# Patient Record
Sex: FEMALE | Race: WHITE | Employment: UNEMPLOYED | URBAN - METROPOLITAN AREA
[De-identification: names, ages, dates, MRNs, and addresses within clinical notes are randomized per-mention and may not be internally consistent; named-entity substitution may affect disease eponyms.]

---

## 2018-03-08 PROBLEM — M51.36 LUMBAR DEGENERATIVE DISC DISEASE: Status: ACTIVE | Noted: 2018-03-08

## 2018-03-08 PROBLEM — M51.369 LUMBAR DEGENERATIVE DISC DISEASE: Status: ACTIVE | Noted: 2018-03-08

## 2018-06-29 ENCOUNTER — HOSPITAL ENCOUNTER (OUTPATIENT)
Age: 68
Discharge: HOME | End: 2018-07-01
Payer: MEDICARE

## 2018-06-29 PROCEDURE — 88305 TISSUE EXAM BY PATHOLOGIST: CPT

## 2018-06-29 PROCEDURE — 88342 IMHCHEM/IMCYTCHM 1ST ANTB: CPT

## 2019-03-26 ENCOUNTER — HOSPITAL ENCOUNTER (OUTPATIENT)
Age: 69
Discharge: HOME | End: 2019-03-28

## 2019-03-26 LAB — LACTIC ACID: 1.8 MMOL/L (ref 0.5–2.2)

## 2019-03-26 PROCEDURE — 83605 ASSAY OF LACTIC ACID: CPT

## 2019-03-27 ENCOUNTER — HOSPITAL ENCOUNTER (OUTPATIENT)
Age: 69
Discharge: HOME | End: 2019-03-29

## 2019-03-27 LAB
ANION GAP SERPL CALCULATED.3IONS-SCNC: 12 MMOL/L (ref 7–16)
BUN BLDV-MCNC: 17 MG/DL (ref 8–23)
CALCIUM SERPL-MCNC: 8.8 MG/DL (ref 8.6–10.2)
CHLORIDE BLD-SCNC: 104 MMOL/L (ref 98–107)
CO2: 26 MMOL/L (ref 22–29)
CREAT SERPL-MCNC: 0.8 MG/DL (ref 0.5–1)
GFR AFRICAN AMERICAN: >60
GFR NON-AFRICAN AMERICAN: >60 ML/MIN/1.73
GLUCOSE BLD-MCNC: 158 MG/DL (ref 74–99)
HCT VFR BLD CALC: 42.2 % (ref 34–48)
HEMOGLOBIN: 13 G/DL (ref 11.5–15.5)
MCH RBC QN AUTO: 28.1 PG (ref 26–35)
MCHC RBC AUTO-ENTMCNC: 30.8 % (ref 32–34.5)
MCV RBC AUTO: 91.1 FL (ref 80–99.9)
PDW BLD-RTO: 15.1 FL (ref 11.5–15)
PLATELET # BLD: 189 E9/L (ref 130–450)
PMV BLD AUTO: 11.7 FL (ref 7–12)
POTASSIUM SERPL-SCNC: 4.3 MMOL/L (ref 3.5–5)
RBC # BLD: 4.63 E12/L (ref 3.5–5.5)
SODIUM BLD-SCNC: 142 MMOL/L (ref 132–146)
WBC # BLD: 9.1 E9/L (ref 4.5–11.5)

## 2019-03-27 PROCEDURE — 80048 BASIC METABOLIC PNL TOTAL CA: CPT

## 2019-03-27 PROCEDURE — 85027 COMPLETE CBC AUTOMATED: CPT

## 2019-03-28 ENCOUNTER — HOSPITAL ENCOUNTER (OUTPATIENT)
Age: 69
Discharge: HOME | End: 2019-03-30

## 2019-03-28 LAB
ANION GAP SERPL CALCULATED.3IONS-SCNC: 14 MMOL/L (ref 7–16)
BUN BLDV-MCNC: 22 MG/DL (ref 8–23)
CALCIUM SERPL-MCNC: 9.3 MG/DL (ref 8.6–10.2)
CHLORIDE BLD-SCNC: 98 MMOL/L (ref 98–107)
CO2: 26 MMOL/L (ref 22–29)
CREAT SERPL-MCNC: 0.7 MG/DL (ref 0.5–1)
GFR AFRICAN AMERICAN: >60
GFR NON-AFRICAN AMERICAN: >60 ML/MIN/1.73
GLUCOSE BLD-MCNC: 179 MG/DL (ref 74–99)
HCT VFR BLD CALC: 40.5 % (ref 34–48)
HEMOGLOBIN: 12.1 G/DL (ref 11.5–15.5)
MCH RBC QN AUTO: 28.1 PG (ref 26–35)
MCHC RBC AUTO-ENTMCNC: 29.9 % (ref 32–34.5)
MCV RBC AUTO: 94 FL (ref 80–99.9)
PDW BLD-RTO: 15.6 FL (ref 11.5–15)
PLATELET # BLD: 177 E9/L (ref 130–450)
PMV BLD AUTO: 11.6 FL (ref 7–12)
POTASSIUM SERPL-SCNC: 4.3 MMOL/L (ref 3.5–5)
RBC # BLD: 4.31 E12/L (ref 3.5–5.5)
SODIUM BLD-SCNC: 138 MMOL/L (ref 132–146)
WBC # BLD: 9 E9/L (ref 4.5–11.5)

## 2019-03-28 PROCEDURE — 80048 BASIC METABOLIC PNL TOTAL CA: CPT

## 2019-03-28 PROCEDURE — 85027 COMPLETE CBC AUTOMATED: CPT

## 2019-03-29 ENCOUNTER — HOSPITAL ENCOUNTER (OUTPATIENT)
Age: 69
Discharge: HOME | End: 2019-03-31

## 2019-03-29 LAB
ANION GAP SERPL CALCULATED.3IONS-SCNC: 14 MMOL/L (ref 7–16)
BILIRUBIN URINE: NEGATIVE
BLOOD, URINE: NEGATIVE
BUN BLDV-MCNC: 27 MG/DL (ref 8–23)
CALCIUM SERPL-MCNC: 8.9 MG/DL (ref 8.6–10.2)
CHLORIDE BLD-SCNC: 103 MMOL/L (ref 98–107)
CLARITY: CLEAR
CO2: 24 MMOL/L (ref 22–29)
COLOR: YELLOW
CREAT SERPL-MCNC: 0.7 MG/DL (ref 0.5–1)
GFR AFRICAN AMERICAN: >60
GFR NON-AFRICAN AMERICAN: >60 ML/MIN/1.73
GLUCOSE BLD-MCNC: 232 MG/DL (ref 74–99)
GLUCOSE URINE: >=1000 MG/DL
HCT VFR BLD CALC: 40 % (ref 34–48)
HEMOGLOBIN: 12.1 G/DL (ref 11.5–15.5)
KETONES, URINE: 15 MG/DL
LEUKOCYTE ESTERASE, URINE: NEGATIVE
MCH RBC QN AUTO: 28.1 PG (ref 26–35)
MCHC RBC AUTO-ENTMCNC: 30.3 % (ref 32–34.5)
MCV RBC AUTO: 93 FL (ref 80–99.9)
NITRITE, URINE: NEGATIVE
PDW BLD-RTO: 15.6 FL (ref 11.5–15)
PH UA: 6 (ref 5–9)
PLATELET # BLD: 174 E9/L (ref 130–450)
PMV BLD AUTO: 11.7 FL (ref 7–12)
POTASSIUM SERPL-SCNC: 4.7 MMOL/L (ref 3.5–5)
PROTEIN UA: NEGATIVE MG/DL
RBC # BLD: 4.3 E12/L (ref 3.5–5.5)
SODIUM BLD-SCNC: 141 MMOL/L (ref 132–146)
SPECIFIC GRAVITY UA: 1.01 (ref 1–1.03)
UROBILINOGEN, URINE: 0.2 E.U./DL
WBC # BLD: 7.5 E9/L (ref 4.5–11.5)

## 2019-03-29 PROCEDURE — 87088 URINE BACTERIA CULTURE: CPT

## 2019-03-29 PROCEDURE — 81003 URINALYSIS AUTO W/O SCOPE: CPT

## 2019-03-29 PROCEDURE — 85027 COMPLETE CBC AUTOMATED: CPT

## 2019-03-29 PROCEDURE — 80048 BASIC METABOLIC PNL TOTAL CA: CPT

## 2019-03-30 ENCOUNTER — HOSPITAL ENCOUNTER (OUTPATIENT)
Age: 69
Discharge: HOME | End: 2019-04-01

## 2019-03-30 LAB
ANION GAP SERPL CALCULATED.3IONS-SCNC: 15 MMOL/L (ref 7–16)
BUN BLDV-MCNC: 27 MG/DL (ref 8–23)
CALCIUM SERPL-MCNC: 9.3 MG/DL (ref 8.6–10.2)
CHLORIDE BLD-SCNC: 104 MMOL/L (ref 98–107)
CO2: 26 MMOL/L (ref 22–29)
CREAT SERPL-MCNC: 0.6 MG/DL (ref 0.5–1)
GFR AFRICAN AMERICAN: >60
GFR NON-AFRICAN AMERICAN: >60 ML/MIN/1.73
GLUCOSE BLD-MCNC: 176 MG/DL (ref 74–99)
HCT VFR BLD CALC: 43.8 % (ref 34–48)
HEMOGLOBIN: 13.4 G/DL (ref 11.5–15.5)
MCH RBC QN AUTO: 28.2 PG (ref 26–35)
MCHC RBC AUTO-ENTMCNC: 30.6 % (ref 32–34.5)
MCV RBC AUTO: 92.2 FL (ref 80–99.9)
PDW BLD-RTO: 15.2 FL (ref 11.5–15)
PLATELET # BLD: 184 E9/L (ref 130–450)
PMV BLD AUTO: 12 FL (ref 7–12)
POTASSIUM SERPL-SCNC: 4.1 MMOL/L (ref 3.5–5)
RBC # BLD: 4.75 E12/L (ref 3.5–5.5)
SODIUM BLD-SCNC: 145 MMOL/L (ref 132–146)
WBC # BLD: 6.1 E9/L (ref 4.5–11.5)

## 2019-03-30 PROCEDURE — 85027 COMPLETE CBC AUTOMATED: CPT

## 2019-03-30 PROCEDURE — 80048 BASIC METABOLIC PNL TOTAL CA: CPT

## 2019-03-31 ENCOUNTER — HOSPITAL ENCOUNTER (OUTPATIENT)
Age: 69
Discharge: HOME | End: 2019-04-02

## 2019-03-31 LAB
ANION GAP SERPL CALCULATED.3IONS-SCNC: 11 MMOL/L (ref 7–16)
BASOPHILS ABSOLUTE: 0 E9/L (ref 0–0.2)
BASOPHILS RELATIVE PERCENT: 0 % (ref 0–2)
BUN BLDV-MCNC: 28 MG/DL (ref 8–23)
CALCIUM SERPL-MCNC: 8.9 MG/DL (ref 8.6–10.2)
CHLORIDE BLD-SCNC: 106 MMOL/L (ref 98–107)
CO2: 25 MMOL/L (ref 22–29)
CREAT SERPL-MCNC: 0.6 MG/DL (ref 0.5–1)
EOSINOPHILS ABSOLUTE: 0 E9/L (ref 0.05–0.5)
EOSINOPHILS RELATIVE PERCENT: 0 % (ref 0–6)
GFR AFRICAN AMERICAN: >60
GFR NON-AFRICAN AMERICAN: >60 ML/MIN/1.73
GLUCOSE BLD-MCNC: 165 MG/DL (ref 74–99)
HCT VFR BLD CALC: 40.2 % (ref 34–48)
HEMOGLOBIN: 12.4 G/DL (ref 11.5–15.5)
IMMATURE GRANULOCYTES #: 0.03 E9/L
IMMATURE GRANULOCYTES %: 0.5 % (ref 0–5)
LYMPHOCYTES ABSOLUTE: 0.55 E9/L (ref 1.5–4)
LYMPHOCYTES RELATIVE PERCENT: 9.5 % (ref 20–42)
MCH RBC QN AUTO: 27.7 PG (ref 26–35)
MCHC RBC AUTO-ENTMCNC: 30.8 % (ref 32–34.5)
MCV RBC AUTO: 89.9 FL (ref 80–99.9)
MONOCYTES ABSOLUTE: 0.26 E9/L (ref 0.1–0.95)
MONOCYTES RELATIVE PERCENT: 4.5 % (ref 2–12)
NEUTROPHILS ABSOLUTE: 4.94 E9/L (ref 1.8–7.3)
NEUTROPHILS RELATIVE PERCENT: 85.5 % (ref 43–80)
ORGANISM: ABNORMAL
PDW BLD-RTO: 14.7 FL (ref 11.5–15)
PLATELET # BLD: 188 E9/L (ref 130–450)
PMV BLD AUTO: 11.9 FL (ref 7–12)
POTASSIUM SERPL-SCNC: 4.1 MMOL/L (ref 3.5–5)
RBC # BLD: 4.47 E12/L (ref 3.5–5.5)
RBC # BLD: NORMAL 10*6/UL
SODIUM BLD-SCNC: 142 MMOL/L (ref 132–146)
URINE CULTURE, ROUTINE: ABNORMAL
URINE CULTURE, ROUTINE: ABNORMAL
WBC # BLD: 5.8 E9/L (ref 4.5–11.5)

## 2019-03-31 PROCEDURE — 85025 COMPLETE CBC W/AUTO DIFF WBC: CPT

## 2019-03-31 PROCEDURE — 80048 BASIC METABOLIC PNL TOTAL CA: CPT

## 2019-04-01 ENCOUNTER — HOSPITAL ENCOUNTER (OUTPATIENT)
Age: 69
Discharge: HOME | End: 2019-04-03

## 2019-04-01 LAB
ANION GAP SERPL CALCULATED.3IONS-SCNC: 15 MMOL/L (ref 7–16)
BUN BLDV-MCNC: 27 MG/DL (ref 8–23)
CALCIUM SERPL-MCNC: 9 MG/DL (ref 8.6–10.2)
CHLORIDE BLD-SCNC: 104 MMOL/L (ref 98–107)
CO2: 24 MMOL/L (ref 22–29)
CREAT SERPL-MCNC: 0.6 MG/DL (ref 0.5–1)
GFR AFRICAN AMERICAN: >60
GFR NON-AFRICAN AMERICAN: >60 ML/MIN/1.73
GLUCOSE BLD-MCNC: 128 MG/DL (ref 74–99)
HCT VFR BLD CALC: 42.8 % (ref 34–48)
HEMOGLOBIN: 13.4 G/DL (ref 11.5–15.5)
MCH RBC QN AUTO: 28.1 PG (ref 26–35)
MCHC RBC AUTO-ENTMCNC: 31.3 % (ref 32–34.5)
MCV RBC AUTO: 89.7 FL (ref 80–99.9)
PDW BLD-RTO: 14.6 FL (ref 11.5–15)
PLATELET # BLD: 190 E9/L (ref 130–450)
PMV BLD AUTO: 11.4 FL (ref 7–12)
POTASSIUM SERPL-SCNC: 3.9 MMOL/L (ref 3.5–5)
RBC # BLD: 4.77 E12/L (ref 3.5–5.5)
SODIUM BLD-SCNC: 143 MMOL/L (ref 132–146)
WBC # BLD: 6.7 E9/L (ref 4.5–11.5)

## 2019-04-01 PROCEDURE — 85027 COMPLETE CBC AUTOMATED: CPT

## 2019-04-01 PROCEDURE — 80048 BASIC METABOLIC PNL TOTAL CA: CPT

## 2019-04-03 ENCOUNTER — HOSPITAL ENCOUNTER (EMERGENCY)
Age: 69
Discharge: ANOTHER HOSPITAL | End: 2019-04-04
Attending: EMERGENCY MEDICINE
Payer: MEDICARE

## 2019-04-03 VITALS
TEMPERATURE: 98.4 F | WEIGHT: 160 LBS | BODY MASS INDEX: 27.31 KG/M2 | HEIGHT: 64 IN | OXYGEN SATURATION: 97 % | SYSTOLIC BLOOD PRESSURE: 143 MMHG | HEART RATE: 97 BPM | RESPIRATION RATE: 18 BRPM | DIASTOLIC BLOOD PRESSURE: 86 MMHG

## 2019-04-03 DIAGNOSIS — R45.851 SUICIDAL IDEATION: Primary | ICD-10-CM

## 2019-04-03 LAB
BASOPHILS ABSOLUTE: 0.01 E9/L (ref 0–0.2)
BASOPHILS RELATIVE PERCENT: 0.1 % (ref 0–2)
BILIRUBIN URINE: NEGATIVE
BLOOD, URINE: ABNORMAL
CLARITY: CLEAR
COLOR: YELLOW
EOSINOPHILS ABSOLUTE: 0.12 E9/L (ref 0.05–0.5)
EOSINOPHILS RELATIVE PERCENT: 1.6 % (ref 0–6)
GLUCOSE URINE: 100 MG/DL
HCT VFR BLD CALC: 47.3 % (ref 34–48)
HEMOGLOBIN: 15.2 G/DL (ref 11.5–15.5)
IMMATURE GRANULOCYTES #: 0.09 E9/L
IMMATURE GRANULOCYTES %: 1.2 % (ref 0–5)
KETONES, URINE: NEGATIVE MG/DL
LEUKOCYTE ESTERASE, URINE: NEGATIVE
LYMPHOCYTES ABSOLUTE: 1.25 E9/L (ref 1.5–4)
LYMPHOCYTES RELATIVE PERCENT: 16.8 % (ref 20–42)
MCH RBC QN AUTO: 28 PG (ref 26–35)
MCHC RBC AUTO-ENTMCNC: 32.1 % (ref 32–34.5)
MCV RBC AUTO: 87.3 FL (ref 80–99.9)
MONOCYTES ABSOLUTE: 0.45 E9/L (ref 0.1–0.95)
MONOCYTES RELATIVE PERCENT: 6 % (ref 2–12)
NEUTROPHILS ABSOLUTE: 5.52 E9/L (ref 1.8–7.3)
NEUTROPHILS RELATIVE PERCENT: 74.3 % (ref 43–80)
NITRITE, URINE: NEGATIVE
PDW BLD-RTO: 14.1 FL (ref 11.5–15)
PH UA: 7 (ref 5–9)
PLATELET # BLD: 223 E9/L (ref 130–450)
PMV BLD AUTO: 10.7 FL (ref 7–12)
PROTEIN UA: NEGATIVE MG/DL
RBC # BLD: 5.42 E12/L (ref 3.5–5.5)
SPECIFIC GRAVITY UA: <=1.005 (ref 1–1.03)
UROBILINOGEN, URINE: 0.2 E.U./DL
WBC # BLD: 7.4 E9/L (ref 4.5–11.5)

## 2019-04-03 PROCEDURE — 99285 EMERGENCY DEPT VISIT HI MDM: CPT

## 2019-04-03 PROCEDURE — 80307 DRUG TEST PRSMV CHEM ANLYZR: CPT

## 2019-04-03 PROCEDURE — 84443 ASSAY THYROID STIM HORMONE: CPT

## 2019-04-03 PROCEDURE — 84439 ASSAY OF FREE THYROXINE: CPT

## 2019-04-03 PROCEDURE — G0480 DRUG TEST DEF 1-7 CLASSES: HCPCS

## 2019-04-03 PROCEDURE — 36415 COLL VENOUS BLD VENIPUNCTURE: CPT

## 2019-04-03 PROCEDURE — 85025 COMPLETE CBC W/AUTO DIFF WBC: CPT

## 2019-04-03 PROCEDURE — 80053 COMPREHEN METABOLIC PANEL: CPT

## 2019-04-03 PROCEDURE — 81001 URINALYSIS AUTO W/SCOPE: CPT

## 2019-04-03 ASSESSMENT — PAIN DESCRIPTION - LOCATION: LOCATION: BACK

## 2019-04-03 ASSESSMENT — PAIN SCALES - GENERAL: PAINLEVEL_OUTOF10: 10

## 2019-04-03 ASSESSMENT — PAIN DESCRIPTION - PAIN TYPE: TYPE: ACUTE PAIN

## 2019-04-04 PROBLEM — R45.851 SUICIDAL IDEATION: Status: ACTIVE | Noted: 2019-04-04

## 2019-04-04 LAB
ACETAMINOPHEN LEVEL: <5 MCG/ML (ref 10–30)
ALBUMIN SERPL-MCNC: 3.4 G/DL (ref 3.5–5.2)
ALP BLD-CCNC: 57 U/L (ref 35–104)
ALT SERPL-CCNC: 13 U/L (ref 0–32)
AMPHETAMINE SCREEN, URINE: NOT DETECTED
ANION GAP SERPL CALCULATED.3IONS-SCNC: 13 MMOL/L (ref 7–16)
AST SERPL-CCNC: 18 U/L (ref 0–31)
BACTERIA: ABNORMAL /HPF
BARBITURATE SCREEN URINE: NOT DETECTED
BENZODIAZEPINE SCREEN, URINE: NOT DETECTED
BILIRUB SERPL-MCNC: 1.3 MG/DL (ref 0–1.2)
BUN BLDV-MCNC: 12 MG/DL (ref 8–23)
CALCIUM SERPL-MCNC: 8.8 MG/DL (ref 8.6–10.2)
CANNABINOID SCREEN URINE: NOT DETECTED
CHLORIDE BLD-SCNC: 102 MMOL/L (ref 98–107)
CO2: 26 MMOL/L (ref 22–29)
COCAINE METABOLITE SCREEN URINE: NOT DETECTED
CREAT SERPL-MCNC: 0.6 MG/DL (ref 0.5–1)
EKG ATRIAL RATE: 67 BPM
EKG P AXIS: 99 DEGREES
EKG P-R INTERVAL: 140 MS
EKG Q-T INTERVAL: 440 MS
EKG QRS DURATION: 82 MS
EKG QTC CALCULATION (BAZETT): 464 MS
EKG R AXIS: 70 DEGREES
EKG T AXIS: 83 DEGREES
EKG VENTRICULAR RATE: 67 BPM
EPITHELIAL CELLS, UA: ABNORMAL /HPF
ETHANOL: <10 MG/DL (ref 0–0.08)
GFR AFRICAN AMERICAN: >60
GFR NON-AFRICAN AMERICAN: >60 ML/MIN/1.73
GLUCOSE BLD-MCNC: 73 MG/DL (ref 74–99)
METHADONE SCREEN, URINE: NOT DETECTED
OPIATE SCREEN URINE: NOT DETECTED
PHENCYCLIDINE SCREEN URINE: NOT DETECTED
POTASSIUM SERPL-SCNC: 3.8 MMOL/L (ref 3.5–5)
PROPOXYPHENE SCREEN: NOT DETECTED
RBC UA: ABNORMAL /HPF (ref 0–2)
SALICYLATE, SERUM: <0.3 MG/DL (ref 0–30)
SODIUM BLD-SCNC: 141 MMOL/L (ref 132–146)
T3 UPTAKE PERCENT: 37.3 % (ref 22.5–37)
T4 FREE: 1.74 NG/DL (ref 0.93–1.7)
TOTAL PROTEIN: 5.8 G/DL (ref 6.4–8.3)
TRICYCLIC ANTIDEPRESSANTS SCREEN SERUM: NEGATIVE NG/ML
TSH SERPL DL<=0.05 MIU/L-ACNC: 2.96 UIU/ML (ref 0.27–4.2)
WBC UA: ABNORMAL /HPF (ref 0–5)
YEAST: ABNORMAL

## 2019-04-04 PROCEDURE — 6370000000 HC RX 637 (ALT 250 FOR IP): Performed by: EMERGENCY MEDICINE

## 2019-04-04 PROCEDURE — 93005 ELECTROCARDIOGRAM TRACING: CPT | Performed by: EMERGENCY MEDICINE

## 2019-04-04 RX ORDER — LORAZEPAM 1 MG/1
1 TABLET ORAL ONCE
Status: COMPLETED | OUTPATIENT
Start: 2019-04-04 | End: 2019-04-04

## 2019-04-04 RX ADMIN — LORAZEPAM 1 MG: 1 TABLET ORAL at 01:18

## 2019-04-04 ASSESSMENT — PATIENT HEALTH QUESTIONNAIRE - PHQ9: SUM OF ALL RESPONSES TO PHQ QUESTIONS 1-9: 24

## 2019-04-04 NOTE — ED NOTES
Pt moved to [facility]. Pt voiced several times \"I just want to die\". She states she hates herself for making the decision to get the spinal stimulator implanted. She states she was in [place] for rehab and wasn't happy with her care. States nothing was being done for her. Pt calm and cooperative but tearful. Pt awaiting social work consult.       Kaitlyn Fortune RN  04/04/19 5107

## 2019-04-04 NOTE — ED NOTES
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 Pt has increase in depression and thoughts of suicide due to her current medical condition. Pt has a mental health hx of depression and anxiety. Pt is in current treatment with [facility]. Pt reports that she takes her meds as prescribed. Pt denies previous suicide attempts but states she was hospitalized for depression \"80\" yrs ago. Pt is current at [facility] in [place]. JESS GÓMEZ spoke with supervisor Barbara who informed that they do not have suicide rooms and they do 2 hour roundings at night. Barbara did inform that they could order a 24/ 7 sitter for Pt. Pt denies alcohol/drug use. Pt is depressed, oriented x 4, flat affect, anxious, cooperative, denies A/V hallucinations. Denies HI. Pt reports poor sleep/appetite. JESS GÓMEZ reviewed chart with ED Doc. Pt does not meet inpatient criteria at this time. Pt will be d/c to [facility] with a request for a sitter. Pt will need to follow up with outpatient provider for counseling and possible med adjustment. JESS GÓMEZ contacted [facility], spoke with supervisor Barbara and informed of this information. Philip Hook is in agreement and understanding d/c orders. JESS GÓMEZ will make transportation arrangements for Pt to be returned to [facility] rehabilitation.      ELICEO Lane, Farhat Wilde  04/04/19 9548

## 2019-04-04 NOTE — ED PROVIDER NOTES
Urine NOT DETECTED Negative < 25 ng/mL    Methadone Screen, Urine NOT DETECTED Negative <300 ng/mL    Propoxyphene Scrn, Ur NOT DETECTED Negative <300 ng/mL   CBC Auto Differential   Result Value Ref Range    WBC 7.4 4.5 - 11.5 E9/L    RBC 5.42 3.50 - 5.50 E12/L    Hemoglobin 15.2 11.5 - 15.5 g/dL    Hematocrit 47.3 34.0 - 48.0 %    MCV 87.3 80.0 - 99.9 fL    MCH 28.0 26.0 - 35.0 pg    MCHC 32.1 32.0 - 34.5 %    RDW 14.1 11.5 - 15.0 fL    Platelets 696 435 - 184 E9/L    MPV 10.7 7.0 - 12.0 fL    Neutrophils % 74.3 43.0 - 80.0 %    Immature Granulocytes % 1.2 0.0 - 5.0 %    Lymphocytes % 16.8 (L) 20.0 - 42.0 %    Monocytes % 6.0 2.0 - 12.0 %    Eosinophils % 1.6 0.0 - 6.0 %    Basophils % 0.1 0.0 - 2.0 %    Neutrophils # 5.52 1.80 - 7.30 E9/L    Immature Granulocytes # 0.09 E9/L    Lymphocytes # 1.25 (L) 1.50 - 4.00 E9/L    Monocytes # 0.45 0.10 - 0.95 E9/L    Eosinophils # 0.12 0.05 - 0.50 E9/L    Basophils # 0.01 0.00 - 0.20 E9/L   Comprehensive Metabolic Panel   Result Value Ref Range    Sodium 141 132 - 146 mmol/L    Potassium 3.8 3.5 - 5.0 mmol/L    Chloride 102 98 - 107 mmol/L    CO2 26 22 - 29 mmol/L    Anion Gap 13 7 - 16 mmol/L    Glucose 73 (L) 74 - 99 mg/dL    BUN 12 8 - 23 mg/dL    CREATININE 0.6 0.5 - 1.0 mg/dL    GFR Non-African American >60 >=60 mL/min/1.73    GFR African American >60     Calcium 8.8 8.6 - 10.2 mg/dL    Total Protein 5.8 (L) 6.4 - 8.3 g/dL    Alb 3.4 (L) 3.5 - 5.2 g/dL    Total Bilirubin 1.3 (H) 0.0 - 1.2 mg/dL    Alkaline Phosphatase 57 35 - 104 U/L    ALT 13 0 - 32 U/L    AST 18 0 - 31 U/L   T3, Uptake   Result Value Ref Range    T3 Uptake 37.3 (H) 22.5 - 37.0 %   T4, Free   Result Value Ref Range    T4 Free 1.74 (H) 0.93 - 1.70 ng/dL   TSH without Reflex   Result Value Ref Range    TSH 2.960 0.270 - 4.200 uIU/mL   Microscopic Urinalysis   Result Value Ref Range    WBC, UA 1-3 0 - 5 /HPF    RBC, UA 1-3 0 - 2 /HPF    Epi Cells RARE /HPF    Bacteria, UA FEW (A) /HPF    Yeast, UA MANY EKG 12 Lead   Result Value Ref Range    Ventricular Rate 67 BPM    Atrial Rate 67 BPM    P-R Interval 140 ms    QRS Duration 82 ms    Q-T Interval 440 ms    QTc Calculation (Bazett) 464 ms    P Axis 99 degrees    R Axis 70 degrees    T Axis 83 degrees       RADIOLOGY:  Interpreted by Radiologist.  No orders to display     ------------------------- NURSING NOTES AND VITALS REVIEWED ---------------------------   The nursing notes within the ED encounter and vital signs as below have been reviewed by myself. BP (!) 143/86   Pulse 97   Temp 98.4 °F (36.9 °C) (Temporal)   Resp 18   Ht 5' 4\" (1.626 m)   Wt 160 lb (72.6 kg)   SpO2 97%   BMI 27.46 kg/m²   Oxygen Saturation Interpretation: Normal    The patients available past medical records and past encounters were reviewed. ------------------------------ ED COURSE/MEDICAL DECISION MAKING----------------------  Medications   LORazepam (ATIVAN) tablet 1 mg (1 mg Oral Given 4/4/19 0118)       Medical Decision Making:    Pt presents for SI. Normal physical exam. Not in distress. Under ED observation. Social work called for consult. Medically cleared. Spoke with Nursing home staff. They are comfortable taking her back into their care and taking extra precautions against suicide. The nursing home will have a sitter watching her 24/7. This patient's ED course included: a personal history and physicial examination and multiple bedside re-evaluations    This patient has remained hemodynamically stable and been closely monitored during their ED course. Re-Evaluations:           Re-evaluation. Patients symptoms are improving    Consultations:             None. Counseling: The emergency provider has spoken with the patient and discussed todays results, in addition to providing specific details for the plan of care and counseling regarding the diagnosis and prognosis. Questions are answered at this time and they are agreeable with the plan. --------------------------------- IMPRESSION AND DISPOSITION ---------------------------------    IMPRESSION  1. Suicidal ideation        DISPOSITION  Disposition: Discharge to nursing home  Patient condition is good    4/3/19, 10:26 PM.    This note is prepared by Yael Velasquez, acting as Scribe for Susie Mckeon MD.    Susie Mckeon MD:  The scribe's documentation has been prepared under my direction and personally reviewed by me in its entirety. I confirm that the note above accurately reflects all work, treatment, procedures, and medical decision making performed by me.         Susie Mckeon MD  04/04/19 8489

## 2019-06-28 ENCOUNTER — HOSPITAL ENCOUNTER (EMERGENCY)
Age: 69
Discharge: HOME | DRG: 682 | End: 2019-06-29
Attending: EMERGENCY MEDICINE
Payer: MEDICARE

## 2019-06-28 DIAGNOSIS — N30.00 ACUTE CYSTITIS WITHOUT HEMATURIA: Primary | ICD-10-CM

## 2019-06-28 DIAGNOSIS — E86.0 DEHYDRATION: ICD-10-CM

## 2019-06-28 PROCEDURE — 99284 EMERGENCY DEPT VISIT MOD MDM: CPT

## 2019-06-28 RX ORDER — LEVOTHYROXINE SODIUM 0.05 MG/1
50 TABLET ORAL DAILY
Status: ON HOLD | COMMUNITY
End: 2019-09-23 | Stop reason: ALTCHOICE

## 2019-06-29 VITALS
BODY MASS INDEX: 22.02 KG/M2 | HEART RATE: 99 BPM | RESPIRATION RATE: 18 BRPM | SYSTOLIC BLOOD PRESSURE: 113 MMHG | OXYGEN SATURATION: 98 % | WEIGHT: 129 LBS | DIASTOLIC BLOOD PRESSURE: 69 MMHG | HEIGHT: 64 IN | TEMPERATURE: 98.8 F

## 2019-06-29 LAB
ALBUMIN SERPL-MCNC: 3.5 G/DL (ref 3.5–5.2)
ALP BLD-CCNC: 62 U/L (ref 35–104)
ALT SERPL-CCNC: 5 U/L (ref 0–32)
ANION GAP SERPL CALCULATED.3IONS-SCNC: 17 MMOL/L (ref 7–16)
AST SERPL-CCNC: 10 U/L (ref 0–31)
BACTERIA: ABNORMAL /HPF
BASOPHILS ABSOLUTE: 0.03 E9/L (ref 0–0.2)
BASOPHILS RELATIVE PERCENT: 0.7 % (ref 0–2)
BILIRUB SERPL-MCNC: 0.4 MG/DL (ref 0–1.2)
BILIRUBIN URINE: ABNORMAL
BLOOD, URINE: ABNORMAL
BUN BLDV-MCNC: 10 MG/DL (ref 8–23)
CALCIUM SERPL-MCNC: 9.2 MG/DL (ref 8.6–10.2)
CHLORIDE BLD-SCNC: 98 MMOL/L (ref 98–107)
CLARITY: ABNORMAL
CO2: 24 MMOL/L (ref 22–29)
COLOR: YELLOW
CREAT SERPL-MCNC: 0.9 MG/DL (ref 0.5–1)
EOSINOPHILS ABSOLUTE: 0.19 E9/L (ref 0.05–0.5)
EOSINOPHILS RELATIVE PERCENT: 4.2 % (ref 0–6)
EPITHELIAL CELLS, UA: ABNORMAL /HPF
GFR AFRICAN AMERICAN: >60
GFR NON-AFRICAN AMERICAN: >60 ML/MIN/1.73
GLUCOSE BLD-MCNC: 156 MG/DL (ref 74–99)
GLUCOSE URINE: NEGATIVE MG/DL
HCT VFR BLD CALC: 37 % (ref 34–48)
HEMOGLOBIN: 11.7 G/DL (ref 11.5–15.5)
IMMATURE GRANULOCYTES #: 0.02 E9/L
IMMATURE GRANULOCYTES %: 0.4 % (ref 0–5)
KETONES, URINE: 40 MG/DL
LEUKOCYTE ESTERASE, URINE: ABNORMAL
LYMPHOCYTES ABSOLUTE: 1.43 E9/L (ref 1.5–4)
LYMPHOCYTES RELATIVE PERCENT: 31.4 % (ref 20–42)
MAGNESIUM: 1 MG/DL (ref 1.6–2.6)
MCH RBC QN AUTO: 26.5 PG (ref 26–35)
MCHC RBC AUTO-ENTMCNC: 31.6 % (ref 32–34.5)
MCV RBC AUTO: 83.9 FL (ref 80–99.9)
MONOCYTES ABSOLUTE: 0.37 E9/L (ref 0.1–0.95)
MONOCYTES RELATIVE PERCENT: 8.1 % (ref 2–12)
NEUTROPHILS ABSOLUTE: 2.51 E9/L (ref 1.8–7.3)
NEUTROPHILS RELATIVE PERCENT: 55.2 % (ref 43–80)
NITRITE, URINE: POSITIVE
PDW BLD-RTO: 14.2 FL (ref 11.5–15)
PH UA: 5 (ref 5–9)
PLATELET # BLD: 253 E9/L (ref 130–450)
PMV BLD AUTO: 10.3 FL (ref 7–12)
POTASSIUM REFLEX MAGNESIUM: 3.3 MMOL/L (ref 3.5–5)
PROTEIN UA: 30 MG/DL
RBC # BLD: 4.41 E12/L (ref 3.5–5.5)
RBC UA: ABNORMAL /HPF (ref 0–2)
SODIUM BLD-SCNC: 139 MMOL/L (ref 132–146)
SPECIFIC GRAVITY UA: >=1.03 (ref 1–1.03)
TOTAL PROTEIN: 6.4 G/DL (ref 6.4–8.3)
UROBILINOGEN, URINE: 1 E.U./DL
WBC # BLD: 4.6 E9/L (ref 4.5–11.5)
WBC UA: ABNORMAL /HPF (ref 0–5)

## 2019-06-29 PROCEDURE — 6360000002 HC RX W HCPCS: Performed by: EMERGENCY MEDICINE

## 2019-06-29 PROCEDURE — 2580000003 HC RX 258: Performed by: EMERGENCY MEDICINE

## 2019-06-29 PROCEDURE — 87088 URINE BACTERIA CULTURE: CPT

## 2019-06-29 PROCEDURE — 85025 COMPLETE CBC W/AUTO DIFF WBC: CPT

## 2019-06-29 PROCEDURE — 96375 TX/PRO/DX INJ NEW DRUG ADDON: CPT

## 2019-06-29 PROCEDURE — 81001 URINALYSIS AUTO W/SCOPE: CPT

## 2019-06-29 PROCEDURE — 36415 COLL VENOUS BLD VENIPUNCTURE: CPT

## 2019-06-29 PROCEDURE — 96361 HYDRATE IV INFUSION ADD-ON: CPT

## 2019-06-29 PROCEDURE — 83735 ASSAY OF MAGNESIUM: CPT

## 2019-06-29 PROCEDURE — 96365 THER/PROPH/DIAG IV INF INIT: CPT

## 2019-06-29 PROCEDURE — 80053 COMPREHEN METABOLIC PANEL: CPT

## 2019-06-29 RX ORDER — CEPHALEXIN 500 MG/1
500 CAPSULE ORAL 4 TIMES DAILY
Qty: 28 CAPSULE | Refills: 0 | Status: ON HOLD | OUTPATIENT
Start: 2019-06-29 | End: 2019-07-02

## 2019-06-29 RX ORDER — LORAZEPAM 2 MG/ML
1 INJECTION INTRAMUSCULAR ONCE
Status: COMPLETED | OUTPATIENT
Start: 2019-06-29 | End: 2019-06-29

## 2019-06-29 RX ORDER — 0.9 % SODIUM CHLORIDE 0.9 %
1000 INTRAVENOUS SOLUTION INTRAVENOUS ONCE
Status: COMPLETED | OUTPATIENT
Start: 2019-06-29 | End: 2019-06-29

## 2019-06-29 RX ADMIN — LORAZEPAM 1 MG: 2 INJECTION INTRAMUSCULAR; INTRAVENOUS at 06:02

## 2019-06-29 RX ADMIN — SODIUM CHLORIDE 1000 ML: 9 INJECTION, SOLUTION INTRAVENOUS at 01:51

## 2019-06-29 RX ADMIN — CEFTRIAXONE SODIUM 1 G: 1 INJECTION, POWDER, FOR SOLUTION INTRAMUSCULAR; INTRAVENOUS at 03:08

## 2019-06-29 NOTE — ED NOTES
Stated she hadn't slept in 3 nights. Medicated as ordered. Repositioned for comfort.        Sahra Rayo RN  06/29/19 6876

## 2019-06-29 NOTE — ED NOTES
[employer] for transport services and they referred me to [facility] [phone] which is temporarily disconnected. Pt repositioned for comfort every30 to 60 min.       Sun Dan RN  06/29/19 7429

## 2019-06-29 NOTE — ED PROVIDER NOTES
Intravenous Stopped 6/29/19 0338)       Medical Decision Making:    Patient arrived in the ED for a fall from bed at her rehab facility. Re-Evaluations:           Re-evaluation. Patients symptoms are improving    Consultations:                 Counseling: The emergency provider has spoken with the patient and discussed todays results, in addition to providing specific details for the plan of care and counseling regarding the diagnosis and prognosis. Questions are answered at this time and they are agreeable with the plan.     --------------------------------- IMPRESSION AND DISPOSITION ---------------------------------    IMPRESSION  1. Acute cystitis without hematuria    2. Dehydration        DISPOSITION  Disposition: Discharge to nursing home  Patient condition is stable    6/28/19, 11:53 PM.    This note is prepared by Susana Quintanilla, acting as Scribe for Sharif Louie MD.    Sharif Louie MD:  The scribe's documentation has been prepared under my direction and personally reviewed by me in its entirety. I confirm that the note above accurately reflects all work, treatment, procedures, and medical decision making performed by me.           Sharif Louie MD  06/29/19 5461

## 2019-06-30 LAB — URINE CULTURE, ROUTINE: NORMAL

## 2019-07-02 ENCOUNTER — APPOINTMENT (OUTPATIENT)
Dept: GENERAL RADIOLOGY | Age: 69
DRG: 682 | End: 2019-07-02
Payer: MEDICARE

## 2019-07-02 ENCOUNTER — HOSPITAL ENCOUNTER (INPATIENT)
Age: 69
LOS: 9 days | Discharge: PSYCHIATRIC OR SUBSTANCE USE FACILITY | DRG: 682 | End: 2019-07-11
Attending: EMERGENCY MEDICINE | Admitting: INTERNAL MEDICINE
Payer: MEDICARE

## 2019-07-02 ENCOUNTER — APPOINTMENT (OUTPATIENT)
Dept: CT IMAGING | Age: 69
DRG: 682 | End: 2019-07-02
Payer: MEDICARE

## 2019-07-02 DIAGNOSIS — E83.42 HYPOMAGNESEMIA: ICD-10-CM

## 2019-07-02 DIAGNOSIS — N28.9 RENAL INSUFFICIENCY: ICD-10-CM

## 2019-07-02 DIAGNOSIS — I95.9 HYPOTENSION, UNSPECIFIED HYPOTENSION TYPE: ICD-10-CM

## 2019-07-02 DIAGNOSIS — Z97.8 INDWELLING FOLEY CATHETER PRESENT: ICD-10-CM

## 2019-07-02 DIAGNOSIS — G93.40 ACUTE ENCEPHALOPATHY: Primary | ICD-10-CM

## 2019-07-02 PROBLEM — R41.82 ALTERED MENTAL STATUS: Status: ACTIVE | Noted: 2019-07-02

## 2019-07-02 LAB
ALBUMIN SERPL-MCNC: 3.3 G/DL (ref 3.5–5.2)
ALP BLD-CCNC: 58 U/L (ref 35–104)
ALT SERPL-CCNC: 5 U/L (ref 0–32)
AMMONIA: 18 UMOL/L (ref 11–51)
ANION GAP SERPL CALCULATED.3IONS-SCNC: 11 MMOL/L (ref 7–16)
AST SERPL-CCNC: 7 U/L (ref 0–31)
B.E.: -2.4 MMOL/L (ref -3–3)
BACTERIA: ABNORMAL /HPF
BASOPHILS ABSOLUTE: 0.02 E9/L (ref 0–0.2)
BASOPHILS RELATIVE PERCENT: 0.4 % (ref 0–2)
BILIRUB SERPL-MCNC: 0.4 MG/DL (ref 0–1.2)
BILIRUBIN URINE: ABNORMAL
BLOOD, URINE: ABNORMAL
BUN BLDV-MCNC: 13 MG/DL (ref 8–23)
CALCIUM SERPL-MCNC: 8.8 MG/DL (ref 8.6–10.2)
CHLORIDE BLD-SCNC: 105 MMOL/L (ref 98–107)
CHP ED QC CHECK: NORMAL
CLARITY: CLEAR
CO2: 28 MMOL/L (ref 22–29)
COHB: 0.1 % (ref 0–1.5)
COLOR: YELLOW
CREAT SERPL-MCNC: 1.6 MG/DL (ref 0.5–1)
CRITICAL: ABNORMAL
DATE ANALYZED: ABNORMAL
DATE OF COLLECTION: ABNORMAL
EKG ATRIAL RATE: 88 BPM
EKG P AXIS: 58 DEGREES
EKG P-R INTERVAL: 162 MS
EKG Q-T INTERVAL: 414 MS
EKG QRS DURATION: 80 MS
EKG QTC CALCULATION (BAZETT): 500 MS
EKG R AXIS: 12 DEGREES
EKG T AXIS: 51 DEGREES
EKG VENTRICULAR RATE: 88 BPM
EOSINOPHILS ABSOLUTE: 0.28 E9/L (ref 0.05–0.5)
EOSINOPHILS RELATIVE PERCENT: 5.5 % (ref 0–6)
GFR AFRICAN AMERICAN: 39
GFR NON-AFRICAN AMERICAN: 32 ML/MIN/1.73
GLUCOSE BLD-MCNC: 115 MG/DL
GLUCOSE BLD-MCNC: 136 MG/DL (ref 74–99)
GLUCOSE URINE: NEGATIVE MG/DL
HCO3: 23.4 MMOL/L (ref 22–26)
HCT VFR BLD CALC: 36.6 % (ref 34–48)
HEMOGLOBIN: 11.5 G/DL (ref 11.5–15.5)
HHB: 6.8 % (ref 0–5)
IMMATURE GRANULOCYTES #: 0.01 E9/L
IMMATURE GRANULOCYTES %: 0.2 % (ref 0–5)
KETONES, URINE: NEGATIVE MG/DL
LAB: ABNORMAL
LACTIC ACID: 1.2 MMOL/L (ref 0.5–2.2)
LEUKOCYTE ESTERASE, URINE: ABNORMAL
LYMPHOCYTES ABSOLUTE: 1.42 E9/L (ref 1.5–4)
LYMPHOCYTES RELATIVE PERCENT: 28 % (ref 20–42)
Lab: ABNORMAL
MAGNESIUM: 1.1 MG/DL (ref 1.6–2.6)
MCH RBC QN AUTO: 26.8 PG (ref 26–35)
MCHC RBC AUTO-ENTMCNC: 31.4 % (ref 32–34.5)
MCV RBC AUTO: 85.3 FL (ref 80–99.9)
METER GLUCOSE: 115 MG/DL (ref 74–99)
METER GLUCOSE: 246 MG/DL (ref 74–99)
METER GLUCOSE: 286 MG/DL (ref 74–99)
METHB: 0.4 % (ref 0–1.5)
MODE: ABNORMAL
MONOCYTES ABSOLUTE: 0.36 E9/L (ref 0.1–0.95)
MONOCYTES RELATIVE PERCENT: 7.1 % (ref 2–12)
NEUTROPHILS ABSOLUTE: 2.98 E9/L (ref 1.8–7.3)
NEUTROPHILS RELATIVE PERCENT: 58.8 % (ref 43–80)
NITRITE, URINE: NEGATIVE
O2 CONTENT: 14 ML/DL
O2 SATURATION: 93.2 % (ref 92–98.5)
O2HB: 92.7 % (ref 94–97)
OPERATOR ID: 421
PATIENT TEMP: 37 C
PCO2: 44.2 MMHG (ref 35–45)
PDW BLD-RTO: 14.6 FL (ref 11.5–15)
PH BLOOD GAS: 7.34 (ref 7.35–7.45)
PH UA: 5.5 (ref 5–9)
PLATELET # BLD: 225 E9/L (ref 130–450)
PMV BLD AUTO: 10.6 FL (ref 7–12)
PO2: 74.9 MMHG (ref 60–100)
POTASSIUM REFLEX MAGNESIUM: 3.7 MMOL/L (ref 3.5–5)
PROTEIN UA: ABNORMAL MG/DL
RBC # BLD: 4.29 E12/L (ref 3.5–5.5)
RBC UA: ABNORMAL /HPF (ref 0–2)
SODIUM BLD-SCNC: 144 MMOL/L (ref 132–146)
SOURCE, BLOOD GAS: ABNORMAL
SPECIFIC GRAVITY UA: 1.01 (ref 1–1.03)
THB: 10.7 G/DL (ref 11.5–16.5)
TIME ANALYZED: 901
TOTAL PROTEIN: 5.9 G/DL (ref 6.4–8.3)
TROPONIN: <0.01 NG/ML (ref 0–0.03)
UROBILINOGEN, URINE: 0.2 E.U./DL
WBC # BLD: 5.1 E9/L (ref 4.5–11.5)
WBC UA: ABNORMAL /HPF (ref 0–5)

## 2019-07-02 PROCEDURE — 96375 TX/PRO/DX INJ NEW DRUG ADDON: CPT

## 2019-07-02 PROCEDURE — 71045 X-RAY EXAM CHEST 1 VIEW: CPT

## 2019-07-02 PROCEDURE — 82140 ASSAY OF AMMONIA: CPT

## 2019-07-02 PROCEDURE — 82805 BLOOD GASES W/O2 SATURATION: CPT

## 2019-07-02 PROCEDURE — 74176 CT ABD & PELVIS W/O CONTRAST: CPT

## 2019-07-02 PROCEDURE — 6370000000 HC RX 637 (ALT 250 FOR IP): Performed by: INTERNAL MEDICINE

## 2019-07-02 PROCEDURE — 2140000000 HC CCU INTERMEDIATE R&B

## 2019-07-02 PROCEDURE — 85025 COMPLETE CBC W/AUTO DIFF WBC: CPT

## 2019-07-02 PROCEDURE — 94760 N-INVAS EAR/PLS OXIMETRY 1: CPT

## 2019-07-02 PROCEDURE — 02HV33Z INSERTION OF INFUSION DEVICE INTO SUPERIOR VENA CAVA, PERCUTANEOUS APPROACH: ICD-10-PCS | Performed by: EMERGENCY MEDICINE

## 2019-07-02 PROCEDURE — 83735 ASSAY OF MAGNESIUM: CPT

## 2019-07-02 PROCEDURE — 84484 ASSAY OF TROPONIN QUANT: CPT

## 2019-07-02 PROCEDURE — 83605 ASSAY OF LACTIC ACID: CPT

## 2019-07-02 PROCEDURE — 2500000003 HC RX 250 WO HCPCS: Performed by: EMERGENCY MEDICINE

## 2019-07-02 PROCEDURE — 96365 THER/PROPH/DIAG IV INF INIT: CPT

## 2019-07-02 PROCEDURE — 82962 GLUCOSE BLOOD TEST: CPT

## 2019-07-02 PROCEDURE — 99285 EMERGENCY DEPT VISIT HI MDM: CPT

## 2019-07-02 PROCEDURE — 81001 URINALYSIS AUTO W/SCOPE: CPT

## 2019-07-02 PROCEDURE — 2580000003 HC RX 258: Performed by: STUDENT IN AN ORGANIZED HEALTH CARE EDUCATION/TRAINING PROGRAM

## 2019-07-02 PROCEDURE — 6360000002 HC RX W HCPCS: Performed by: EMERGENCY MEDICINE

## 2019-07-02 PROCEDURE — 93005 ELECTROCARDIOGRAM TRACING: CPT | Performed by: STUDENT IN AN ORGANIZED HEALTH CARE EDUCATION/TRAINING PROGRAM

## 2019-07-02 PROCEDURE — 80053 COMPREHEN METABOLIC PANEL: CPT

## 2019-07-02 PROCEDURE — 6360000002 HC RX W HCPCS: Performed by: INTERNAL MEDICINE

## 2019-07-02 PROCEDURE — 93010 ELECTROCARDIOGRAM REPORT: CPT | Performed by: INTERNAL MEDICINE

## 2019-07-02 PROCEDURE — 51702 INSERT TEMP BLADDER CATH: CPT

## 2019-07-02 PROCEDURE — 87040 BLOOD CULTURE FOR BACTERIA: CPT

## 2019-07-02 PROCEDURE — 87088 URINE BACTERIA CULTURE: CPT

## 2019-07-02 PROCEDURE — 2580000003 HC RX 258: Performed by: INTERNAL MEDICINE

## 2019-07-02 PROCEDURE — 6360000002 HC RX W HCPCS: Performed by: STUDENT IN AN ORGANIZED HEALTH CARE EDUCATION/TRAINING PROGRAM

## 2019-07-02 PROCEDURE — 70450 CT HEAD/BRAIN W/O DYE: CPT

## 2019-07-02 PROCEDURE — 36415 COLL VENOUS BLD VENIPUNCTURE: CPT

## 2019-07-02 RX ORDER — LISINOPRIL 5 MG/1
5 TABLET ORAL DAILY
Status: DISCONTINUED | OUTPATIENT
Start: 2019-07-02 | End: 2019-07-11 | Stop reason: HOSPADM

## 2019-07-02 RX ORDER — OXYCODONE HYDROCHLORIDE AND ACETAMINOPHEN 5; 325 MG/1; MG/1
1 TABLET ORAL EVERY 6 HOURS PRN
Status: ON HOLD | COMMUNITY
End: 2019-07-17 | Stop reason: HOSPADM

## 2019-07-02 RX ORDER — SODIUM CHLORIDE 9 MG/ML
INJECTION, SOLUTION INTRAVENOUS CONTINUOUS
Status: DISCONTINUED | OUTPATIENT
Start: 2019-07-02 | End: 2019-07-09

## 2019-07-02 RX ORDER — GLIMEPIRIDE 2 MG/1
2 TABLET ORAL EVERY EVENING
Status: DISCONTINUED | OUTPATIENT
Start: 2019-07-02 | End: 2019-07-02

## 2019-07-02 RX ORDER — DULOXETIN HYDROCHLORIDE 60 MG/1
60 CAPSULE, DELAYED RELEASE ORAL NIGHTLY
Status: ON HOLD | COMMUNITY
End: 2019-07-17 | Stop reason: HOSPADM

## 2019-07-02 RX ORDER — DEXTROSE MONOHYDRATE 25 G/50ML
12.5 INJECTION, SOLUTION INTRAVENOUS PRN
Status: DISCONTINUED | OUTPATIENT
Start: 2019-07-02 | End: 2019-07-11 | Stop reason: HOSPADM

## 2019-07-02 RX ORDER — TRAZODONE HYDROCHLORIDE 50 MG/1
200 TABLET ORAL NIGHTLY
Status: DISCONTINUED | OUTPATIENT
Start: 2019-07-02 | End: 2019-07-10

## 2019-07-02 RX ORDER — BISACODYL 10 MG
10 SUPPOSITORY, RECTAL RECTAL DAILY PRN
Status: ON HOLD | COMMUNITY
End: 2019-07-17 | Stop reason: HOSPADM

## 2019-07-02 RX ORDER — CLONAZEPAM 2 MG/1
2 TABLET ORAL NIGHTLY PRN
Status: ON HOLD | COMMUNITY
End: 2019-07-17 | Stop reason: HOSPADM

## 2019-07-02 RX ORDER — ATORVASTATIN CALCIUM 10 MG/1
20 TABLET, FILM COATED ORAL DAILY
Status: DISCONTINUED | OUTPATIENT
Start: 2019-07-02 | End: 2019-07-11 | Stop reason: HOSPADM

## 2019-07-02 RX ORDER — GABAPENTIN 300 MG/1
300 CAPSULE ORAL 3 TIMES DAILY
Status: ON HOLD | COMMUNITY
End: 2019-07-17 | Stop reason: HOSPADM

## 2019-07-02 RX ORDER — 0.9 % SODIUM CHLORIDE 0.9 %
500 INTRAVENOUS SOLUTION INTRAVENOUS ONCE
Status: DISCONTINUED | OUTPATIENT
Start: 2019-07-02 | End: 2019-07-02

## 2019-07-02 RX ORDER — ONDANSETRON 2 MG/ML
4 INJECTION INTRAMUSCULAR; INTRAVENOUS EVERY 6 HOURS PRN
Status: DISCONTINUED | OUTPATIENT
Start: 2019-07-02 | End: 2019-07-11 | Stop reason: HOSPADM

## 2019-07-02 RX ORDER — DRONABINOL 2.5 MG/1
2.5 CAPSULE ORAL
Status: ON HOLD | COMMUNITY
End: 2019-07-17 | Stop reason: HOSPADM

## 2019-07-02 RX ORDER — SODIUM CHLORIDE 0.9 % (FLUSH) 0.9 %
10 SYRINGE (ML) INJECTION PRN
Status: DISCONTINUED | OUTPATIENT
Start: 2019-07-02 | End: 2019-07-11 | Stop reason: HOSPADM

## 2019-07-02 RX ORDER — LEVOTHYROXINE SODIUM 0.05 MG/1
50 TABLET ORAL DAILY
Status: DISCONTINUED | OUTPATIENT
Start: 2019-07-03 | End: 2019-07-11 | Stop reason: HOSPADM

## 2019-07-02 RX ORDER — NICOTINE POLACRILEX 4 MG
15 LOZENGE BUCCAL PRN
Status: DISCONTINUED | OUTPATIENT
Start: 2019-07-02 | End: 2019-07-11 | Stop reason: HOSPADM

## 2019-07-02 RX ORDER — ARIPIPRAZOLE 15 MG/1
15 TABLET ORAL DAILY
Status: ON HOLD | COMMUNITY
End: 2019-07-17 | Stop reason: HOSPADM

## 2019-07-02 RX ORDER — MAGNESIUM SULFATE 1 G/100ML
1 INJECTION INTRAVENOUS PRN
Status: DISCONTINUED | OUTPATIENT
Start: 2019-07-02 | End: 2019-07-11 | Stop reason: HOSPADM

## 2019-07-02 RX ORDER — OXYCODONE HYDROCHLORIDE AND ACETAMINOPHEN 5; 325 MG/1; MG/1
1 TABLET ORAL EVERY 4 HOURS PRN
Status: ON HOLD | COMMUNITY
End: 2019-07-17 | Stop reason: HOSPADM

## 2019-07-02 RX ORDER — SERTRALINE HYDROCHLORIDE 100 MG/1
100 TABLET, FILM COATED ORAL DAILY
Status: DISCONTINUED | OUTPATIENT
Start: 2019-07-02 | End: 2019-07-02

## 2019-07-02 RX ORDER — BACLOFEN 10 MG/1
10 TABLET ORAL EVERY 8 HOURS PRN
Status: ON HOLD | COMMUNITY
End: 2019-07-17 | Stop reason: HOSPADM

## 2019-07-02 RX ORDER — INSULIN GLARGINE 100 [IU]/ML
50 INJECTION, SOLUTION SUBCUTANEOUS NIGHTLY
Status: DISCONTINUED | OUTPATIENT
Start: 2019-07-02 | End: 2019-07-03

## 2019-07-02 RX ORDER — OMEPRAZOLE 40 MG/1
40 CAPSULE, DELAYED RELEASE ORAL DAILY
Status: ON HOLD | COMMUNITY
End: 2019-07-17 | Stop reason: HOSPADM

## 2019-07-02 RX ORDER — LACTULOSE 10 G/15ML
45 SOLUTION ORAL
Status: ON HOLD | COMMUNITY
End: 2019-07-17 | Stop reason: HOSPADM

## 2019-07-02 RX ORDER — PHENAZOPYRIDINE HYDROCHLORIDE 200 MG/1
200 TABLET, FILM COATED ORAL 3 TIMES DAILY PRN
Status: ON HOLD | COMMUNITY
End: 2019-07-17 | Stop reason: HOSPADM

## 2019-07-02 RX ORDER — ASPIRIN 81 MG/1
81 TABLET, CHEWABLE ORAL DAILY
Status: DISCONTINUED | OUTPATIENT
Start: 2019-07-02 | End: 2019-07-03

## 2019-07-02 RX ORDER — ACETAMINOPHEN 325 MG/1
650 TABLET ORAL EVERY 4 HOURS PRN
COMMUNITY

## 2019-07-02 RX ORDER — 0.9 % SODIUM CHLORIDE 0.9 %
1000 INTRAVENOUS SOLUTION INTRAVENOUS ONCE
Status: COMPLETED | OUTPATIENT
Start: 2019-07-02 | End: 2019-07-02

## 2019-07-02 RX ORDER — DEXTROSE MONOHYDRATE 50 MG/ML
100 INJECTION, SOLUTION INTRAVENOUS PRN
Status: DISCONTINUED | OUTPATIENT
Start: 2019-07-02 | End: 2019-07-11 | Stop reason: HOSPADM

## 2019-07-02 RX ORDER — SODIUM CHLORIDE 0.9 % (FLUSH) 0.9 %
10 SYRINGE (ML) INJECTION EVERY 12 HOURS SCHEDULED
Status: DISCONTINUED | OUTPATIENT
Start: 2019-07-02 | End: 2019-07-11 | Stop reason: HOSPADM

## 2019-07-02 RX ORDER — INSULIN GLARGINE 100 [IU]/ML
65 INJECTION, SOLUTION SUBCUTANEOUS NIGHTLY
Status: DISCONTINUED | OUTPATIENT
Start: 2019-07-02 | End: 2019-07-02

## 2019-07-02 RX ADMIN — ENOXAPARIN SODIUM 40 MG: 40 INJECTION SUBCUTANEOUS at 21:30

## 2019-07-02 RX ADMIN — MAGNESIUM SULFATE 1 G: 1 INJECTION INTRAVENOUS at 12:47

## 2019-07-02 RX ADMIN — HYDROCORTISONE SODIUM SUCCINATE 100 MG: 100 INJECTION, POWDER, FOR SOLUTION INTRAMUSCULAR; INTRAVENOUS at 10:19

## 2019-07-02 RX ADMIN — INSULIN GLARGINE 50 UNITS: 100 INJECTION, SOLUTION SUBCUTANEOUS at 21:30

## 2019-07-02 RX ADMIN — ATORVASTATIN CALCIUM 20 MG: 10 TABLET, FILM COATED ORAL at 21:31

## 2019-07-02 RX ADMIN — METFORMIN HYDROCHLORIDE 500 MG: 500 TABLET ORAL at 21:31

## 2019-07-02 RX ADMIN — SODIUM CHLORIDE: 9 INJECTION, SOLUTION INTRAVENOUS at 16:05

## 2019-07-02 RX ADMIN — SODIUM CHLORIDE 1000 ML: 9 INJECTION, SOLUTION INTRAVENOUS at 08:17

## 2019-07-02 RX ADMIN — TRAZODONE HYDROCHLORIDE 200 MG: 50 TABLET ORAL at 21:31

## 2019-07-02 RX ADMIN — MAGNESIUM SULFATE 1 G: 1 INJECTION INTRAVENOUS at 09:17

## 2019-07-02 RX ADMIN — CEFEPIME 2 G: 2 INJECTION, POWDER, FOR SOLUTION INTRAVENOUS at 12:52

## 2019-07-02 RX ADMIN — Medication 10 ML: at 21:31

## 2019-07-02 RX ADMIN — MAGNESIUM SULFATE 1 G: 1 INJECTION INTRAVENOUS at 10:19

## 2019-07-02 RX ADMIN — MAGNESIUM SULFATE 1 G: 1 INJECTION INTRAVENOUS at 11:40

## 2019-07-02 RX ADMIN — VANCOMYCIN HYDROCHLORIDE 1250 MG: 10 INJECTION, POWDER, LYOPHILIZED, FOR SOLUTION INTRAVENOUS at 14:42

## 2019-07-02 ASSESSMENT — PAIN SCALES - GENERAL: PAINLEVEL_OUTOF10: 0

## 2019-07-02 NOTE — PROCEDURES
Central Line Placement Procedure Note    Indication: vascular access    Consent: Obtained via daughter. Procedure: The patient was positioned appropriately and the skin over the left internal jugular vein was prepped with betadine and draped in a sterile fashion. Local anesthesia was obtained by infiltration using 1% Lidocaine without epinephrine. A large bore needle was used to identify the vein. A guide wire was then inserted into the vein through the needle. The guide wire would not feed properly after multiple attempts so the procedure was aborted.

## 2019-07-02 NOTE — PROGRESS NOTES
Spoke with Dr herring for [facility] regarding patients home medications and he stated that he would like  [facility] to make this changes and to address this tomorrow morning with him.

## 2019-07-02 NOTE — ED PROVIDER NOTES
history of Anxiety disorder, Chronic back pain, Diabetes mellitus ([address] Hyperlipidemia, Neurogenic bowel, Radiculopathy of lumbar region, and Spinal cord infarction ([facility] [address] Past Surgical History:  has a past surgical history that includes back surgery. Social History:  reports that she has quit smoking. She smoked 0.50 packs per day. She has never used smokeless tobacco. She reports that she does not drink alcohol or use drugs. Family History: family history includes Diabetes in her father. The patients home medications have been reviewed. Allergies: Patient has no known allergies.     -------------------------------------------------- RESULTS -------------------------------------------------    LABS:  Results for orders placed or performed during the hospital encounter of 07/02/19   CBC Auto Differential   Result Value Ref Range    WBC 5.1 4.5 - 11.5 E9/L    RBC 4.29 3.50 - 5.50 E12/L    Hemoglobin 11.5 11.5 - 15.5 g/dL    Hematocrit 36.6 34.0 - 48.0 %    MCV 85.3 80.0 - 99.9 fL    MCH 26.8 26.0 - 35.0 pg    MCHC 31.4 (L) 32.0 - 34.5 %    RDW 14.6 11.5 - 15.0 fL    Platelets 847 123 - 469 E9/L    MPV 10.6 7.0 - 12.0 fL    Neutrophils % 58.8 43.0 - 80.0 %    Immature Granulocytes % 0.2 0.0 - 5.0 %    Lymphocytes % 28.0 20.0 - 42.0 %    Monocytes % 7.1 2.0 - 12.0 %    Eosinophils % 5.5 0.0 - 6.0 %    Basophils % 0.4 0.0 - 2.0 %    Neutrophils # 2.98 1.80 - 7.30 E9/L    Immature Granulocytes # 0.01 E9/L    Lymphocytes # 1.42 (L) 1.50 - 4.00 E9/L    Monocytes # 0.36 0.10 - 0.95 E9/L    Eosinophils # 0.28 0.05 - 0.50 E9/L    Basophils # 0.02 0.00 - 0.20 E9/L   Comprehensive Metabolic Panel w/ Reflex to MG   Result Value Ref Range    Sodium 144 132 - 146 mmol/L    Potassium reflex Magnesium 3.7 3.5 - 5.0 mmol/L    Chloride 105 98 - 107 mmol/L    CO2 28 22 - 29 mmol/L    Anion Gap 11 7 - 16 mmol/L    Glucose 136 (H) 74 - 99 mg/dL    BUN 13 8 - 23 mg/dL    CREATININE 1.6 (H) 0.5 - 1.0 mg/dL    GFR Non- 32 >=60 mL/min/1.73    GFR African American 39     Calcium 8.8 8.6 - 10.2 mg/dL    Total Protein 5.9 (L) 6.4 - 8.3 g/dL    Alb 3.3 (L) 3.5 - 5.2 g/dL    Total Bilirubin 0.4 0.0 - 1.2 mg/dL    Alkaline Phosphatase 58 35 - 104 U/L    ALT 5 0 - 32 U/L    AST 7 0 - 31 U/L   Troponin   Result Value Ref Range    Troponin <0.01 0.00 - 0.03 ng/mL   Urinalysis, reflex to microscopic   Result Value Ref Range    Color, UA Yellow Straw/Yellow    Clarity, UA Clear Clear    Glucose, Ur Negative Negative mg/dL    Bilirubin Urine SMALL (A) Negative    Ketones, Urine Negative Negative mg/dL    Specific Gravity, UA 1.015 1.005 - 1.030    Blood, Urine LARGE (A) Negative    pH, UA 5.5 5.0 - 9.0    Protein, UA TRACE Negative mg/dL    Urobilinogen, Urine 0.2 <2.0 E.U./dL    Nitrite, Urine Negative Negative    Leukocyte Esterase, Urine TRACE (A) Negative   Lactic Acid, Plasma   Result Value Ref Range    Lactic Acid 1.2 0.5 - 2.2 mmol/L   Microscopic Urinalysis   Result Value Ref Range    WBC, UA 0-1 0 - 5 /HPF    RBC, UA 10-20 (A) 0 - 2 /HPF    Bacteria, UA FEW (A) /HPF   Magnesium   Result Value Ref Range    Magnesium 1.1 (LL) 1.6 - 2.6 mg/dL   Ammonia   Result Value Ref Range    Ammonia 18.0 11.0 - 51.0 umol/L   Blood Gas, Arterial   Result Value Ref Range    Date Analyzed [id number]     Time Analyzed 0901     Source: Blood Arterial     pH, Blood Gas 7.341 (L) 7.350 - 7.450    PCO2 44.2 35.0 - 45.0 mmHg    PO2 74.9 60.0 - 100.0 mmHg    HCO3 23.4 22.0 - 26.0 mmol/L    B.E. -2.4 -3.0 - 3.0 mmol/L    O2 Sat 93.2 92.0 - 98.5 %    O2Hb 92.7 (L) 94.0 - 97.0 %    COHb 0.1 0.0 - 1.5 %    MetHb 0.4 0.0 - 1.5 %    O2 Content 14.0 mL/dL    HHb 6.8 (H) 0.0 - 5.0 %    tHb (est) 10.7 (L) 11.5 - 16.5 g/dL    Mode RA     Date Of Collection      Time Collected      Pt Temp 37.0 C     ID [id number]     Lab [id number]     Critical(s) Notified .  No Critical Values    POCT Glucose   Result Value Ref Range    Glucose 115 mg/dL    QC OK? y POCT Glucose   Result Value Ref Range    Meter Glucose 115 (H) 74 - 99 mg/dL   EKG 12 Lead   Result Value Ref Range    Ventricular Rate 88 BPM    Atrial Rate 88 BPM    P-R Interval 162 ms    QRS Duration 80 ms    Q-T Interval 414 ms    QTc Calculation (Bazett) 500 ms    P Axis 58 degrees    R Axis 12 degrees    T Axis 51 degrees       RADIOLOGY:  XR CHEST PORTABLE   Final Result   1. Stable, enlarged cardiac mediastinal silhouette with mild central   pulmonary vascular congestion and thoracic aortic vascular   calcification. 2. Nonspecific bibasilar airspace disease, findings can be seen in   infiltrate/pneumonia and/or atelectasis. .      CT ABDOMEN PELVIS WO CONTRAST Additional Contrast? None   Final Result   5 mm subsolid nodule in the right middle lobe. No renal or   ureteral calculi or hydronephrosis. Blankenship catheter within a collapsed   urinary bladder. No pericolic or periappendiceal inflammatory changes. Density is within the subcutaneous fat of the anterior abdominal wall,   extending across the mid abdomen, likely related to abdominal   injections. CT Head WO Contrast   Final Result   No evidence of intracranial mass. No change since July 19, 2015. XR CHEST PORTABLE   Final Result   No evidence of aspiration pneumonia or other acute pathology in the   chest.                        ------------------------- NURSING NOTES AND VITALS REVIEWED ---------------------------  Date / Time Roomed:  7/2/2019  6:55 AM  ED Bed Assignment:  [id number]/[id number]    The nursing notes within the ED encounter and vital signs as below have been reviewed.      Patient Vitals for the past 24 hrs:   BP Temp Temp src Pulse Resp SpO2 Height Weight   07/02/19 1500 139/63 96.8 °F (36 °C) Temporal 99 18 98 % -- --   07/02/19 1455 -- -- -- -- -- -- 5' 4\" (1.626 m) 132 lb 8 oz (60.1 kg)   07/02/19 1415 134/85 97.9 °F (36.6 °C) Oral 99 20 97 % -- --   07/02/19 1322 (!) 148/96 -- -- -- 14 100 % -- --   07/02/19 1300 (!) telemetry  Patient's condition is fair.          Dorcas Perea DO  Resident  07/02/19 9611

## 2019-07-03 LAB
ALBUMIN SERPL-MCNC: 3.5 G/DL (ref 3.5–5.2)
ALP BLD-CCNC: 57 U/L (ref 35–104)
ALT SERPL-CCNC: 7 U/L (ref 0–32)
ANION GAP SERPL CALCULATED.3IONS-SCNC: 13 MMOL/L (ref 7–16)
AST SERPL-CCNC: 10 U/L (ref 0–31)
BASOPHILS ABSOLUTE: 0.02 E9/L (ref 0–0.2)
BASOPHILS RELATIVE PERCENT: 0.3 % (ref 0–2)
BILIRUB SERPL-MCNC: 0.3 MG/DL (ref 0–1.2)
BUN BLDV-MCNC: 10 MG/DL (ref 8–23)
CALCIUM SERPL-MCNC: 8.8 MG/DL (ref 8.6–10.2)
CHLORIDE BLD-SCNC: 106 MMOL/L (ref 98–107)
CO2: 25 MMOL/L (ref 22–29)
CORTISOL TOTAL: 18.67 MCG/DL (ref 2.68–18.4)
CREAT SERPL-MCNC: 1.1 MG/DL (ref 0.5–1)
EOSINOPHILS ABSOLUTE: 0.17 E9/L (ref 0.05–0.5)
EOSINOPHILS RELATIVE PERCENT: 2.8 % (ref 0–6)
GFR AFRICAN AMERICAN: 60
GFR NON-AFRICAN AMERICAN: 50 ML/MIN/1.73
GLUCOSE BLD-MCNC: 55 MG/DL (ref 74–99)
HCT VFR BLD CALC: 33.5 % (ref 34–48)
HEMOGLOBIN: 10.6 G/DL (ref 11.5–15.5)
IMMATURE GRANULOCYTES #: 0.02 E9/L
IMMATURE GRANULOCYTES %: 0.3 % (ref 0–5)
LV EF: 60 %
LVEF MODALITY: NORMAL
LYMPHOCYTES ABSOLUTE: 1.08 E9/L (ref 1.5–4)
LYMPHOCYTES RELATIVE PERCENT: 17.6 % (ref 20–42)
MAGNESIUM: 1.9 MG/DL (ref 1.6–2.6)
MCH RBC QN AUTO: 26.3 PG (ref 26–35)
MCHC RBC AUTO-ENTMCNC: 31.6 % (ref 32–34.5)
MCV RBC AUTO: 83.1 FL (ref 80–99.9)
METER GLUCOSE: 132 MG/DL (ref 74–99)
METER GLUCOSE: 46 MG/DL (ref 74–99)
METER GLUCOSE: 57 MG/DL (ref 74–99)
METER GLUCOSE: 61 MG/DL (ref 74–99)
METER GLUCOSE: 75 MG/DL (ref 74–99)
METER GLUCOSE: 89 MG/DL (ref 74–99)
METER GLUCOSE: 96 MG/DL (ref 74–99)
MONOCYTES ABSOLUTE: 0.51 E9/L (ref 0.1–0.95)
MONOCYTES RELATIVE PERCENT: 8.3 % (ref 2–12)
NEUTROPHILS ABSOLUTE: 4.35 E9/L (ref 1.8–7.3)
NEUTROPHILS RELATIVE PERCENT: 70.7 % (ref 43–80)
PDW BLD-RTO: 14.6 FL (ref 11.5–15)
PLATELET # BLD: 267 E9/L (ref 130–450)
PMV BLD AUTO: 10.3 FL (ref 7–12)
POTASSIUM REFLEX MAGNESIUM: 3 MMOL/L (ref 3.5–5)
PROCALCITONIN: 0.13 NG/ML (ref 0–0.08)
RBC # BLD: 4.03 E12/L (ref 3.5–5.5)
SODIUM BLD-SCNC: 144 MMOL/L (ref 132–146)
TOTAL PROTEIN: 5.8 G/DL (ref 6.4–8.3)
WBC # BLD: 6.2 E9/L (ref 4.5–11.5)

## 2019-07-03 PROCEDURE — 84145 PROCALCITONIN (PCT): CPT

## 2019-07-03 PROCEDURE — 36415 COLL VENOUS BLD VENIPUNCTURE: CPT

## 2019-07-03 PROCEDURE — 36592 COLLECT BLOOD FROM PICC: CPT

## 2019-07-03 PROCEDURE — 93306 TTE W/DOPPLER COMPLETE: CPT

## 2019-07-03 PROCEDURE — 82533 TOTAL CORTISOL: CPT

## 2019-07-03 PROCEDURE — 97530 THERAPEUTIC ACTIVITIES: CPT

## 2019-07-03 PROCEDURE — 82024 ASSAY OF ACTH: CPT

## 2019-07-03 PROCEDURE — 2140000000 HC CCU INTERMEDIATE R&B

## 2019-07-03 PROCEDURE — 97166 OT EVAL MOD COMPLEX 45 MIN: CPT

## 2019-07-03 PROCEDURE — 80053 COMPREHEN METABOLIC PANEL: CPT

## 2019-07-03 PROCEDURE — 82962 GLUCOSE BLOOD TEST: CPT

## 2019-07-03 PROCEDURE — 2580000003 HC RX 258: Performed by: INTERNAL MEDICINE

## 2019-07-03 PROCEDURE — 97110 THERAPEUTIC EXERCISES: CPT

## 2019-07-03 PROCEDURE — 6370000000 HC RX 637 (ALT 250 FOR IP): Performed by: INTERNAL MEDICINE

## 2019-07-03 PROCEDURE — 97162 PT EVAL MOD COMPLEX 30 MIN: CPT

## 2019-07-03 PROCEDURE — 83735 ASSAY OF MAGNESIUM: CPT

## 2019-07-03 PROCEDURE — 85025 COMPLETE CBC W/AUTO DIFF WBC: CPT

## 2019-07-03 PROCEDURE — 6360000002 HC RX W HCPCS: Performed by: INTERNAL MEDICINE

## 2019-07-03 RX ORDER — POTASSIUM CHLORIDE 7.45 MG/ML
10 INJECTION INTRAVENOUS
Status: DISPENSED | OUTPATIENT
Start: 2019-07-03 | End: 2019-07-03

## 2019-07-03 RX ORDER — DULOXETIN HYDROCHLORIDE 60 MG/1
60 CAPSULE, DELAYED RELEASE ORAL DAILY
Status: DISCONTINUED | OUTPATIENT
Start: 2019-07-03 | End: 2019-07-11 | Stop reason: HOSPADM

## 2019-07-03 RX ORDER — DRONABINOL 2.5 MG/1
2.5 CAPSULE ORAL 2 TIMES DAILY
Status: DISCONTINUED | OUTPATIENT
Start: 2019-07-03 | End: 2019-07-09

## 2019-07-03 RX ORDER — INSULIN GLARGINE 100 [IU]/ML
35 INJECTION, SOLUTION SUBCUTANEOUS NIGHTLY
Status: DISCONTINUED | OUTPATIENT
Start: 2019-07-03 | End: 2019-07-04

## 2019-07-03 RX ORDER — ARIPIPRAZOLE 15 MG/1
15 TABLET ORAL DAILY
Status: DISCONTINUED | OUTPATIENT
Start: 2019-07-03 | End: 2019-07-10

## 2019-07-03 RX ADMIN — DEXTROSE 50 % IN WATER (D50W) INTRAVENOUS SYRINGE 12.5 G: at 06:27

## 2019-07-03 RX ADMIN — DULOXETINE HYDROCHLORIDE 60 MG: 60 CAPSULE, DELAYED RELEASE ORAL at 09:41

## 2019-07-03 RX ADMIN — SODIUM CHLORIDE: 9 INJECTION, SOLUTION INTRAVENOUS at 23:31

## 2019-07-03 RX ADMIN — METFORMIN HYDROCHLORIDE 500 MG: 500 TABLET ORAL at 21:24

## 2019-07-03 RX ADMIN — POTASSIUM CHLORIDE 10 MEQ: 7.46 INJECTION, SOLUTION INTRAVENOUS at 11:33

## 2019-07-03 RX ADMIN — POTASSIUM CHLORIDE 10 MEQ: 7.46 INJECTION, SOLUTION INTRAVENOUS at 10:32

## 2019-07-03 RX ADMIN — POTASSIUM CHLORIDE 10 MEQ: 7.46 INJECTION, SOLUTION INTRAVENOUS at 13:10

## 2019-07-03 RX ADMIN — POTASSIUM CHLORIDE 10 MEQ: 7.46 INJECTION, SOLUTION INTRAVENOUS at 09:42

## 2019-07-03 RX ADMIN — APIXABAN 5 MG: 5 TABLET, FILM COATED ORAL at 08:47

## 2019-07-03 RX ADMIN — DEXTROSE 50 % IN WATER (D50W) INTRAVENOUS SYRINGE 12.5 G: at 10:59

## 2019-07-03 RX ADMIN — TRAZODONE HYDROCHLORIDE 200 MG: 50 TABLET ORAL at 21:24

## 2019-07-03 RX ADMIN — DRONABINOL 2.5 MG: 2.5 CAPSULE ORAL at 09:42

## 2019-07-03 RX ADMIN — LISINOPRIL 5 MG: 5 TABLET ORAL at 08:47

## 2019-07-03 RX ADMIN — ARIPIPRAZOLE 15 MG: 15 TABLET ORAL at 08:47

## 2019-07-03 RX ADMIN — METFORMIN HYDROCHLORIDE 500 MG: 500 TABLET ORAL at 08:47

## 2019-07-03 RX ADMIN — ATORVASTATIN CALCIUM 20 MG: 10 TABLET, FILM COATED ORAL at 21:25

## 2019-07-03 RX ADMIN — DRONABINOL 2.5 MG: 2.5 CAPSULE ORAL at 21:25

## 2019-07-03 RX ADMIN — APIXABAN 5 MG: 5 TABLET, FILM COATED ORAL at 21:24

## 2019-07-03 RX ADMIN — POTASSIUM CHLORIDE 10 MEQ: 7.46 INJECTION, SOLUTION INTRAVENOUS at 08:48

## 2019-07-03 RX ADMIN — Medication 10 ML: at 08:49

## 2019-07-03 RX ADMIN — POTASSIUM CHLORIDE 10 MEQ: 7.46 INJECTION, SOLUTION INTRAVENOUS at 14:22

## 2019-07-03 RX ADMIN — Medication 10 ML: at 21:25

## 2019-07-03 RX ADMIN — POTASSIUM CHLORIDE 10 MEQ: 7.46 INJECTION, SOLUTION INTRAVENOUS at 15:25

## 2019-07-03 RX ADMIN — DEXTROSE 50 % IN WATER (D50W) INTRAVENOUS SYRINGE 12.5 G: at 16:20

## 2019-07-03 RX ADMIN — SODIUM CHLORIDE: 9 INJECTION, SOLUTION INTRAVENOUS at 09:42

## 2019-07-03 RX ADMIN — LEVOTHYROXINE SODIUM 50 MCG: 50 TABLET ORAL at 06:54

## 2019-07-03 RX ADMIN — DEXTROSE 50 % IN WATER (D50W) INTRAVENOUS SYRINGE 12.5 G: at 21:23

## 2019-07-03 ASSESSMENT — PAIN SCALES - GENERAL
PAINLEVEL_OUTOF10: 0

## 2019-07-03 NOTE — PROGRESS NOTES
Aware of consult at 7/3/19 at 096 9523. Brief overview of chart indicates patient is not appropriate for acute inpatient rehabilitation at this time as there are not PT and OT evals and work up is incomplete. Will re-evaluate when all information is available. .  Final determination will be made by physician pending completion of testing, comprehensive chart review, patient and / or family input, medical stability, discharge plan, and payer source confirmation.

## 2019-07-03 NOTE — PLAN OF CARE
Problem: Mental Status - Impaired:  Goal: Mental status will improve  Description  Mental status will improve  Outcome: Met This Shift     Problem: Fluid Volume - Imbalance:  Goal: Absence of imbalanced fluid volume signs and symptoms  Description  Absence of imbalanced fluid volume signs and symptoms  Outcome: Met This Shift     Problem: Falls - Risk of:  Goal: Will remain free from falls  Description  Will remain free from falls  Outcome: Met This Shift  Goal: Absence of physical injury  Description  Absence of physical injury  Outcome: Met This Shift     Problem: Risk for Impaired Skin Integrity  Goal: Tissue integrity - skin and mucous membranes  Description  Structural intactness and normal physiological function of skin and  mucous membranes. Outcome: Met This Shift     Problem: Safety:  Goal: Free from accidental physical injury  Description  Free from accidental physical injury  Outcome: Met This Shift     Problem: Daily Care:  Goal: Daily care needs are met  Description  Daily care needs are met  Outcome: Met This Shift     Problem: Inadequate oral food/beverage intake (NI-2.1)  Goal: Food and/or Nutrient Delivery  Description  Individualized approach for food/nutrient provision.   7/3/2019 1422 by Tracy Tate RD, LD  Outcome: Met This Shift     Problem: Nausea/Vomiting:  Goal: Absence of nausea/vomiting  Description  Absence of nausea/vomiting  Outcome: Met This Shift  Goal: Able to drink  Description  Able to drink  Outcome: Met This Shift

## 2019-07-03 NOTE — PROGRESS NOTES
Physical Therapy    Facility/Department: [facility]  Initial Assessment    NAME: Liliana Friday  : 1950  MRN: [id number]    Date of Service: 7/3/2019    Evaluating Therapist: Ronny Sloan PT, DPT    Room #: 3528/3947-U  DIAGNOSIS: AMS  PRECAUTIONS: Falls, paraplegia, BLE tone, bed alarm  PMHx: Chronic back pain, Depression, DM, HLD, HTN, Neurogenic bowel and bladder, TETE, Spinal cord infarction  PROCEDURES: NA    Social:  Pt was admitted from a rehab facility, per chart. Possibly [place]. Prior to admission pt used WC for mobility. Pt reported completing slide board transfers to [facility]. Initial Evaluation  Date: 7/3/19 Treatment  Date:     Short Term/ Long Term   Goals   AM-PAC 6 Clicks      Does pt have pain? No c/o pain at rest     Bed Mobility  Rolling: MaxA  Supine to sit: MaxA  Sit to supine: MaxA  Scooting: MaxA  ModA   Slide board Transfers Bed to chair: NT  Chair to bed: NT  MaxA   Wheelchair mobility NT  >50 feet with Gaudencio on level surfaces   Stair negotiation: ascended and descended NA  NA   BLE ROM Limited by tone     BLE strength Grossly 0/5  Increase by 1/3 MMT grade   Balance Sitting: SBA  Standing: NA  Sitting: Independent  Standing: NA     Pt is alert and oriented x 3-4 (Self, place, year but not month)  Sensation: impaired light touch to BLEs  Edema: WNL    ASSESSMENT  Pt displays functional ability as noted in the objective portion of this evaluation. Comments/Treatment:  Pt was supine in bed upon arrival with HCA present. Assisted pt with rolling and repositioning in bed. Pt was agreeable to initial evaluation. Pt reported being confused but seem to improve cognitively with session and conversation. Pt was able to recall PLOF and transfer technique. PROM completed in BLEs in all planes at all joints. Tone noted throughout BLEs. Pt was able to use BUEs to assist with rolling and remaining bed mobility.   Pt reported mild increase in LBP during transfer but improved

## 2019-07-03 NOTE — CARE COORDINATION
Care Coordination:  Admission papers contain medical information from [facility]. I have messaged the liaison to check if patient can return upon discharge. I have also left a message for Myla Browne, sister to check if dc plan is to return to [place]. Pt's daughter is in route traveling from [place] to [facility] today as per nurses notes. Will await return call from sister and return call from [place] as well.     Agustina Salgado    Addendum:  Pt is not a bed hold but can return to [place] [phone]

## 2019-07-04 LAB
ANION GAP SERPL CALCULATED.3IONS-SCNC: 9 MMOL/L (ref 7–16)
BUN BLDV-MCNC: 5 MG/DL (ref 8–23)
CALCIUM SERPL-MCNC: 9 MG/DL (ref 8.6–10.2)
CHLORIDE BLD-SCNC: 109 MMOL/L (ref 98–107)
CO2: 26 MMOL/L (ref 22–29)
CREAT SERPL-MCNC: 0.8 MG/DL (ref 0.5–1)
FERRITIN: 173 NG/ML
FOLATE: 5.7 NG/ML (ref 4.8–24.2)
GFR AFRICAN AMERICAN: >60
GFR NON-AFRICAN AMERICAN: >60 ML/MIN/1.73
GLUCOSE BLD-MCNC: 128 MG/DL (ref 74–99)
HCT VFR BLD CALC: 33.9 % (ref 34–48)
IMMATURE RETIC FRACT: 10.1 % (ref 3–15.9)
IRON SATURATION: 41 % (ref 15–50)
IRON: 67 MCG/DL (ref 37–145)
METER GLUCOSE: 104 MG/DL (ref 74–99)
METER GLUCOSE: 141 MG/DL (ref 74–99)
METER GLUCOSE: 149 MG/DL (ref 74–99)
METER GLUCOSE: 172 MG/DL (ref 74–99)
METER GLUCOSE: 57 MG/DL (ref 74–99)
METER GLUCOSE: 61 MG/DL (ref 74–99)
METER GLUCOSE: 98 MG/DL (ref 74–99)
POTASSIUM SERPL-SCNC: 3.7 MMOL/L (ref 3.5–5)
RETIC HGB EQUIVALENT: 33.8 PG (ref 28.2–36.6)
RETICULOCYTE ABSOLUTE COUNT: 0.05 E12/L
RETICULOCYTE COUNT PCT: 1.3 % (ref 0.4–1.9)
SODIUM BLD-SCNC: 144 MMOL/L (ref 132–146)
TOTAL IRON BINDING CAPACITY: 163 MCG/DL (ref 250–450)
URINE CULTURE, ROUTINE: NORMAL
VITAMIN B-12: 1047 PG/ML (ref 211–946)

## 2019-07-04 PROCEDURE — 6370000000 HC RX 637 (ALT 250 FOR IP): Performed by: INTERNAL MEDICINE

## 2019-07-04 PROCEDURE — 83540 ASSAY OF IRON: CPT

## 2019-07-04 PROCEDURE — 2580000003 HC RX 258: Performed by: INTERNAL MEDICINE

## 2019-07-04 PROCEDURE — 82728 ASSAY OF FERRITIN: CPT

## 2019-07-04 PROCEDURE — 82746 ASSAY OF FOLIC ACID SERUM: CPT

## 2019-07-04 PROCEDURE — 82272 OCCULT BLD FECES 1-3 TESTS: CPT

## 2019-07-04 PROCEDURE — 2140000000 HC CCU INTERMEDIATE R&B

## 2019-07-04 PROCEDURE — 80048 BASIC METABOLIC PNL TOTAL CA: CPT

## 2019-07-04 PROCEDURE — 82962 GLUCOSE BLOOD TEST: CPT

## 2019-07-04 PROCEDURE — 6360000002 HC RX W HCPCS: Performed by: INTERNAL MEDICINE

## 2019-07-04 PROCEDURE — 87324 CLOSTRIDIUM AG IA: CPT

## 2019-07-04 PROCEDURE — 85045 AUTOMATED RETICULOCYTE COUNT: CPT

## 2019-07-04 PROCEDURE — 83550 IRON BINDING TEST: CPT

## 2019-07-04 PROCEDURE — 36415 COLL VENOUS BLD VENIPUNCTURE: CPT

## 2019-07-04 PROCEDURE — 82607 VITAMIN B-12: CPT

## 2019-07-04 PROCEDURE — 87077 CULTURE AEROBIC IDENTIFY: CPT

## 2019-07-04 PROCEDURE — 87045 FECES CULTURE AEROBIC BACT: CPT

## 2019-07-04 RX ORDER — INSULIN GLARGINE 100 [IU]/ML
30 INJECTION, SOLUTION SUBCUTANEOUS NIGHTLY
Status: DISCONTINUED | OUTPATIENT
Start: 2019-07-04 | End: 2019-07-04

## 2019-07-04 RX ORDER — INSULIN GLARGINE 100 [IU]/ML
15 INJECTION, SOLUTION SUBCUTANEOUS NIGHTLY
Status: DISCONTINUED | OUTPATIENT
Start: 2019-07-04 | End: 2019-07-05

## 2019-07-04 RX ORDER — HEPARIN SODIUM (PORCINE) LOCK FLUSH IV SOLN 100 UNIT/ML 100 UNIT/ML
100 SOLUTION INTRAVENOUS PRN
Status: DISCONTINUED | OUTPATIENT
Start: 2019-07-04 | End: 2019-07-11 | Stop reason: HOSPADM

## 2019-07-04 RX ADMIN — DEXTROSE 50 % IN WATER (D50W) INTRAVENOUS SYRINGE 12.5 G: at 06:30

## 2019-07-04 RX ADMIN — ATORVASTATIN CALCIUM 20 MG: 10 TABLET, FILM COATED ORAL at 21:26

## 2019-07-04 RX ADMIN — SODIUM CHLORIDE: 9 INJECTION, SOLUTION INTRAVENOUS at 12:58

## 2019-07-04 RX ADMIN — LISINOPRIL 5 MG: 5 TABLET ORAL at 10:19

## 2019-07-04 RX ADMIN — LINACLOTIDE 290 MCG: 145 CAPSULE, GELATIN COATED ORAL at 06:26

## 2019-07-04 RX ADMIN — METFORMIN HYDROCHLORIDE 500 MG: 500 TABLET ORAL at 21:27

## 2019-07-04 RX ADMIN — HEPARIN 100 UNITS: 100 SYRINGE at 21:27

## 2019-07-04 RX ADMIN — ARIPIPRAZOLE 15 MG: 15 TABLET ORAL at 10:09

## 2019-07-04 RX ADMIN — Medication 10 ML: at 10:20

## 2019-07-04 RX ADMIN — Medication 10 ML: at 21:27

## 2019-07-04 RX ADMIN — METFORMIN HYDROCHLORIDE 500 MG: 500 TABLET ORAL at 10:19

## 2019-07-04 RX ADMIN — LEVOTHYROXINE SODIUM 50 MCG: 50 TABLET ORAL at 06:26

## 2019-07-04 RX ADMIN — APIXABAN 5 MG: 5 TABLET, FILM COATED ORAL at 21:26

## 2019-07-04 RX ADMIN — DULOXETINE HYDROCHLORIDE 60 MG: 60 CAPSULE, DELAYED RELEASE ORAL at 10:09

## 2019-07-04 RX ADMIN — TRAZODONE HYDROCHLORIDE 200 MG: 50 TABLET ORAL at 21:26

## 2019-07-04 RX ADMIN — DRONABINOL 2.5 MG: 2.5 CAPSULE ORAL at 10:29

## 2019-07-04 RX ADMIN — DEXTROSE 50 % IN WATER (D50W) INTRAVENOUS SYRINGE 12.5 G: at 02:19

## 2019-07-04 RX ADMIN — APIXABAN 5 MG: 5 TABLET, FILM COATED ORAL at 10:20

## 2019-07-04 RX ADMIN — DRONABINOL 2.5 MG: 2.5 CAPSULE ORAL at 21:47

## 2019-07-04 ASSESSMENT — PAIN DESCRIPTION - ORIENTATION: ORIENTATION: RIGHT;LEFT

## 2019-07-04 ASSESSMENT — PAIN DESCRIPTION - LOCATION: LOCATION: LEG

## 2019-07-04 ASSESSMENT — PAIN SCALES - GENERAL
PAINLEVEL_OUTOF10: 0

## 2019-07-04 ASSESSMENT — PAIN DESCRIPTION - PAIN TYPE: TYPE: ACUTE PAIN;CHRONIC PAIN

## 2019-07-04 NOTE — PLAN OF CARE
Problem: Cardiac Output - Decreased:  Goal: Cardiac output within specified parameters  Description  Cardiac output within specified parameters  Outcome: Met This Shift     Problem: Fluid Volume - Imbalance:  Goal: Absence of imbalanced fluid volume signs and symptoms  Description  Absence of imbalanced fluid volume signs and symptoms  Outcome: Met This Shift     Problem: Tissue Perfusion - Cardiopulmonary, Altered:  Goal: Absence of angina  Description  Absence of angina  Outcome: Met This Shift  Goal: Hemodynamic stability will improve  Description  Hemodynamic stability will improve  Outcome: Met This Shift     Problem: Falls - Risk of:  Goal: Will remain free from falls  Description  Will remain free from falls  Outcome: Met This Shift  Goal: Absence of physical injury  Description  Absence of physical injury  Outcome: Met This Shift     Problem: Risk for Impaired Skin Integrity  Goal: Tissue integrity - skin and mucous membranes  Description  Structural intactness and normal physiological function of skin and  mucous membranes.   Outcome: Met This Shift     Problem: Safety:  Goal: Free from accidental physical injury  Description  Free from accidental physical injury  Outcome: Met This Shift     Problem: Daily Care:  Goal: Daily care needs are met  Description  Daily care needs are met  Outcome: Met This Shift

## 2019-07-04 NOTE — PROGRESS NOTES
Called [phone] to see if they have patient's dentures. They said they will see if night time nurse has them and she will call back tonight.

## 2019-07-05 LAB
C DIFF TOXIN/ANTIGEN: NORMAL
METER GLUCOSE: 122 MG/DL (ref 74–99)
METER GLUCOSE: 129 MG/DL (ref 74–99)
METER GLUCOSE: 137 MG/DL (ref 74–99)
METER GLUCOSE: 140 MG/DL (ref 74–99)
METER GLUCOSE: 143 MG/DL (ref 74–99)
OCCULT BLOOD DIAGNOSTIC: NORMAL

## 2019-07-05 PROCEDURE — 6370000000 HC RX 637 (ALT 250 FOR IP): Performed by: INTERNAL MEDICINE

## 2019-07-05 PROCEDURE — 2580000003 HC RX 258: Performed by: INTERNAL MEDICINE

## 2019-07-05 PROCEDURE — 82962 GLUCOSE BLOOD TEST: CPT

## 2019-07-05 PROCEDURE — 1200000000 HC SEMI PRIVATE

## 2019-07-05 RX ORDER — INSULIN GLARGINE 100 [IU]/ML
10 INJECTION, SOLUTION SUBCUTANEOUS NIGHTLY
Status: DISCONTINUED | OUTPATIENT
Start: 2019-07-05 | End: 2019-07-10

## 2019-07-05 RX ADMIN — ARIPIPRAZOLE 15 MG: 15 TABLET ORAL at 08:48

## 2019-07-05 RX ADMIN — INSULIN GLARGINE 10 UNITS: 100 INJECTION, SOLUTION SUBCUTANEOUS at 22:21

## 2019-07-05 RX ADMIN — SODIUM CHLORIDE: 9 INJECTION, SOLUTION INTRAVENOUS at 02:56

## 2019-07-05 RX ADMIN — LEVOTHYROXINE SODIUM 50 MCG: 50 TABLET ORAL at 06:42

## 2019-07-05 RX ADMIN — METFORMIN HYDROCHLORIDE 500 MG: 500 TABLET ORAL at 22:12

## 2019-07-05 RX ADMIN — LISINOPRIL 5 MG: 5 TABLET ORAL at 08:48

## 2019-07-05 RX ADMIN — APIXABAN 5 MG: 5 TABLET, FILM COATED ORAL at 08:48

## 2019-07-05 RX ADMIN — METFORMIN HYDROCHLORIDE 500 MG: 500 TABLET ORAL at 08:48

## 2019-07-05 RX ADMIN — INSULIN LISPRO 3 UNITS: 100 INJECTION, SOLUTION INTRAVENOUS; SUBCUTANEOUS at 17:40

## 2019-07-05 RX ADMIN — DRONABINOL 2.5 MG: 2.5 CAPSULE ORAL at 08:48

## 2019-07-05 RX ADMIN — DULOXETINE HYDROCHLORIDE 60 MG: 60 CAPSULE, DELAYED RELEASE ORAL at 08:48

## 2019-07-05 RX ADMIN — Medication 10 ML: at 22:00

## 2019-07-05 RX ADMIN — TRAZODONE HYDROCHLORIDE 200 MG: 50 TABLET ORAL at 22:00

## 2019-07-05 RX ADMIN — Medication 10 ML: at 08:48

## 2019-07-05 RX ADMIN — LINACLOTIDE 290 MCG: 145 CAPSULE, GELATIN COATED ORAL at 06:14

## 2019-07-05 RX ADMIN — INSULIN LISPRO 3 UNITS: 100 INJECTION, SOLUTION INTRAVENOUS; SUBCUTANEOUS at 06:45

## 2019-07-05 RX ADMIN — ATORVASTATIN CALCIUM 20 MG: 10 TABLET, FILM COATED ORAL at 22:00

## 2019-07-05 ASSESSMENT — PAIN DESCRIPTION - PAIN TYPE: TYPE: ACUTE PAIN;CHRONIC PAIN

## 2019-07-05 ASSESSMENT — PAIN SCALES - GENERAL
PAINLEVEL_OUTOF10: 0
PAINLEVEL_OUTOF10: 0

## 2019-07-05 NOTE — PROGRESS NOTES
Department of Internal Quintin Brown M.D.  Progress Note      SUBJECTIVE:  Says food does not ytaste good ; I asked her to eat from outside;     OBJECTIVE  More alert and tells helen her daughter is here abd soft     Medications    Current Facility-Administered Medications: insulin glargine (LANTUS) injection vial 10 Units, 10 Units, Subcutaneous, Nightly  heparin flush 100 UNIT/ML injection 100 Units, 100 Units, Intracatheter, PRN  ARIPiprazole (ABILIFY) tablet 15 mg, 15 mg, Oral, Daily  apixaban (ELIQUIS) tablet 5 mg, 5 mg, Oral, BID  DULoxetine (CYMBALTA) extended release capsule 60 mg, 60 mg, Oral, Daily  dronabinol (MARINOL) capsule 2.5 mg, 2.5 mg, Oral, BID  linaclotide (LINZESS) capsule 290 mcg, 290 mcg, Oral, QAM AC  magnesium sulfate 1 g in dextrose 5% 100 mL IVPB, 1 g, Intravenous, PRN  atorvastatin (LIPITOR) tablet 20 mg, 20 mg, Oral, Daily  levothyroxine (SYNTHROID) tablet 50 mcg, 50 mcg, Oral, Daily  lisinopril (PRINIVIL;ZESTRIL) tablet 5 mg, 5 mg, Oral, Daily  metFORMIN (GLUCOPHAGE) tablet 500 mg, 500 mg, Oral, BID  traZODone (DESYREL) tablet 200 mg, 200 mg, Oral, Nightly  0.9 % sodium chloride infusion, , Intravenous, Continuous  sodium chloride flush 0.9 % injection 10 mL, 10 mL, Intravenous, 2 times per day  sodium chloride flush 0.9 % injection 10 mL, 10 mL, Intravenous, PRN  magnesium hydroxide (MILK OF MAGNESIA) 400 MG/5ML suspension 30 mL, 30 mL, Oral, Daily PRN  ondansetron (ZOFRAN) injection 4 mg, 4 mg, Intravenous, Q6H PRN  glucose (GLUTOSE) 40 % oral gel 15 g, 15 g, Oral, PRN  dextrose 50 % IV solution, 12.5 g, Intravenous, PRN  glucagon (rDNA) injection 1 mg, 1 mg, Intramuscular, PRN  dextrose 5 % solution, 100 mL/hr, Intravenous, PRN  perflutren lipid microspheres (DEFINITY) injection 1.65 mg, 1.5 mL, Intravenous, ONCE PRN  insulin lispro (HUMALOG) injection vial 0-18 Units, 0-18 Units, Subcutaneous, TID WC  Physical    VITALS:  BP (!) 162/78   Pulse 95

## 2019-07-05 NOTE — CONSULTS
PSYCHIATRIC EVALUATION  (Consult)     CHIEF COMPLAINT:   [x] Mood Problems [x] Anxiety Problems [] Psychosis                    [x] Suicidal/Homicidal   [] Aggression  [] Other    HISTORY OF PRESENT ILLNESS: Jaime Kendall  is a 79 y.o. female who has a previous psychiatric history of depression and anxiety and presents for admission with acute mental status change and increased depression with poor oral intake. Patient appears confused, however makes a statement about wanting to die due to pain. Symptoms are constant, severe, and worsened by possible health stress. Please transfer to psych when medically clear.       Precipitating Factors:     [] Family Stress   [] Recent loss/grief Stress   [x] Health Stress   [] Relationship Stress    [] Legal Stress   [x] Environmental Stress    [] Occupational Stress   [] Financial Stress   [] Substance Abuse [] Other      PAST PSYCHIATRIC HISTORY:   History of psychiatric Hospitalization:    [x] Denies    [] yes  [] Days ago     []  Weeks Ago    [] Months ago  [] Years ago              [] [facility]  [] [facility]  [] Other:        [] Once  [] More than once    Outpatient treatment:  [] [facility]  [] [place]  [] [employer]              [] [employer]  [] [facility]      [] [address] [place] [address] [] [facility]      [] [facility] [] [facility]  [] [facility] [] [facility]             [] currently  [x] in the past  [] Non-Compliant    [] Denies    Previous suicide attempt: [x]Denies            [] yes  [] OD  [] Cutting  [] Hanging  [] Gun  [] Other    Previous psych medications:  [] Was prescribed               [x] Currently Taking       [] Never taken medications      PAST MEDICAL HISTORY:       Diagnosis Date    Anemia     Anxiety disorder     Chronic back pain 01/2018    6/10 on the pain scale    Depression     [facility] [address]     Hyperlipidemia     Hypertension     Neurogenic bowel     Neuromuscular dysfunction of bladder     Obstructive sleep apnea     Radiculopathy of lumbar region     Spinal cord [facility] [address]     Suicidal ideations     Vitamin D deficiency        FAMILY PSYCHIATRIC HISTORY:  Family History   Problem Relation Age of Onset    Diabetes Father            [x] Denies       [] Endorses               [] Father                [] Depression  [] Anxiety  [] Bipolar  [] Psychosis  []  Other                  [] Mother               [] Depression  [] Anxiety  [] Bipolar  [] Psychosis  []  Other                  [] Sibling               [] Depression  [] Anxiety  [] Bipolar  [] Psychosis  []  Other                  [] Grandparent               [] Depression  [] Anxiety  [] Bipolar  [] Psychosis  []  Other       SOCIAL HISTORY:     1. Living Situation:[] Private Residence [] Homeless [x] [address]             [] [address] [] [address]  [] Shelter [] Other   2. Employment:  [x] Unemployed  [] Employed  [] Disabled  [] Retired   1. Legal History: [x] No Arrest [] Arrest  [] Theft  []  Assault  [] Substances   4. History of Trama/ Abuse: [x] Denies  [] Emotional [] Physical [] Sexual   5. Spirituality: [x] Spiritual [] Not Spiritual   6. Substance Abuse: [x] Denies  [] Drug of choice      [] Amphetamines [] Marijuana [] Cocaine      [] Opioids  [] Alcohol  [] Benzodiazepines     For further SH review SW note. Risk Assessment:  1. Risk Factors:   [x] Depression  [x] Anxiety  [x] Psychosis   [x] Suicidal/Homicidal Thoughts [] Suicide Attempt [] Substance Abuse     2. Protective Factors: [x] Controlled Environment         [] Supportive Family []         [] [facility] Support     3. Level of Risk: [] Mild [] Moderate [x] Severe      Strengths & Weaknesses:    1. Strengths: [x] Ability to communicate feelings     [x] Independent ADL's     [] Supportive Family    [] Current Health Status     2.  Weaknesses: [x] Emotional          [] Motivational     MEDICATIONS: Current Facility-Administered Medications: insulin glargine (LANTUS) injection vial 10 Units, 10 Units, Subcutaneous, Nightly  [START ON 7/6/2019] linaclotide (LINZESS) capsule 145 mcg, 145 mcg, Oral, QAM AC  heparin flush 100 UNIT/ML injection 100 Units, 100 Units, Intracatheter, PRN  ARIPiprazole (ABILIFY) tablet 15 mg, 15 mg, Oral, Daily  apixaban (ELIQUIS) tablet 5 mg, 5 mg, Oral, BID  DULoxetine (CYMBALTA) extended release capsule 60 mg, 60 mg, Oral, Daily  dronabinol (MARINOL) capsule 2.5 mg, 2.5 mg, Oral, BID  magnesium sulfate 1 g in dextrose 5% 100 mL IVPB, 1 g, Intravenous, PRN  atorvastatin (LIPITOR) tablet 20 mg, 20 mg, Oral, Daily  levothyroxine (SYNTHROID) tablet 50 mcg, 50 mcg, Oral, Daily  lisinopril (PRINIVIL;ZESTRIL) tablet 5 mg, 5 mg, Oral, Daily  metFORMIN (GLUCOPHAGE) tablet 500 mg, 500 mg, Oral, BID  traZODone (DESYREL) tablet 200 mg, 200 mg, Oral, Nightly  0.9 % sodium chloride infusion, , Intravenous, Continuous  sodium chloride flush 0.9 % injection 10 mL, 10 mL, Intravenous, 2 times per day  sodium chloride flush 0.9 % injection 10 mL, 10 mL, Intravenous, PRN  magnesium hydroxide (MILK OF MAGNESIA) 400 MG/5ML suspension 30 mL, 30 mL, Oral, Daily PRN  ondansetron (ZOFRAN) injection 4 mg, 4 mg, Intravenous, Q6H PRN  glucose (GLUTOSE) 40 % oral gel 15 g, 15 g, Oral, PRN  dextrose 50 % IV solution, 12.5 g, Intravenous, PRN  glucagon (rDNA) injection 1 mg, 1 mg, Intramuscular, PRN  dextrose 5 % solution, 100 mL/hr, Intravenous, PRN  perflutren lipid microspheres (DEFINITY) injection 1.65 mg, 1.5 mL, Intravenous, ONCE PRN  insulin lispro (HUMALOG) injection vial 0-18 Units, 0-18 Units, Subcutaneous, TID WC         VITALS: BP (!) 153/80   Pulse 101   Temp 97.3 °F (36.3 °C) (Temporal)   Resp 18   Ht 5' 4\" (1.626 m)   Wt 135 lb 14.4 oz (61.6 kg)   SpO2 97%   BMI 23.33 kg/m²     ALLERGIES: Patient has no known allergies.             MENTAL STATUS EXAM:       Mental Status Examination:    Cognition:      [x] Alert  [x] Awake  [x] Oriented  [x] Person  [x] Place [] Time      [] drowsy  [] tired  [] lethargic  [] distractable  []     Attention/Concentration:   [x] Attentive  [] Distracted        Memory Recent and Remote: [] Intact   [] Impaired [x] Partially Impaired     Language: [] Able to recognize and name objects          [] Unable to recognize and name Objects    Fund of Knowledge:  [] Poor []  Fair  [x] Good    Speech: [] Normal  [x] Soft  [] Slow  [] Fast [] Pressured            [] Loud [] Dysarthria  [] Incoherent       Appearance: [] Well Groomed  [] Casual Dressed  [] Unkept  [x] Disheveled          [] Normal weight[] Thin  [] Overweight  [] Obese           Attitude: [] Positive  [] Hostile  [] Demanding  [x] Guarded  [] Defensive         [x] Cooperative  []  Uncooperative      Behavior:  [] Normal Gait  [] Walks with Assistance  [] [address]    [] Walks with Nandini An  [x] In Hospital Bed  [] Sitting in Chair    Muscle-Skeletal:  [x] Normal Muscle Tone [] Muscle Atrophy       [] Abnormal Muscle Movement     Eye Contact:  [x] Good eye contact  [] Intermittent Eye Contact  [] Poor Eye Contact     Mood: [x] Depressed  [x] Anxious  [] Irritated  [] Euthymic   [] Angry [] Restless    Affect:  [x] Congruent  [] Incongruent  [] Labile  [] Constricted  [] Flat  [] Bizarre     Thought Process and Association:  [] Logical [] Illogical       [] Linear and Goal Directed  [x] Tangential  [] Circumstantial     Thought Content:  [] Denies [x] Endorses [x] Suicidal [] Homicidal  [x] Delusional      [x] Paranoid  [] Somatic  [] Grandiose    Perception: [x]  None  [] Auditory   [] Visual  [] tactile   [] olfactory  [] Illusions         Insight: [] Intact  [] Fair  [x] Limited    Judgement:  [] Intact  [] Fair  [x] Limited        ASSESSMENT  Patient Active Problem List   Diagnosis    Midline low back pain with sciatica    Lumbar degenerative disc disease    Suicidal ideation    Altered mental status    Hypotension       Recommendations and plan of treatment: Patient is actively presenting with severe

## 2019-07-05 NOTE — H&P
can be ruled out with doppler per primary  - In the meantime, patient can have supplemental nutrition via [employer] if she is amenable to it  - NPO midnight before PEG    Plan approved by Dr Denise Scales, please see his addendum for corrections.     Keith Eldridge MD  Resident, PGY-1  7/5/2019  11:46 AM

## 2019-07-05 NOTE — CARE COORDINATION
Social Work/Discharge Planning;  Continue to follow to assist with discharge planning. Placed call to daughter Ayanna([phone]). Left message for Irene Lopez re; discharge planning assistance.   Rivas CHOE

## 2019-07-05 NOTE — PLAN OF CARE
Problem: Cardiac Output - Decreased:  Goal: Avoidance of environmental tobacco smoke  Outcome: Met This Shift  Goal: Cardiac output within specified parameters  7/4/2019 2208 by Andry Levy RN  Outcome: Met This Shift  7/4/2019 1357 by Terrance Croft RN  Outcome: Met This Shift  Goal: Hemodynamic stability will improve  Outcome: Met This Shift     Problem: Fluid Volume - Imbalance:  Goal: Absence of imbalanced fluid volume signs and symptoms  7/4/2019 2208 by Andry Levy RN  Outcome: Met This Shift  7/4/2019 1357 by Terrance Croft RN  Outcome: Met This Shift     Problem: Tissue Perfusion, Altered:  Goal: Circulatory function within specified parameters  Outcome: Met This Shift     Problem: Tissue Perfusion - Cardiopulmonary, Altered:  Goal: Absence of angina  7/4/2019 2208 by Andry Levy RN  Outcome: Met This Shift  7/4/2019 1357 by Terrance Croft RN  Outcome: Met This Shift  Goal: Hemodynamic stability will improve  7/4/2019 2208 by Andry Levy RN  Outcome: Met This Shift  7/4/2019 1357 by Terrance Croft RN  Outcome: Met This Shift     Problem: Falls - Risk of:  Goal: Will remain free from falls  7/4/2019 2208 by Andry Levy RN  Outcome: Met This Shift  7/4/2019 1839 by Shaniqua Johnson RN  Outcome: Met This Shift  7/4/2019 1357 by Terrance Croft RN  Outcome: Met This Shift  Goal: Absence of physical injury  7/4/2019 1357 by Terrance Croft RN  Outcome: Met This Shift     Problem: Risk for Impaired Skin Integrity  Goal: Tissue integrity - skin and mucous membranes  7/4/2019 2208 by Andry Levy RN  Outcome: Met This Shift  7/4/2019 1357 by Terrance Croft RN  Outcome: Met This Shift     Problem: Safety:  Goal: Free from accidental physical injury  7/4/2019 2208 by Andry Levy RN  Outcome: Met This Shift  7/4/2019 1357 by Terrance Croft RN  Outcome: Met This Shift     Problem: Daily Care:  Goal: Daily care needs are met  7/4/2019 2208 by Andry Levy RN  Outcome: Met This Shift  7/4/2019 135Jovan by Terrance Croft

## 2019-07-06 LAB
ADRENOCORTICOTROPIC HORMONE: 34 PG/ML (ref 6–58)
METER GLUCOSE: 124 MG/DL (ref 74–99)
METER GLUCOSE: 131 MG/DL (ref 74–99)
METER GLUCOSE: 136 MG/DL (ref 74–99)
METER GLUCOSE: 137 MG/DL (ref 74–99)

## 2019-07-06 PROCEDURE — 6370000000 HC RX 637 (ALT 250 FOR IP): Performed by: INTERNAL MEDICINE

## 2019-07-06 PROCEDURE — 2580000003 HC RX 258: Performed by: INTERNAL MEDICINE

## 2019-07-06 PROCEDURE — 6360000002 HC RX W HCPCS: Performed by: INTERNAL MEDICINE

## 2019-07-06 PROCEDURE — 82962 GLUCOSE BLOOD TEST: CPT

## 2019-07-06 PROCEDURE — 6370000000 HC RX 637 (ALT 250 FOR IP): Performed by: FAMILY MEDICINE

## 2019-07-06 PROCEDURE — 1200000000 HC SEMI PRIVATE

## 2019-07-06 RX ORDER — LISINOPRIL 5 MG/1
5 TABLET ORAL EVERY 12 HOURS PRN
Status: DISCONTINUED | OUTPATIENT
Start: 2019-07-06 | End: 2019-07-11 | Stop reason: HOSPADM

## 2019-07-06 RX ADMIN — TRAZODONE HYDROCHLORIDE 200 MG: 50 TABLET ORAL at 21:14

## 2019-07-06 RX ADMIN — Medication 10 ML: at 21:14

## 2019-07-06 RX ADMIN — SODIUM CHLORIDE: 9 INJECTION, SOLUTION INTRAVENOUS at 21:13

## 2019-07-06 RX ADMIN — HEPARIN 100 UNITS: 100 SYRINGE at 21:14

## 2019-07-06 RX ADMIN — LISINOPRIL 5 MG: 5 TABLET ORAL at 16:55

## 2019-07-06 RX ADMIN — ATORVASTATIN CALCIUM 20 MG: 10 TABLET, FILM COATED ORAL at 21:14

## 2019-07-06 RX ADMIN — SODIUM CHLORIDE: 9 INJECTION, SOLUTION INTRAVENOUS at 07:03

## 2019-07-06 RX ADMIN — DRONABINOL 2.5 MG: 2.5 CAPSULE ORAL at 21:15

## 2019-07-06 ASSESSMENT — PAIN SCALES - GENERAL
PAINLEVEL_OUTOF10: 0

## 2019-07-06 ASSESSMENT — PAIN DESCRIPTION - PAIN TYPE: TYPE: ACUTE PAIN;CHRONIC PAIN

## 2019-07-06 ASSESSMENT — PAIN DESCRIPTION - LOCATION: LOCATION: GENERALIZED

## 2019-07-06 NOTE — PROGRESS NOTES
Patient Active Problem List    Diagnosis Date Noted    Altered mental status 07/02/2019    Hypotension 07/02/2019    Suicidal ideation 04/04/2019    Lumbar degenerative disc disease 03/08/2018    Midline low back pain with sciatica 03/18/2016        Severe malnutrition        Depression    Plan:     Encourage good oral intake. Continue with plans for PEG tube placement on 7/8/19.

## 2019-07-07 LAB
BLOOD CULTURE, ROUTINE: NORMAL
CULTURE, BLOOD 2: NORMAL
CULTURE, STOOL: NORMAL
METER GLUCOSE: 124 MG/DL (ref 74–99)
METER GLUCOSE: 135 MG/DL (ref 74–99)
METER GLUCOSE: 136 MG/DL (ref 74–99)
METER GLUCOSE: 136 MG/DL (ref 74–99)
METER GLUCOSE: 167 MG/DL (ref 74–99)

## 2019-07-07 PROCEDURE — 6370000000 HC RX 637 (ALT 250 FOR IP): Performed by: INTERNAL MEDICINE

## 2019-07-07 PROCEDURE — 1200000000 HC SEMI PRIVATE

## 2019-07-07 PROCEDURE — 2580000003 HC RX 258: Performed by: INTERNAL MEDICINE

## 2019-07-07 PROCEDURE — 82962 GLUCOSE BLOOD TEST: CPT

## 2019-07-07 RX ADMIN — Medication 10 ML: at 20:57

## 2019-07-07 RX ADMIN — LISINOPRIL 5 MG: 5 TABLET ORAL at 09:00

## 2019-07-07 RX ADMIN — DRONABINOL 2.5 MG: 2.5 CAPSULE ORAL at 09:00

## 2019-07-07 RX ADMIN — METFORMIN HYDROCHLORIDE 500 MG: 500 TABLET ORAL at 09:00

## 2019-07-07 RX ADMIN — LEVOTHYROXINE SODIUM 50 MCG: 50 TABLET ORAL at 06:28

## 2019-07-07 RX ADMIN — METFORMIN HYDROCHLORIDE 500 MG: 500 TABLET ORAL at 20:54

## 2019-07-07 RX ADMIN — ATORVASTATIN CALCIUM 20 MG: 10 TABLET, FILM COATED ORAL at 20:51

## 2019-07-07 RX ADMIN — DRONABINOL 2.5 MG: 2.5 CAPSULE ORAL at 20:53

## 2019-07-07 RX ADMIN — TRAZODONE HYDROCHLORIDE 200 MG: 50 TABLET ORAL at 20:54

## 2019-07-07 RX ADMIN — DULOXETINE HYDROCHLORIDE 60 MG: 60 CAPSULE, DELAYED RELEASE ORAL at 09:00

## 2019-07-07 RX ADMIN — LINACLOTIDE 145 MCG: 145 CAPSULE, GELATIN COATED ORAL at 06:28

## 2019-07-07 RX ADMIN — Medication 10 ML: at 09:00

## 2019-07-07 RX ADMIN — INSULIN GLARGINE 10 UNITS: 100 INJECTION, SOLUTION SUBCUTANEOUS at 20:56

## 2019-07-07 RX ADMIN — ARIPIPRAZOLE 15 MG: 15 TABLET ORAL at 09:00

## 2019-07-07 ASSESSMENT — PAIN SCALES - GENERAL
PAINLEVEL_OUTOF10: 0

## 2019-07-08 ENCOUNTER — ANESTHESIA EVENT (OUTPATIENT)
Dept: ENDOSCOPY | Age: 69
DRG: 682 | End: 2019-07-08
Payer: MEDICARE

## 2019-07-08 ENCOUNTER — ANESTHESIA (OUTPATIENT)
Dept: ENDOSCOPY | Age: 69
DRG: 682 | End: 2019-07-08
Payer: MEDICARE

## 2019-07-08 VITALS
OXYGEN SATURATION: 95 % | SYSTOLIC BLOOD PRESSURE: 110 MMHG | RESPIRATION RATE: 28 BRPM | DIASTOLIC BLOOD PRESSURE: 65 MMHG

## 2019-07-08 LAB
METER GLUCOSE: 137 MG/DL (ref 74–99)
METER GLUCOSE: 142 MG/DL (ref 74–99)
METER GLUCOSE: 155 MG/DL (ref 74–99)
METER GLUCOSE: 157 MG/DL (ref 74–99)

## 2019-07-08 PROCEDURE — 3609018000 HC EGD ESOPHAGOGASTRODUODENOSCOPY PEG TUBE INSERTION: Performed by: SURGERY

## 2019-07-08 PROCEDURE — 2709999900 HC NON-CHARGEABLE SUPPLY: Performed by: SURGERY

## 2019-07-08 PROCEDURE — 6360000002 HC RX W HCPCS: Performed by: NURSE ANESTHETIST, CERTIFIED REGISTERED

## 2019-07-08 PROCEDURE — 2580000003 HC RX 258: Performed by: NURSE ANESTHETIST, CERTIFIED REGISTERED

## 2019-07-08 PROCEDURE — 82962 GLUCOSE BLOOD TEST: CPT

## 2019-07-08 PROCEDURE — 0DH68UZ INSERTION OF FEEDING DEVICE INTO STOMACH, VIA NATURAL OR ARTIFICIAL OPENING ENDOSCOPIC: ICD-10-PCS | Performed by: SURGERY

## 2019-07-08 PROCEDURE — 97535 SELF CARE MNGMENT TRAINING: CPT

## 2019-07-08 PROCEDURE — 6370000000 HC RX 637 (ALT 250 FOR IP): Performed by: INTERNAL MEDICINE

## 2019-07-08 PROCEDURE — 7100000001 HC PACU RECOVERY - ADDTL 15 MIN: Performed by: SURGERY

## 2019-07-08 PROCEDURE — 7100000000 HC PACU RECOVERY - FIRST 15 MIN: Performed by: SURGERY

## 2019-07-08 PROCEDURE — 3700000001 HC ADD 15 MINUTES (ANESTHESIA): Performed by: SURGERY

## 2019-07-08 PROCEDURE — 2580000003 HC RX 258: Performed by: INTERNAL MEDICINE

## 2019-07-08 PROCEDURE — 2580000003 HC RX 258: Performed by: SURGERY

## 2019-07-08 PROCEDURE — 3700000000 HC ANESTHESIA ATTENDED CARE: Performed by: SURGERY

## 2019-07-08 PROCEDURE — 2500000003 HC RX 250 WO HCPCS: Performed by: SURGERY

## 2019-07-08 PROCEDURE — 99221 1ST HOSP IP/OBS SF/LOW 40: CPT | Performed by: PSYCHIATRY & NEUROLOGY

## 2019-07-08 PROCEDURE — 1200000000 HC SEMI PRIVATE

## 2019-07-08 RX ORDER — CEFAZOLIN SODIUM 1 G/3ML
INJECTION, POWDER, FOR SOLUTION INTRAMUSCULAR; INTRAVENOUS PRN
Status: DISCONTINUED | OUTPATIENT
Start: 2019-07-08 | End: 2019-07-08 | Stop reason: SDUPTHER

## 2019-07-08 RX ORDER — LIDOCAINE HYDROCHLORIDE 10 MG/ML
INJECTION, SOLUTION INFILTRATION; PERINEURAL PRN
Status: DISCONTINUED | OUTPATIENT
Start: 2019-07-08 | End: 2019-07-08 | Stop reason: ALTCHOICE

## 2019-07-08 RX ORDER — PROPOFOL 10 MG/ML
INJECTION, EMULSION INTRAVENOUS PRN
Status: DISCONTINUED | OUTPATIENT
Start: 2019-07-08 | End: 2019-07-08 | Stop reason: SDUPTHER

## 2019-07-08 RX ORDER — SODIUM CHLORIDE 9 MG/ML
INJECTION, SOLUTION INTRAVENOUS CONTINUOUS PRN
Status: DISCONTINUED | OUTPATIENT
Start: 2019-07-08 | End: 2019-07-08 | Stop reason: SDUPTHER

## 2019-07-08 RX ADMIN — PROPOFOL 220 MG: 10 INJECTION, EMULSION INTRAVENOUS at 14:14

## 2019-07-08 RX ADMIN — CEFAZOLIN 2000 MG: 1 INJECTION, POWDER, FOR SOLUTION INTRAMUSCULAR; INTRAVENOUS; PARENTERAL at 14:14

## 2019-07-08 RX ADMIN — SODIUM CHLORIDE: 9 INJECTION, SOLUTION INTRAVENOUS at 14:11

## 2019-07-08 RX ADMIN — SODIUM CHLORIDE: 9 INJECTION, SOLUTION INTRAVENOUS at 12:51

## 2019-07-08 RX ADMIN — Medication 10 ML: at 15:43

## 2019-07-08 RX ADMIN — Medication 10 ML: at 21:10

## 2019-07-08 RX ADMIN — LISINOPRIL 5 MG: 5 TABLET ORAL at 09:12

## 2019-07-08 NOTE — ANESTHESIA PRE PROCEDURE
injection 10 mL  10 mL Intravenous PRN sEequiel Ziegler MD        magnesium hydroxide (MILK OF MAGNESIA) 400 MG/5ML suspension 30 mL  30 mL Oral Daily PRN Esequiel Ziegler MD        ondansetron [facility]) injection 4 mg  4 mg Intravenous Q6H PRN Esequiel Ziegler MD        glucose (GLUTOSE) 40 % oral gel 15 g  15 g Oral PRN Esequiel Ziegler MD        dextrose 50 % IV solution  12.5 g Intravenous PRN Esequiel Ziegler MD   12.5 g at 07/04/19 0630    glucagon (rDNA) injection 1 mg  1 mg Intramuscular PRN Esequiel Ziegler MD        dextrose 5 % solution  100 mL/hr Intravenous PRN Esequiel Ziegler MD        perflutren lipid microspheres (DEFINITY) injection 1.65 mg  1.5 mL Intravenous ONCE PRN Esequiel Ziegler MD        insulin lispro (HUMALOG) injection vial 0-18 Units  0-18 Units Subcutaneous TID WC Esequiel Ziegler MD   3 Units at 07/05/19 1740       Allergies:  No Known Allergies    Problem List:    Patient Active Problem List   Diagnosis Code    Midline low back pain with sciatica M54.40    Lumbar degenerative disc disease M51.36    Suicidal ideation R45.851    Altered mental status R41.82    Hypotension I95.9       Past Medical History:        Diagnosis Date    Anemia     Anxiety disorder     Chronic back pain 01/2018    6/10 on the pain scale    Depression     Diabetes mellitus ([address]     Hyperlipidemia     Hypertension     Neurogenic bowel     Neuromuscular dysfunction of bladder     Obstructive sleep apnea     Radiculopathy of lumbar region     Spinal cord infarction ([address]     Suicidal ideations     Vitamin D deficiency        Past Surgical History:        Procedure Laterality Date    BACK SURGERY         Social History:    Social History     Tobacco Use    Smoking status: Former Smoker     Packs/day: 0.50    Smokeless tobacco: Never Used   Substance Use Topics    Alcohol use:  No                                Counseling given: Not Answered      Vital Signs (Current):   Vitals:    07/07/19 1537 07/07/19 2326 07/08/19 0600 07/08/19 0740   BP: (!) 155/79 139/84  (!) 155/78   Pulse: 106 99  81   Resp: 16 16  16   Temp: 96.7 °F (35.9 °C) 96.6 °F (35.9 °C)  97 °F (36.1 °C)   TempSrc: Temporal Temporal  Temporal   SpO2: 96% 98%  97%   Weight:   127 lb 6 oz (57.8 kg)    Height:                                                  BP Readings from Last 3 Encounters:   07/08/19 (!) 155/78   06/29/19 113/69   04/03/19 (!) 143/86       NPO Status:                                                                                 BMI:   Wt Readings from Last 3 Encounters:   07/08/19 127 lb 6 oz (57.8 kg)   06/28/19 129 lb (58.5 kg)   04/03/19 160 lb (72.6 kg)     Body mass index is 21.86 kg/m². CBC:   Lab Results   Component Value Date    WBC 6.2 07/03/2019    RBC 4.03 07/03/2019    HGB 10.6 07/03/2019    HCT 33.9 07/04/2019    MCV 83.1 07/03/2019    RDW 14.6 07/03/2019     07/03/2019       CMP:   Lab Results   Component Value Date     07/04/2019    K 3.7 07/04/2019    K 3.0 07/03/2019     07/04/2019    CO2 26 07/04/2019    BUN 5 07/04/2019    CREATININE 0.8 07/04/2019    GFRAA >60 07/04/2019    LABGLOM >60 07/04/2019    GLUCOSE 128 07/04/2019    PROT 5.8 07/03/2019    CALCIUM 9.0 07/04/2019    BILITOT 0.3 07/03/2019    ALKPHOS 57 07/03/2019    AST 10 07/03/2019    ALT 7 07/03/2019       POC Tests: No results for input(s): POCGLU, POCNA, POCK, POCCL, POCBUN, POCHEMO, POCHCT in the last 72 hours.     Coags: No results found for: PROTIME, INR, APTT    HCG (If Applicable): No results found for: PREGTESTUR, PREGSERUM, HCG, HCGQUANT     ABGs: No results found for: PHART, PO2ART, SRT9DLX, VLV1VWV, BEART, V0MFEPHC     Type & Screen (If Applicable):  No results found for: LABABO, [address]    Anesthesia Evaluation  Patient summary reviewed  Airway:         Dental:          Pulmonary:   (+) sleep apnea (Obstructive.):                            ROS

## 2019-07-08 NOTE — PLAN OF CARE
Problem: Malnutrition  (NI-5.2)  Goal: Food and/or Nutrient Delivery  Description When TF able to be intiated reccomending Diabetic 1.5 @ 40ml/hr x 23 hrs w/ 1 hr hold for synthroid- 100cc flush q4h if flush rec needed. Individualized approach for food/nutrient provision.   Outcome: Met This Shift

## 2019-07-08 NOTE — PROGRESS NOTES
GENERAL SURGERY  DAILY PROGRESS NOTE  7/8/2019    Subjective:  No issues overnight. Patient confused this am, refusing to wear hospital gown or clothing.      Objective:  BP (!) 155/78   Pulse 81   Temp 97 °F (36.1 °C) (Temporal)   Resp 16   Ht 5' 4\" (1.626 m)   Wt 127 lb 6 oz (57.8 kg)   SpO2 97%   BMI 21.86 kg/m²     GENERAL:  Laying in bed, awake, alert, cooperative, no apparent distress  HEAD: Normocephalic, atraumatic  EYES: No sclera icterus, pupils equal  LUNGS:  No increased work of breathing  CARDIOVASCULAR:  Reg rate  ABDOMEN:  Soft, non-tender, non-distended  EXTREMITIES: No edema or swelling  SKIN: Warm and dry    Assessment/Plan:  79 y.o. female with AMS, protein calorie malnutrition  - Will proceed with PEG placement this am  - NPO      Electronically signed by Shaniqua Lopez MD on 7/8/2019 at 8:31 AM

## 2019-07-08 NOTE — OP NOTE
Op Note    Trever Soares  YOB: 1950  [id number]    DATE OF PROCEDURE: 7/8/2019    SURGEON: Adella Mohs, M.D. Gwendolynn Jacks: None    PREOPERATIVE DIAGNOSIS: Malnutrition  . POSTOPERATIVE DIAGNOSIS: Same    OPERATION: EGD with PEG placement. ANESTHESIA: LMAC. ESTIMATED BLOOD LOSS: None    COMPLICATIONS: None. SPECIMENS: None. DESCRIPTION OF PROCEDURE: The patient was taken to the endoscopy suite and  placed on the endoscopy table in a supine position. LMAC anesthesia was  administered. A bite block was inserted. A lubricated gastroscope was inserted through the oropharynx and advanced  under direct vision to the esophagus and then the stomach. The stomach was  insufflated. The anterior abdominal wall was transilluminated. This area  was marked, prepped, and draped. Local anesthetic was injected. An 11 blade  was used to make a transverse incision. A snare forceps was inserted through the  gastroscope and deployed into the gastric lumen. An angiocatheter was  inserted through the incision and inserted into the gastric lumen under  direct vision. A wire was inserted through the angiocatheter and grasped with  a snare. The gastroscope, snare, and wire were then retrieved. The wire was connected to the gastrostomy tube and then pulled through the esophagus, stomach, and out through the anterior abdominal wall. The gastroscope was reintroduced into the stomach. The PEG tube was seen in good position without evidence of bleeding. The gastroscope was removed. The PEG tube was fit with a bumper and feeding port and connected to gravity. A binder was applied. The patient tolerated the procedure well.     Gordon Rich MD  7/8/2019  2:24 PM

## 2019-07-09 LAB
METER GLUCOSE: 156 MG/DL (ref 74–99)
METER GLUCOSE: 178 MG/DL (ref 74–99)
METER GLUCOSE: 183 MG/DL (ref 74–99)
METER GLUCOSE: 74 MG/DL (ref 74–99)

## 2019-07-09 PROCEDURE — 97535 SELF CARE MNGMENT TRAINING: CPT

## 2019-07-09 PROCEDURE — 1200000000 HC SEMI PRIVATE

## 2019-07-09 PROCEDURE — 6370000000 HC RX 637 (ALT 250 FOR IP): Performed by: SURGERY

## 2019-07-09 PROCEDURE — 97110 THERAPEUTIC EXERCISES: CPT

## 2019-07-09 PROCEDURE — 82962 GLUCOSE BLOOD TEST: CPT

## 2019-07-09 PROCEDURE — 2580000003 HC RX 258: Performed by: SURGERY

## 2019-07-09 PROCEDURE — 6370000000 HC RX 637 (ALT 250 FOR IP): Performed by: INTERNAL MEDICINE

## 2019-07-09 RX ADMIN — TRAZODONE HYDROCHLORIDE 100 MG: 50 TABLET ORAL at 21:43

## 2019-07-09 RX ADMIN — DULOXETINE HYDROCHLORIDE 60 MG: 60 CAPSULE, DELAYED RELEASE ORAL at 10:15

## 2019-07-09 RX ADMIN — LISINOPRIL 5 MG: 5 TABLET ORAL at 10:15

## 2019-07-09 RX ADMIN — INSULIN LISPRO 3 UNITS: 100 INJECTION, SOLUTION INTRAVENOUS; SUBCUTANEOUS at 18:02

## 2019-07-09 RX ADMIN — APIXABAN 5 MG: 5 TABLET, FILM COATED ORAL at 21:43

## 2019-07-09 RX ADMIN — METFORMIN HYDROCHLORIDE 500 MG: 500 TABLET ORAL at 10:15

## 2019-07-09 RX ADMIN — INSULIN GLARGINE 10 UNITS: 100 INJECTION, SOLUTION SUBCUTANEOUS at 21:45

## 2019-07-09 RX ADMIN — INSULIN LISPRO 3 UNITS: 100 INJECTION, SOLUTION INTRAVENOUS; SUBCUTANEOUS at 01:14

## 2019-07-09 RX ADMIN — Medication 10 ML: at 21:50

## 2019-07-09 RX ADMIN — METFORMIN HYDROCHLORIDE 500 MG: 500 TABLET ORAL at 21:43

## 2019-07-09 RX ADMIN — INSULIN LISPRO 3 UNITS: 100 INJECTION, SOLUTION INTRAVENOUS; SUBCUTANEOUS at 12:01

## 2019-07-09 RX ADMIN — ARIPIPRAZOLE 15 MG: 15 TABLET ORAL at 10:16

## 2019-07-09 RX ADMIN — LEVOTHYROXINE SODIUM 50 MCG: 50 TABLET ORAL at 06:50

## 2019-07-09 RX ADMIN — ATORVASTATIN CALCIUM 20 MG: 10 TABLET, FILM COATED ORAL at 21:43

## 2019-07-09 RX ADMIN — APIXABAN 5 MG: 5 TABLET, FILM COATED ORAL at 10:15

## 2019-07-09 RX ADMIN — LINACLOTIDE 145 MCG: 145 CAPSULE, GELATIN COATED ORAL at 06:50

## 2019-07-09 ASSESSMENT — PAIN SCALES - GENERAL: PAINLEVEL_OUTOF10: 0

## 2019-07-09 NOTE — PROGRESS NOTES
A  Min cues for sequencing and technique required. Supine to sit: Moderate Assist   Sit to supine: Moderate Assist    Functional Transfers NT  --     Functional Mobility NT  --     Balance Sitting:     Static: SBA/ CGA    Dynamic:min A  Standing: NT  Sitting:     Static: SBA    Dynamic:Min A  Standing: NT    Pt tolerated sitting at EOB x10 minutes with fair tolerance. Mild LOB x1 requiring Min A to recover.     Activity Tolerance F- Fair-   F+   Visual/  Perceptual wfl            Comments: Upon arrival pt in supine with HOB elevated. Pt educated on transfer/mobility safety, adaptive techniques for ADL tasks, body mechanics, posture, safety and benefits of increasing activity. Pt verbalized/demonstrated limited understanding, recommend continued education. At end of session pt returned to supine with HOB elevated and pillow between knees for pressure relief and to maintain skin integrity with all lines and tubes intact, call light within reach and bed alarm activated. · Pt has made limited progress towards set goals.    · Continue with current plan of care    Time in: [address] [facility]  Time out:1014  Total Tx Time: [address] [facility], [place] BROWN/L [address]

## 2019-07-09 NOTE — PLAN OF CARE
Problem: Falls - Risk of:  Goal: Will remain free from falls  Description  Will remain free from falls  Outcome: Met This Shift  Goal: Absence of physical injury  Description  Absence of physical injury  Outcome: Met This Shift     Problem: Safety:  Goal: Free from accidental physical injury  Description  Free from accidental physical injury  Outcome: Met This Shift     Problem: Nausea/Vomiting:  Goal: Absence of nausea/vomiting  Description  Absence of nausea/vomiting  Outcome: Met This Shift     Problem: Mental Status - Impaired:  Goal: Mental status will improve  Description  Mental status will improve  Outcome: Ongoing

## 2019-07-10 ENCOUNTER — APPOINTMENT (OUTPATIENT)
Dept: GENERAL RADIOLOGY | Age: 69
DRG: 682 | End: 2019-07-10
Payer: MEDICARE

## 2019-07-10 LAB
METER GLUCOSE: 105 MG/DL (ref 74–99)
METER GLUCOSE: 143 MG/DL (ref 74–99)
METER GLUCOSE: 173 MG/DL (ref 74–99)
METER GLUCOSE: 213 MG/DL (ref 74–99)

## 2019-07-10 PROCEDURE — 82962 GLUCOSE BLOOD TEST: CPT

## 2019-07-10 PROCEDURE — 74018 RADEX ABDOMEN 1 VIEW: CPT

## 2019-07-10 PROCEDURE — 6370000000 HC RX 637 (ALT 250 FOR IP): Performed by: SURGERY

## 2019-07-10 PROCEDURE — 6370000000 HC RX 637 (ALT 250 FOR IP): Performed by: FAMILY MEDICINE

## 2019-07-10 PROCEDURE — 6370000000 HC RX 637 (ALT 250 FOR IP): Performed by: INTERNAL MEDICINE

## 2019-07-10 PROCEDURE — 2580000003 HC RX 258: Performed by: SURGERY

## 2019-07-10 PROCEDURE — 6360000004 HC RX CONTRAST MEDICATION: Performed by: INTERNAL MEDICINE

## 2019-07-10 PROCEDURE — 1200000000 HC SEMI PRIVATE

## 2019-07-10 RX ORDER — TRAZODONE HYDROCHLORIDE 50 MG/1
200 TABLET ORAL NIGHTLY
Status: CANCELLED | OUTPATIENT
Start: 2019-07-10

## 2019-07-10 RX ORDER — INSULIN GLARGINE 100 [IU]/ML
15 INJECTION, SOLUTION SUBCUTANEOUS NIGHTLY
Status: DISCONTINUED | OUTPATIENT
Start: 2019-07-10 | End: 2019-07-11 | Stop reason: HOSPADM

## 2019-07-10 RX ORDER — DEXTROSE MONOHYDRATE 25 G/50ML
12.5 INJECTION, SOLUTION INTRAVENOUS PRN
Status: CANCELLED | OUTPATIENT
Start: 2019-07-10

## 2019-07-10 RX ORDER — LISINOPRIL 5 MG/1
5 TABLET ORAL DAILY
Status: CANCELLED | OUTPATIENT
Start: 2019-07-11

## 2019-07-10 RX ORDER — TRAZODONE HYDROCHLORIDE 50 MG/1
200 TABLET ORAL NIGHTLY
Status: DISCONTINUED | OUTPATIENT
Start: 2019-07-10 | End: 2019-07-11 | Stop reason: HOSPADM

## 2019-07-10 RX ORDER — ARIPIPRAZOLE 15 MG/1
15 TABLET ORAL DAILY
Status: DISCONTINUED | OUTPATIENT
Start: 2019-07-10 | End: 2019-07-11 | Stop reason: HOSPADM

## 2019-07-10 RX ORDER — METOCLOPRAMIDE 5 MG/1
5 TABLET ORAL ONCE
Status: COMPLETED | OUTPATIENT
Start: 2019-07-10 | End: 2019-07-10

## 2019-07-10 RX ORDER — TRAZODONE HYDROCHLORIDE 50 MG/1
200 TABLET ORAL ONCE
Status: COMPLETED | OUTPATIENT
Start: 2019-07-10 | End: 2019-07-10

## 2019-07-10 RX ORDER — DEXTROSE MONOHYDRATE 50 MG/ML
100 INJECTION, SOLUTION INTRAVENOUS PRN
Status: CANCELLED | OUTPATIENT
Start: 2019-07-10

## 2019-07-10 RX ORDER — ARIPIPRAZOLE 15 MG/1
15 TABLET ORAL DAILY
Status: CANCELLED | OUTPATIENT
Start: 2019-07-11

## 2019-07-10 RX ORDER — LEVOTHYROXINE SODIUM 0.05 MG/1
50 TABLET ORAL DAILY
Status: CANCELLED | OUTPATIENT
Start: 2019-07-11

## 2019-07-10 RX ORDER — DULOXETIN HYDROCHLORIDE 60 MG/1
60 CAPSULE, DELAYED RELEASE ORAL DAILY
Status: CANCELLED | OUTPATIENT
Start: 2019-07-11

## 2019-07-10 RX ORDER — ATORVASTATIN CALCIUM 10 MG/1
20 TABLET, FILM COATED ORAL DAILY
Status: CANCELLED | OUTPATIENT
Start: 2019-07-10

## 2019-07-10 RX ORDER — NICOTINE POLACRILEX 4 MG
15 LOZENGE BUCCAL PRN
Status: CANCELLED | OUTPATIENT
Start: 2019-07-10

## 2019-07-10 RX ORDER — INSULIN GLARGINE 100 [IU]/ML
15 INJECTION, SOLUTION SUBCUTANEOUS NIGHTLY
Status: CANCELLED | OUTPATIENT
Start: 2019-07-10

## 2019-07-10 RX ADMIN — TRAZODONE HYDROCHLORIDE 200 MG: 50 TABLET ORAL at 21:55

## 2019-07-10 RX ADMIN — METFORMIN HYDROCHLORIDE 500 MG: 500 TABLET ORAL at 21:56

## 2019-07-10 RX ADMIN — APIXABAN 5 MG: 5 TABLET, FILM COATED ORAL at 21:55

## 2019-07-10 RX ADMIN — ATORVASTATIN CALCIUM 20 MG: 10 TABLET, FILM COATED ORAL at 21:56

## 2019-07-10 RX ADMIN — Medication 10 ML: at 10:56

## 2019-07-10 RX ADMIN — INSULIN GLARGINE 15 UNITS: 100 INJECTION, SOLUTION SUBCUTANEOUS at 21:51

## 2019-07-10 RX ADMIN — Medication 10 ML: at 21:57

## 2019-07-10 RX ADMIN — TRAZODONE HYDROCHLORIDE 200 MG: 50 TABLET ORAL at 04:46

## 2019-07-10 RX ADMIN — LEVOTHYROXINE SODIUM 50 MCG: 50 TABLET ORAL at 06:24

## 2019-07-10 RX ADMIN — DIATRIZOATE MEGLUMINE AND DIATRIZOATE SODIUM 30 ML: 600; 100 SOLUTION ORAL; RECTAL at 09:32

## 2019-07-10 RX ADMIN — APIXABAN 5 MG: 5 TABLET, FILM COATED ORAL at 10:42

## 2019-07-10 RX ADMIN — DULOXETINE HYDROCHLORIDE 60 MG: 60 CAPSULE, DELAYED RELEASE ORAL at 10:42

## 2019-07-10 RX ADMIN — INSULIN LISPRO 6 UNITS: 100 INJECTION, SOLUTION INTRAVENOUS; SUBCUTANEOUS at 06:32

## 2019-07-10 RX ADMIN — METOCLOPRAMIDE HYDROCHLORIDE 5 MG: 5 TABLET ORAL at 10:42

## 2019-07-10 RX ADMIN — LISINOPRIL 5 MG: 5 TABLET ORAL at 10:42

## 2019-07-10 RX ADMIN — METFORMIN HYDROCHLORIDE 500 MG: 500 TABLET ORAL at 10:42

## 2019-07-10 RX ADMIN — ARIPIPRAZOLE 15 MG: 15 TABLET ORAL at 10:42

## 2019-07-10 RX ADMIN — INSULIN LISPRO 3 UNITS: 100 INJECTION, SOLUTION INTRAVENOUS; SUBCUTANEOUS at 18:52

## 2019-07-10 ASSESSMENT — PAIN SCALES - GENERAL: PAINLEVEL_OUTOF10: 0

## 2019-07-10 NOTE — ED NOTES
JESS AWARE OF PATIENT NEED FOR PSYCH BED. WAITING FOR POSSIBLE BED AVAILABILITY ON 7W. DUE TO PATIENT MEDICAL ACUITY, PATIENT NOT TO BE REFERRED TO [facility] FOR ALTERNATIVE PLACEMENT.      Clinton Herr, ELICEO, LSW  07/10/19 4987

## 2019-07-10 NOTE — PLAN OF CARE
Problem: Tissue Perfusion - Cardiopulmonary, Altered:  Goal: Absence of angina  Description  Absence of angina  Outcome: Met This Shift  Goal: Hemodynamic stability will improve  Description  Hemodynamic stability will improve  Outcome: Met This Shift     Problem: Falls - Risk of:  Goal: Will remain free from falls  Description  Will remain free from falls  7/10/2019 1536 by Sunshine Ha RN  Outcome: Met This Shift  7/10/2019 0325 by Andrea Paul RN  Outcome: Met This Shift  Goal: Absence of physical injury  Description  Absence of physical injury  7/10/2019 1536 by Sunshine Ha RN  Outcome: Met This Shift  7/10/2019 0325 by Andrea Paul RN  Outcome: Met This Shift     Problem: Risk for Impaired Skin Integrity  Goal: Tissue integrity - skin and mucous membranes  Description  Structural intactness and normal physiological function of skin and  mucous membranes.   Outcome: Met This Shift     Problem: Safety:  Goal: Free from accidental physical injury  Description  Free from accidental physical injury  7/10/2019 1536 by Sunshine Ha RN  Outcome: Met This Shift  7/10/2019 0325 by Andrea aPul RN  Outcome: Met This Shift     Problem: Daily Care:  Goal: Daily care needs are met  Description  Daily care needs are met  Outcome: Met This Shift     Problem: Nausea/Vomiting:  Goal: Able to drink  Description  Able to drink  7/10/2019 0325 by Andrea Paul RN  Outcome: Met This Shift     Problem: Mental Status - Impaired:  Goal: Mental status will improve  Description  Mental status will improve  7/10/2019 1536 by Sunshine Ha RN  Outcome: Ongoing  7/10/2019 0325 by Andrea Paul RN  Outcome: Met This Shift     Problem: Fluid Volume - Imbalance:  Goal: Absence of imbalanced fluid volume signs and symptoms  Description  Absence of imbalanced fluid volume signs and symptoms  7/10/2019 1536 by Sunshine Ha RN  Outcome: Ongoing  7/10/2019 0325 by Andrea Paul RN  Outcome: Not Met This Shift Problem: Neurological  Goal: Maximum potential motor/sensory/cognitive function  7/10/2019 1536 by Kaye Reyes RN  Outcome: Ongoing  7/10/2019 0325 by Kenyatta Scanlon RN  Outcome: Not Met This Shift

## 2019-07-10 NOTE — PLAN OF CARE
Problem: Mental Status - Impaired:  Goal: Mental status will improve  Description  Mental status will improve  7/10/2019 1950 by Victor Manuel Castillo RN  Outcome: Met This Shift  7/10/2019 1536 by Elaine Gore RN  Outcome: Ongoing     Problem: Discharge Planning:  Goal: Discharged to appropriate level of care  Description  Discharged to appropriate level of care  Outcome: Met This Shift     Problem: Tissue Perfusion - Cardiopulmonary, Altered:  Goal: Absence of angina  Description  Absence of angina  7/10/2019 1536 by Elaine Gore RN  Outcome: Met This Shift  Goal: Hemodynamic stability will improve  Description  Hemodynamic stability will improve  7/10/2019 1536 by Elaine Gore RN  Outcome: Met This Shift     Problem: Fluid Volume - Imbalance:  Goal: Absence of imbalanced fluid volume signs and symptoms  Description  Absence of imbalanced fluid volume signs and symptoms  7/10/2019 1950 by Victor Manuel Castillo RN  Outcome: Met This Shift  7/10/2019 1536 by Elaine Gore RN  Outcome: Ongoing

## 2019-07-10 NOTE — PROGRESS NOTES
Department of Internal Yessy Keen M.D.  Progress Note      SUBJECTIVE:  Nausea this morning; and emesis    OBJECTIVE  abd distended    Medications    Current Facility-Administered Medications: apixaban (ELIQUIS) tablet 5 mg, 5 mg, Per G Tube, BID  ARIPiprazole (ABILIFY) tablet 15 mg, 15 mg, PEG Tube, Daily  metFORMIN (GLUCOPHAGE) tablet 500 mg, 500 mg, PEG Tube, BID  traZODone (DESYREL) tablet 200 mg, 200 mg, PEG Tube, Nightly  metoclopramide (REGLAN) tablet 5 mg, 5 mg, PEG Tube, 4x Daily AC & HS  insulin glargine (LANTUS) injection vial 15 Units, 15 Units, Subcutaneous, Nightly  insulin lispro (HUMALOG) injection vial 0-18 Units, 0-18 Units, Subcutaneous, Q6H  lisinopril (PRINIVIL;ZESTRIL) tablet 5 mg, 5 mg, Oral, Q12H PRN  heparin flush 100 UNIT/ML injection 100 Units, 100 Units, Intracatheter, PRN  DULoxetine (CYMBALTA) extended release capsule 60 mg, 60 mg, Oral, Daily  magnesium sulfate 1 g in dextrose 5% 100 mL IVPB, 1 g, Intravenous, PRN  atorvastatin (LIPITOR) tablet 20 mg, 20 mg, Oral, Daily  levothyroxine (SYNTHROID) tablet 50 mcg, 50 mcg, Oral, Daily  lisinopril (PRINIVIL;ZESTRIL) tablet 5 mg, 5 mg, Oral, Daily  sodium chloride flush 0.9 % injection 10 mL, 10 mL, Intravenous, 2 times per day  sodium chloride flush 0.9 % injection 10 mL, 10 mL, Intravenous, PRN  magnesium hydroxide (MILK OF MAGNESIA) 400 MG/5ML suspension 30 mL, 30 mL, Oral, Daily PRN  ondansetron (ZOFRAN) injection 4 mg, 4 mg, Intravenous, Q6H PRN  glucose (GLUTOSE) 40 % oral gel 15 g, 15 g, Oral, PRN  dextrose 50 % IV solution, 12.5 g, Intravenous, PRN  glucagon (rDNA) injection 1 mg, 1 mg, Intramuscular, PRN  dextrose 5 % solution, 100 mL/hr, Intravenous, PRN  perflutren lipid microspheres (DEFINITY) injection 1.65 mg, 1.5 mL, Intravenous, ONCE PRN  Physical    VITALS:  BP (!) 148/79   Pulse 110   Temp 97.8 °F (36.6 °C) (Temporal)   Resp 16   Ht 5' 4\" (1.626 m)   Wt 130 lb 3.2 oz (59.1 kg)

## 2019-07-10 NOTE — CARE COORDINATION
Just received a call from Dr Omar Bragg to the rn and I was notified that the patient is medically stable to go to Psych today . I called the tobias and I told them that she is medically cleared for psych today and they are unsure of having a bed today  And will let us know. At this tijme they have no beds on psych.  Thanks Currensee-Jenny

## 2019-07-11 ENCOUNTER — HOSPITAL ENCOUNTER (INPATIENT)
Age: 69
LOS: 6 days | Discharge: NURSING, REHAB OR LONG-TERM CARE | DRG: 885 | End: 2019-07-17
Attending: PSYCHIATRY & NEUROLOGY | Admitting: PSYCHIATRY & NEUROLOGY
Payer: MEDICARE

## 2019-07-11 VITALS
HEIGHT: 64 IN | SYSTOLIC BLOOD PRESSURE: 133 MMHG | TEMPERATURE: 99.5 F | OXYGEN SATURATION: 97 % | WEIGHT: 130.2 LBS | BODY MASS INDEX: 22.23 KG/M2 | DIASTOLIC BLOOD PRESSURE: 80 MMHG | HEART RATE: 109 BPM | RESPIRATION RATE: 16 BRPM

## 2019-07-11 PROBLEM — Z86.59 HISTORY OF MAJOR DEPRESSION: Status: ACTIVE | Noted: 2019-07-11

## 2019-07-11 LAB
METER GLUCOSE: 106 MG/DL (ref 74–99)
METER GLUCOSE: 127 MG/DL (ref 74–99)
METER GLUCOSE: 176 MG/DL (ref 74–99)
METER GLUCOSE: 197 MG/DL (ref 74–99)

## 2019-07-11 PROCEDURE — 6370000000 HC RX 637 (ALT 250 FOR IP): Performed by: INTERNAL MEDICINE

## 2019-07-11 PROCEDURE — 1240000000 HC EMOTIONAL WELLNESS R&B

## 2019-07-11 PROCEDURE — 6370000000 HC RX 637 (ALT 250 FOR IP): Performed by: SURGERY

## 2019-07-11 PROCEDURE — 2580000003 HC RX 258: Performed by: SURGERY

## 2019-07-11 PROCEDURE — 82962 GLUCOSE BLOOD TEST: CPT

## 2019-07-11 RX ORDER — DEXTROSE MONOHYDRATE 50 MG/ML
100 INJECTION, SOLUTION INTRAVENOUS PRN
Status: DISCONTINUED | OUTPATIENT
Start: 2019-07-11 | End: 2019-07-17 | Stop reason: HOSPADM

## 2019-07-11 RX ORDER — LISINOPRIL 5 MG/1
5 TABLET ORAL DAILY
Status: DISCONTINUED | OUTPATIENT
Start: 2019-07-12 | End: 2019-07-17 | Stop reason: HOSPADM

## 2019-07-11 RX ORDER — ATORVASTATIN CALCIUM 40 MG/1
20 TABLET, FILM COATED ORAL DAILY
Status: DISCONTINUED | OUTPATIENT
Start: 2019-07-11 | End: 2019-07-17 | Stop reason: HOSPADM

## 2019-07-11 RX ORDER — NICOTINE POLACRILEX 4 MG
15 LOZENGE BUCCAL PRN
Status: DISCONTINUED | OUTPATIENT
Start: 2019-07-11 | End: 2019-07-17 | Stop reason: HOSPADM

## 2019-07-11 RX ORDER — DEXTROSE MONOHYDRATE 25 G/50ML
12.5 INJECTION, SOLUTION INTRAVENOUS PRN
Status: DISCONTINUED | OUTPATIENT
Start: 2019-07-11 | End: 2019-07-17 | Stop reason: HOSPADM

## 2019-07-11 RX ORDER — ARIPIPRAZOLE 15 MG/1
15 TABLET ORAL DAILY
Status: DISCONTINUED | OUTPATIENT
Start: 2019-07-12 | End: 2019-07-13

## 2019-07-11 RX ORDER — LEVOTHYROXINE SODIUM 0.05 MG/1
50 TABLET ORAL DAILY
Status: DISCONTINUED | OUTPATIENT
Start: 2019-07-12 | End: 2019-07-17 | Stop reason: HOSPADM

## 2019-07-11 RX ORDER — DULOXETIN HYDROCHLORIDE 60 MG/1
60 CAPSULE, DELAYED RELEASE ORAL DAILY
Status: DISCONTINUED | OUTPATIENT
Start: 2019-07-12 | End: 2019-07-17 | Stop reason: HOSPADM

## 2019-07-11 RX ORDER — TRAZODONE HYDROCHLORIDE 50 MG/1
200 TABLET ORAL NIGHTLY
Status: DISCONTINUED | OUTPATIENT
Start: 2019-07-11 | End: 2019-07-17 | Stop reason: HOSPADM

## 2019-07-11 RX ORDER — INSULIN GLARGINE 100 [IU]/ML
15 INJECTION, SOLUTION SUBCUTANEOUS NIGHTLY
Status: DISCONTINUED | OUTPATIENT
Start: 2019-07-11 | End: 2019-07-17 | Stop reason: HOSPADM

## 2019-07-11 RX ADMIN — APIXABAN 5 MG: 5 TABLET, FILM COATED ORAL at 09:36

## 2019-07-11 RX ADMIN — APIXABAN 5 MG: 5 TABLET, FILM COATED ORAL at 23:33

## 2019-07-11 RX ADMIN — DULOXETINE HYDROCHLORIDE 60 MG: 60 CAPSULE, DELAYED RELEASE ORAL at 09:37

## 2019-07-11 RX ADMIN — LISINOPRIL 5 MG: 5 TABLET ORAL at 09:37

## 2019-07-11 RX ADMIN — INSULIN LISPRO 3 UNITS: 100 INJECTION, SOLUTION INTRAVENOUS; SUBCUTANEOUS at 19:35

## 2019-07-11 RX ADMIN — ARIPIPRAZOLE 15 MG: 15 TABLET ORAL at 09:36

## 2019-07-11 RX ADMIN — LEVOTHYROXINE SODIUM 50 MCG: 50 TABLET ORAL at 06:20

## 2019-07-11 RX ADMIN — Medication 10 ML: at 09:37

## 2019-07-11 RX ADMIN — METFORMIN HYDROCHLORIDE 500 MG: 500 TABLET ORAL at 09:37

## 2019-07-11 RX ADMIN — ATORVASTATIN CALCIUM 20 MG: 40 TABLET, FILM COATED ORAL at 23:37

## 2019-07-11 RX ADMIN — TRAZODONE HYDROCHLORIDE 200 MG: 50 TABLET ORAL at 23:54

## 2019-07-11 RX ADMIN — INSULIN GLARGINE 15 UNITS: 100 INJECTION, SOLUTION SUBCUTANEOUS at 23:56

## 2019-07-11 RX ADMIN — INSULIN LISPRO 3 UNITS: 100 INJECTION, SOLUTION INTRAVENOUS; SUBCUTANEOUS at 06:35

## 2019-07-11 ASSESSMENT — SLEEP AND FATIGUE QUESTIONNAIRES
DIFFICULTY FALLING ASLEEP: YES
RESTFUL SLEEP: YES
DIFFICULTY STAYING ASLEEP: YES
DO YOU USE A SLEEP AID: YES
SLEEP PATTERN: DIFFICULTY FALLING ASLEEP;DIFFICULTY ARISING;DISTURBED/INTERRUPTED SLEEP;RESTLESSNESS
AVERAGE NUMBER OF SLEEP HOURS: 5
DIFFICULTY ARISING: NO
DO YOU HAVE DIFFICULTY SLEEPING: YES

## 2019-07-11 ASSESSMENT — LIFESTYLE VARIABLES: HISTORY_ALCOHOL_USE: NO

## 2019-07-11 ASSESSMENT — PAIN SCALES - GENERAL: PAINLEVEL_OUTOF10: 0

## 2019-07-11 ASSESSMENT — PATIENT HEALTH QUESTIONNAIRE - PHQ9: SUM OF ALL RESPONSES TO PHQ QUESTIONS 1-9: 17

## 2019-07-11 NOTE — PROGRESS NOTES
Department of Internal Jontaan Heaton M.D.  Progress Note      SUBJECTIVE:  Resting better    OBJECTIVE  abd soft ;legs contracted    Medications    Current Facility-Administered Medications: apixaban (ELIQUIS) tablet 5 mg, 5 mg, Per G Tube, BID  ARIPiprazole (ABILIFY) tablet 15 mg, 15 mg, PEG Tube, Daily  metFORMIN (GLUCOPHAGE) tablet 500 mg, 500 mg, PEG Tube, BID  traZODone (DESYREL) tablet 200 mg, 200 mg, PEG Tube, Nightly  insulin glargine (LANTUS) injection vial 15 Units, 15 Units, Subcutaneous, Nightly  diatrizoate meglumine-sodium (GASTROGRAFIN) 66-10 % solution 30 mL, 30 mL, Per G Tube, ONCE PRN  insulin lispro (HUMALOG) injection vial 0-18 Units, 0-18 Units, Subcutaneous, Q6H  lisinopril (PRINIVIL;ZESTRIL) tablet 5 mg, 5 mg, Oral, Q12H PRN  heparin flush 100 UNIT/ML injection 100 Units, 100 Units, Intracatheter, PRN  DULoxetine (CYMBALTA) extended release capsule 60 mg, 60 mg, Oral, Daily  magnesium sulfate 1 g in dextrose 5% 100 mL IVPB, 1 g, Intravenous, PRN  atorvastatin (LIPITOR) tablet 20 mg, 20 mg, Oral, Daily  levothyroxine (SYNTHROID) tablet 50 mcg, 50 mcg, Oral, Daily  lisinopril (PRINIVIL;ZESTRIL) tablet 5 mg, 5 mg, Oral, Daily  sodium chloride flush 0.9 % injection 10 mL, 10 mL, Intravenous, 2 times per day  sodium chloride flush 0.9 % injection 10 mL, 10 mL, Intravenous, PRN  magnesium hydroxide (MILK OF MAGNESIA) 400 MG/5ML suspension 30 mL, 30 mL, Oral, Daily PRN  ondansetron (ZOFRAN) injection 4 mg, 4 mg, Intravenous, Q6H PRN  glucose (GLUTOSE) 40 % oral gel 15 g, 15 g, Oral, PRN  dextrose 50 % IV solution, 12.5 g, Intravenous, PRN  glucagon (rDNA) injection 1 mg, 1 mg, Intramuscular, PRN  dextrose 5 % solution, 100 mL/hr, Intravenous, PRN  perflutren lipid microspheres (DEFINITY) injection 1.65 mg, 1.5 mL, Intravenous, ONCE PRN  Physical    VITALS:  /85   Pulse 80   Temp 96.6 °F (35.9 °C) (Temporal)   Resp 16   Ht 5' 4\" (1.626 m)   Wt 130 lb 3.2 oz (59.1 kg)   SpO2 97%   BMI 22.35 kg/m²   24HR INTAKE/OUTPUT:      Intake/Output Summary (Last 24 hours) at 7/11/2019 0658  Last data filed at 7/10/2019 2330  Gross per 24 hour   Intake 800 ml   Output 350 ml   Net 450 ml     LUNGS:  No increased work of breathing, good air exchange, clear to auscultation bilaterally, no crackles or wheezing  CARDIOVASCULAR:  Normal apical impulse, regular rate and rhythm, normal S1 and S2, no S3 or S4, and no murmur noted  Data    CBC with Differential:  Lab Results   Component Value Date    WBC 6.2 07/03/2019    RBC 4.03 07/03/2019    HGB 10.6 07/03/2019    HCT 33.9 07/04/2019     07/03/2019    MCV 83.1 07/03/2019    MCH 26.3 07/03/2019    MCHC 31.6 07/03/2019    RDW 14.6 07/03/2019    LYMPHOPCT 17.6 07/03/2019    MONOPCT 8.3 07/03/2019    BASOPCT 0.3 07/03/2019    MONOSABS 0.51 07/03/2019    LYMPHSABS 1.08 07/03/2019    EOSABS 0.17 07/03/2019    BASOSABS 0.02 07/03/2019     CMP:  Lab Results   Component Value Date     07/04/2019    K 3.7 07/04/2019    K 3.0 07/03/2019     07/04/2019    CO2 26 07/04/2019    BUN 5 07/04/2019    CREATININE 0.8 07/04/2019    GFRAA >60 07/04/2019    LABGLOM >60 07/04/2019    GLUCOSE 128 07/04/2019    PROT 5.8 07/03/2019    LABALBU 3.5 07/03/2019    CALCIUM 9.0 07/04/2019    BILITOT 0.3 07/03/2019    ALKPHOS 57 07/03/2019    AST 10 07/03/2019    ALT 7 07/03/2019     PT/INR:  No results found for: PROTIME, INR    ASSESSMENT AND PLAN      Patient Active Hospital Problem List:   psychotic depression and agitation=better    Protein calorier malnutrition   Better     Mental status more alert with trazodone on board   Dm=2 good control    Electronically signed by Natalya Robin MD on 7/11/2019 at 6:58 AM

## 2019-07-11 NOTE — ED NOTES
[facility] 7[facility]W [facility] Dr [facility] Lad[facility] aware of pending admission, call to Anthony Tavares in admitting, advised of assigned bed 7303, call to  [phone], spoke with [employer] who faxed voluntary and copy of POA to [facility], given bed assignment 7303, advised pt 7-W currently processing an admission, requested pt be transferred approximately 1915, report can be called to [phone].      [address], [place], [place]  07/11/19 0584

## 2019-07-12 PROBLEM — F33.2 SEVERE EPISODE OF RECURRENT MAJOR DEPRESSIVE DISORDER, WITHOUT PSYCHOTIC FEATURES (HCC): Status: ACTIVE | Noted: 2019-07-11

## 2019-07-12 LAB
ALBUMIN SERPL-MCNC: 3.2 G/DL (ref 3.5–5.2)
ALP BLD-CCNC: 58 U/L (ref 35–104)
ALT SERPL-CCNC: 7 U/L (ref 0–32)
ANION GAP SERPL CALCULATED.3IONS-SCNC: 12 MMOL/L (ref 7–16)
AST SERPL-CCNC: 11 U/L (ref 0–31)
BILIRUB SERPL-MCNC: 0.5 MG/DL (ref 0–1.2)
BUN BLDV-MCNC: 14 MG/DL (ref 8–23)
CALCIUM SERPL-MCNC: 9.3 MG/DL (ref 8.6–10.2)
CHLORIDE BLD-SCNC: 100 MMOL/L (ref 98–107)
CHOLESTEROL, TOTAL: 131 MG/DL (ref 0–199)
CO2: 29 MMOL/L (ref 22–29)
CREAT SERPL-MCNC: 0.6 MG/DL (ref 0.5–1)
GFR AFRICAN AMERICAN: >60
GFR NON-AFRICAN AMERICAN: >60 ML/MIN/1.73
GLUCOSE BLD-MCNC: 212 MG/DL (ref 74–99)
HBA1C MFR BLD: 6.3 % (ref 4–5.6)
HCT VFR BLD CALC: 33.9 % (ref 34–48)
HDLC SERPL-MCNC: 46 MG/DL
HEMOGLOBIN: 10.7 G/DL (ref 11.5–15.5)
LDL CHOLESTEROL CALCULATED: 37 MG/DL (ref 0–99)
MCH RBC QN AUTO: 26.9 PG (ref 26–35)
MCHC RBC AUTO-ENTMCNC: 31.6 % (ref 32–34.5)
MCV RBC AUTO: 85.2 FL (ref 80–99.9)
METER GLUCOSE: 165 MG/DL (ref 74–99)
METER GLUCOSE: 175 MG/DL (ref 74–99)
METER GLUCOSE: 184 MG/DL (ref 74–99)
METER GLUCOSE: 208 MG/DL (ref 74–99)
PDW BLD-RTO: 15.6 FL (ref 11.5–15)
PLATELET # BLD: 200 E9/L (ref 130–450)
PMV BLD AUTO: 11.4 FL (ref 7–12)
POTASSIUM SERPL-SCNC: 3.4 MMOL/L (ref 3.5–5)
RBC # BLD: 3.98 E12/L (ref 3.5–5.5)
SODIUM BLD-SCNC: 141 MMOL/L (ref 132–146)
TOTAL PROTEIN: 5.9 G/DL (ref 6.4–8.3)
TRIGL SERPL-MCNC: 242 MG/DL (ref 0–149)
VLDLC SERPL CALC-MCNC: 48 MG/DL
WBC # BLD: 6 E9/L (ref 4.5–11.5)

## 2019-07-12 PROCEDURE — 85027 COMPLETE CBC AUTOMATED: CPT

## 2019-07-12 PROCEDURE — 82962 GLUCOSE BLOOD TEST: CPT

## 2019-07-12 PROCEDURE — 1240000000 HC EMOTIONAL WELLNESS R&B

## 2019-07-12 PROCEDURE — 36415 COLL VENOUS BLD VENIPUNCTURE: CPT

## 2019-07-12 PROCEDURE — 6370000000 HC RX 637 (ALT 250 FOR IP): Performed by: INTERNAL MEDICINE

## 2019-07-12 PROCEDURE — 83036 HEMOGLOBIN GLYCOSYLATED A1C: CPT

## 2019-07-12 PROCEDURE — 80061 LIPID PANEL: CPT

## 2019-07-12 PROCEDURE — 99221 1ST HOSP IP/OBS SF/LOW 40: CPT | Performed by: NURSE PRACTITIONER

## 2019-07-12 PROCEDURE — 80053 COMPREHEN METABOLIC PANEL: CPT

## 2019-07-12 RX ADMIN — TRAZODONE HYDROCHLORIDE 200 MG: 50 TABLET ORAL at 21:39

## 2019-07-12 RX ADMIN — ATORVASTATIN CALCIUM 20 MG: 40 TABLET, FILM COATED ORAL at 21:39

## 2019-07-12 RX ADMIN — APIXABAN 5 MG: 5 TABLET, FILM COATED ORAL at 10:01

## 2019-07-12 RX ADMIN — INSULIN LISPRO 5 UNITS: 100 INJECTION, SOLUTION INTRAVENOUS; SUBCUTANEOUS at 14:50

## 2019-07-12 RX ADMIN — METFORMIN HYDROCHLORIDE 500 MG: 500 TABLET ORAL at 10:01

## 2019-07-12 RX ADMIN — ARIPIPRAZOLE 15 MG: 15 TABLET ORAL at 10:01

## 2019-07-12 RX ADMIN — INSULIN LISPRO 5 UNITS: 100 INJECTION, SOLUTION INTRAVENOUS; SUBCUTANEOUS at 21:40

## 2019-07-12 RX ADMIN — LEVOTHYROXINE SODIUM 50 MCG: 50 TABLET ORAL at 07:04

## 2019-07-12 RX ADMIN — INSULIN LISPRO 6 UNITS: 100 INJECTION, SOLUTION INTRAVENOUS; SUBCUTANEOUS at 10:28

## 2019-07-12 RX ADMIN — INSULIN LISPRO 3 UNITS: 100 INJECTION, SOLUTION INTRAVENOUS; SUBCUTANEOUS at 01:56

## 2019-07-12 RX ADMIN — APIXABAN 5 MG: 5 TABLET, FILM COATED ORAL at 21:39

## 2019-07-12 RX ADMIN — METFORMIN HYDROCHLORIDE 500 MG: 500 TABLET ORAL at 17:12

## 2019-07-12 RX ADMIN — INSULIN GLARGINE 15 UNITS: 100 INJECTION, SOLUTION SUBCUTANEOUS at 21:40

## 2019-07-12 RX ADMIN — LISINOPRIL 5 MG: 5 TABLET ORAL at 10:01

## 2019-07-12 ASSESSMENT — PAIN SCALES - GENERAL
PAINLEVEL_OUTOF10: 0
PAINLEVEL_OUTOF10: 0

## 2019-07-12 ASSESSMENT — SLEEP AND FATIGUE QUESTIONNAIRES
DO YOU HAVE DIFFICULTY SLEEPING: YES
RESTFUL SLEEP: NO
DIFFICULTY FALLING ASLEEP: YES
DIFFICULTY STAYING ASLEEP: YES
DIFFICULTY ARISING: NO
SLEEP PATTERN: DIFFICULTY FALLING ASLEEP
DO YOU USE A SLEEP AID: YES

## 2019-07-12 ASSESSMENT — LIFESTYLE VARIABLES: HISTORY_ALCOHOL_USE: NO

## 2019-07-12 ASSESSMENT — PATIENT HEALTH QUESTIONNAIRE - PHQ9: SUM OF ALL RESPONSES TO PHQ QUESTIONS 1-9: 25

## 2019-07-12 NOTE — H&P
Hyperlipidemia     Hypertension     Neurogenic bowel     Neuromuscular dysfunction of bladder     Obstructive sleep apnea     Radiculopathy of lumbar region     Spinal cord infarction ([facility] [address]     Suicidal ideations     Vitamin D deficiency        FAMILY PSYCHIATRIC HISTORY:  Family History   Problem Relation Age of Onset    Diabetes Father         [x] Denies      [] Endorses    [] Father     [] Depression  [] Anxiety  [] Bipolar  [] Psychosis  []  Other      [] Mother    [] Depression  [] Anxiety  [] Bipolar  [] Psychosis  []  Other      [] Sibling    [] Depression  [] Anxiety  [] Bipolar  [] Psychosis  []  Other      [] Grandparent    [] Depression  [] Anxiety  [] Bipolar  [] Psychosis  []  Other      SOCIAL HISTORY:     1. Living Situation:[] Private Residence [] Homeless [x] [address]                                   [] [place] [address] [] [address]  [] Shelter [] Other   2. Employment:  [x] Unemployed  [] Employed  [] Disabled  [] Retired   1. Legal History: [x] No Arrest [] Arrest  [] Theft  []  Assault  [] Substances   4. History of Trama/ Abuse: [x] Denies  [] Emotional [] Physical [] Sexual   5. Spirituality: [x] Spiritual [] Not Spiritual   6. Substance Abuse: [x] Denies  [] Drug of choice                                       [] Amphetamines [] Marijuana [] Cocaine                                       [] Opioids  [] Alcohol  [] Benzodiazepines      For further SH review SW note.     Risk Assessment:  1. Risk Factors:   [x] Depression  [x] Anxiety  [x] Psychosis   [x] Suicidal/Homicidal Thoughts [] Suicide Attempt [] Substance Abuse      2. Protective Factors: [x] Controlled Environment                                          [] Supportive Family []                                          [] Restorationist Support      3.  Level of Risk: [] Mild [] Moderate [x] Severe       Strengths & Weaknesses:     1. Strengths: [x] Ability to communicate feelings                          [x] Independent ADL's                           [] Supportive Family                          [] Current Health Status      2. Weaknesses: [x] Emotional                                [] Motivational     MEDICATIONS: Current Facility-Administered Medications: dextrose 5 % solution, 100 mL/hr, Intravenous, PRN  dextrose 50 % IV solution, 12.5 g, Intravenous, PRN  glucagon (rDNA) injection 1 mg, 1 mg, Intramuscular, PRN  glucose (GLUTOSE) 40 % oral gel 15 g, 15 g, Oral, PRN  apixaban (ELIQUIS) tablet 5 mg, 5 mg, Per G Tube, BID  ARIPiprazole (ABILIFY) tablet 15 mg, 15 mg, PEG Tube, Daily  atorvastatin (LIPITOR) tablet 20 mg, 20 mg, Oral, Daily  DULoxetine (CYMBALTA) extended release capsule 60 mg, 60 mg, Oral, Daily  insulin glargine (LANTUS) injection vial 15 Units, 15 Units, Subcutaneous, Nightly  insulin lispro (HUMALOG) injection vial 0-18 Units, 0-18 Units, Subcutaneous, Q6H  levothyroxine (SYNTHROID) tablet 50 mcg, 50 mcg, Oral, Daily  lisinopril (PRINIVIL;ZESTRIL) tablet 5 mg, 5 mg, Oral, Daily  metFORMIN (GLUCOPHAGE) tablet 500 mg, 500 mg, PEG Tube, BID WC  traZODone (DESYREL) tablet 200 mg, 200 mg, PEG Tube, Nightly    Medical Review of Systems:     All other than marked systmes have been reviewed and are all negative.     Constitutional Symptoms: []  fever []  Chills  Skin Symptoms: [] rash []  Pruritus   Eye Symptoms: [] Vision unchanged []  recent vision problems[] blurred vision   Respiratory Symptoms:[] shortness of breath [] cough  Cardiovascular Symptoms:  [] chest pain   [] palpitations   Gastrointestinal Symptoms: []  abdominal pain []  nausea []  vomiting []  diarrhea  Genitourinary Symptoms: []  dysuria  []  hematuria   Musculoskeletal Symptoms: [x]  back pain []  muscle pain []  joint pain  Neurologic Symptoms: []  headache []  dizziness  Hematolymphoid Symptoms: [] Adenopathy [] Bruises   [] Schimosis       VITALS: /85   Pulse 78   Temp 98.9 °F (37.2 °C) (Oral)   Resp 14   SpO2 97%     ALLERGIES:

## 2019-07-12 NOTE — GROUP NOTE
Group Therapy Note    Date: July 12    Group Start Time: 1630  Group End Time: 1700  Group Topic: Healthy Living/Wellness    [facility] 7W ACUTE BH 2    Sebas Oglesby RN        Group Therapy Note  How to stay cool, symptoms of heat exhaustion and heat stroke  Attendees: 7/11           Signature:  Sebas Oglesby RN

## 2019-07-13 PROBLEM — E44.0 MODERATE PROTEIN-CALORIE MALNUTRITION (HCC): Chronic | Status: ACTIVE | Noted: 2019-07-13

## 2019-07-13 LAB
METER GLUCOSE: 152 MG/DL (ref 74–99)
METER GLUCOSE: 155 MG/DL (ref 74–99)
METER GLUCOSE: 173 MG/DL (ref 74–99)
METER GLUCOSE: 218 MG/DL (ref 74–99)
METER GLUCOSE: 251 MG/DL (ref 74–99)

## 2019-07-13 PROCEDURE — 6370000000 HC RX 637 (ALT 250 FOR IP): Performed by: NURSE PRACTITIONER

## 2019-07-13 PROCEDURE — 82962 GLUCOSE BLOOD TEST: CPT

## 2019-07-13 PROCEDURE — 6370000000 HC RX 637 (ALT 250 FOR IP): Performed by: INTERNAL MEDICINE

## 2019-07-13 PROCEDURE — 1240000000 HC EMOTIONAL WELLNESS R&B

## 2019-07-13 PROCEDURE — 99231 SBSQ HOSP IP/OBS SF/LOW 25: CPT | Performed by: NURSE PRACTITIONER

## 2019-07-13 RX ORDER — ARIPIPRAZOLE 5 MG/1
5 TABLET ORAL ONCE
Status: COMPLETED | OUTPATIENT
Start: 2019-07-13 | End: 2019-07-13

## 2019-07-13 RX ORDER — ARIPIPRAZOLE 10 MG/1
20 TABLET ORAL NIGHTLY
Status: DISCONTINUED | OUTPATIENT
Start: 2019-07-14 | End: 2019-07-17 | Stop reason: HOSPADM

## 2019-07-13 RX ADMIN — INSULIN LISPRO 10 UNITS: 100 INJECTION, SOLUTION INTRAVENOUS; SUBCUTANEOUS at 09:39

## 2019-07-13 RX ADMIN — DULOXETINE HYDROCHLORIDE 60 MG: 60 CAPSULE, DELAYED RELEASE ORAL at 09:38

## 2019-07-13 RX ADMIN — ARIPIPRAZOLE 15 MG: 15 TABLET ORAL at 09:38

## 2019-07-13 RX ADMIN — INSULIN LISPRO 5 UNITS: 100 INJECTION, SOLUTION INTRAVENOUS; SUBCUTANEOUS at 02:33

## 2019-07-13 RX ADMIN — INSULIN LISPRO 15 UNITS: 100 INJECTION, SOLUTION INTRAVENOUS; SUBCUTANEOUS at 21:29

## 2019-07-13 RX ADMIN — METFORMIN HYDROCHLORIDE 500 MG: 500 TABLET ORAL at 09:38

## 2019-07-13 RX ADMIN — INSULIN GLARGINE 15 UNITS: 100 INJECTION, SOLUTION SUBCUTANEOUS at 21:26

## 2019-07-13 RX ADMIN — TRAZODONE HYDROCHLORIDE 200 MG: 50 TABLET ORAL at 21:20

## 2019-07-13 RX ADMIN — APIXABAN 5 MG: 5 TABLET, FILM COATED ORAL at 21:22

## 2019-07-13 RX ADMIN — METFORMIN HYDROCHLORIDE 500 MG: 500 TABLET ORAL at 19:04

## 2019-07-13 RX ADMIN — INSULIN LISPRO 5 UNITS: 100 INJECTION, SOLUTION INTRAVENOUS; SUBCUTANEOUS at 13:19

## 2019-07-13 RX ADMIN — ARIPIPRAZOLE 5 MG: 5 TABLET ORAL at 21:22

## 2019-07-13 RX ADMIN — LISINOPRIL 5 MG: 5 TABLET ORAL at 09:38

## 2019-07-13 RX ADMIN — APIXABAN 5 MG: 5 TABLET, FILM COATED ORAL at 09:38

## 2019-07-13 RX ADMIN — ATORVASTATIN CALCIUM 20 MG: 40 TABLET, FILM COATED ORAL at 21:26

## 2019-07-13 RX ADMIN — LEVOTHYROXINE SODIUM 50 MCG: 50 TABLET ORAL at 06:37

## 2019-07-13 ASSESSMENT — PAIN SCALES - GENERAL: PAINLEVEL_OUTOF10: 0

## 2019-07-14 LAB
METER GLUCOSE: 199 MG/DL (ref 74–99)
METER GLUCOSE: 229 MG/DL (ref 74–99)
METER GLUCOSE: 243 MG/DL (ref 74–99)
METER GLUCOSE: 70 MG/DL (ref 74–99)
METER GLUCOSE: 99 MG/DL (ref 74–99)

## 2019-07-14 PROCEDURE — 1240000000 HC EMOTIONAL WELLNESS R&B

## 2019-07-14 PROCEDURE — 6370000000 HC RX 637 (ALT 250 FOR IP): Performed by: INTERNAL MEDICINE

## 2019-07-14 PROCEDURE — 82962 GLUCOSE BLOOD TEST: CPT

## 2019-07-14 PROCEDURE — 99231 SBSQ HOSP IP/OBS SF/LOW 25: CPT | Performed by: NURSE PRACTITIONER

## 2019-07-14 PROCEDURE — 6370000000 HC RX 637 (ALT 250 FOR IP): Performed by: NURSE PRACTITIONER

## 2019-07-14 RX ADMIN — LEVOTHYROXINE SODIUM 50 MCG: 50 TABLET ORAL at 07:01

## 2019-07-14 RX ADMIN — ATORVASTATIN CALCIUM 20 MG: 40 TABLET, FILM COATED ORAL at 21:05

## 2019-07-14 RX ADMIN — DULOXETINE HYDROCHLORIDE 60 MG: 60 CAPSULE, DELAYED RELEASE ORAL at 09:25

## 2019-07-14 RX ADMIN — TRAZODONE HYDROCHLORIDE 200 MG: 50 TABLET ORAL at 21:05

## 2019-07-14 RX ADMIN — INSULIN LISPRO 10 UNITS: 100 INJECTION, SOLUTION INTRAVENOUS; SUBCUTANEOUS at 13:21

## 2019-07-14 RX ADMIN — METFORMIN HYDROCHLORIDE 500 MG: 500 TABLET ORAL at 18:42

## 2019-07-14 RX ADMIN — APIXABAN 5 MG: 5 TABLET, FILM COATED ORAL at 09:24

## 2019-07-14 RX ADMIN — ARIPIPRAZOLE 20 MG: 10 TABLET ORAL at 21:05

## 2019-07-14 RX ADMIN — APIXABAN 5 MG: 5 TABLET, FILM COATED ORAL at 21:05

## 2019-07-14 RX ADMIN — INSULIN LISPRO 10 UNITS: 100 INJECTION, SOLUTION INTRAVENOUS; SUBCUTANEOUS at 20:52

## 2019-07-14 RX ADMIN — METFORMIN HYDROCHLORIDE 500 MG: 500 TABLET ORAL at 09:24

## 2019-07-14 RX ADMIN — INSULIN LISPRO 5 UNITS: 100 INJECTION, SOLUTION INTRAVENOUS; SUBCUTANEOUS at 09:25

## 2019-07-14 RX ADMIN — INSULIN GLARGINE 15 UNITS: 100 INJECTION, SOLUTION SUBCUTANEOUS at 20:52

## 2019-07-14 RX ADMIN — LISINOPRIL 5 MG: 5 TABLET ORAL at 09:27

## 2019-07-14 ASSESSMENT — PAIN SCALES - GENERAL
PAINLEVEL_OUTOF10: 0
PAINLEVEL_OUTOF10: 0

## 2019-07-14 NOTE — GROUP NOTE
Group Therapy Note    Date: July 14    Group Start Time: 1600  Group End Time: 1700  Group Topic: Healthy Living/Wellness    [facility] 7U ACUTE BH 45 Matilda Miller RN        Group Therapy Note    Attendees: 11/13     Chung Neal RN

## 2019-07-15 LAB
METER GLUCOSE: 109 MG/DL (ref 74–99)
METER GLUCOSE: 119 MG/DL (ref 74–99)
METER GLUCOSE: 151 MG/DL (ref 74–99)
METER GLUCOSE: 195 MG/DL (ref 74–99)
METER GLUCOSE: 288 MG/DL (ref 74–99)

## 2019-07-15 PROCEDURE — 6370000000 HC RX 637 (ALT 250 FOR IP): Performed by: INTERNAL MEDICINE

## 2019-07-15 PROCEDURE — 6370000000 HC RX 637 (ALT 250 FOR IP): Performed by: NURSE PRACTITIONER

## 2019-07-15 PROCEDURE — 82962 GLUCOSE BLOOD TEST: CPT

## 2019-07-15 PROCEDURE — 99231 SBSQ HOSP IP/OBS SF/LOW 25: CPT | Performed by: NURSE PRACTITIONER

## 2019-07-15 PROCEDURE — 1240000000 HC EMOTIONAL WELLNESS R&B

## 2019-07-15 RX ADMIN — APIXABAN 5 MG: 5 TABLET, FILM COATED ORAL at 21:27

## 2019-07-15 RX ADMIN — INSULIN GLARGINE 15 UNITS: 100 INJECTION, SOLUTION SUBCUTANEOUS at 21:34

## 2019-07-15 RX ADMIN — INSULIN LISPRO 15 UNITS: 100 INJECTION, SOLUTION INTRAVENOUS; SUBCUTANEOUS at 12:26

## 2019-07-15 RX ADMIN — APIXABAN 5 MG: 5 TABLET, FILM COATED ORAL at 09:46

## 2019-07-15 RX ADMIN — ATORVASTATIN CALCIUM 20 MG: 40 TABLET, FILM COATED ORAL at 21:29

## 2019-07-15 RX ADMIN — DULOXETINE HYDROCHLORIDE 60 MG: 60 CAPSULE, DELAYED RELEASE ORAL at 09:46

## 2019-07-15 RX ADMIN — METFORMIN HYDROCHLORIDE 500 MG: 500 TABLET ORAL at 16:58

## 2019-07-15 RX ADMIN — LEVOTHYROXINE SODIUM 50 MCG: 50 TABLET ORAL at 06:26

## 2019-07-15 RX ADMIN — INSULIN LISPRO 5 UNITS: 100 INJECTION, SOLUTION INTRAVENOUS; SUBCUTANEOUS at 16:58

## 2019-07-15 RX ADMIN — ARIPIPRAZOLE 20 MG: 10 TABLET ORAL at 21:27

## 2019-07-15 RX ADMIN — LISINOPRIL 5 MG: 5 TABLET ORAL at 09:46

## 2019-07-15 RX ADMIN — METFORMIN HYDROCHLORIDE 500 MG: 500 TABLET ORAL at 09:46

## 2019-07-15 RX ADMIN — TRAZODONE HYDROCHLORIDE 200 MG: 50 TABLET ORAL at 21:27

## 2019-07-15 ASSESSMENT — PAIN SCALES - GENERAL: PAINLEVEL_OUTOF10: 0

## 2019-07-16 LAB
METER GLUCOSE: 161 MG/DL (ref 74–99)
METER GLUCOSE: 166 MG/DL (ref 74–99)
METER GLUCOSE: 223 MG/DL (ref 74–99)
METER GLUCOSE: 94 MG/DL (ref 74–99)

## 2019-07-16 PROCEDURE — 6370000000 HC RX 637 (ALT 250 FOR IP): Performed by: NURSE PRACTITIONER

## 2019-07-16 PROCEDURE — 1240000000 HC EMOTIONAL WELLNESS R&B

## 2019-07-16 PROCEDURE — 82962 GLUCOSE BLOOD TEST: CPT

## 2019-07-16 PROCEDURE — 99231 SBSQ HOSP IP/OBS SF/LOW 25: CPT | Performed by: NURSE PRACTITIONER

## 2019-07-16 PROCEDURE — 6370000000 HC RX 637 (ALT 250 FOR IP): Performed by: INTERNAL MEDICINE

## 2019-07-16 RX ADMIN — DULOXETINE HYDROCHLORIDE 60 MG: 60 CAPSULE, DELAYED RELEASE ORAL at 09:41

## 2019-07-16 RX ADMIN — INSULIN GLARGINE 15 UNITS: 100 INJECTION, SOLUTION SUBCUTANEOUS at 21:42

## 2019-07-16 RX ADMIN — APIXABAN 5 MG: 5 TABLET, FILM COATED ORAL at 21:37

## 2019-07-16 RX ADMIN — INSULIN LISPRO 5 UNITS: 100 INJECTION, SOLUTION INTRAVENOUS; SUBCUTANEOUS at 09:35

## 2019-07-16 RX ADMIN — ARIPIPRAZOLE 20 MG: 10 TABLET ORAL at 21:38

## 2019-07-16 RX ADMIN — METFORMIN HYDROCHLORIDE 500 MG: 500 TABLET ORAL at 09:40

## 2019-07-16 RX ADMIN — APIXABAN 5 MG: 5 TABLET, FILM COATED ORAL at 09:41

## 2019-07-16 RX ADMIN — TRAZODONE HYDROCHLORIDE 200 MG: 50 TABLET ORAL at 21:37

## 2019-07-16 RX ADMIN — ATORVASTATIN CALCIUM 20 MG: 40 TABLET, FILM COATED ORAL at 21:41

## 2019-07-16 RX ADMIN — INSULIN LISPRO 10 UNITS: 100 INJECTION, SOLUTION INTRAVENOUS; SUBCUTANEOUS at 13:17

## 2019-07-16 RX ADMIN — INSULIN LISPRO 5 UNITS: 100 INJECTION, SOLUTION INTRAVENOUS; SUBCUTANEOUS at 17:52

## 2019-07-16 RX ADMIN — LISINOPRIL 5 MG: 5 TABLET ORAL at 09:40

## 2019-07-16 RX ADMIN — METFORMIN HYDROCHLORIDE 500 MG: 500 TABLET ORAL at 17:49

## 2019-07-16 ASSESSMENT — PAIN SCALES - GENERAL
PAINLEVEL_OUTOF10: 0
PAINLEVEL_OUTOF10: 0

## 2019-07-16 NOTE — PLAN OF CARE
Patient pleasant and cooperative. Denies SI, HI, and hallucinations. Patient alert and oriented to person, place, and time. Patient was irritable and demanding in the morning but was pleasant after breakfast and throughout the afternoon. Patient was up to [facility] for breakfast and lunch, which she ate well, patient did not like lunch but asked for PB&J which this nurse made for her. Patient took her medications and attended AM groups. No unit problems observed or reported.  Will continue to observe and support  Problem: Falls - Risk of:  Goal: Will remain free from falls  Description  Will remain free from falls  Outcome: Met This Shift     Problem: Depressive Behavior With or Without Suicide Precautions:  Goal: Absence of self-harm  Description  Absence of self-harm  Outcome: Met This Shift
Patient resting quiet to self at this time. Bed lowest position with wheels locked, bed alarm active, call light in reach. Respirations are even and unlabored, no signs or symptoms of distress or discomfort. No PRN medications given thus far this shift. Staff will continue to conduct environmental rounds to ensure the safety of everyone on the unit. Staff will provide support and interventions as requested or required.
Problem: Malnutrition  (NI-5.2)  Goal: Food and/or Nutrient Delivery  Description- Continue TF + PO diet as able   Individualized approach for food/nutrient provision.   Outcome: Met This Shift
No  Thought Content: Poverty of Content  Hallucinations: None  Delusions: No  Memory:Normal: No  Memory: Poor Recent, Poor Remote  Insight and Judgment: No  Insight and Judgment: Poor Judgment, Poor Insight  Present Suicidal Ideation: No  Present Homicidal Ideation: No    Daily Assessment Last Entry:   Daily Sleep (WDL): Within Defined Limits         Patient Currently in Pain: Denies  Daily Nutrition (WDL): Exceptions to WDL  Appetite Change: Decreased  Barriers to Nutrition: Cognitive impairment  Level of Assistance: Set up    Patient Monitoring:  Frequency of Checks: 4 times per hour, close    Psychiatric Symptoms:   Depression Symptoms  Depression Symptoms: Impaired concentration, Loss of interest  Anxiety Symptoms  Anxiety Symptoms: Generalized  Michelle Symptoms  Michelle Symptoms: No problems reported or observed. Psychosis Symptoms  Delusion Type: No problems reported or observed.     Suicide Risk CSSR-S:  1) Within the past month, have you wished you were dead or wished you could go to sleep and not wake up? : Yes  2) Have you actually had any thoughts of killing yourself? : No  6) Have you ever done anything, started to do anything, or prepared to do anything to end your life?: No  Change in Result no Change in Plan of care no      EDUCATION:   Learner Progress Toward Treatment Goals: Reviewed goals and plan of care    Method: Small group    Outcome: Needs reinforcement    PATIENT GOALS: \"exercises to get stronger\"    PLAN/TREATMENT RECOMMENDATIONS UPDATE:encourage group attendance and provide emotional support    GOALS UPDATE:   Time frame for Short-Term Goals: 1-3 days      Erik Garcia RN

## 2019-07-17 VITALS
TEMPERATURE: 97.6 F | HEART RATE: 101 BPM | OXYGEN SATURATION: 99 % | BODY MASS INDEX: 22.13 KG/M2 | WEIGHT: 129.6 LBS | SYSTOLIC BLOOD PRESSURE: 109 MMHG | DIASTOLIC BLOOD PRESSURE: 69 MMHG | RESPIRATION RATE: 16 BRPM | HEIGHT: 64 IN

## 2019-07-17 LAB
METER GLUCOSE: 138 MG/DL (ref 74–99)
METER GLUCOSE: 164 MG/DL (ref 74–99)

## 2019-07-17 PROCEDURE — 82962 GLUCOSE BLOOD TEST: CPT

## 2019-07-17 PROCEDURE — 99238 HOSP IP/OBS DSCHRG MGMT 30/<: CPT | Performed by: NURSE PRACTITIONER

## 2019-07-17 PROCEDURE — 6370000000 HC RX 637 (ALT 250 FOR IP): Performed by: INTERNAL MEDICINE

## 2019-07-17 RX ORDER — ACETAMINOPHEN 325 MG/1
650 TABLET ORAL EVERY 4 HOURS PRN
Status: DISCONTINUED | OUTPATIENT
Start: 2019-07-17 | End: 2019-07-17 | Stop reason: HOSPADM

## 2019-07-17 RX ORDER — ARIPIPRAZOLE 20 MG/1
20 TABLET ORAL NIGHTLY
Qty: 30 TABLET | Refills: 0 | Status: SHIPPED | OUTPATIENT
Start: 2019-07-17 | End: 2019-09-13 | Stop reason: ALTCHOICE

## 2019-07-17 RX ORDER — DULOXETIN HYDROCHLORIDE 60 MG/1
60 CAPSULE, DELAYED RELEASE ORAL DAILY
Qty: 30 CAPSULE | Refills: 0 | Status: SHIPPED | OUTPATIENT
Start: 2019-07-18 | End: 2019-09-13 | Stop reason: DRUGHIGH

## 2019-07-17 RX ORDER — MAGNESIUM HYDROXIDE/ALUMINUM HYDROXICE/SIMETHICONE 120; 1200; 1200 MG/30ML; MG/30ML; MG/30ML
30 SUSPENSION ORAL PRN
Status: DISCONTINUED | OUTPATIENT
Start: 2019-07-17 | End: 2019-07-17 | Stop reason: HOSPADM

## 2019-07-17 RX ORDER — HYDROXYZINE HYDROCHLORIDE 10 MG/1
50 TABLET, FILM COATED ORAL 3 TIMES DAILY PRN
Status: DISCONTINUED | OUTPATIENT
Start: 2019-07-17 | End: 2019-07-17 | Stop reason: HOSPADM

## 2019-07-17 RX ADMIN — LISINOPRIL 5 MG: 5 TABLET ORAL at 09:09

## 2019-07-17 RX ADMIN — INSULIN LISPRO 5 UNITS: 100 INJECTION, SOLUTION INTRAVENOUS; SUBCUTANEOUS at 09:13

## 2019-07-17 RX ADMIN — LEVOTHYROXINE SODIUM 50 MCG: 50 TABLET ORAL at 06:35

## 2019-07-17 RX ADMIN — APIXABAN 5 MG: 5 TABLET, FILM COATED ORAL at 09:09

## 2019-07-17 RX ADMIN — DULOXETINE HYDROCHLORIDE 60 MG: 60 CAPSULE, DELAYED RELEASE ORAL at 09:09

## 2019-07-17 RX ADMIN — METFORMIN HYDROCHLORIDE 500 MG: 500 TABLET ORAL at 09:09

## 2019-07-17 ASSESSMENT — PAIN SCALES - GENERAL: PAINLEVEL_OUTOF10: 0

## 2019-07-17 NOTE — DISCHARGE INSTR - COC
Equipment (for information only, NOT a DME order):  {EQUIPMENT:661584254}  Other Treatments: ***    Patient's personal belongings (please select all that are sent with patient):  {CHP DME Belongings:988662083}    RN SIGNATURE:  {Esignature:[id number]}    CASE MANAGEMENT/SOCIAL WORK SECTION    Inpatient Status Date: ***    Readmission Risk Assessment Score:  Readmission Risk              Risk of Unplanned Readmission:        16           Discharging to Facility/ Agency   · Name: Luz Reid at  [facility]  · [address]  · Phone: [phone]  · Fax:    Dialysis Facility (if applicable)   · Name:  · Address:  · Dialysis Schedule:  · Phone:  · Fax:    / signature: Electronically signed by Neno Elliott on 7/17/2019 at 2:48 PM    PHYSICIAN SECTION    Prognosis: {Prognosis:3306373552}    Condition at Discharge: Peter Villatoro Patient Condition:184881364}    Rehab Potential (if transferring to Rehab): {Prognosis:6249198648}    Recommended Labs or Other Treatments After Discharge: ***    Physician Certification: I certify the above information and transfer of Sebas Cali  is necessary for the continuing treatment of the diagnosis listed and that she requires {Admit to Appropriate Level of Care:92857} for {GREATER/LESS:184146366} 30 days.      Update Admission H&P: {CHP DME Changes in RQDVA:247544060}    PHYSICIAN SIGNATURE:  {Esignature:[id number]}

## 2019-07-17 NOTE — DISCHARGE SUMMARY
Psychiatric: Mental Status Examination:    Cognition:      [x] Alert  [x] Awake  [x] Oriented  [x] Person  [x] Place [] Time    [] drowsy  [] tired  [] lethargic  [] distractable       Attention/Concentration:   [x] Attentive  [] Distracted        Memory Recent and Remote: [] Intact   [] Impaired [x] Partially Impaired     Language: [] Able to recognize and name objects          [] Unable to recognize and name Objects    Fund of Knowledge:  [] Poor []  Fair  [] Good    Speech: [x] Normal  [] Soft  [] Slow  [] Fast [] Pressured            [] Loud [] Dysarthria  [] Incoherent       Appearance: [] Well Groomed  [x] Casual Dressed  [] Unkept  [] Disheveled          [] Normal weight[] Thin  [] Overweight  [] Obese           Attitude: [] Positive  [] Hostile  [] Demanding  [] Guarded  [] Defensive         [x] Cooperative  []  Uncooperative      Behavior:  [] Normal Gait  [] Walks with Assistance  [] Ashwini Chair    [] Walks with Mukesh Ekaterina  [] In Hospital Bed  [x] Sitting in Chair    Muscle-Skeletal:  [x] Normal Muscle Tone [] Muscle Atrophy       [] Abnormal Muscle Movement     Eye Contact:  [x] Good eye contact  [] Intermittent Eye Contact  [] Poor Eye Contact     Mood: [] Depressed  [] Anxious  [] Irritated  [x] Euthymic   [] Angry [] Restless    Affect:  [x] Congruent  [] Incongruent  [] Labile  [] Constricted  [] Flat  [] Bizarre     Thought Process and Association:  [] Logical [] Illogical       [x] Linear and Goal Directed  [] Tangential  [] Circumstantial     Thought Content:  [x] Denies [] Endorses [] Suicidal [] Homicidal  [] Delusional      [] Paranoid  [] Somatic  [] Grandiose    Perception: [x]  None  [] Auditory   [] Visual  [] tactile   [] olfactory  [] Illusions         Insight: [] Intact  [x] Fair  [] Limited    Judgement:  [] Intact  [x] Fair  [] Limited    Hospital Course:   Admit Date: 7/11/2019     Discharge Date: 7/17/2019  Admitted from:  [x]  Emergency Room  []  Home  []  Another

## 2019-07-17 NOTE — PROGRESS NOTES
[facility]  Discharge Note    Pt discharged with followings belongings:   Dentures: Lowers, Uppers  Vision - Corrective Lenses: Glasses  Hearing Aid: None  Jewelry: None  Body Piercings Removed: N/A  Clothing: Pants, Shirt  Were All Patient Medications Collected?: Not Applicable  Other Valuables: None   Valuables sent home with patient. Valuables retrieved from safe, Security envelope number:  NA and returned to patient. Patient left department with   via  , discharged to  . Patient education on aftercare instructions: yes  Information faxed to NA by NA Patient verbalize understanding of AVS:  yes.     Status EXAM upon discharge:  Status and Exam  Normal: No  Facial Expression: Sad  Affect: Congruent  Level of Consciousness: Alert  Mood:Normal: No  Mood: Anxious, Depressed  Motor Activity:Normal: No  Motor Activity: Decreased  Interview Behavior: Cooperative  Preception: [place] to Person, [place] Mar[place] to Time, [place] to Place  Attention:Normal: No  Attention: Distractible  Thought Processes: Circumstantial  Thought Content:Normal: No  Thought Content: Poverty of Content  Hallucinations: None  Delusions: No  Memory:Normal: No  Memory: Poor Recent  Insight and Judgment: No  Insight and Judgment: Poor Judgment, Poor Insight  Present Suicidal Ideation: No  Present Homicidal Ideation: No      Metabolic Screening:    Lab Results   Component Value Date    LABA1C 6.3 (H) 07/12/2019       Lab Results   Component Value Date    CHOL 131 07/12/2019     Lab Results   Component Value Date    TRIG 242 (H) 07/12/2019     Lab Results   Component Value Date    HDL 46 07/12/2019     No components found for: [facility]  Lab Results   Component Value Date    LABVLDL 48 07/12/2019       Kaela Perez RN
DATE OF SERVICE:     7/15/2019    Brittany Wing seen today for the purpose of continuation of care. Nursing, social work reports, laboratory studies and vital signs are reviewed. Patient chief complaint today is:             [] Depression      [x] Anxiety        [] Psychosis         [] Suicidal/Homicidal                         [x] Delusions           [] Aggression          Subjective: Today patient is tube fed. Oriented to self only. Patient is paranoid. Taking meds through peg tube. Sleep:  [x] Good [] Fair  [] Poor  Appetite:  [] Good [] Fair  [x] Poor    Depression:  [] Mild [] Moderate [] Severe                [] Constant [] Sporadic     Anxiety: [] Mild [] Moderate [x] Severe    [x] Constant [] Sporadic     Delusions: [x] Mild [] Moderate [] Severe     [] Constant [x] Sporadic     [x] Paranoid [x] Somatic [] Grandiose     Hallucinations: [] Mild [] Moderate [] Severe     [] Constant [] Sporadic    [] Auditory  [] Visual [] Tactile       Suicidal: [] Constant [] Sporadic  Homicidal: [] Constant [] Sporadic    Unscheduled Medications     [] Patient Receiving Emergency Medications \" Chemical Restraint\"   [] Requesting PRN medications for anxiety    Medical Review of Systems:     All other than marked systmes have been reviewed and are all negative.     Constitutional Symptoms: []  fever []  Chills  Skin Symptoms: [] rash []  Pruritus   Eye Symptoms: [] Vision unchanged []  recent vision problems[] blurred vision   Respiratory Symptoms:[] shortness of breath [] cough  Cardiovascular Symptoms:  [] chest pain   [] palpitations   Gastrointestinal Symptoms: []  abdominal pain []  nausea []  vomiting []  diarrhea  Genitourinary Symptoms: []  dysuria  []  hematuria   Musculoskeletal Symptoms: []  back pain []  muscle pain []  joint pain  Neurologic Symptoms: []  headache []  dizziness  Hematolymphoid Symptoms: [] Adenopathy [] Bruises   [] Schimosis       Psychiatric Review of systems  Delusions:  []
Nutrition Assessment (Enteral Nutrition)    Type and Reason for Visit: Initial, Positive Nutrition Screen    Nutrition Summary: Pt w/ moderate malnurition on admit AEB h/o poor po intake/ refusal to eat & fat/muscle wasting 2/2 AMS w/ depression. Pt at further risk d/t h/o paraplegia. Recent PEG placed & EN support initiated. Continue current TF regimen. Nutrition Recommendations: Continue current Tube Feeding however Run TF x 23 hrs holding for Synthroid. Will provide total of: 1035 ml tv, 1553 kcals, 85 gm pro, 785 ml free water. Continue current water flushes 175 cc q 6 hr= 1485 total ml water   Regimen meets 100% est calorie & protein needs     Malnutrition Assessment:  · Malnutrition Status: Meets the criteria for moderate malnutrition  · Context: Social or environmental circumstances  · Findings of the 6 clinical characteristics of malnutrition (Minimum of 2 out of 6 clinical characteristics is required to make the diagnosis of moderate or severe Protein Calorie Malnutrition based on AND/ASPEN Guidelines):  1. Energy Intake-Less than or equal to 50% of estimated energy requirement, Greater than or equal to 3 months    2. Weight Loss-Unable to assess(d/t lack of hx on file )   3. Fat Loss-Moderate subcutaneous fat loss, Orbital, Triceps  4. Muscle Loss-Moderate muscle mass loss, Temples (temporalis muscle), Clavicles (pectoralis and deltoids)  5. Fluid Accumulation-No significant fluid accumulation  6.  Strength-Not measured    Nutrition Risk Level: Moderate    Nutrition Needs:  · Estimated Daily Total Kcal: 5043-0865 (MSJ REE 1116 x 1.2 SF)  · Estimated Daily Protein (g): 70-85(1.2-1.4 g/kg )  · Estimated Daily Fluid (ml/day): 9978-1040 (1 ml/kcal )    Nutrition Diagnosis:   · Problem:  Moderate malnutrition, In context of social or environmental circumstances  · Etiology: related to Psychological cause/life stress     Signs and symptoms:  as evidenced by Diet history of poor intake,
Out of her room since supper and watching Tv with others. HOB up 75-90 degrees to prevent aspiration. Glucerna con't to infuse at 45/hr and checked for residual and 175 water flush given every 6 hrs and prn. Binder over peg tube.
Patient denies SI, HI or hallucinations. Patient turned every 2 hrs. Patient social. Will continue to observe and support.
Patient rang call light for someone to come to room . This nurse went to her room to patient verbalizes that she no longer wants medications or tube feed. Patient had pulled tube feeding line apart. Talked with patient and explained that I can not stop the tube feeding that it is a doctors order. Patient further states that she is in her right mind and has decided that she no longer wants tube feed or medications. Again reconnected tube feed and explained that I could not just stop the tube feed. Patient verbalizing understanding that I am doing my job.
Staff doing every 15 minute rounds and patient once again found to have pulled tube feeding apart. Verbalized not wanting tube feeding ,medications or turned every 2 hours. Dr. Amira Coleman paged. Dr. Nicky herring gave verbal consent to leave disconnected and let Dr. Amira Coleman know tomorrow.
[facility]  Admission Note     Patient was a transfer from [id number], [facility] [address]. Patient is pleasant, calm, and cooperative, although she is exit seeking by continuously asking when she can to bed. Patient states \"I just feel so overwhelmed with all of this. \"  Patient states that \"I was afraid to come here because I thought I was going to be put into a room with 15 other people and they were going to beat the shit outta me. \"  Explained to patient and reassured patient that she is in a private room and the only people that would come into her room would be staff members and there wouldn't be 15 of us. Also explained unit milieu to patient and she stated \"I don't know if I can do that. \"  Patient states that she's from [facility] where she has been \"ever since my surgery around [place]. \"  Patient states that \"a few years ago I had a stimulator placed in my back and it was working great and then it got infected so they had to remove it, and then a couple years later which would have been this past December, Dr. Bartolome Arzola was going to put another one in me and I don't know what he did to me or messed up, but now I'm paralyzed. \"  Patient is sad and depressed and states \"I'm so anxious all of the time, and sometimes I just wish I would go to sleep and not wake up. I've never wanted to hurt myself or kill myself, but I definitely have thoughts I'd be better off dead than living like this. \"  Patient states that she is hopeless and \"I'll never walk again or have a normal life. \"  Patient states that \"my  and I moved up here from [place] and he passed away 4 years ago. I was taking care of him for a couple years and now I don't have anyone to take care of me. \"  Asked patient where her daughter, Melia Diaz, who is her POA lives and she stated \"[place]. \"  Asked patient if she has considered moving to a facility closer to her daughter so that she is able to see her and her grandchildren more often and she
Content:  [] Denies [x] Endorses [x] Suicidal [] Homicidal  [x] Delusional                                       [x] Paranoid  [] Somatic  [] Grandiose     Perception: [x]  None  [] Auditory   [] Visual  [] tactile   [] olfactory  [] Illusions         Insight: [] Intact  [] Fair  [x] Limited    Judgement:  [] Intact  [] Fair  [x] Limited       Assessment/Plan:        Patient Active Problem List   Diagnosis Code    Midline low back pain with sciatica M54.40    Lumbar degenerative disc disease M51.36    Suicidal ideation R45.851    Altered mental status R41.82    Hypotension I95.9    Severe episode of recurrent major depressive disorder, without psychotic features ([facility] [place] [address] F33.2         Plan:    []  Patient is refusing medications  [x] Improving as expected   [] Not improving as expected   [] Worsening    []  At Baseline     Continue current treatment  Speech for swallow eval    Reason for more than one antipsychotic:  [x] N/A  [] 3 failed monotherapy(drugs tried):  [] Cross over to a new antipsychotic  [] Taper to monotherapy from polypharmacy  [] Augmentation of Clozapine therapy due to treatment resistance to single therapy      Signed:  Jes Reece  7/13/2019  1:55 PM
[] Illogical                                        [] Linear and Goal Directed  [] Tangential  [x] Circumstantial      Thought Content:  [] Denies [x] Endorses [x] Suicidal [] Homicidal  [x] Delusional                                       [x] Paranoid  [] Somatic  [] Grandiose     Perception: [x]  None  [] Auditory   [] Visual  [] tactile   [] olfactory  [] Illusions         Insight: [] Intact  [] Fair  [x] Limited    Judgement:  [] Intact  [] Fair  [x] Limited       Assessment/Plan:        Patient Active Problem List   Diagnosis Code    Midline low back pain with sciatica M54.40    Lumbar degenerative disc disease M51.36    Suicidal ideation R45.851    Altered mental status R41.82    Hypotension I95.9    Severe episode of recurrent major depressive disorder, without psychotic features (HCC) F33.2    Moderate protein-calorie malnutrition (HCC) E44.0         Plan:    []  Patient is refusing medications  [x] Improving as expected   [] Not improving as expected   [] Worsening    []  At Baseline     Continue current treatment      Reason for more than one antipsychotic:  [x] N/A  [] 3 failed monotherapy(drugs tried):  [] Cross over to a new antipsychotic  [] Taper to monotherapy from polypharmacy  [] Augmentation of Clozapine therapy due to treatment resistance to single therapy      Signed:  Byron Artis  7/16/2019  11:50 AM

## 2019-07-18 ENCOUNTER — HOSPITAL ENCOUNTER (EMERGENCY)
Age: 69
Discharge: NURSING, REHAB OR LONG-TERM CARE | DRG: 378 | End: 2019-07-18
Attending: EMERGENCY MEDICINE
Payer: MEDICARE

## 2019-07-18 VITALS
TEMPERATURE: 96.5 F | BODY MASS INDEX: 22.2 KG/M2 | RESPIRATION RATE: 20 BRPM | SYSTOLIC BLOOD PRESSURE: 136 MMHG | WEIGHT: 130 LBS | DIASTOLIC BLOOD PRESSURE: 77 MMHG | OXYGEN SATURATION: 99 % | HEIGHT: 64 IN | HEART RATE: 118 BPM

## 2019-07-18 DIAGNOSIS — F32.A DEPRESSION, UNSPECIFIED DEPRESSION TYPE: Primary | ICD-10-CM

## 2019-07-18 LAB
ACETAMINOPHEN LEVEL: 15.6 MCG/ML (ref 10–30)
ALBUMIN SERPL-MCNC: 4.1 G/DL (ref 3.5–5.2)
ALP BLD-CCNC: 55 U/L (ref 35–104)
ALT SERPL-CCNC: 16 U/L (ref 0–32)
AMPHETAMINE SCREEN, URINE: NOT DETECTED
ANION GAP SERPL CALCULATED.3IONS-SCNC: 14 MMOL/L (ref 7–16)
AST SERPL-CCNC: 12 U/L (ref 0–31)
BACTERIA: ABNORMAL /HPF
BARBITURATE SCREEN URINE: NOT DETECTED
BASOPHILS ABSOLUTE: 0.03 E9/L (ref 0–0.2)
BASOPHILS RELATIVE PERCENT: 0.5 % (ref 0–2)
BENZODIAZEPINE SCREEN, URINE: NOT DETECTED
BILIRUB SERPL-MCNC: 0.5 MG/DL (ref 0–1.2)
BILIRUBIN URINE: NEGATIVE
BLOOD, URINE: NEGATIVE
BUN BLDV-MCNC: 18 MG/DL (ref 8–23)
CALCIUM SERPL-MCNC: 9.9 MG/DL (ref 8.6–10.2)
CANNABINOID SCREEN URINE: NOT DETECTED
CHLORIDE BLD-SCNC: 98 MMOL/L (ref 98–107)
CLARITY: CLEAR
CO2: 27 MMOL/L (ref 22–29)
COCAINE METABOLITE SCREEN URINE: NOT DETECTED
COLOR: YELLOW
CREAT SERPL-MCNC: 0.7 MG/DL (ref 0.5–1)
EOSINOPHILS ABSOLUTE: 0.16 E9/L (ref 0.05–0.5)
EOSINOPHILS RELATIVE PERCENT: 2.7 % (ref 0–6)
ETHANOL: <10 MG/DL (ref 0–0.08)
GFR AFRICAN AMERICAN: >60
GFR NON-AFRICAN AMERICAN: >60 ML/MIN/1.73
GLUCOSE BLD-MCNC: 210 MG/DL (ref 74–99)
GLUCOSE URINE: NEGATIVE MG/DL
HCT VFR BLD CALC: 30 % (ref 34–48)
HEMOGLOBIN: 9.4 G/DL (ref 11.5–15.5)
IMMATURE GRANULOCYTES #: 0.01 E9/L
IMMATURE GRANULOCYTES %: 0.2 % (ref 0–5)
KETONES, URINE: NEGATIVE MG/DL
LEUKOCYTE ESTERASE, URINE: ABNORMAL
LYMPHOCYTES ABSOLUTE: 1.79 E9/L (ref 1.5–4)
LYMPHOCYTES RELATIVE PERCENT: 30.1 % (ref 20–42)
MCH RBC QN AUTO: 27.1 PG (ref 26–35)
MCHC RBC AUTO-ENTMCNC: 31.3 % (ref 32–34.5)
MCV RBC AUTO: 86.5 FL (ref 80–99.9)
METHADONE SCREEN, URINE: NOT DETECTED
MONOCYTES ABSOLUTE: 0.38 E9/L (ref 0.1–0.95)
MONOCYTES RELATIVE PERCENT: 6.4 % (ref 2–12)
NEUTROPHILS ABSOLUTE: 3.57 E9/L (ref 1.8–7.3)
NEUTROPHILS RELATIVE PERCENT: 60.1 % (ref 43–80)
NITRITE, URINE: NEGATIVE
OPIATE SCREEN URINE: NOT DETECTED
PDW BLD-RTO: 16.6 FL (ref 11.5–15)
PH UA: 6 (ref 5–9)
PHENCYCLIDINE SCREEN URINE: NOT DETECTED
PLATELET # BLD: 318 E9/L (ref 130–450)
PMV BLD AUTO: 11.5 FL (ref 7–12)
POTASSIUM SERPL-SCNC: 3.9 MMOL/L (ref 3.5–5)
PROPOXYPHENE SCREEN: NOT DETECTED
PROTEIN UA: NEGATIVE MG/DL
RBC # BLD: 3.47 E12/L (ref 3.5–5.5)
RBC UA: ABNORMAL /HPF (ref 0–2)
SALICYLATE, SERUM: <0.3 MG/DL (ref 0–30)
SODIUM BLD-SCNC: 139 MMOL/L (ref 132–146)
SPECIFIC GRAVITY UA: <=1.005 (ref 1–1.03)
TOTAL PROTEIN: 6.6 G/DL (ref 6.4–8.3)
TRICYCLIC ANTIDEPRESSANTS SCREEN SERUM: NEGATIVE NG/ML
UROBILINOGEN, URINE: 0.2 E.U./DL
WBC # BLD: 5.9 E9/L (ref 4.5–11.5)
WBC UA: >20 /HPF (ref 0–5)

## 2019-07-18 PROCEDURE — 99284 EMERGENCY DEPT VISIT MOD MDM: CPT

## 2019-07-18 PROCEDURE — 80053 COMPREHEN METABOLIC PANEL: CPT

## 2019-07-18 PROCEDURE — 80307 DRUG TEST PRSMV CHEM ANLYZR: CPT

## 2019-07-18 PROCEDURE — G0480 DRUG TEST DEF 1-7 CLASSES: HCPCS

## 2019-07-18 PROCEDURE — 81001 URINALYSIS AUTO W/SCOPE: CPT

## 2019-07-18 PROCEDURE — 36415 COLL VENOUS BLD VENIPUNCTURE: CPT

## 2019-07-18 PROCEDURE — 85025 COMPLETE CBC W/AUTO DIFF WBC: CPT

## 2019-07-18 PROCEDURE — 6370000000 HC RX 637 (ALT 250 FOR IP): Performed by: PHYSICIAN ASSISTANT

## 2019-07-18 RX ORDER — LORAZEPAM 1 MG/1
1 TABLET ORAL ONCE
Status: COMPLETED | OUTPATIENT
Start: 2019-07-18 | End: 2019-07-18

## 2019-07-18 RX ADMIN — LORAZEPAM 1 MG: 1 TABLET ORAL at 20:19

## 2019-07-18 NOTE — ED PROVIDER NOTES
includes Diabetes in her father. Allergies: Patient has no known allergies. Physical Exam           ED Triage Vitals [07/18/19 1850]   BP Temp Temp Source Pulse Resp SpO2 Height Weight   138/78 96.9 °F (36.1 °C) Temporal 111 16 96 % 5' 4\" (1.626 m) 130 lb (59 kg)      Oxygen Saturation Interpretation: Normal.    General Appearance:  lying in bed. Constitutional:   Level of Consciousness: Awake and alert. ETOH: No.          Distress: none. Cooperativeness: cooperative, avoids eye contact. Eyes:  PERRL, EOMI, no discharge or conjunctival injection. Ears:  External ears without lesions. Throat:  Pharynx without injection, exudate, or tonsillar hypertrophy. Airway patient. Neck:  Normal ROM. Supple. Respiratory:  Clear to auscultation and breath sounds equal.  CV:  Regular rate and rhythm, normal heart sounds, without pathological murmurs, ectopy, gallops, or rubs. GI:  Abdomen Soft, nontender, good bowel sounds. No firm or pulsatile mass. Back:  No costovertebral tenderness. Integument:  Normal turgor. Warm, dry, without visible rash, unless noted elsewhere. Lymphatics: No lymphangitis or adenopathy noted. Neurological:  Oriented. Motor functions intact. Psychiatric:        Thought Process:       Coherent:  Yes. Delusions / Paranoia: no evidence of paranoia. Flight of ideas:  No.         Rambling conversation:  No.       Affect: sad . Suicidal ideation:  no suicidal ideation. Homicidal ideation:  no homicidal ideation. Perceptions:  denies any perceptual disturbance present. Insight:  average. Judgement:  average.     Lab / Imaging Results   (All laboratory and radiology results have been personally reviewed by myself)  Labs:  Results for orders placed or performed during the hospital encounter of 07/18/19   CBC Auto Differential   Result Value Ref Range    WBC 5.9 4.5 - 11.5 E9/L    RBC 3.47 (L) 3.50 - 5.50 E12/L    Hemoglobin 9.4

## 2019-07-19 NOTE — ED NOTES
SW placed phone call to [facility] [phone] and left vm requesting call back.      Amari Stark, EL[place], [facility]  07/18/19 [address], MSW, [place]  07/18/19 2009

## 2019-07-21 ENCOUNTER — HOSPITAL ENCOUNTER (INPATIENT)
Age: 69
LOS: 5 days | Discharge: NURSING, REHAB OR LONG-TERM CARE | DRG: 378 | End: 2019-07-26
Attending: EMERGENCY MEDICINE | Admitting: INTERNAL MEDICINE
Payer: MEDICARE

## 2019-07-21 ENCOUNTER — APPOINTMENT (OUTPATIENT)
Dept: CT IMAGING | Age: 69
DRG: 378 | End: 2019-07-21
Payer: MEDICARE

## 2019-07-21 ENCOUNTER — APPOINTMENT (OUTPATIENT)
Dept: GENERAL RADIOLOGY | Age: 69
DRG: 378 | End: 2019-07-21
Payer: MEDICARE
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
## 2019-07-21 DIAGNOSIS — R40.4 TRANSIENT ALTERATION OF AWARENESS: Primary | ICD-10-CM

## 2019-07-21 DIAGNOSIS — F41.9 ANXIETY: ICD-10-CM

## 2019-07-21 DIAGNOSIS — D64.9 ANEMIA, UNSPECIFIED TYPE: ICD-10-CM

## 2019-07-21 LAB
ANION GAP SERPL CALCULATED.3IONS-SCNC: 12 MMOL/L (ref 7–16)
BASOPHILS ABSOLUTE: 0.03 E9/L (ref 0–0.2)
BASOPHILS RELATIVE PERCENT: 0.4 % (ref 0–2)
BUN BLDV-MCNC: 16 MG/DL (ref 8–23)
CALCIUM SERPL-MCNC: 9.6 MG/DL (ref 8.6–10.2)
CHLORIDE BLD-SCNC: 98 MMOL/L (ref 98–107)
CO2: 28 MMOL/L (ref 22–29)
CREAT SERPL-MCNC: 0.7 MG/DL (ref 0.5–1)
EOSINOPHILS ABSOLUTE: 0.19 E9/L (ref 0.05–0.5)
EOSINOPHILS RELATIVE PERCENT: 2.7 % (ref 0–6)
GFR AFRICAN AMERICAN: >60
GFR NON-AFRICAN AMERICAN: >60 ML/MIN/1.73
GLUCOSE BLD-MCNC: 72 MG/DL (ref 74–99)
HCT VFR BLD CALC: 24.2 % (ref 34–48)
HEMOGLOBIN: 7.4 G/DL (ref 11.5–15.5)
IMMATURE GRANULOCYTES #: 0.02 E9/L
IMMATURE GRANULOCYTES %: 0.3 % (ref 0–5)
LYMPHOCYTES ABSOLUTE: 2.18 E9/L (ref 1.5–4)
LYMPHOCYTES RELATIVE PERCENT: 31.1 % (ref 20–42)
MCH RBC QN AUTO: 27.3 PG (ref 26–35)
MCHC RBC AUTO-ENTMCNC: 30.6 % (ref 32–34.5)
MCV RBC AUTO: 89.3 FL (ref 80–99.9)
MONOCYTES ABSOLUTE: 0.49 E9/L (ref 0.1–0.95)
MONOCYTES RELATIVE PERCENT: 7 % (ref 2–12)
NEUTROPHILS ABSOLUTE: 4.09 E9/L (ref 1.8–7.3)
NEUTROPHILS RELATIVE PERCENT: 58.5 % (ref 43–80)
PDW BLD-RTO: 18.6 FL (ref 11.5–15)
PLATELET # BLD: 339 E9/L (ref 130–450)
PMV BLD AUTO: 10.9 FL (ref 7–12)
POTASSIUM SERPL-SCNC: 3.9 MMOL/L (ref 3.5–5)
RBC # BLD: 2.71 E12/L (ref 3.5–5.5)
SODIUM BLD-SCNC: 138 MMOL/L (ref 132–146)
TOTAL CK: 52 U/L (ref 20–180)
TROPONIN: <0.01 NG/ML (ref 0–0.03)
WBC # BLD: 7 E9/L (ref 4.5–11.5)

## 2019-07-21 PROCEDURE — 70450 CT HEAD/BRAIN W/O DYE: CPT

## 2019-07-21 PROCEDURE — 73030 X-RAY EXAM OF SHOULDER: CPT

## 2019-07-21 PROCEDURE — 84484 ASSAY OF TROPONIN QUANT: CPT

## 2019-07-21 PROCEDURE — 2060000000 HC ICU INTERMEDIATE R&B

## 2019-07-21 PROCEDURE — 71045 X-RAY EXAM CHEST 1 VIEW: CPT

## 2019-07-21 PROCEDURE — 72125 CT NECK SPINE W/O DYE: CPT

## 2019-07-21 PROCEDURE — 82550 ASSAY OF CK (CPK): CPT

## 2019-07-21 PROCEDURE — 93005 ELECTROCARDIOGRAM TRACING: CPT | Performed by: PREVENTIVE MEDICINE

## 2019-07-21 PROCEDURE — 85025 COMPLETE CBC W/AUTO DIFF WBC: CPT

## 2019-07-21 PROCEDURE — 80048 BASIC METABOLIC PNL TOTAL CA: CPT

## 2019-07-21 PROCEDURE — 36415 COLL VENOUS BLD VENIPUNCTURE: CPT

## 2019-07-21 PROCEDURE — 99285 EMERGENCY DEPT VISIT HI MDM: CPT

## 2019-07-21 PROCEDURE — 2580000003 HC RX 258: Performed by: PREVENTIVE MEDICINE

## 2019-07-21 PROCEDURE — 94760 N-INVAS EAR/PLS OXIMETRY 1: CPT

## 2019-07-21 RX ORDER — ACETAMINOPHEN 325 MG/1
650 TABLET ORAL EVERY 4 HOURS PRN
Status: DISCONTINUED | OUTPATIENT
Start: 2019-07-21 | End: 2019-07-26 | Stop reason: HOSPADM

## 2019-07-21 RX ORDER — SODIUM CHLORIDE 0.9 % (FLUSH) 0.9 %
10 SYRINGE (ML) INJECTION EVERY 12 HOURS SCHEDULED
Status: DISCONTINUED | OUTPATIENT
Start: 2019-07-22 | End: 2019-07-26 | Stop reason: HOSPADM

## 2019-07-21 RX ORDER — 0.9 % SODIUM CHLORIDE 0.9 %
1000 INTRAVENOUS SOLUTION INTRAVENOUS ONCE
Status: COMPLETED | OUTPATIENT
Start: 2019-07-21 | End: 2019-07-22

## 2019-07-21 RX ORDER — SODIUM CHLORIDE 0.9 % (FLUSH) 0.9 %
10 SYRINGE (ML) INJECTION PRN
Status: DISCONTINUED | OUTPATIENT
Start: 2019-07-21 | End: 2019-07-26 | Stop reason: HOSPADM

## 2019-07-21 RX ADMIN — SODIUM CHLORIDE 1000 ML: 9 INJECTION, SOLUTION INTRAVENOUS at 22:43

## 2019-07-22 ENCOUNTER — APPOINTMENT (OUTPATIENT)
Dept: GENERAL RADIOLOGY | Age: 69
DRG: 378 | End: 2019-07-22
Payer: MEDICARE

## 2019-07-22 LAB
BASOPHILS ABSOLUTE: 0.02 E9/L (ref 0–0.2)
BASOPHILS RELATIVE PERCENT: 0.4 % (ref 0–2)
EKG ATRIAL RATE: 109 BPM
EKG P AXIS: 69 DEGREES
EKG P-R INTERVAL: 156 MS
EKG Q-T INTERVAL: 366 MS
EKG QRS DURATION: 74 MS
EKG QTC CALCULATION (BAZETT): 492 MS
EKG R AXIS: 56 DEGREES
EKG T AXIS: 87 DEGREES
EKG VENTRICULAR RATE: 109 BPM
EOSINOPHILS ABSOLUTE: 0.13 E9/L (ref 0.05–0.5)
EOSINOPHILS RELATIVE PERCENT: 2.9 % (ref 0–6)
FERRITIN: 95 NG/ML
HCT VFR BLD CALC: 21.1 % (ref 34–48)
HEMOGLOBIN: 6.7 G/DL (ref 11.5–15.5)
IMMATURE GRANULOCYTES #: 0.01 E9/L
IMMATURE GRANULOCYTES %: 0.2 % (ref 0–5)
LYMPHOCYTES ABSOLUTE: 1.58 E9/L (ref 1.5–4)
LYMPHOCYTES RELATIVE PERCENT: 34.8 % (ref 20–42)
MCH RBC QN AUTO: 28.3 PG (ref 26–35)
MCHC RBC AUTO-ENTMCNC: 31.8 % (ref 32–34.5)
MCV RBC AUTO: 89 FL (ref 80–99.9)
METER GLUCOSE: 105 MG/DL (ref 74–99)
METER GLUCOSE: 119 MG/DL (ref 74–99)
METER GLUCOSE: 72 MG/DL (ref 74–99)
METER GLUCOSE: 86 MG/DL (ref 74–99)
METER GLUCOSE: 99 MG/DL (ref 74–99)
MONOCYTES ABSOLUTE: 0.27 E9/L (ref 0.1–0.95)
MONOCYTES RELATIVE PERCENT: 5.9 % (ref 2–12)
NEUTROPHILS ABSOLUTE: 2.53 E9/L (ref 1.8–7.3)
NEUTROPHILS RELATIVE PERCENT: 55.8 % (ref 43–80)
PDW BLD-RTO: 18.7 FL (ref 11.5–15)
PLATELET # BLD: 292 E9/L (ref 130–450)
PMV BLD AUTO: 11.1 FL (ref 7–12)
RBC # BLD: 2.37 E12/L (ref 3.5–5.5)
WBC # BLD: 4.5 E9/L (ref 4.5–11.5)

## 2019-07-22 PROCEDURE — 85025 COMPLETE CBC W/AUTO DIFF WBC: CPT

## 2019-07-22 PROCEDURE — 2060000000 HC ICU INTERMEDIATE R&B

## 2019-07-22 PROCEDURE — 2580000003 HC RX 258: Performed by: PREVENTIVE MEDICINE

## 2019-07-22 PROCEDURE — 82962 GLUCOSE BLOOD TEST: CPT

## 2019-07-22 PROCEDURE — 6370000000 HC RX 637 (ALT 250 FOR IP): Performed by: INTERNAL MEDICINE

## 2019-07-22 PROCEDURE — 6360000002 HC RX W HCPCS: Performed by: INTERNAL MEDICINE

## 2019-07-22 PROCEDURE — 6360000002 HC RX W HCPCS: Performed by: PREVENTIVE MEDICINE

## 2019-07-22 PROCEDURE — 93010 ELECTROCARDIOGRAM REPORT: CPT | Performed by: INTERNAL MEDICINE

## 2019-07-22 PROCEDURE — 82728 ASSAY OF FERRITIN: CPT

## 2019-07-22 PROCEDURE — 73560 X-RAY EXAM OF KNEE 1 OR 2: CPT

## 2019-07-22 PROCEDURE — 36415 COLL VENOUS BLD VENIPUNCTURE: CPT

## 2019-07-22 PROCEDURE — 2580000003 HC RX 258: Performed by: INTERNAL MEDICINE

## 2019-07-22 RX ORDER — HALOPERIDOL 2 MG/1
2 TABLET ORAL EVERY 6 HOURS PRN
Status: DISCONTINUED | OUTPATIENT
Start: 2019-07-22 | End: 2019-07-22 | Stop reason: SDUPTHER

## 2019-07-22 RX ORDER — LORAZEPAM 0.5 MG/1
0.5 TABLET ORAL EVERY 6 HOURS PRN
Status: DISCONTINUED | OUTPATIENT
Start: 2019-07-22 | End: 2019-07-22

## 2019-07-22 RX ORDER — LORAZEPAM 0.5 MG/1
0.5 TABLET ORAL EVERY 6 HOURS PRN
Status: ON HOLD | COMMUNITY
End: 2019-07-24 | Stop reason: HOSPADM

## 2019-07-22 RX ORDER — NICOTINE POLACRILEX 4 MG
15 LOZENGE BUCCAL PRN
Status: ON HOLD | COMMUNITY
End: 2019-07-24 | Stop reason: HOSPADM

## 2019-07-22 RX ORDER — FENTANYL CITRATE 50 UG/ML
50 INJECTION, SOLUTION INTRAMUSCULAR; INTRAVENOUS ONCE
Status: COMPLETED | OUTPATIENT
Start: 2019-07-22 | End: 2019-07-22

## 2019-07-22 RX ORDER — ROSUVASTATIN CALCIUM 10 MG/1
10 TABLET, COATED ORAL NIGHTLY
Status: DISCONTINUED | OUTPATIENT
Start: 2019-07-22 | End: 2019-07-26 | Stop reason: HOSPADM

## 2019-07-22 RX ORDER — HALOPERIDOL 2 MG/1
2 TABLET ORAL EVERY 6 HOURS PRN
Status: ON HOLD | COMMUNITY
End: 2019-07-24 | Stop reason: HOSPADM

## 2019-07-22 RX ORDER — LEVOTHYROXINE SODIUM 0.05 MG/1
50 TABLET ORAL DAILY
Status: DISCONTINUED | OUTPATIENT
Start: 2019-07-22 | End: 2019-07-26 | Stop reason: HOSPADM

## 2019-07-22 RX ORDER — LISINOPRIL 5 MG/1
5 TABLET ORAL DAILY
Status: DISCONTINUED | OUTPATIENT
Start: 2019-07-22 | End: 2019-07-26 | Stop reason: HOSPADM

## 2019-07-22 RX ORDER — DOCUSATE SODIUM 100 MG/1
100 CAPSULE, LIQUID FILLED ORAL 2 TIMES DAILY
COMMUNITY

## 2019-07-22 RX ORDER — ACETAMINOPHEN 325 MG/1
650 TABLET ORAL EVERY 4 HOURS PRN
Status: DISCONTINUED | OUTPATIENT
Start: 2019-07-22 | End: 2019-07-22 | Stop reason: SDUPTHER

## 2019-07-22 RX ORDER — GABAPENTIN 400 MG/1
400 CAPSULE ORAL 3 TIMES DAILY
Status: DISCONTINUED | OUTPATIENT
Start: 2019-07-22 | End: 2019-07-26 | Stop reason: HOSPADM

## 2019-07-22 RX ORDER — TRAZODONE HYDROCHLORIDE 50 MG/1
200 TABLET ORAL NIGHTLY
Status: DISCONTINUED | OUTPATIENT
Start: 2019-07-22 | End: 2019-07-26 | Stop reason: HOSPADM

## 2019-07-22 RX ORDER — NICOTINE POLACRILEX 4 MG
15 LOZENGE BUCCAL PRN
Status: DISCONTINUED | OUTPATIENT
Start: 2019-07-22 | End: 2019-07-26 | Stop reason: HOSPADM

## 2019-07-22 RX ORDER — CYCLOBENZAPRINE HCL 5 MG
5 TABLET ORAL 3 TIMES DAILY
Status: ON HOLD | COMMUNITY
End: 2019-10-07 | Stop reason: HOSPADM

## 2019-07-22 RX ORDER — SODIUM CHLORIDE 0.9 % (FLUSH) 0.9 %
10 SYRINGE (ML) INJECTION EVERY 12 HOURS SCHEDULED
Status: DISCONTINUED | OUTPATIENT
Start: 2019-07-22 | End: 2019-07-26 | Stop reason: HOSPADM

## 2019-07-22 RX ORDER — HALOPERIDOL 5 MG/ML
2 INJECTION INTRAMUSCULAR EVERY 6 HOURS PRN
Status: DISCONTINUED | OUTPATIENT
Start: 2019-07-22 | End: 2019-07-26 | Stop reason: HOSPADM

## 2019-07-22 RX ORDER — CYCLOBENZAPRINE HCL 10 MG
10 TABLET ORAL 3 TIMES DAILY
Status: DISCONTINUED | OUTPATIENT
Start: 2019-07-22 | End: 2019-07-26 | Stop reason: HOSPADM

## 2019-07-22 RX ORDER — INSULIN GLARGINE 100 [IU]/ML
50 INJECTION, SOLUTION SUBCUTANEOUS NIGHTLY
Status: DISCONTINUED | OUTPATIENT
Start: 2019-07-22 | End: 2019-07-23

## 2019-07-22 RX ORDER — SODIUM CHLORIDE 0.9 % (FLUSH) 0.9 %
10 SYRINGE (ML) INJECTION PRN
Status: DISCONTINUED | OUTPATIENT
Start: 2019-07-22 | End: 2019-07-26 | Stop reason: HOSPADM

## 2019-07-22 RX ORDER — ARIPIPRAZOLE 10 MG/1
20 TABLET ORAL NIGHTLY
Status: DISCONTINUED | OUTPATIENT
Start: 2019-07-22 | End: 2019-07-22

## 2019-07-22 RX ORDER — CLONAZEPAM 0.5 MG/1
1 TABLET ORAL 3 TIMES DAILY
Status: DISCONTINUED | OUTPATIENT
Start: 2019-07-22 | End: 2019-07-26 | Stop reason: HOSPADM

## 2019-07-22 RX ORDER — LORAZEPAM 2 MG/ML
0.5 INJECTION INTRAMUSCULAR EVERY 6 HOURS PRN
Status: ON HOLD | COMMUNITY
End: 2019-07-24 | Stop reason: HOSPADM

## 2019-07-22 RX ORDER — ARIPIPRAZOLE 10 MG/1
20 TABLET ORAL DAILY
Status: DISCONTINUED | OUTPATIENT
Start: 2019-07-22 | End: 2019-07-26 | Stop reason: HOSPADM

## 2019-07-22 RX ORDER — ONDANSETRON 2 MG/ML
4 INJECTION INTRAMUSCULAR; INTRAVENOUS EVERY 6 HOURS PRN
Status: DISCONTINUED | OUTPATIENT
Start: 2019-07-22 | End: 2019-07-26 | Stop reason: HOSPADM

## 2019-07-22 RX ORDER — DOCUSATE SODIUM 100 MG/1
100 CAPSULE, LIQUID FILLED ORAL 2 TIMES DAILY
Status: DISCONTINUED | OUTPATIENT
Start: 2019-07-22 | End: 2019-07-26 | Stop reason: HOSPADM

## 2019-07-22 RX ORDER — SODIUM CHLORIDE 9 MG/ML
INJECTION, SOLUTION INTRAVENOUS CONTINUOUS
Status: DISCONTINUED | OUTPATIENT
Start: 2019-07-22 | End: 2019-07-22

## 2019-07-22 RX ORDER — ROSUVASTATIN CALCIUM 10 MG/1
10 TABLET, COATED ORAL NIGHTLY
COMMUNITY

## 2019-07-22 RX ORDER — DULOXETIN HYDROCHLORIDE 60 MG/1
60 CAPSULE, DELAYED RELEASE ORAL DAILY
Status: DISCONTINUED | OUTPATIENT
Start: 2019-07-22 | End: 2019-07-26 | Stop reason: HOSPADM

## 2019-07-22 RX ORDER — HALOPERIDOL 5 MG/ML
2 INJECTION INTRAMUSCULAR EVERY 6 HOURS PRN
Status: ON HOLD | COMMUNITY
End: 2019-07-24 | Stop reason: HOSPADM

## 2019-07-22 RX ORDER — CLONAZEPAM 0.5 MG/1
1 TABLET ORAL ONCE
Status: COMPLETED | OUTPATIENT
Start: 2019-07-22 | End: 2019-07-22

## 2019-07-22 RX ORDER — HALOPERIDOL 2 MG/ML
2 SOLUTION ORAL EVERY 6 HOURS PRN
Status: DISCONTINUED | OUTPATIENT
Start: 2019-07-22 | End: 2019-07-26 | Stop reason: HOSPADM

## 2019-07-22 RX ADMIN — Medication 10 ML: at 21:47

## 2019-07-22 RX ADMIN — APIXABAN 5 MG: 5 TABLET, FILM COATED ORAL at 10:53

## 2019-07-22 RX ADMIN — METFORMIN HYDROCHLORIDE 1000 MG: 1000 TABLET ORAL at 10:47

## 2019-07-22 RX ADMIN — DOCUSATE SODIUM 100 MG: 100 CAPSULE, LIQUID FILLED ORAL at 10:46

## 2019-07-22 RX ADMIN — GABAPENTIN 400 MG: 400 CAPSULE ORAL at 08:59

## 2019-07-22 RX ADMIN — LEVOTHYROXINE SODIUM 50 MCG: 50 TABLET ORAL at 10:46

## 2019-07-22 RX ADMIN — Medication 10 ML: at 08:59

## 2019-07-22 RX ADMIN — Medication 10 ML: at 10:53

## 2019-07-22 RX ADMIN — METFORMIN HYDROCHLORIDE 1000 MG: 1000 TABLET ORAL at 21:38

## 2019-07-22 RX ADMIN — ROSUVASTATIN CALCIUM 10 MG: 10 TABLET, FILM COATED ORAL at 21:39

## 2019-07-22 RX ADMIN — CYCLOBENZAPRINE 10 MG: 10 TABLET, FILM COATED ORAL at 21:39

## 2019-07-22 RX ADMIN — ACETAMINOPHEN 650 MG: 325 TABLET, FILM COATED ORAL at 03:27

## 2019-07-22 RX ADMIN — DOCUSATE SODIUM 100 MG: 100 CAPSULE, LIQUID FILLED ORAL at 21:39

## 2019-07-22 RX ADMIN — GABAPENTIN 400 MG: 400 CAPSULE ORAL at 13:44

## 2019-07-22 RX ADMIN — CLONAZEPAM 1 MG: 0.5 TABLET ORAL at 21:38

## 2019-07-22 RX ADMIN — CLONAZEPAM 1 MG: 0.5 TABLET ORAL at 08:59

## 2019-07-22 RX ADMIN — DULOXETINE HYDROCHLORIDE 60 MG: 60 CAPSULE, DELAYED RELEASE ORAL at 10:47

## 2019-07-22 RX ADMIN — CLONAZEPAM 1 MG: 0.5 TABLET ORAL at 04:41

## 2019-07-22 RX ADMIN — GABAPENTIN 400 MG: 400 CAPSULE ORAL at 21:38

## 2019-07-22 RX ADMIN — CYCLOBENZAPRINE 10 MG: 10 TABLET, FILM COATED ORAL at 13:44

## 2019-07-22 RX ADMIN — TRAZODONE HYDROCHLORIDE 200 MG: 50 TABLET ORAL at 21:38

## 2019-07-22 RX ADMIN — SODIUM CHLORIDE: 9 INJECTION, SOLUTION INTRAVENOUS at 00:19

## 2019-07-22 RX ADMIN — CYCLOBENZAPRINE 10 MG: 10 TABLET, FILM COATED ORAL at 08:59

## 2019-07-22 RX ADMIN — HALOPERIDOL LACTATE 2 MG: 5 INJECTION INTRAMUSCULAR at 08:59

## 2019-07-22 RX ADMIN — CLONAZEPAM 1 MG: 0.5 TABLET ORAL at 13:44

## 2019-07-22 RX ADMIN — APIXABAN 5 MG: 5 TABLET, FILM COATED ORAL at 21:39

## 2019-07-22 RX ADMIN — Medication 10 ML: at 10:46

## 2019-07-22 RX ADMIN — FENTANYL CITRATE 50 MCG: 50 INJECTION, SOLUTION INTRAMUSCULAR; INTRAVENOUS at 00:16

## 2019-07-22 RX ADMIN — ARIPIPRAZOLE 20 MG: 10 TABLET ORAL at 04:41

## 2019-07-22 RX ADMIN — LISINOPRIL 5 MG: 5 TABLET ORAL at 10:47

## 2019-07-22 ASSESSMENT — PAIN DESCRIPTION - LOCATION
LOCATION: LEG
LOCATION: LEG

## 2019-07-22 ASSESSMENT — ENCOUNTER SYMPTOMS
SHORTNESS OF BREATH: 0
NAUSEA: 0
DIARRHEA: 0
VOMITING: 0
CONSTIPATION: 0
ABDOMINAL PAIN: 0
CHEST TIGHTNESS: 0
COUGH: 0
ALLERGIC/IMMUNOLOGIC NEGATIVE: 1

## 2019-07-22 ASSESSMENT — PAIN DESCRIPTION - FREQUENCY
FREQUENCY: CONTINUOUS
FREQUENCY: CONTINUOUS

## 2019-07-22 ASSESSMENT — PAIN DESCRIPTION - PROGRESSION
CLINICAL_PROGRESSION: GRADUALLY IMPROVING
CLINICAL_PROGRESSION: NOT CHANGED
CLINICAL_PROGRESSION: NOT CHANGED

## 2019-07-22 ASSESSMENT — PAIN SCALES - GENERAL
PAINLEVEL_OUTOF10: 5
PAINLEVEL_OUTOF10: 10
PAINLEVEL_OUTOF10: 4
PAINLEVEL_OUTOF10: 0

## 2019-07-22 ASSESSMENT — PAIN DESCRIPTION - PAIN TYPE
TYPE: ACUTE PAIN
TYPE: CHRONIC PAIN

## 2019-07-22 ASSESSMENT — PAIN DESCRIPTION - ONSET
ONSET: ON-GOING
ONSET: ON-GOING

## 2019-07-22 ASSESSMENT — PAIN DESCRIPTION - ORIENTATION
ORIENTATION: RIGHT
ORIENTATION: RIGHT;LEFT

## 2019-07-22 ASSESSMENT — PAIN DESCRIPTION - DESCRIPTORS
DESCRIPTORS: PATIENT UNABLE TO DESCRIBE
DESCRIPTORS: ACHING;BURNING

## 2019-07-22 ASSESSMENT — PAIN - FUNCTIONAL ASSESSMENT: PAIN_FUNCTIONAL_ASSESSMENT: PREVENTS OR INTERFERES SOME ACTIVE ACTIVITIES AND ADLS

## 2019-07-22 NOTE — ED PROVIDER NOTES
Head: Normocephalic and atraumatic. Right Ear: External ear normal.   Left Ear: External ear normal.   Nose: Nose normal.   Mouth/Throat: Oropharynx is clear and moist. No oropharyngeal exudate. Moist oral mucosa, there are no signs of head trauma   Eyes: Pupils are equal, round, and reactive to light. Conjunctivae and EOM are normal. Right eye exhibits no discharge. Left eye exhibits no discharge. Scleral pallor noted   Neck: Normal range of motion. Neck supple. No JVD present. No tracheal deviation present. No thyromegaly present. No JVD, supple demonstrates full range of motion there is no cervical midline tenderness step-off or appreciable deformity. Cardiovascular: Regular rhythm, normal heart sounds and intact distal pulses. Exam reveals no gallop and no friction rub. No murmur heard. Tachycardia noted. Pulmonary/Chest: Effort normal and breath sounds normal. No respiratory distress. She has no wheezes. She has no rales. Lungs are clear when compared bilaterally. Abdominal: Soft. Bowel sounds are normal. She exhibits no distension. There is no tenderness. There is no rebound and no guarding. PEG tube noted otherwise unremarkable abdominal exam with normoactive bowel sounds, nontender, nondistended. No ecchymosis rashes or erythema noted. Genitourinary:   Genitourinary Comments: Rectal exam performed with female attendant at the bedside demonstrated guaiac-negative stool. Normal rectal tone   Musculoskeletal: Normal range of motion. She exhibits no edema. Right lower extremity is chronically contracted, bilateral dorsalis pedis pulses are equal and compared bilaterally. There is no peripheral edema. Lymphadenopathy:     She has no cervical adenopathy. Neurological: She is alert and oriented to person, place, and time. Moves all 4 extremities, GCS of 15, alert and oriented to person place and time   Skin: Skin is warm and dry. She is not diaphoretic. There is pallor. Obstructive sleep apnea, Radiculopathy of lumbar region, Spinal cord infarction [facility]), Suicidal ideations, and Vitamin D deficiency. Past Surgical History:  has a past surgical history that includes back surgery. Social History:  reports that she has quit smoking. She smoked 0.50 packs per day. She has never used smokeless tobacco. She reports that she does not drink alcohol or use drugs. Family History: family history includes Diabetes in her father. The patients home medications have been reviewed. Allergies: Patient has no known allergies.     -------------------------------------------------- RESULTS -------------------------------------------------    LABS:  Results for orders placed or performed during the hospital encounter of 07/21/19   CBC auto differential   Result Value Ref Range    WBC 7.0 4.5 - 11.5 E9/L    RBC 2.71 (L) 3.50 - 5.50 E12/L    Hemoglobin 7.4 (L) 11.5 - 15.5 g/dL    Hematocrit 24.2 (L) 34.0 - 48.0 %    MCV 89.3 80.0 - 99.9 fL    MCH 27.3 26.0 - 35.0 pg    MCHC 30.6 (L) 32.0 - 34.5 %    RDW 18.6 (H) 11.5 - 15.0 fL    Platelets 731 580 - 614 E9/L    MPV 10.9 7.0 - 12.0 fL    Neutrophils % 58.5 43.0 - 80.0 %    Immature Granulocytes % 0.3 0.0 - 5.0 %    Lymphocytes % 31.1 20.0 - 42.0 %    Monocytes % 7.0 2.0 - 12.0 %    Eosinophils % 2.7 0.0 - 6.0 %    Basophils % 0.4 0.0 - 2.0 %    Neutrophils # 4.09 1.80 - 7.30 E9/L    Immature Granulocytes # 0.02 E9/L    Lymphocytes # 2.18 1.50 - 4.00 E9/L    Monocytes # 0.49 0.10 - 0.95 E9/L    Eosinophils # 0.19 0.05 - 0.50 E9/L    Basophils # 0.03 0.00 - 0.20 D4/Q   Basic metabolic panel   Result Value Ref Range    Sodium 138 132 - 146 mmol/L    Potassium 3.9 3.5 - 5.0 mmol/L    Chloride 98 98 - 107 mmol/L    CO2 28 22 - 29 mmol/L    Anion Gap 12 7 - 16 mmol/L    Glucose 72 (L) 74 - 99 mg/dL    BUN 16 8 - 23 mg/dL    CREATININE 0.7 0.5 - 1.0 mg/dL    GFR Non-African American >60 >=60 mL/min/1.73    GFR African American >60     Calcium 9.6 8.6 - 10.2 mg/dL   Troponin   Result Value Ref Range    Troponin <0.01 0.00 - 0.03 ng/mL   CK   Result Value Ref Range    Total CK 52 20 - 180 U/L       RADIOLOGY:  CT Head WO Contrast   Final Result      No evidence of acute intracranial hemorrhage or edema. CT Cervical Spine WO Contrast   Final Result   No evidence of fracture or dislocation of cervical spine. XR CHEST PORTABLE   Final Result      No acute radiographic abnormality. Cardiomegaly. XR SHOULDER RIGHT (MIN 2 VIEWS)   Final Result      No acute osseous abnormalities. EKG: This EKG is signed and interpreted by me. Rate: 109  Rhythm: Sinus  Interpretation: sinus tachycardia  Comparison: stable as compared to patient's most recent EKG      ------------------------- NURSING NOTES AND VITALS REVIEWED ---------------------------  Date / Time Roomed:  7/21/2019 10:26 PM  ED Bed Assignment:  12/12    The nursing notes within the ED encounter and vital signs as below have been reviewed. Patient Vitals for the past 24 hrs:   BP Temp Temp src Pulse Resp SpO2 Height Weight   07/22/19 0001 138/72 97.7 °F (36.5 °C) Oral 114 22 98 % -- --   07/21/19 2245 108/61 -- -- 108 22 98 % -- --   07/21/19 2228 126/70 97 °F (36.1 °C) -- 109 16 100 % 5' 4\" (1.626 m) 130 lb (59 kg)       Oxygen Saturation Interpretation: Normal    ------------------------------------------ PROGRESS NOTES ------------------------------------------      Counseling:  I have spoken with the patient and discussed todays results, in addition to providing specific details for the plan of care and counseling regarding the diagnosis and prognosis.   Their questions are answered at this time and they are agreeable with the plan of admission.    --------------------------------- ADDITIONAL PROVIDER NOTES ---------------------------------    This patient's ED course included: a personal history and physicial examination and

## 2019-07-22 NOTE — DISCHARGE SUMMARY
protein-calorie malnutrition. 3.  Volume depletion. 4.  Paraplegia. She will be followed up as an outpatient.         Adrien Scruggs MD    D: 07/21/2019 11:45:25       T: 07/21/2019 12:57:16     [email or web address]  Job#: [id number]     Doc#: [id number]    CC:

## 2019-07-23 LAB
ALBUMIN SERPL-MCNC: 3.5 G/DL (ref 3.5–5.2)
ALP BLD-CCNC: 50 U/L (ref 35–104)
ALT SERPL-CCNC: 13 U/L (ref 0–32)
ANION GAP SERPL CALCULATED.3IONS-SCNC: 16 MMOL/L (ref 7–16)
AST SERPL-CCNC: 20 U/L (ref 0–31)
BASOPHILS ABSOLUTE: 0.03 E9/L (ref 0–0.2)
BASOPHILS RELATIVE PERCENT: 0.5 % (ref 0–2)
BILIRUB SERPL-MCNC: 0.5 MG/DL (ref 0–1.2)
BUN BLDV-MCNC: 12 MG/DL (ref 8–23)
CALCIUM SERPL-MCNC: 9.2 MG/DL (ref 8.6–10.2)
CHLORIDE BLD-SCNC: 101 MMOL/L (ref 98–107)
CO2: 21 MMOL/L (ref 22–29)
CREAT SERPL-MCNC: 0.7 MG/DL (ref 0.5–1)
EOSINOPHILS ABSOLUTE: 0.16 E9/L (ref 0.05–0.5)
EOSINOPHILS RELATIVE PERCENT: 2.6 % (ref 0–6)
GFR AFRICAN AMERICAN: >60
GFR NON-AFRICAN AMERICAN: >60 ML/MIN/1.73
GLUCOSE BLD-MCNC: 122 MG/DL (ref 74–99)
HCT VFR BLD CALC: 27.3 % (ref 34–48)
HEMOGLOBIN: 8.2 G/DL (ref 11.5–15.5)
IMMATURE GRANULOCYTES #: 0.02 E9/L
IMMATURE GRANULOCYTES %: 0.3 % (ref 0–5)
LYMPHOCYTES ABSOLUTE: 1.64 E9/L (ref 1.5–4)
LYMPHOCYTES RELATIVE PERCENT: 26.2 % (ref 20–42)
MCH RBC QN AUTO: 28.1 PG (ref 26–35)
MCHC RBC AUTO-ENTMCNC: 30 % (ref 32–34.5)
MCV RBC AUTO: 93.5 FL (ref 80–99.9)
METER GLUCOSE: 145 MG/DL (ref 74–99)
METER GLUCOSE: 242 MG/DL (ref 74–99)
METER GLUCOSE: 92 MG/DL (ref 74–99)
METER GLUCOSE: 98 MG/DL (ref 74–99)
MONOCYTES ABSOLUTE: 0.4 E9/L (ref 0.1–0.95)
MONOCYTES RELATIVE PERCENT: 6.4 % (ref 2–12)
NEUTROPHILS ABSOLUTE: 4.01 E9/L (ref 1.8–7.3)
NEUTROPHILS RELATIVE PERCENT: 64 % (ref 43–80)
PDW BLD-RTO: 19.9 FL (ref 11.5–15)
PLATELET # BLD: 303 E9/L (ref 130–450)
PMV BLD AUTO: 11.2 FL (ref 7–12)
POTASSIUM REFLEX MAGNESIUM: 4.5 MMOL/L (ref 3.5–5)
RBC # BLD: 2.92 E12/L (ref 3.5–5.5)
SODIUM BLD-SCNC: 138 MMOL/L (ref 132–146)
TOTAL PROTEIN: 5.7 G/DL (ref 6.4–8.3)
WBC # BLD: 6.3 E9/L (ref 4.5–11.5)

## 2019-07-23 PROCEDURE — 97530 THERAPEUTIC ACTIVITIES: CPT

## 2019-07-23 PROCEDURE — 36415 COLL VENOUS BLD VENIPUNCTURE: CPT

## 2019-07-23 PROCEDURE — 6370000000 HC RX 637 (ALT 250 FOR IP): Performed by: INTERNAL MEDICINE

## 2019-07-23 PROCEDURE — 99221 1ST HOSP IP/OBS SF/LOW 40: CPT | Performed by: PSYCHIATRY & NEUROLOGY

## 2019-07-23 PROCEDURE — 97162 PT EVAL MOD COMPLEX 30 MIN: CPT

## 2019-07-23 PROCEDURE — 2580000003 HC RX 258: Performed by: INTERNAL MEDICINE

## 2019-07-23 PROCEDURE — 80053 COMPREHEN METABOLIC PANEL: CPT

## 2019-07-23 PROCEDURE — 82962 GLUCOSE BLOOD TEST: CPT

## 2019-07-23 PROCEDURE — 2060000000 HC ICU INTERMEDIATE R&B

## 2019-07-23 PROCEDURE — 85025 COMPLETE CBC W/AUTO DIFF WBC: CPT

## 2019-07-23 PROCEDURE — 97166 OT EVAL MOD COMPLEX 45 MIN: CPT

## 2019-07-23 RX ORDER — INSULIN GLARGINE 100 [IU]/ML
25 INJECTION, SOLUTION SUBCUTANEOUS NIGHTLY
Status: DISCONTINUED | OUTPATIENT
Start: 2019-07-23 | End: 2019-07-26 | Stop reason: HOSPADM

## 2019-07-23 RX ADMIN — CLONAZEPAM 1 MG: 0.5 TABLET ORAL at 21:45

## 2019-07-23 RX ADMIN — ARIPIPRAZOLE 20 MG: 10 TABLET ORAL at 09:11

## 2019-07-23 RX ADMIN — GABAPENTIN 400 MG: 400 CAPSULE ORAL at 14:11

## 2019-07-23 RX ADMIN — INSULIN GLARGINE 25 UNITS: 100 INJECTION, SOLUTION SUBCUTANEOUS at 20:53

## 2019-07-23 RX ADMIN — CYCLOBENZAPRINE 10 MG: 10 TABLET, FILM COATED ORAL at 20:40

## 2019-07-23 RX ADMIN — GABAPENTIN 400 MG: 400 CAPSULE ORAL at 21:45

## 2019-07-23 RX ADMIN — CYCLOBENZAPRINE 10 MG: 10 TABLET, FILM COATED ORAL at 09:11

## 2019-07-23 RX ADMIN — DULOXETINE HYDROCHLORIDE 60 MG: 60 CAPSULE, DELAYED RELEASE ORAL at 09:11

## 2019-07-23 RX ADMIN — Medication 10 ML: at 09:13

## 2019-07-23 RX ADMIN — CLONAZEPAM 1 MG: 0.5 TABLET ORAL at 09:11

## 2019-07-23 RX ADMIN — LISINOPRIL 5 MG: 5 TABLET ORAL at 09:11

## 2019-07-23 RX ADMIN — GABAPENTIN 400 MG: 400 CAPSULE ORAL at 09:11

## 2019-07-23 RX ADMIN — LEVOTHYROXINE SODIUM 50 MCG: 50 TABLET ORAL at 06:23

## 2019-07-23 RX ADMIN — DOCUSATE SODIUM 100 MG: 100 CAPSULE, LIQUID FILLED ORAL at 09:11

## 2019-07-23 RX ADMIN — Medication 10 ML: at 20:49

## 2019-07-23 RX ADMIN — METFORMIN HYDROCHLORIDE 1000 MG: 1000 TABLET ORAL at 09:11

## 2019-07-23 RX ADMIN — APIXABAN 5 MG: 5 TABLET, FILM COATED ORAL at 09:11

## 2019-07-23 RX ADMIN — TRAZODONE HYDROCHLORIDE 200 MG: 50 TABLET ORAL at 20:40

## 2019-07-23 RX ADMIN — APIXABAN 5 MG: 5 TABLET, FILM COATED ORAL at 20:40

## 2019-07-23 RX ADMIN — ROSUVASTATIN CALCIUM 10 MG: 10 TABLET, FILM COATED ORAL at 20:42

## 2019-07-23 RX ADMIN — DOCUSATE SODIUM 100 MG: 100 CAPSULE, LIQUID FILLED ORAL at 20:40

## 2019-07-23 RX ADMIN — METFORMIN HYDROCHLORIDE 1000 MG: 1000 TABLET ORAL at 20:40

## 2019-07-23 ASSESSMENT — PAIN DESCRIPTION - FREQUENCY: FREQUENCY: INTERMITTENT

## 2019-07-23 ASSESSMENT — PAIN DESCRIPTION - PROGRESSION
CLINICAL_PROGRESSION: NOT CHANGED
CLINICAL_PROGRESSION: NOT CHANGED

## 2019-07-23 ASSESSMENT — PAIN DESCRIPTION - DESCRIPTORS: DESCRIPTORS: BURNING;CRAMPING

## 2019-07-23 ASSESSMENT — PAIN - FUNCTIONAL ASSESSMENT: PAIN_FUNCTIONAL_ASSESSMENT: PREVENTS OR INTERFERES WITH ALL ACTIVE AND SOME PASSIVE ACTIVITIES

## 2019-07-23 ASSESSMENT — PAIN DESCRIPTION - LOCATION: LOCATION: LEG

## 2019-07-23 ASSESSMENT — PAIN DESCRIPTION - ORIENTATION: ORIENTATION: RIGHT;LEFT

## 2019-07-23 ASSESSMENT — PAIN DESCRIPTION - PAIN TYPE: TYPE: CHRONIC PAIN

## 2019-07-23 ASSESSMENT — PAIN SCALES - GENERAL: PAINLEVEL_OUTOF10: 4

## 2019-07-23 ASSESSMENT — PAIN DESCRIPTION - ONSET: ONSET: ON-GOING

## 2019-07-23 NOTE — PROGRESS NOTES
Call made to Dr. Thomas Lugo regarding low bps
Dr Haydee Pichardo assigned to consult per Psych Dept.   Pt added to census
OCCUPATIONAL THERAPY INITIAL EVALUATION      Date:2019  Patient Name: Jacquelyn Reed  MRN: [id number]  : 1950  Room: [address] [place], [place] [address]      [address], OTR/L #[id number]    AM-PAC Daily Activity Raw Score: 10/24  Recommended Adaptive Equipment: TBD     Diagnosis: Anemia [D64.9]  Anemia [D64.9]  Anemia [D64.9]     Pt presented to ED on 19 for syncope, fall    Pertinent Medical History: anemia, anxiety disorder, chronic back pain, depression, DM, HTN, neurogenic bowel, TETE, radiculopathy lumbar, spinal cord infarction, SI    Precautions:  Falls, TSM, bed alarm, PEG w/ diet, paraplegic     Home Living: Pt admitted from NH (per chart). Prior Level of Function: assistance with ADLs; non-ambulatory. Per pt, assistance for transfers >< w/c. Pt required assist PRN w/ w/c mobility. Pt is a questionable historian. Pain Level: Pt c/o 7/10 abdominal pain this session ; reinforced pain management strategies    Cognition: A&O: 3/4; Follows 1 step directions ~50-75% of session   Memory:  fair    Sequencing:  fair    Problem solving:  poor   Judgement/safety:  poor     Functional Assessment:   Initial Eval Status  Date: 19 Treatment Status  Date: Short Term Goals  Treatment frequency: PRN    Feeding Minimal Assist  Carb control diet     Modified Hughes    Grooming Moderate Assist   Seated EOB  Supervision    UB Dressing Maximal Assist   Minimal Assist    LB Dressing Dependent   -   Bathing Dependent  Maximal Assist    Toileting Dependent   -   Bed Mobility  Rolling: Maximal Assist   Supine to sit: Maximal Assist  Sit to supine: Maximal Assist  Rolling: Minimal Assist   Supine to sit: Moderate Assist   Sit to supine:  Moderate Assist    Functional Transfers NT  Max A   Functional Mobility NT  -   Balance Sitting: SBA (static)  Min A (dynamic during grooming task EOB)  Standing: NT  Sitting: Supervision   Activity Tolerance Poor  Fair   Visual/  Perceptual WFL             Hand dominance: R
Resp 18   Ht 5' 4\" (1.626 m)   Wt 98 lb 1 oz (44.5 kg)   SpO2 98%   BMI 16.83 kg/m²   24HR INTAKE/OUTPUT:      Intake/Output Summary (Last 24 hours) at 7/23/2019 6310  Last data filed at 7/23/2019 8832  Gross per 24 hour   Intake 588 ml   Output 1375 ml   Net -787 ml     LUNGS:  No increased work of breathing, good air exchange, clear to auscultation bilaterally, no crackles or wheezing  CARDIOVASCULAR:  Normal apical impulse, regular rate and rhythm, normal S1 and S2, no S3 or S4, and no murmur noted  Data    CBC with Differential:  Lab Results   Component Value Date    WBC 4.5 07/22/2019    RBC 2.37 07/22/2019    HGB 6.7 07/22/2019    HCT 21.1 07/22/2019     07/22/2019    MCV 89.0 07/22/2019    MCH 28.3 07/22/2019    MCHC 31.8 07/22/2019    RDW 18.7 07/22/2019    LYMPHOPCT 34.8 07/22/2019    MONOPCT 5.9 07/22/2019    BASOPCT 0.4 07/22/2019    MONOSABS 0.27 07/22/2019    LYMPHSABS 1.58 07/22/2019    EOSABS 0.13 07/22/2019    BASOSABS 0.02 07/22/2019     CMP:  Lab Results   Component Value Date     07/21/2019    K 3.9 07/21/2019    K 3.0 07/03/2019    CL 98 07/21/2019    CO2 28 07/21/2019    BUN 16 07/21/2019    CREATININE 0.7 07/21/2019    GFRAA >60 07/21/2019    LABGLOM >60 07/21/2019    GLUCOSE 72 07/21/2019    PROT 6.6 07/18/2019    LABALBU 4.1 07/18/2019    CALCIUM 9.6 07/21/2019    BILITOT 0.5 07/18/2019    ALKPHOS 55 07/18/2019    AST 12 07/18/2019    ALT 16 07/18/2019     PT/INR:  No results found for: PROTIME, INR    ASSESSMENT AND PLAN      Patient C/Bradford Cheema 1106 Problem List:   Anemia (7/21/2019)    Assessment: dropping    Plan: colonoscopy    paraplegia    Severe depression    Diabetes mellitus   Anxiety disorder with delusions    Electronically signed by Rom Ramirez MD on 7/23/2019 at 7:27 AM
requested to return to bed due to fatigue. Pt was assisted to supine and scooted toward HOB. Pt was positioned with TAPS and rolled onto L side. Pt was left supine in bed with all needs met at conclusion of session. Pts/family goals:  None stated. Patient and or family understand(s) diagnosis, prognosis, and plan of care:  Yes. PLAN  PT care will be provided in accordance with the objectives noted above. Whenever appropriate, clear delegation orders will be provided for nursing staff. Exercises and functional mobility practice will be used as well as appropriate assistive devices or modalities to obtain goals. Patient and family education will also be administered as needed. Frequency of treatments: 2-5x/week x 3-5 days.     Time in: 1105  Time out: Willie Miles [address] [place] [address]  [id number]

## 2019-07-24 LAB
ABO/RH: NORMAL
ANISOCYTOSIS: ABNORMAL
ANTIBODY SCREEN: NORMAL
BASOPHILS ABSOLUTE: 0.02 E9/L (ref 0–0.2)
BASOPHILS RELATIVE PERCENT: 0.4 % (ref 0–2)
BLOOD BANK DISPENSE STATUS: NORMAL
BLOOD BANK PRODUCT CODE: NORMAL
BPU ID: NORMAL
DESCRIPTION BLOOD BANK: NORMAL
EOSINOPHILS ABSOLUTE: 0.11 E9/L (ref 0.05–0.5)
EOSINOPHILS RELATIVE PERCENT: 2.2 % (ref 0–6)
HCT VFR BLD CALC: 20.1 % (ref 34–48)
HEMOGLOBIN: 6.4 G/DL (ref 11.5–15.5)
IMMATURE GRANULOCYTES #: 0.02 E9/L
IMMATURE GRANULOCYTES %: 0.4 % (ref 0–5)
LYMPHOCYTES ABSOLUTE: 1.5 E9/L (ref 1.5–4)
LYMPHOCYTES RELATIVE PERCENT: 29.5 % (ref 20–42)
MCH RBC QN AUTO: 29 PG (ref 26–35)
MCHC RBC AUTO-ENTMCNC: 31.8 % (ref 32–34.5)
MCV RBC AUTO: 91 FL (ref 80–99.9)
METER GLUCOSE: 103 MG/DL (ref 74–99)
METER GLUCOSE: 107 MG/DL (ref 74–99)
METER GLUCOSE: 162 MG/DL (ref 74–99)
METER GLUCOSE: 204 MG/DL (ref 74–99)
MONOCYTES ABSOLUTE: 0.4 E9/L (ref 0.1–0.95)
MONOCYTES RELATIVE PERCENT: 7.9 % (ref 2–12)
NEUTROPHILS ABSOLUTE: 3.03 E9/L (ref 1.8–7.3)
NEUTROPHILS RELATIVE PERCENT: 59.6 % (ref 43–80)
OVALOCYTES: ABNORMAL
PDW BLD-RTO: 20.4 FL (ref 11.5–15)
PLATELET # BLD: 264 E9/L (ref 130–450)
PMV BLD AUTO: 11.3 FL (ref 7–12)
POIKILOCYTES: ABNORMAL
POLYCHROMASIA: ABNORMAL
RBC # BLD: 2.21 E12/L (ref 3.5–5.5)
WBC # BLD: 5.1 E9/L (ref 4.5–11.5)

## 2019-07-24 PROCEDURE — 2060000000 HC ICU INTERMEDIATE R&B

## 2019-07-24 PROCEDURE — 86923 COMPATIBILITY TEST ELECTRIC: CPT

## 2019-07-24 PROCEDURE — 86850 RBC ANTIBODY SCREEN: CPT

## 2019-07-24 PROCEDURE — 86901 BLOOD TYPING SEROLOGIC RH(D): CPT

## 2019-07-24 PROCEDURE — 82962 GLUCOSE BLOOD TEST: CPT

## 2019-07-24 PROCEDURE — 6370000000 HC RX 637 (ALT 250 FOR IP): Performed by: INTERNAL MEDICINE

## 2019-07-24 PROCEDURE — 36415 COLL VENOUS BLD VENIPUNCTURE: CPT

## 2019-07-24 PROCEDURE — 85025 COMPLETE CBC W/AUTO DIFF WBC: CPT

## 2019-07-24 PROCEDURE — 2580000003 HC RX 258: Performed by: INTERNAL MEDICINE

## 2019-07-24 PROCEDURE — 86900 BLOOD TYPING SEROLOGIC ABO: CPT

## 2019-07-24 PROCEDURE — 36430 TRANSFUSION BLD/BLD COMPNT: CPT

## 2019-07-24 PROCEDURE — P9016 RBC LEUKOCYTES REDUCED: HCPCS

## 2019-07-24 RX ORDER — GABAPENTIN 400 MG/1
400 CAPSULE ORAL 3 TIMES DAILY
Qty: 90 CAPSULE | Refills: 3 | DISCHARGE
Start: 2019-07-24 | End: 2019-10-22

## 2019-07-24 RX ORDER — FERROUS SULFATE 325(65) MG
325 TABLET ORAL 2 TIMES DAILY WITH MEALS
Status: DISCONTINUED | OUTPATIENT
Start: 2019-07-24 | End: 2019-07-26 | Stop reason: HOSPADM

## 2019-07-24 RX ORDER — 0.9 % SODIUM CHLORIDE 0.9 %
250 INTRAVENOUS SOLUTION INTRAVENOUS ONCE
Status: COMPLETED | OUTPATIENT
Start: 2019-07-24 | End: 2019-07-25

## 2019-07-24 RX ORDER — FERROUS SULFATE 325(65) MG
325 TABLET ORAL 2 TIMES DAILY WITH MEALS
Qty: 30 TABLET | Refills: 3 | DISCHARGE
Start: 2019-07-24

## 2019-07-24 RX ORDER — CLONAZEPAM 1 MG/1
0.5 TABLET ORAL 3 TIMES DAILY
Qty: 90 TABLET | Refills: 3 | Status: ON HOLD | DISCHARGE
Start: 2019-07-24 | End: 2019-09-23 | Stop reason: DRUGHIGH

## 2019-07-24 RX ORDER — INSULIN GLARGINE 100 [IU]/ML
25 INJECTION, SOLUTION SUBCUTANEOUS NIGHTLY
Qty: 1 VIAL | Refills: 3 | Status: ON HOLD | DISCHARGE
Start: 2019-07-24 | End: 2019-10-07 | Stop reason: HOSPADM

## 2019-07-24 RX ADMIN — Medication 10 ML: at 09:29

## 2019-07-24 RX ADMIN — CYCLOBENZAPRINE 10 MG: 10 TABLET, FILM COATED ORAL at 21:19

## 2019-07-24 RX ADMIN — METFORMIN HYDROCHLORIDE 1000 MG: 1000 TABLET ORAL at 09:29

## 2019-07-24 RX ADMIN — CYCLOBENZAPRINE 10 MG: 10 TABLET, FILM COATED ORAL at 09:29

## 2019-07-24 RX ADMIN — LEVOTHYROXINE SODIUM 50 MCG: 50 TABLET ORAL at 06:23

## 2019-07-24 RX ADMIN — APIXABAN 5 MG: 5 TABLET, FILM COATED ORAL at 09:30

## 2019-07-24 RX ADMIN — CLONAZEPAM 1 MG: 0.5 TABLET ORAL at 21:28

## 2019-07-24 RX ADMIN — GABAPENTIN 400 MG: 400 CAPSULE ORAL at 14:20

## 2019-07-24 RX ADMIN — ARIPIPRAZOLE 20 MG: 10 TABLET ORAL at 09:29

## 2019-07-24 RX ADMIN — DULOXETINE HYDROCHLORIDE 60 MG: 60 CAPSULE, DELAYED RELEASE ORAL at 09:29

## 2019-07-24 RX ADMIN — CLONAZEPAM 1 MG: 0.5 TABLET ORAL at 09:30

## 2019-07-24 RX ADMIN — TRAZODONE HYDROCHLORIDE 200 MG: 50 TABLET ORAL at 21:18

## 2019-07-24 RX ADMIN — METFORMIN HYDROCHLORIDE 1000 MG: 1000 TABLET ORAL at 21:18

## 2019-07-24 RX ADMIN — GABAPENTIN 400 MG: 400 CAPSULE ORAL at 09:29

## 2019-07-24 RX ADMIN — DOCUSATE SODIUM 100 MG: 100 CAPSULE, LIQUID FILLED ORAL at 09:30

## 2019-07-24 RX ADMIN — Medication 10 ML: at 21:22

## 2019-07-24 RX ADMIN — FERROUS SULFATE TAB 325 MG (65 MG ELEMENTAL FE) 325 MG: 325 (65 FE) TAB at 08:45

## 2019-07-24 RX ADMIN — SODIUM CHLORIDE 250 ML: 9 INJECTION, SOLUTION INTRAVENOUS at 14:20

## 2019-07-24 RX ADMIN — DOCUSATE SODIUM 100 MG: 100 CAPSULE, LIQUID FILLED ORAL at 21:17

## 2019-07-24 RX ADMIN — GABAPENTIN 400 MG: 400 CAPSULE ORAL at 21:19

## 2019-07-24 RX ADMIN — ACETAMINOPHEN 650 MG: 325 TABLET, FILM COATED ORAL at 21:19

## 2019-07-24 RX ADMIN — Medication 10 ML: at 09:30

## 2019-07-24 RX ADMIN — LISINOPRIL 5 MG: 5 TABLET ORAL at 09:29

## 2019-07-24 RX ADMIN — INSULIN GLARGINE 25 UNITS: 100 INJECTION, SOLUTION SUBCUTANEOUS at 21:24

## 2019-07-24 RX ADMIN — ROSUVASTATIN CALCIUM 10 MG: 10 TABLET, FILM COATED ORAL at 21:17

## 2019-07-24 RX ADMIN — INSULIN LISPRO 5 UNITS: 100 INJECTION, SOLUTION INTRAVENOUS; SUBCUTANEOUS at 08:44

## 2019-07-24 ASSESSMENT — PAIN SCALES - GENERAL
PAINLEVEL_OUTOF10: 0
PAINLEVEL_OUTOF10: 4
PAINLEVEL_OUTOF10: 0
PAINLEVEL_OUTOF10: 5

## 2019-07-24 ASSESSMENT — PAIN - FUNCTIONAL ASSESSMENT: PAIN_FUNCTIONAL_ASSESSMENT: PREVENTS OR INTERFERES WITH ALL ACTIVE AND SOME PASSIVE ACTIVITIES

## 2019-07-24 ASSESSMENT — PAIN DESCRIPTION - DESCRIPTORS: DESCRIPTORS: BURNING

## 2019-07-24 ASSESSMENT — PAIN DESCRIPTION - PROGRESSION
CLINICAL_PROGRESSION: NOT CHANGED
CLINICAL_PROGRESSION: NOT CHANGED

## 2019-07-24 ASSESSMENT — PAIN DESCRIPTION - LOCATION: LOCATION: LEG

## 2019-07-24 ASSESSMENT — PAIN DESCRIPTION - ONSET: ONSET: ON-GOING

## 2019-07-24 ASSESSMENT — PAIN DESCRIPTION - ORIENTATION: ORIENTATION: RIGHT;LEFT

## 2019-07-24 ASSESSMENT — PAIN DESCRIPTION - PAIN TYPE: TYPE: CHRONIC PAIN

## 2019-07-24 ASSESSMENT — PAIN DESCRIPTION - FREQUENCY: FREQUENCY: CONTINUOUS

## 2019-07-24 NOTE — CONSULTS
## 77083973
feature. PLAN:  1. Continue current medication and care. 2.  Continue supportive care. The patient can be sent back to the nursing home with psychiatric  followup appointment. We will sign off on this patient. If there is any question or concern,  please give us a call.         Monalisa Dakin, MD    D: 07/23/2019 21:16:30       T: 07/23/2019 21:21:36     VICENTE_ELROYM_01  Job#: 0969192     Doc#: 46100632    CC:
tablet Take 2 mg by mouth every 6 hours as needed (agitation aggression) Max daily doses: 4   Yes Historical Provider, MD   haloperidol lactate (HALDOL) 5 MG/ML injection Inject 2 mg into the muscle every 6 hours as needed for Agitation Max daily doses: 4mg  If unable to take PO   Yes Historical Provider, MD   LORazepam (ATIVAN) 0.5 MG tablet Take 0.5 mg by mouth every 6 hours as needed for Anxiety. Max daily doses : 4   Yes Historical Provider, MD   LORazepam (ATIVAN) 2 MG/ML injection Infuse 0.5 mg intravenously every 6 hours as needed. Max daily doses 4  If unable to give po   Yes Historical Provider, MD   insulin lispro (HUMALOG) 100 UNIT/ML injection vial Inject into the skin 4 times daily (after meals and at bedtime) 7/02/19 Uses Sliding Scale:  201-250:  2units  251-300 4 units  301-350: 6 units  351-400: 8 units  401 or higher give 10 units and call MD   Yes Historical Provider, MD   apixaban (ELIQUIS) 5 MG TABS tablet Take 5 mg by mouth 2 times daily   Yes Historical Provider, MD   levothyroxine (SYNTHROID) 50 MCG tablet Take 50 mcg by mouth Daily   Yes Historical Provider, MD   metformin (GLUCOPHAGE) 500 MG tablet Take 1,000 mg by mouth 2 times daily 2 tabs bid   Yes Historical Provider, MD   aspirin 81 MG chewable tablet Take 81 mg by mouth daily.    Yes Historical Provider, MD   lisinopril (PRINIVIL;ZESTRIL) 5 MG tablet Take 5 mg by mouth daily    Yes Historical Provider, MD   insulin glargine (LANTUS) 100 UNIT/ML injection Inject 50 Units into the skin nightly    Yes Historical Provider, MD   DULoxetine (CYMBALTA) 60 MG extended release capsule Take 1 capsule by mouth daily 7/18/19   JenelleCommunity Hospital of Huntington Park, APRN - CNP   ARIPiprazole (ABILIFY) 20 MG tablet 1 tablet by PEG Tube route nightly  Patient taking differently: Take 20 mg by mouth nightly  7/17/19   Jenelle Fuentes, APRN - CNP   acetaminophen (TYLENOL) 325 MG tablet Take 650 mg by mouth every 4 hours as needed for Pain    Historical Provider, MD   atorvastatin

## 2019-07-24 NOTE — CARE COORDINATION
Discharge held due to low H and H and need for transfusion. Notified Jl Ground at [facility].

## 2019-07-24 NOTE — DISCHARGE INSTR - COC
Continuity of Care Form    Patient Name: Alvy Homans   :  1950  MRN:  [id number]    Admit date:  2019  Discharge date:  2019    Code Status Order: Full Code   Advance Directives:   [facility] Documentation     Date/Time Healthcare Directive Type of Healthcare Directive Copy in [address] Agent's Name Healthcare Agent's Phone Number    [phone]  No, patient does not have an advance directive for healthcare treatment -- -- -- -- --          Admitting Physician:  Jonah Waters MD  PCP: Jonah Waters MD    Discharging Nurse: [facility] Unit/Room#: 5427/5046-Z  Discharging Unit Phone Number: [phone]    Emergency Contact:   Extended Emergency Contact Information  Primary Emergency Contact: Noel Joridave [facility] [address] [facility] Phone: [phone]  Work Phone: [phone]  Mobile Phone: [phone]  Relation: Brother/Sister  Secondary Emergency Contact: [address] [facility] Phone: [phone]  Mobile Phone: [phone]  Relation: Brother/Sister  Preferred language: English   needed? No    Past Surgical History:  Past Surgical History:   Procedure Laterality Date    BACK SURGERY         Immunization History: There is no immunization history on file for this patient.     Active Problems:  Patient Active Problem List   Diagnosis Code    Midline low back pain with sciatica M54.40    Lumbar degenerative disc disease M51.36    Suicidal ideation R45.851    Altered mental status R41.82    Hypotension I95.9    Severe episode of recurrent major depressive disorder, without psychotic features (HCC) F33.2    Moderate protein-calorie malnutrition (HCC) E44.0    Anemia D64.9       Isolation/Infection:   Isolation          No Isolation            Nurse Assessment:  Last Vital Signs: BP (!) 107/53   Pulse 103   Temp 98.4 °F (36.9 °C) (Temporal)   Resp 22   Ht 5' 4\" (1.626 m)   Wt 103 lb (46.7 turns    Patient's personal belongings (please select all that are sent with patient):  None    RN SIGNATURE:  Electronically signed by Deidra López RN on 7/26/19 at 10:50 AM    CASE MANAGEMENT/SOCIAL WORK SECTION    Inpatient Status Date: ***    Readmission Risk Assessment Score:  Readmission Risk              Risk of Unplanned Readmission:        25           Discharging to Facility/ Agency   · Name:   · Address:  · Phone:  · Fax:    Dialysis Facility (if applicable)   · Name:  · Address:  · Dialysis Schedule:  · Phone:  · Fax:    / signature: {Esignature:[id number]}    PHYSICIAN SECTION    Prognosis: {Prognosis:9350111228}    Condition at Discharge: Peter Villatoro Patient Condition:635097216}    Rehab Potential (if transferring to Rehab): {Prognosis:1010281042}    Recommended Labs or Other Treatments After Discharge: ***    Physician Certification: I certify the above information and transfer of Brittany Wing  is necessary for the continuing treatment of the diagnosis listed and that she requires {Admit to Appropriate Level of Care:40373} for {GREATER/LESS:207606254} 30 days.      Update Admission H&P: {CHP DME Changes in RKCAL:170563390}    PHYSICIAN SIGNATURE:  {Esignature:[id number]} Electronically signed by Denver Shi, MD on 7/24/2019 at 7:49 AM

## 2019-07-24 NOTE — PLAN OF CARE
Problem: Risk for Impaired Skin Integrity  Goal: Tissue integrity - skin and mucous membranes  Description  Structural intactness and normal physiological function of skin and  mucous membranes. Outcome: Met This Shift     Problem: Falls - Risk of:  Goal: Will remain free from falls  Description  Will remain free from falls  Outcome: Met This Shift     Problem: Malnutrition  (NI-5.2)  Goal: Food and/or Nutrient Delivery  Description  Individualized approach for food/nutrient provision.   7/23/2019 1052 by Fatemeh Enriquez, MS, RD, LD  Outcome: Met This Shift

## 2019-07-25 ENCOUNTER — APPOINTMENT (OUTPATIENT)
Dept: NUCLEAR MEDICINE | Age: 69
DRG: 378 | End: 2019-07-25
Payer: MEDICARE

## 2019-07-25 LAB
HCT VFR BLD CALC: 24.6 % (ref 34–48)
HEMOGLOBIN: 7.9 G/DL (ref 11.5–15.5)
METER GLUCOSE: 125 MG/DL (ref 74–99)
METER GLUCOSE: 139 MG/DL (ref 74–99)
METER GLUCOSE: 88 MG/DL (ref 74–99)
METER GLUCOSE: 90 MG/DL (ref 74–99)

## 2019-07-25 PROCEDURE — 2580000003 HC RX 258: Performed by: INTERNAL MEDICINE

## 2019-07-25 PROCEDURE — 85018 HEMOGLOBIN: CPT

## 2019-07-25 PROCEDURE — 3430000000 HC RX DIAGNOSTIC RADIOPHARMACEUTICAL: Performed by: RADIOLOGY

## 2019-07-25 PROCEDURE — 36415 COLL VENOUS BLD VENIPUNCTURE: CPT

## 2019-07-25 PROCEDURE — 85014 HEMATOCRIT: CPT

## 2019-07-25 PROCEDURE — 82962 GLUCOSE BLOOD TEST: CPT

## 2019-07-25 PROCEDURE — 6370000000 HC RX 637 (ALT 250 FOR IP): Performed by: INTERNAL MEDICINE

## 2019-07-25 PROCEDURE — 78278 ACUTE GI BLOOD LOSS IMAGING: CPT

## 2019-07-25 PROCEDURE — 2060000000 HC ICU INTERMEDIATE R&B

## 2019-07-25 PROCEDURE — A9560 TC99M LABELED RBC: HCPCS | Performed by: RADIOLOGY

## 2019-07-25 RX ADMIN — CYCLOBENZAPRINE 10 MG: 10 TABLET, FILM COATED ORAL at 22:13

## 2019-07-25 RX ADMIN — ACETAMINOPHEN 650 MG: 325 TABLET, FILM COATED ORAL at 22:12

## 2019-07-25 RX ADMIN — LISINOPRIL 5 MG: 5 TABLET ORAL at 07:55

## 2019-07-25 RX ADMIN — FERROUS SULFATE TAB 325 MG (65 MG ELEMENTAL FE) 325 MG: 325 (65 FE) TAB at 18:06

## 2019-07-25 RX ADMIN — TRAZODONE HYDROCHLORIDE 200 MG: 50 TABLET ORAL at 22:13

## 2019-07-25 RX ADMIN — Medication 10 ML: at 22:14

## 2019-07-25 RX ADMIN — ROSUVASTATIN CALCIUM 10 MG: 10 TABLET, FILM COATED ORAL at 22:13

## 2019-07-25 RX ADMIN — CLONAZEPAM 1 MG: 0.5 TABLET ORAL at 22:13

## 2019-07-25 RX ADMIN — METFORMIN HYDROCHLORIDE 1000 MG: 1000 TABLET ORAL at 07:55

## 2019-07-25 RX ADMIN — GABAPENTIN 400 MG: 400 CAPSULE ORAL at 07:55

## 2019-07-25 RX ADMIN — DOCUSATE SODIUM 100 MG: 100 CAPSULE, LIQUID FILLED ORAL at 07:55

## 2019-07-25 RX ADMIN — ACETAMINOPHEN 650 MG: 325 TABLET, FILM COATED ORAL at 18:06

## 2019-07-25 RX ADMIN — CYCLOBENZAPRINE 10 MG: 10 TABLET, FILM COATED ORAL at 07:55

## 2019-07-25 RX ADMIN — GABAPENTIN 400 MG: 400 CAPSULE ORAL at 13:56

## 2019-07-25 RX ADMIN — CLONAZEPAM 1 MG: 0.5 TABLET ORAL at 07:55

## 2019-07-25 RX ADMIN — ARIPIPRAZOLE 20 MG: 10 TABLET ORAL at 07:54

## 2019-07-25 RX ADMIN — FERROUS SULFATE TAB 325 MG (65 MG ELEMENTAL FE) 325 MG: 325 (65 FE) TAB at 07:55

## 2019-07-25 RX ADMIN — Medication 10 ML: at 07:56

## 2019-07-25 RX ADMIN — LEVOTHYROXINE SODIUM 50 MCG: 50 TABLET ORAL at 06:20

## 2019-07-25 RX ADMIN — DULOXETINE HYDROCHLORIDE 60 MG: 60 CAPSULE, DELAYED RELEASE ORAL at 07:55

## 2019-07-25 RX ADMIN — GABAPENTIN 400 MG: 400 CAPSULE ORAL at 22:13

## 2019-07-25 RX ADMIN — DOCUSATE SODIUM 100 MG: 100 CAPSULE, LIQUID FILLED ORAL at 22:14

## 2019-07-25 RX ADMIN — Medication 25 MILLICURIE: at 10:52

## 2019-07-25 RX ADMIN — METFORMIN HYDROCHLORIDE 1000 MG: 1000 TABLET ORAL at 22:14

## 2019-07-25 RX ADMIN — Medication 10 ML: at 07:55

## 2019-07-25 RX ADMIN — INSULIN GLARGINE 25 UNITS: 100 INJECTION, SOLUTION SUBCUTANEOUS at 22:18

## 2019-07-25 ASSESSMENT — PAIN DESCRIPTION - PROGRESSION: CLINICAL_PROGRESSION: NOT CHANGED

## 2019-07-25 ASSESSMENT — PAIN SCALES - GENERAL
PAINLEVEL_OUTOF10: 0
PAINLEVEL_OUTOF10: 9
PAINLEVEL_OUTOF10: 9
PAINLEVEL_OUTOF10: 4
PAINLEVEL_OUTOF10: 0

## 2019-07-25 ASSESSMENT — PAIN - FUNCTIONAL ASSESSMENT: PAIN_FUNCTIONAL_ASSESSMENT: PREVENTS OR INTERFERES WITH ALL ACTIVE AND SOME PASSIVE ACTIVITIES

## 2019-07-25 ASSESSMENT — PAIN DESCRIPTION - DESCRIPTORS: DESCRIPTORS: BURNING

## 2019-07-25 ASSESSMENT — PAIN DESCRIPTION - LOCATION: LOCATION: LEG

## 2019-07-25 ASSESSMENT — PAIN DESCRIPTION - ONSET: ONSET: ON-GOING

## 2019-07-25 ASSESSMENT — PAIN DESCRIPTION - FREQUENCY: FREQUENCY: CONTINUOUS

## 2019-07-25 ASSESSMENT — PAIN DESCRIPTION - PAIN TYPE: TYPE: CHRONIC PAIN

## 2019-07-25 ASSESSMENT — PAIN DESCRIPTION - ORIENTATION: ORIENTATION: RIGHT;LEFT

## 2019-07-25 NOTE — CARE COORDINATION
Plan is to [facility] on discharge. Confirmed with Kellen at [facility]. No precert is needed. Have placed Envelope and ambulance form on soft chart.

## 2019-07-26 VITALS
TEMPERATURE: 98.2 F | WEIGHT: 101.19 LBS | HEIGHT: 64 IN | DIASTOLIC BLOOD PRESSURE: 60 MMHG | BODY MASS INDEX: 17.28 KG/M2 | HEART RATE: 92 BPM | SYSTOLIC BLOOD PRESSURE: 112 MMHG | OXYGEN SATURATION: 95 % | RESPIRATION RATE: 14 BRPM

## 2019-07-26 LAB
HCT VFR BLD CALC: 27 % (ref 34–48)
HEMOGLOBIN: 8.5 G/DL (ref 11.5–15.5)
MCH RBC QN AUTO: 28.3 PG (ref 26–35)
MCHC RBC AUTO-ENTMCNC: 31.5 % (ref 32–34.5)
MCV RBC AUTO: 90 FL (ref 80–99.9)
METER GLUCOSE: 94 MG/DL (ref 74–99)
METER GLUCOSE: 94 MG/DL (ref 74–99)
PDW BLD-RTO: 18.2 FL (ref 11.5–15)
PLATELET # BLD: 280 E9/L (ref 130–450)
PMV BLD AUTO: 11 FL (ref 7–12)
RBC # BLD: 3 E12/L (ref 3.5–5.5)
WBC # BLD: 4.8 E9/L (ref 4.5–11.5)

## 2019-07-26 PROCEDURE — 6370000000 HC RX 637 (ALT 250 FOR IP): Performed by: INTERNAL MEDICINE

## 2019-07-26 PROCEDURE — 2580000003 HC RX 258: Performed by: INTERNAL MEDICINE

## 2019-07-26 PROCEDURE — 82962 GLUCOSE BLOOD TEST: CPT

## 2019-07-26 PROCEDURE — 85027 COMPLETE CBC AUTOMATED: CPT

## 2019-07-26 PROCEDURE — 36415 COLL VENOUS BLD VENIPUNCTURE: CPT

## 2019-07-26 RX ADMIN — GABAPENTIN 400 MG: 400 CAPSULE ORAL at 13:13

## 2019-07-26 RX ADMIN — FERROUS SULFATE TAB 325 MG (65 MG ELEMENTAL FE) 325 MG: 325 (65 FE) TAB at 08:18

## 2019-07-26 RX ADMIN — CLONAZEPAM 1 MG: 0.5 TABLET ORAL at 13:13

## 2019-07-26 RX ADMIN — CYCLOBENZAPRINE 10 MG: 10 TABLET, FILM COATED ORAL at 08:18

## 2019-07-26 RX ADMIN — LEVOTHYROXINE SODIUM 50 MCG: 50 TABLET ORAL at 05:13

## 2019-07-26 RX ADMIN — METFORMIN HYDROCHLORIDE 1000 MG: 1000 TABLET ORAL at 08:18

## 2019-07-26 RX ADMIN — LISINOPRIL 5 MG: 5 TABLET ORAL at 08:18

## 2019-07-26 RX ADMIN — Medication 10 ML: at 08:19

## 2019-07-26 RX ADMIN — CYCLOBENZAPRINE 10 MG: 10 TABLET, FILM COATED ORAL at 13:13

## 2019-07-26 RX ADMIN — GABAPENTIN 400 MG: 400 CAPSULE ORAL at 08:18

## 2019-07-26 RX ADMIN — DOCUSATE SODIUM 100 MG: 100 CAPSULE, LIQUID FILLED ORAL at 08:18

## 2019-07-26 RX ADMIN — ARIPIPRAZOLE 20 MG: 10 TABLET ORAL at 10:46

## 2019-07-26 RX ADMIN — CLONAZEPAM 1 MG: 0.5 TABLET ORAL at 08:18

## 2019-07-26 RX ADMIN — DULOXETINE HYDROCHLORIDE 60 MG: 60 CAPSULE, DELAYED RELEASE ORAL at 08:18

## 2019-07-26 ASSESSMENT — PAIN SCALES - GENERAL: PAINLEVEL_OUTOF10: 0

## 2019-07-31 NOTE — DISCHARGE SUMMARY
not  reevaluate. She was eventually seen by Psychiatry and nothing was  changed with the medications, and she did have a nuclear red blood cell  tag scan that was negative for overt bleeding, so on 07/26, she was  discharged back to rehab to be followed up as an outpatient off  anticoagulants. FINAL DIAGNOSES:  1.  GI bleed of unknown significance. 2.  Severe depression, suicidal.  3.  Paraplegia.         Pina Washington MD    D: 07/30/2019 8:24:09       T: 07/30/2019 9:45:09     DENNIS/MARYANNE_ALAN_PATRICE  Job#: [id number]     Doc#: [id number]    CC:

## 2019-09-13 ENCOUNTER — APPOINTMENT (OUTPATIENT)
Dept: GENERAL RADIOLOGY | Age: 69
DRG: 698 | End: 2019-09-13
Payer: MEDICARE

## 2019-09-13 ENCOUNTER — HOSPITAL ENCOUNTER (INPATIENT)
Age: 69
LOS: 5 days | Discharge: NURSING, REHAB OR LONG-TERM CARE | DRG: 698 | End: 2019-09-18
Attending: EMERGENCY MEDICINE | Admitting: FAMILY MEDICINE
Payer: MEDICARE

## 2019-09-13 DIAGNOSIS — R50.9 FEVER, UNSPECIFIED FEVER CAUSE: Primary | ICD-10-CM

## 2019-09-13 PROBLEM — A41.9 SEPSIS (HCC): Status: ACTIVE | Noted: 2019-09-13

## 2019-09-13 LAB
ALBUMIN SERPL-MCNC: 3.4 G/DL (ref 3.5–5.2)
ALP BLD-CCNC: 43 U/L (ref 35–104)
ALT SERPL-CCNC: 8 U/L (ref 0–32)
ANION GAP SERPL CALCULATED.3IONS-SCNC: 11 MMOL/L (ref 7–16)
ANION GAP SERPL CALCULATED.3IONS-SCNC: 11 MMOL/L (ref 7–16)
ANISOCYTOSIS: ABNORMAL
APTT: 29 SEC (ref 24.5–35.1)
AST SERPL-CCNC: 15 U/L (ref 0–31)
BACTERIA: ABNORMAL /HPF
BASOPHILS ABSOLUTE: 0 E9/L (ref 0–0.2)
BASOPHILS RELATIVE PERCENT: 0 % (ref 0–2)
BILIRUB SERPL-MCNC: 0.6 MG/DL (ref 0–1.2)
BILIRUBIN URINE: NEGATIVE
BLOOD, URINE: ABNORMAL
BUN BLDV-MCNC: 20 MG/DL (ref 8–23)
BUN BLDV-MCNC: 21 MG/DL (ref 8–23)
CALCIUM SERPL-MCNC: 8.2 MG/DL (ref 8.6–10.2)
CALCIUM SERPL-MCNC: 9.2 MG/DL (ref 8.6–10.2)
CHLORIDE BLD-SCNC: 100 MMOL/L (ref 98–107)
CHLORIDE BLD-SCNC: 98 MMOL/L (ref 98–107)
CHP ED QC CHECK: NORMAL
CLARITY: CLEAR
CO2: 24 MMOL/L (ref 22–29)
CO2: 26 MMOL/L (ref 22–29)
COLOR: YELLOW
CREAT SERPL-MCNC: 1.2 MG/DL (ref 0.5–1)
CREAT SERPL-MCNC: 1.2 MG/DL (ref 0.5–1)
EKG ATRIAL RATE: 117 BPM
EKG P AXIS: 57 DEGREES
EKG P-R INTERVAL: 146 MS
EKG Q-T INTERVAL: 324 MS
EKG QRS DURATION: 80 MS
EKG QTC CALCULATION (BAZETT): 451 MS
EKG R AXIS: 27 DEGREES
EKG T AXIS: 55 DEGREES
EKG VENTRICULAR RATE: 117 BPM
EOSINOPHILS ABSOLUTE: 0.2 E9/L (ref 0.05–0.5)
EOSINOPHILS RELATIVE PERCENT: 4.9 % (ref 0–6)
GFR AFRICAN AMERICAN: 54
GFR AFRICAN AMERICAN: 54
GFR NON-AFRICAN AMERICAN: 45 ML/MIN/1.73
GFR NON-AFRICAN AMERICAN: 45 ML/MIN/1.73
GLUCOSE BLD-MCNC: 114 MG/DL
GLUCOSE BLD-MCNC: 123 MG/DL (ref 74–99)
GLUCOSE BLD-MCNC: 132 MG/DL (ref 74–99)
GLUCOSE URINE: NEGATIVE MG/DL
HCT VFR BLD CALC: 30.9 % (ref 34–48)
HEMOGLOBIN: 9.9 G/DL (ref 11.5–15.5)
HYPOCHROMIA: ABNORMAL
IMMATURE GRANULOCYTES #: 0.01 E9/L
IMMATURE GRANULOCYTES %: 0.2 % (ref 0–5)
INR BLD: 1.4
KETONES, URINE: NEGATIVE MG/DL
LACTIC ACID: 1 MMOL/L (ref 0.5–2.2)
LACTIC ACID: 1.4 MMOL/L (ref 0.5–2.2)
LEUKOCYTE ESTERASE, URINE: NEGATIVE
LYMPHOCYTES ABSOLUTE: 0.35 E9/L (ref 1.5–4)
LYMPHOCYTES RELATIVE PERCENT: 8.6 % (ref 20–42)
MCH RBC QN AUTO: 27.5 PG (ref 26–35)
MCHC RBC AUTO-ENTMCNC: 32 % (ref 32–34.5)
MCV RBC AUTO: 85.8 FL (ref 80–99.9)
METER GLUCOSE: 114 MG/DL (ref 74–99)
METER GLUCOSE: 121 MG/DL (ref 74–99)
MONOCYTES ABSOLUTE: 0.33 E9/L (ref 0.1–0.95)
MONOCYTES RELATIVE PERCENT: 8.1 % (ref 2–12)
NEUTROPHILS ABSOLUTE: 3.18 E9/L (ref 1.8–7.3)
NEUTROPHILS RELATIVE PERCENT: 78.2 % (ref 43–80)
NITRITE, URINE: POSITIVE
PDW BLD-RTO: 15.9 FL (ref 11.5–15)
PH UA: 6 (ref 5–9)
PLATELET # BLD: 233 E9/L (ref 130–450)
PMV BLD AUTO: 11.2 FL (ref 7–12)
POLYCHROMASIA: ABNORMAL
POTASSIUM REFLEX MAGNESIUM: 4 MMOL/L (ref 3.5–5)
POTASSIUM SERPL-SCNC: 4.3 MMOL/L (ref 3.5–5)
PROTEIN UA: 100 MG/DL
PROTHROMBIN TIME: 15.5 SEC (ref 9.3–12.4)
RBC # BLD: 3.6 E12/L (ref 3.5–5.5)
RBC UA: ABNORMAL /HPF (ref 0–2)
SODIUM BLD-SCNC: 135 MMOL/L (ref 132–146)
SODIUM BLD-SCNC: 135 MMOL/L (ref 132–146)
SPECIFIC GRAVITY UA: >=1.03 (ref 1–1.03)
TOTAL PROTEIN: 6 G/DL (ref 6.4–8.3)
TROPONIN: 0.03 NG/ML (ref 0–0.03)
TROPONIN: 0.04 NG/ML (ref 0–0.03)
UROBILINOGEN, URINE: 0.2 E.U./DL
WBC # BLD: 4.1 E9/L (ref 4.5–11.5)
WBC UA: ABNORMAL /HPF (ref 0–5)

## 2019-09-13 PROCEDURE — 85025 COMPLETE CBC W/AUTO DIFF WBC: CPT

## 2019-09-13 PROCEDURE — 6360000002 HC RX W HCPCS: Performed by: EMERGENCY MEDICINE

## 2019-09-13 PROCEDURE — 87633 RESP VIRUS 12-25 TARGETS: CPT

## 2019-09-13 PROCEDURE — 80048 BASIC METABOLIC PNL TOTAL CA: CPT

## 2019-09-13 PROCEDURE — 2060000000 HC ICU INTERMEDIATE R&B

## 2019-09-13 PROCEDURE — 87798 DETECT AGENT NOS DNA AMP: CPT

## 2019-09-13 PROCEDURE — 96368 THER/DIAG CONCURRENT INF: CPT

## 2019-09-13 PROCEDURE — 93010 ELECTROCARDIOGRAM REPORT: CPT | Performed by: INTERNAL MEDICINE

## 2019-09-13 PROCEDURE — 87088 URINE BACTERIA CULTURE: CPT

## 2019-09-13 PROCEDURE — 94761 N-INVAS EAR/PLS OXIMETRY MLT: CPT

## 2019-09-13 PROCEDURE — 87040 BLOOD CULTURE FOR BACTERIA: CPT

## 2019-09-13 PROCEDURE — 96365 THER/PROPH/DIAG IV INF INIT: CPT

## 2019-09-13 PROCEDURE — 96375 TX/PRO/DX INJ NEW DRUG ADDON: CPT

## 2019-09-13 PROCEDURE — 6360000002 HC RX W HCPCS: Performed by: FAMILY MEDICINE

## 2019-09-13 PROCEDURE — 6370000000 HC RX 637 (ALT 250 FOR IP): Performed by: FAMILY MEDICINE

## 2019-09-13 PROCEDURE — 2580000003 HC RX 258: Performed by: EMERGENCY MEDICINE

## 2019-09-13 PROCEDURE — 81001 URINALYSIS AUTO W/SCOPE: CPT

## 2019-09-13 PROCEDURE — 85610 PROTHROMBIN TIME: CPT

## 2019-09-13 PROCEDURE — 87186 SC STD MICRODIL/AGAR DIL: CPT

## 2019-09-13 PROCEDURE — 87486 CHLMYD PNEUM DNA AMP PROBE: CPT

## 2019-09-13 PROCEDURE — 93005 ELECTROCARDIOGRAM TRACING: CPT | Performed by: EMERGENCY MEDICINE

## 2019-09-13 PROCEDURE — 83605 ASSAY OF LACTIC ACID: CPT

## 2019-09-13 PROCEDURE — 99285 EMERGENCY DEPT VISIT HI MDM: CPT

## 2019-09-13 PROCEDURE — 84484 ASSAY OF TROPONIN QUANT: CPT

## 2019-09-13 PROCEDURE — 87581 M.PNEUMON DNA AMP PROBE: CPT

## 2019-09-13 PROCEDURE — 85730 THROMBOPLASTIN TIME PARTIAL: CPT

## 2019-09-13 PROCEDURE — 36415 COLL VENOUS BLD VENIPUNCTURE: CPT

## 2019-09-13 PROCEDURE — 87077 CULTURE AEROBIC IDENTIFY: CPT

## 2019-09-13 PROCEDURE — 80053 COMPREHEN METABOLIC PANEL: CPT

## 2019-09-13 PROCEDURE — 82962 GLUCOSE BLOOD TEST: CPT

## 2019-09-13 PROCEDURE — 2580000003 HC RX 258: Performed by: FAMILY MEDICINE

## 2019-09-13 PROCEDURE — 6370000000 HC RX 637 (ALT 250 FOR IP): Performed by: EMERGENCY MEDICINE

## 2019-09-13 PROCEDURE — 71045 X-RAY EXAM CHEST 1 VIEW: CPT

## 2019-09-13 RX ORDER — NITROGLYCERIN 0.4 MG/1
0.4 TABLET SUBLINGUAL EVERY 5 MIN PRN
Status: DISCONTINUED | OUTPATIENT
Start: 2019-09-13 | End: 2019-09-18 | Stop reason: HOSPADM

## 2019-09-13 RX ORDER — ARIPIPRAZOLE 10 MG/1
20 TABLET ORAL NIGHTLY
Status: DISCONTINUED | OUTPATIENT
Start: 2019-09-13 | End: 2019-09-18 | Stop reason: HOSPADM

## 2019-09-13 RX ORDER — AMOXICILLIN 500 MG/1
500 CAPSULE ORAL 3 TIMES DAILY
Status: ON HOLD | COMMUNITY
Start: 2019-09-09 | End: 2019-09-18 | Stop reason: HOSPADM

## 2019-09-13 RX ORDER — 0.9 % SODIUM CHLORIDE 0.9 %
1000 INTRAVENOUS SOLUTION INTRAVENOUS ONCE
Status: COMPLETED | OUTPATIENT
Start: 2019-09-13 | End: 2019-09-13

## 2019-09-13 RX ORDER — SULFAMETHOXAZOLE AND TRIMETHOPRIM 800; 160 MG/1; MG/1
1 TABLET ORAL 2 TIMES DAILY
Status: ON HOLD | COMMUNITY
End: 2019-09-18 | Stop reason: HOSPADM

## 2019-09-13 RX ORDER — 0.9 % SODIUM CHLORIDE 0.9 %
500 INTRAVENOUS SOLUTION INTRAVENOUS ONCE
Status: COMPLETED | OUTPATIENT
Start: 2019-09-13 | End: 2019-09-13

## 2019-09-13 RX ORDER — CLONAZEPAM 0.5 MG/1
0.5 TABLET ORAL 3 TIMES DAILY
Status: DISCONTINUED | OUTPATIENT
Start: 2019-09-13 | End: 2019-09-18 | Stop reason: HOSPADM

## 2019-09-13 RX ORDER — ACETAMINOPHEN 325 MG/1
650 TABLET ORAL EVERY 4 HOURS PRN
Status: DISCONTINUED | OUTPATIENT
Start: 2019-09-13 | End: 2019-09-18 | Stop reason: HOSPADM

## 2019-09-13 RX ORDER — LEVOTHYROXINE SODIUM 0.05 MG/1
50 TABLET ORAL DAILY
Status: DISCONTINUED | OUTPATIENT
Start: 2019-09-14 | End: 2019-09-18 | Stop reason: HOSPADM

## 2019-09-13 RX ORDER — ACETAMINOPHEN 500 MG
1000 TABLET ORAL ONCE
Status: COMPLETED | OUTPATIENT
Start: 2019-09-13 | End: 2019-09-13

## 2019-09-13 RX ORDER — DULOXETIN HYDROCHLORIDE 30 MG/1
30 CAPSULE, DELAYED RELEASE ORAL DAILY
Status: ON HOLD | COMMUNITY
End: 2019-09-23 | Stop reason: ALTCHOICE

## 2019-09-13 RX ORDER — SODIUM CHLORIDE 0.9 % (FLUSH) 0.9 %
10 SYRINGE (ML) INJECTION PRN
Status: DISCONTINUED | OUTPATIENT
Start: 2019-09-13 | End: 2019-09-18 | Stop reason: HOSPADM

## 2019-09-13 RX ORDER — INSULIN GLARGINE 100 [IU]/ML
25 INJECTION, SOLUTION SUBCUTANEOUS NIGHTLY
Status: DISCONTINUED | OUTPATIENT
Start: 2019-09-13 | End: 2019-09-18 | Stop reason: HOSPADM

## 2019-09-13 RX ORDER — ASPIRIN 81 MG/1
81 TABLET, CHEWABLE ORAL DAILY
Status: DISCONTINUED | OUTPATIENT
Start: 2019-09-13 | End: 2019-09-18 | Stop reason: HOSPADM

## 2019-09-13 RX ORDER — GABAPENTIN 400 MG/1
400 CAPSULE ORAL 3 TIMES DAILY
Status: DISCONTINUED | OUTPATIENT
Start: 2019-09-13 | End: 2019-09-18 | Stop reason: HOSPADM

## 2019-09-13 RX ORDER — DOCUSATE SODIUM 100 MG/1
100 CAPSULE, LIQUID FILLED ORAL 2 TIMES DAILY
Status: DISCONTINUED | OUTPATIENT
Start: 2019-09-13 | End: 2019-09-17 | Stop reason: SDUPTHER

## 2019-09-13 RX ORDER — OLANZAPINE 7.5 MG/1
7.5 TABLET ORAL NIGHTLY
Status: ON HOLD | COMMUNITY
End: 2019-09-18 | Stop reason: HOSPADM

## 2019-09-13 RX ORDER — LISINOPRIL 5 MG/1
5 TABLET ORAL DAILY
Status: DISCONTINUED | OUTPATIENT
Start: 2019-09-14 | End: 2019-09-18 | Stop reason: HOSPADM

## 2019-09-13 RX ORDER — CYCLOBENZAPRINE HCL 10 MG
5 TABLET ORAL 3 TIMES DAILY
Status: DISCONTINUED | OUTPATIENT
Start: 2019-09-13 | End: 2019-09-18 | Stop reason: HOSPADM

## 2019-09-13 RX ORDER — SODIUM CHLORIDE 0.9 % (FLUSH) 0.9 %
10 SYRINGE (ML) INJECTION EVERY 12 HOURS SCHEDULED
Status: DISCONTINUED | OUTPATIENT
Start: 2019-09-13 | End: 2019-09-18 | Stop reason: HOSPADM

## 2019-09-13 RX ORDER — KETOROLAC TROMETHAMINE 30 MG/ML
15 INJECTION, SOLUTION INTRAMUSCULAR; INTRAVENOUS ONCE
Status: COMPLETED | OUTPATIENT
Start: 2019-09-13 | End: 2019-09-13

## 2019-09-13 RX ORDER — FERROUS SULFATE 325(65) MG
325 TABLET ORAL 2 TIMES DAILY WITH MEALS
Status: DISCONTINUED | OUTPATIENT
Start: 2019-09-13 | End: 2019-09-18 | Stop reason: HOSPADM

## 2019-09-13 RX ORDER — DULOXETIN HYDROCHLORIDE 60 MG/1
60 CAPSULE, DELAYED RELEASE ORAL DAILY
Status: DISCONTINUED | OUTPATIENT
Start: 2019-09-13 | End: 2019-09-18 | Stop reason: HOSPADM

## 2019-09-13 RX ORDER — ROSUVASTATIN CALCIUM 10 MG/1
10 TABLET, COATED ORAL NIGHTLY
Status: DISCONTINUED | OUTPATIENT
Start: 2019-09-13 | End: 2019-09-18 | Stop reason: HOSPADM

## 2019-09-13 RX ORDER — SODIUM CHLORIDE 9 MG/ML
INJECTION, SOLUTION INTRAVENOUS CONTINUOUS
Status: DISCONTINUED | OUTPATIENT
Start: 2019-09-13 | End: 2019-09-17

## 2019-09-13 RX ADMIN — SODIUM CHLORIDE: 9 INJECTION, SOLUTION INTRAVENOUS at 20:16

## 2019-09-13 RX ADMIN — SODIUM CHLORIDE 1000 ML: 9 INJECTION, SOLUTION INTRAVENOUS at 17:15

## 2019-09-13 RX ADMIN — ENOXAPARIN SODIUM 40 MG: 40 INJECTION SUBCUTANEOUS at 22:10

## 2019-09-13 RX ADMIN — Medication 10 ML: at 22:16

## 2019-09-13 RX ADMIN — GABAPENTIN 400 MG: 400 CAPSULE ORAL at 22:16

## 2019-09-13 RX ADMIN — KETOROLAC TROMETHAMINE 15 MG: 30 INJECTION, SOLUTION INTRAMUSCULAR at 17:15

## 2019-09-13 RX ADMIN — ACETAMINOPHEN 1000 MG: 500 TABLET ORAL at 17:15

## 2019-09-13 RX ADMIN — CYCLOBENZAPRINE 5 MG: 10 TABLET, FILM COATED ORAL at 22:16

## 2019-09-13 RX ADMIN — ROSUVASTATIN CALCIUM 10 MG: 10 TABLET, FILM COATED ORAL at 22:09

## 2019-09-13 RX ADMIN — CEFEPIME HYDROCHLORIDE 2 G: 2 INJECTION, POWDER, FOR SOLUTION INTRAVENOUS at 17:15

## 2019-09-13 RX ADMIN — SODIUM CHLORIDE 500 ML: 9 INJECTION, SOLUTION INTRAVENOUS at 18:17

## 2019-09-13 RX ADMIN — VANCOMYCIN HYDROCHLORIDE 1250 MG: 10 INJECTION, POWDER, LYOPHILIZED, FOR SOLUTION INTRAVENOUS at 18:06

## 2019-09-13 ASSESSMENT — PAIN SCALES - WONG BAKER: WONGBAKER_NUMERICALRESPONSE: 0

## 2019-09-13 NOTE — ED PROVIDER NOTES
1.4 0.5 - 2.2 mmol/L   Urinalysis   Result Value Ref Range    Color, UA Yellow Straw/Yellow    Clarity, UA Clear Clear    Glucose, Ur Negative Negative mg/dL    Bilirubin Urine Negative Negative    Ketones, Urine Negative Negative mg/dL    Specific Gravity, UA >=1.030 1.005 - 1.030    Blood, Urine SMALL (A) Negative    pH, UA 6.0 5.0 - 9.0    Protein,  (A) Negative mg/dL    Urobilinogen, Urine 0.2 <2.0 E.U./dL    Nitrite, Urine POSITIVE (A) Negative    Leukocyte Esterase, Urine Negative Negative   Protime-INR   Result Value Ref Range    Protime 15.5 (H) 9.3 - 12.4 sec    INR 1.4    APTT   Result Value Ref Range    aPTT 29.0 24.5 - 35.1 sec   Microscopic Urinalysis   Result Value Ref Range    WBC, UA 5-10 0 - 5 /HPF    RBC, UA 1-3 0 - 2 /HPF    Bacteria, UA MODERATE (A) /HPF   POCT Glucose   Result Value Ref Range    Glucose 114 mg/dL    QC OK? ok    POCT Glucose   Result Value Ref Range    Meter Glucose 114 (H) 74 - 99 mg/dL   EKG 12 Lead   Result Value Ref Range    Ventricular Rate 117 BPM    Atrial Rate 117 BPM    P-R Interval 146 ms    QRS Duration 80 ms    Q-T Interval 324 ms    QTc Calculation (Bazett) 451 ms    P Axis 57 degrees    R Axis 27 degrees    T Axis 55 degrees       RADIOLOGY:  Interpreted by Radiologist.  XR CHEST PORTABLE   Final Result      Bilateral interstitial opacities, which may be due to pulmonary edema. EKG:  This EKG is signed and interpreted by the EP. Sinus, no STEMI      ------------------------- NURSING NOTES AND VITALS REVIEWED ---------------------------   The nursing notes within the ED encounter and vital signs as below have been reviewed by myself. BP (!) 102/49   Pulse 111   Temp 101.7 °F (38.7 °C) (Rectal)   Resp 20   Ht 5' 4.5\" (1.638 m)   Wt 127 lb (57.6 kg)   SpO2 97%   BMI 21.46 kg/m²   Oxygen Saturation Interpretation: Normal    The patients available past medical records and past encounters were reviewed.         ------------------------------

## 2019-09-14 ENCOUNTER — APPOINTMENT (OUTPATIENT)
Dept: CT IMAGING | Age: 69
DRG: 698 | End: 2019-09-14
Payer: MEDICARE

## 2019-09-14 LAB
ANION GAP SERPL CALCULATED.3IONS-SCNC: 14 MMOL/L (ref 7–16)
BACTERIA: ABNORMAL /HPF
BASOPHILS ABSOLUTE: 0 E9/L (ref 0–0.2)
BASOPHILS RELATIVE PERCENT: 0 % (ref 0–2)
BILIRUBIN URINE: NEGATIVE
BLOOD, URINE: ABNORMAL
BUN BLDV-MCNC: 18 MG/DL (ref 8–23)
CALCIUM SERPL-MCNC: 8.9 MG/DL (ref 8.6–10.2)
CHLORIDE BLD-SCNC: 101 MMOL/L (ref 98–107)
CLARITY: CLEAR
CO2: 21 MMOL/L (ref 22–29)
COLOR: YELLOW
CREAT SERPL-MCNC: 1 MG/DL (ref 0.5–1)
EOSINOPHILS ABSOLUTE: 0.28 E9/L (ref 0.05–0.5)
EOSINOPHILS RELATIVE PERCENT: 9 % (ref 0–6)
FILM ARRAY ADENOVIRUS: NORMAL
FILM ARRAY BORDETELLA PERTUSSIS: NORMAL
FILM ARRAY CHLAMYDOPHILIA PNEUMONIAE: NORMAL
FILM ARRAY CORONAVIRUS 229E: NORMAL
FILM ARRAY CORONAVIRUS HKU1: NORMAL
FILM ARRAY CORONAVIRUS NL63: NORMAL
FILM ARRAY CORONAVIRUS OC43: NORMAL
FILM ARRAY INFLUENZA A VIRUS 09H1: NORMAL
FILM ARRAY INFLUENZA A VIRUS H1: NORMAL
FILM ARRAY INFLUENZA A VIRUS H3: NORMAL
FILM ARRAY INFLUENZA A VIRUS: NORMAL
FILM ARRAY INFLUENZA B: NORMAL
FILM ARRAY METAPNEUMOVIRUS: NORMAL
FILM ARRAY MYCOPLASMA PNEUMONIAE: NORMAL
FILM ARRAY PARAINFLUENZA VIRUS 1: NORMAL
FILM ARRAY PARAINFLUENZA VIRUS 2: NORMAL
FILM ARRAY PARAINFLUENZA VIRUS 3: NORMAL
FILM ARRAY PARAINFLUENZA VIRUS 4: NORMAL
FILM ARRAY RESPIRATORY SYNCITIAL VIRUS: NORMAL
FILM ARRAY RHINOVIRUS/ENTEROVIRUS: NORMAL
GFR AFRICAN AMERICAN: >60
GFR NON-AFRICAN AMERICAN: 55 ML/MIN/1.73
GLUCOSE BLD-MCNC: 134 MG/DL (ref 74–99)
GLUCOSE URINE: NEGATIVE MG/DL
HCT VFR BLD CALC: 33.1 % (ref 34–48)
HEMOGLOBIN: 9.8 G/DL (ref 11.5–15.5)
HYPOCHROMIA: ABNORMAL
KETONES, URINE: NEGATIVE MG/DL
LACTIC ACID, SEPSIS: 2 MMOL/L (ref 0.5–1.9)
LEUKOCYTE ESTERASE, URINE: NEGATIVE
LYMPHOCYTES ABSOLUTE: 0.4 E9/L (ref 1.5–4)
LYMPHOCYTES RELATIVE PERCENT: 13 % (ref 20–42)
MCH RBC QN AUTO: 27.5 PG (ref 26–35)
MCHC RBC AUTO-ENTMCNC: 29.6 % (ref 32–34.5)
MCV RBC AUTO: 92.7 FL (ref 80–99.9)
METER GLUCOSE: 126 MG/DL (ref 74–99)
METER GLUCOSE: 127 MG/DL (ref 74–99)
METER GLUCOSE: 177 MG/DL (ref 74–99)
METER GLUCOSE: 206 MG/DL (ref 74–99)
MONOCYTES ABSOLUTE: 0.22 E9/L (ref 0.1–0.95)
MONOCYTES RELATIVE PERCENT: 7 % (ref 2–12)
NEUTROPHILS ABSOLUTE: 2.2 E9/L (ref 1.8–7.3)
NEUTROPHILS RELATIVE PERCENT: 71 % (ref 43–80)
NITRITE, URINE: NEGATIVE
PDW BLD-RTO: 15.9 FL (ref 11.5–15)
PH UA: 6 (ref 5–9)
PLATELET # BLD: 152 E9/L (ref 130–450)
PMV BLD AUTO: 10.8 FL (ref 7–12)
POLYCHROMASIA: ABNORMAL
POTASSIUM REFLEX MAGNESIUM: 4.5 MMOL/L (ref 3.5–5)
PROTEIN UA: 30 MG/DL
RBC # BLD: 3.57 E12/L (ref 3.5–5.5)
RBC UA: ABNORMAL /HPF (ref 0–2)
SODIUM BLD-SCNC: 136 MMOL/L (ref 132–146)
SPECIFIC GRAVITY UA: 1.02 (ref 1–1.03)
TROPONIN: 0.01 NG/ML (ref 0–0.03)
TROPONIN: 0.03 NG/ML (ref 0–0.03)
TROPONIN: <0.01 NG/ML (ref 0–0.03)
TROPONIN: <0.01 NG/ML (ref 0–0.03)
UROBILINOGEN, URINE: 0.2 E.U./DL
WBC # BLD: 3.1 E9/L (ref 4.5–11.5)
WBC UA: ABNORMAL /HPF (ref 0–5)

## 2019-09-14 PROCEDURE — 36415 COLL VENOUS BLD VENIPUNCTURE: CPT

## 2019-09-14 PROCEDURE — 80048 BASIC METABOLIC PNL TOTAL CA: CPT

## 2019-09-14 PROCEDURE — 85025 COMPLETE CBC W/AUTO DIFF WBC: CPT

## 2019-09-14 PROCEDURE — 6360000002 HC RX W HCPCS: Performed by: FAMILY MEDICINE

## 2019-09-14 PROCEDURE — 6360000004 HC RX CONTRAST MEDICATION: Performed by: RADIOLOGY

## 2019-09-14 PROCEDURE — 2580000003 HC RX 258: Performed by: FAMILY MEDICINE

## 2019-09-14 PROCEDURE — 51702 INSERT TEMP BLADDER CATH: CPT

## 2019-09-14 PROCEDURE — 2580000003 HC RX 258: Performed by: SPECIALIST

## 2019-09-14 PROCEDURE — 84484 ASSAY OF TROPONIN QUANT: CPT

## 2019-09-14 PROCEDURE — APPSS180 APP SPLIT SHARED TIME > 60 MINUTES: Performed by: NURSE PRACTITIONER

## 2019-09-14 PROCEDURE — 6370000000 HC RX 637 (ALT 250 FOR IP): Performed by: FAMILY MEDICINE

## 2019-09-14 PROCEDURE — 81001 URINALYSIS AUTO W/SCOPE: CPT

## 2019-09-14 PROCEDURE — 6360000002 HC RX W HCPCS: Performed by: SPECIALIST

## 2019-09-14 PROCEDURE — 83605 ASSAY OF LACTIC ACID: CPT

## 2019-09-14 PROCEDURE — 2700000000 HC OXYGEN THERAPY PER DAY

## 2019-09-14 PROCEDURE — 82962 GLUCOSE BLOOD TEST: CPT

## 2019-09-14 PROCEDURE — 99222 1ST HOSP IP/OBS MODERATE 55: CPT | Performed by: INTERNAL MEDICINE

## 2019-09-14 PROCEDURE — 2060000000 HC ICU INTERMEDIATE R&B

## 2019-09-14 PROCEDURE — 74176 CT ABD & PELVIS W/O CONTRAST: CPT

## 2019-09-14 RX ORDER — DEXTROSE MONOHYDRATE 50 MG/ML
100 INJECTION, SOLUTION INTRAVENOUS PRN
Status: DISCONTINUED | OUTPATIENT
Start: 2019-09-14 | End: 2019-09-18 | Stop reason: HOSPADM

## 2019-09-14 RX ORDER — NICOTINE POLACRILEX 4 MG
15 LOZENGE BUCCAL PRN
Status: DISCONTINUED | OUTPATIENT
Start: 2019-09-14 | End: 2019-09-18 | Stop reason: HOSPADM

## 2019-09-14 RX ORDER — DEXTROSE MONOHYDRATE 25 G/50ML
12.5 INJECTION, SOLUTION INTRAVENOUS PRN
Status: DISCONTINUED | OUTPATIENT
Start: 2019-09-14 | End: 2019-09-18 | Stop reason: HOSPADM

## 2019-09-14 RX ADMIN — IOHEXOL 50 ML: 240 INJECTION, SOLUTION INTRATHECAL; INTRAVASCULAR; INTRAVENOUS; ORAL at 21:49

## 2019-09-14 RX ADMIN — SODIUM CHLORIDE: 9 INJECTION, SOLUTION INTRAVENOUS at 15:59

## 2019-09-14 RX ADMIN — ARIPIPRAZOLE 20 MG: 10 TABLET ORAL at 00:15

## 2019-09-14 RX ADMIN — INSULIN GLARGINE 25 UNITS: 100 INJECTION, SOLUTION SUBCUTANEOUS at 21:30

## 2019-09-14 RX ADMIN — Medication 10 ML: at 09:07

## 2019-09-14 RX ADMIN — CEFEPIME HYDROCHLORIDE 2 G: 2 INJECTION, POWDER, FOR SOLUTION INTRAVENOUS at 16:37

## 2019-09-14 RX ADMIN — ACETAMINOPHEN 650 MG: 325 TABLET ORAL at 12:20

## 2019-09-14 RX ADMIN — TRAZODONE HYDROCHLORIDE 200 MG: 150 TABLET ORAL at 21:10

## 2019-09-14 RX ADMIN — INSULIN LISPRO 1 UNITS: 100 INJECTION, SOLUTION INTRAVENOUS; SUBCUTANEOUS at 21:31

## 2019-09-14 RX ADMIN — GABAPENTIN 400 MG: 400 CAPSULE ORAL at 08:50

## 2019-09-14 RX ADMIN — GABAPENTIN 400 MG: 400 CAPSULE ORAL at 14:57

## 2019-09-14 RX ADMIN — CLONAZEPAM 0.5 MG: 0.5 TABLET ORAL at 21:32

## 2019-09-14 RX ADMIN — CLONAZEPAM 0.5 MG: 0.5 TABLET ORAL at 08:50

## 2019-09-14 RX ADMIN — ARIPIPRAZOLE 20 MG: 10 TABLET ORAL at 21:11

## 2019-09-14 RX ADMIN — FERROUS SULFATE TAB 325 MG (65 MG ELEMENTAL FE) 325 MG: 325 (65 FE) TAB at 08:50

## 2019-09-14 RX ADMIN — INSULIN LISPRO 4 UNITS: 100 INJECTION, SOLUTION INTRAVENOUS; SUBCUTANEOUS at 12:18

## 2019-09-14 RX ADMIN — CYCLOBENZAPRINE 5 MG: 10 TABLET, FILM COATED ORAL at 14:57

## 2019-09-14 RX ADMIN — CYCLOBENZAPRINE 5 MG: 10 TABLET, FILM COATED ORAL at 08:50

## 2019-09-14 RX ADMIN — LISINOPRIL 5 MG: 5 TABLET ORAL at 08:50

## 2019-09-14 RX ADMIN — CYCLOBENZAPRINE 5 MG: 10 TABLET, FILM COATED ORAL at 21:11

## 2019-09-14 RX ADMIN — ENOXAPARIN SODIUM 40 MG: 40 INJECTION SUBCUTANEOUS at 09:06

## 2019-09-14 RX ADMIN — DOCUSATE SODIUM 100 MG: 100 CAPSULE, LIQUID FILLED ORAL at 21:35

## 2019-09-14 RX ADMIN — SODIUM CHLORIDE: 9 INJECTION, SOLUTION INTRAVENOUS at 08:58

## 2019-09-14 RX ADMIN — GABAPENTIN 400 MG: 400 CAPSULE ORAL at 21:33

## 2019-09-14 RX ADMIN — CEFEPIME HYDROCHLORIDE 2 G: 2 INJECTION, POWDER, FOR SOLUTION INTRAVENOUS at 05:07

## 2019-09-14 RX ADMIN — PIPERACILLIN SODIUM AND TAZOBACTAM SODIUM 3.38 G: 3; .375 INJECTION, POWDER, LYOPHILIZED, FOR SOLUTION INTRAVENOUS at 20:30

## 2019-09-14 RX ADMIN — CLONAZEPAM 0.5 MG: 0.5 TABLET ORAL at 14:57

## 2019-09-14 RX ADMIN — LEVOTHYROXINE SODIUM 50 MCG: 50 TABLET ORAL at 06:33

## 2019-09-14 RX ADMIN — FERROUS SULFATE TAB 325 MG (65 MG ELEMENTAL FE) 325 MG: 325 (65 FE) TAB at 16:41

## 2019-09-14 RX ADMIN — ASPIRIN 81 MG 81 MG: 81 TABLET ORAL at 08:50

## 2019-09-14 RX ADMIN — DOCUSATE SODIUM 100 MG: 100 CAPSULE, LIQUID FILLED ORAL at 08:50

## 2019-09-14 RX ADMIN — DULOXETINE HYDROCHLORIDE 60 MG: 60 CAPSULE, DELAYED RELEASE ORAL at 08:50

## 2019-09-14 ASSESSMENT — PAIN SCALES - WONG BAKER
WONGBAKER_NUMERICALRESPONSE: 0

## 2019-09-14 ASSESSMENT — PAIN SCALES - GENERAL
PAINLEVEL_OUTOF10: 0

## 2019-09-14 NOTE — CONSULTS
(FLEXERIL) 5 MG tablet Take 5 mg by mouth 3 times daily   Yes Historical Provider, MD   docusate sodium (COLACE) 100 MG capsule Take 100 mg by mouth 2 times daily   Yes Historical Provider, MD   rosuvastatin (CRESTOR) 10 MG tablet Take 10 mg by mouth nightly   Yes Historical Provider, MD   acetaminophen (TYLENOL) 325 MG tablet Take 650 mg by mouth every 4 hours as needed for Pain   Yes Historical Provider, MD   levothyroxine (SYNTHROID) 50 MCG tablet Take 50 mcg by mouth Daily   Yes Historical Provider, MD   metformin (GLUCOPHAGE) 500 MG tablet Take 1,000 mg by mouth 2 times daily    Yes Historical Provider, MD   lisinopril (PRINIVIL;ZESTRIL) 5 MG tablet Take 5 mg by mouth daily    Yes Historical Provider, MD       Current Medications:    Current Facility-Administered Medications: glucose (GLUTOSE) 40 % oral gel 15 g, 15 g, Oral, PRN  dextrose 50 % IV solution, 12.5 g, Intravenous, PRN  glucagon (rDNA) injection 1 mg, 1 mg, Intramuscular, PRN  dextrose 5 % solution, 100 mL/hr, Intravenous, PRN  ARIPiprazole (ABILIFY) tablet 20 mg, 20 mg, Oral, Nightly  clonazePAM (KLONOPIN) tablet 0.5 mg, 0.5 mg, PEG Tube, TID  cyclobenzaprine (FLEXERIL) tablet 5 mg, 5 mg, Oral, TID  docusate sodium (COLACE) capsule 100 mg, 100 mg, Oral, BID  DULoxetine (CYMBALTA) extended release capsule 60 mg, 60 mg, Oral, Daily  ferrous sulfate tablet 325 mg, 325 mg, PEG Tube, BID WC  gabapentin (NEURONTIN) capsule 400 mg, 400 mg, PEG Tube, TID  insulin glargine (LANTUS) injection vial 25 Units, 25 Units, Subcutaneous, Nightly  levothyroxine (SYNTHROID) tablet 50 mcg, 50 mcg, Oral, Daily  lisinopril (PRINIVIL;ZESTRIL) tablet 5 mg, 5 mg, Oral, Daily  rosuvastatin (CRESTOR) tablet 10 mg, 10 mg, Oral, Nightly  traZODone (DESYREL) tablet 200 mg, 200 mg, Oral, Nightly  sodium chloride flush 0.9 % injection 10 mL, 10 mL, Intravenous, 2 times per day  sodium chloride flush 0.9 % injection 10 mL, 10 mL, Intravenous, PRN  enoxaparin (LOVENOX) injection 40

## 2019-09-14 NOTE — CONSULTS
levothyroxine  50 mcg Oral Daily    lisinopril  5 mg Oral Daily    rosuvastatin  10 mg Oral Nightly    traZODone  200 mg Oral Nightly    sodium chloride flush  10 mL Intravenous 2 times per day    enoxaparin  40 mg Subcutaneous Daily    insulin lispro  0-12 Units Subcutaneous TID WC    insulin lispro  0-6 Units Subcutaneous Nightly    aspirin  81 mg Oral Daily     Continuous Infusions:   dextrose      sodium chloride 150 mL/hr at 09/14/19 1559     PRN Meds:glucose, dextrose, glucagon (rDNA), dextrose, sodium chloride flush, acetaminophen, nitroGLYCERIN    Allergies:  Patient has no known allergies. Social History:   Social History     Socioeconomic History    Marital status: Single     Spouse name: None    Number of children: None    Years of education: None    Highest education level: None   Occupational History    None   Social Needs    Financial resource strain: None    Food insecurity:     Worry: None     Inability: None    Transportation needs:     Medical: None     Non-medical: None   Tobacco Use    Smoking status: Former Smoker     Packs/day: 0.50    Smokeless tobacco: Never Used   Substance and Sexual Activity    Alcohol use: No    Drug use: No    Sexual activity: None   Lifestyle    Physical activity:     Days per week: None     Minutes per session: None    Stress: None   Relationships    Social connections:     Talks on phone: None     Gets together: None     Attends Sikhism service: None     Active member of club or organization: None     Attends meetings of clubs or organizations: None     Relationship status: None    Intimate partner violence:     Fear of current or ex partner: None     Emotionally abused: None     Physically abused: None     Forced sexual activity: None   Other Topics Concern    None   Social History Narrative    None     Tobacco: No  Alcohol: No  Pets: No  Travel: No    Family History:       Problem Relation Age of Onset    Diabetes Father    . 09/14/19  0426   WBC 4.1* 3.1*   HGB 9.9* 9.8*   HCT 30.9* 33.1*   MCV 85.8 92.7    152   NEUTROABS 3.18 2.20     No results found for: CRP  No results found for: CRPHS  No results found for: SEDRATE  Lab Results   Component Value Date    ALT 8 09/13/2019    AST 15 09/13/2019    ALKPHOS 43 09/13/2019    BILITOT 0.6 09/13/2019     Lab Results   Component Value Date     09/14/2019    K 4.5 09/14/2019     09/14/2019    CO2 21 09/14/2019    BUN 18 09/14/2019    CREATININE 1.0 09/14/2019    GFRAA >60 09/14/2019    LABGLOM 55 09/14/2019    GLUCOSE 134 09/14/2019    PROT 6.0 09/13/2019    LABALBU 3.4 09/13/2019    CALCIUM 8.9 09/14/2019    BILITOT 0.6 09/13/2019    ALKPHOS 43 09/13/2019    AST 15 09/13/2019    ALT 8 09/13/2019       Lab Results   Component Value Date    PROTIME 15.5 09/13/2019    INR 1.4 09/13/2019       Lab Results   Component Value Date    TSH 2.960 04/03/2019       Lab Results   Component Value Date    COLORU Yellow 09/13/2019    PHUR 6.0 09/13/2019    WBCUA 5-10 09/13/2019    RBCUA 1-3 09/13/2019    YEAST MANY 04/03/2019    BACTERIA MODERATE 09/13/2019    CLARITYU Clear 09/13/2019    SPECGRAV >=1.030 09/13/2019    LEUKOCYTESUR Negative 09/13/2019    UROBILINOGEN 0.2 09/13/2019    BILIRUBINUR Negative 09/13/2019    BLOODU SMALL 09/13/2019    GLUCOSEU Negative 09/13/2019       No results found for: VLN7UFH, BEART, C4DAKZRZ, PHART, THGBART, IUF8SEP, PO2ART, KQD5PMD  Radiology:  XR CHEST PORTABLE   Final Result      Bilateral interstitial opacities, which may be due to pulmonary edema. Microbiology:  Pending  No results for input(s): BC in the last 72 hours. Recent Labs     09/13/19  1700   ORG Enterococcus species*     No results for input(s): BLOODCULT2 in the last 72 hours. No results for input(s): STREPNEUMAGU in the last 72 hours. No results for input(s): LP1UAG in the last 72 hours. No results for input(s): ASO in the last 72 hours.   No results for input(s): CULTRESP in the last 72 hours. Assessment:  · Complicated UTI and sepsis with lactic acidemia    Plan:    · Cont Zosyn  · CT of the abdomen pelvis to evaluate for hydronephrosis  · Change Blankenship repeat cultures  · Check cultures  · Baseline ESR, CRP  · Monitor labs  · Will follow with you    Thank you for having us see this patient in consultation. I will be discussing this case with the treating physicians.       Electronically signed by Nieves Steve MD on 9/14/2019 at 5:38 PM

## 2019-09-14 NOTE — H&P
sulfate 325 (65 Fe) MG tablet 1 tablet by PEG Tube route 2 times daily (with meals) 30 tablet 3    insulin lispro (HUMALOG) 100 UNIT/ML injection vial Inject 0-30 Units into the skin 3 times daily (with meals) 1 vial 3    cyclobenzaprine (FLEXERIL) 5 MG tablet Take 5 mg by mouth 3 times daily      docusate sodium (COLACE) 100 MG capsule Take 100 mg by mouth 2 times daily      rosuvastatin (CRESTOR) 10 MG tablet Take 10 mg by mouth nightly      acetaminophen (TYLENOL) 325 MG tablet Take 650 mg by mouth every 4 hours as needed for Pain      levothyroxine (SYNTHROID) 50 MCG tablet Take 50 mcg by mouth Daily      metformin (GLUCOPHAGE) 500 MG tablet Take 1,000 mg by mouth 2 times daily       lisinopril (PRINIVIL;ZESTRIL) 5 MG tablet Take 5 mg by mouth daily        Social History     Socioeconomic History    Marital status: Single     Spouse name: Not on file    Number of children: Not on file    Years of education: Not on file    Highest education level: Not on file   Occupational History    Not on file   Social Needs    Financial resource strain: Not on file    Food insecurity:     Worry: Not on file     Inability: Not on file    Transportation needs:     Medical: Not on file     Non-medical: Not on file   Tobacco Use    Smoking status: Former Smoker     Packs/day: 0.50    Smokeless tobacco: Never Used   Substance and Sexual Activity    Alcohol use: No    Drug use: No    Sexual activity: Not on file   Lifestyle    Physical activity:     Days per week: Not on file     Minutes per session: Not on file    Stress: Not on file   Relationships    Social connections:     Talks on phone: Not on file     Gets together: Not on file     Attends Oriental orthodox service: Not on file     Active member of club or organization: Not on file     Attends meetings of clubs or organizations: Not on file     Relationship status: Not on file    Intimate partner violence:     Fear of current or ex partner: Not on file

## 2019-09-15 LAB
ANION GAP SERPL CALCULATED.3IONS-SCNC: 6 MMOL/L (ref 7–16)
BUN BLDV-MCNC: 16 MG/DL (ref 8–23)
CALCIUM SERPL-MCNC: 8.4 MG/DL (ref 8.6–10.2)
CHLORIDE BLD-SCNC: 104 MMOL/L (ref 98–107)
CO2: 25 MMOL/L (ref 22–29)
CREAT SERPL-MCNC: 0.8 MG/DL (ref 0.5–1)
GFR AFRICAN AMERICAN: >60
GFR NON-AFRICAN AMERICAN: >60 ML/MIN/1.73
GLUCOSE BLD-MCNC: 199 MG/DL (ref 74–99)
HCT VFR BLD CALC: 26.7 % (ref 34–48)
HEMOGLOBIN: 8.2 G/DL (ref 11.5–15.5)
LACTIC ACID: 1.1 MMOL/L (ref 0.5–2.2)
MCH RBC QN AUTO: 27.2 PG (ref 26–35)
MCHC RBC AUTO-ENTMCNC: 30.7 % (ref 32–34.5)
MCV RBC AUTO: 88.7 FL (ref 80–99.9)
METER GLUCOSE: 110 MG/DL (ref 74–99)
METER GLUCOSE: 146 MG/DL (ref 74–99)
METER GLUCOSE: 223 MG/DL (ref 74–99)
METER GLUCOSE: 226 MG/DL (ref 74–99)
PDW BLD-RTO: 15.8 FL (ref 11.5–15)
PLATELET # BLD: 159 E9/L (ref 130–450)
PMV BLD AUTO: 11.4 FL (ref 7–12)
POTASSIUM REFLEX MAGNESIUM: 4 MMOL/L (ref 3.5–5)
POTASSIUM SERPL-SCNC: 4 MMOL/L (ref 3.5–5)
RBC # BLD: 3.01 E12/L (ref 3.5–5.5)
SODIUM BLD-SCNC: 135 MMOL/L (ref 132–146)
TROPONIN: <0.01 NG/ML (ref 0–0.03)
URINE CULTURE, ROUTINE: NORMAL
WBC # BLD: 1.7 E9/L (ref 4.5–11.5)

## 2019-09-15 PROCEDURE — 85027 COMPLETE CBC AUTOMATED: CPT

## 2019-09-15 PROCEDURE — 2700000000 HC OXYGEN THERAPY PER DAY

## 2019-09-15 PROCEDURE — 2580000003 HC RX 258: Performed by: SPECIALIST

## 2019-09-15 PROCEDURE — 82962 GLUCOSE BLOOD TEST: CPT

## 2019-09-15 PROCEDURE — 36415 COLL VENOUS BLD VENIPUNCTURE: CPT

## 2019-09-15 PROCEDURE — 6370000000 HC RX 637 (ALT 250 FOR IP): Performed by: FAMILY MEDICINE

## 2019-09-15 PROCEDURE — 2060000000 HC ICU INTERMEDIATE R&B

## 2019-09-15 PROCEDURE — 80048 BASIC METABOLIC PNL TOTAL CA: CPT

## 2019-09-15 PROCEDURE — 84484 ASSAY OF TROPONIN QUANT: CPT

## 2019-09-15 PROCEDURE — 6360000002 HC RX W HCPCS: Performed by: SPECIALIST

## 2019-09-15 PROCEDURE — 2580000003 HC RX 258: Performed by: FAMILY MEDICINE

## 2019-09-15 PROCEDURE — 83605 ASSAY OF LACTIC ACID: CPT

## 2019-09-15 PROCEDURE — 6360000002 HC RX W HCPCS: Performed by: FAMILY MEDICINE

## 2019-09-15 RX ADMIN — CLONAZEPAM 0.5 MG: 0.5 TABLET ORAL at 14:25

## 2019-09-15 RX ADMIN — CYCLOBENZAPRINE 5 MG: 10 TABLET, FILM COATED ORAL at 20:49

## 2019-09-15 RX ADMIN — CYCLOBENZAPRINE 5 MG: 10 TABLET, FILM COATED ORAL at 14:25

## 2019-09-15 RX ADMIN — Medication 10 ML: at 00:27

## 2019-09-15 RX ADMIN — LISINOPRIL 5 MG: 5 TABLET ORAL at 10:14

## 2019-09-15 RX ADMIN — FERROUS SULFATE TAB 325 MG (65 MG ELEMENTAL FE) 325 MG: 325 (65 FE) TAB at 10:15

## 2019-09-15 RX ADMIN — INSULIN LISPRO 4 UNITS: 100 INJECTION, SOLUTION INTRAVENOUS; SUBCUTANEOUS at 13:23

## 2019-09-15 RX ADMIN — TRAZODONE HYDROCHLORIDE 200 MG: 150 TABLET ORAL at 20:49

## 2019-09-15 RX ADMIN — CYCLOBENZAPRINE 5 MG: 10 TABLET, FILM COATED ORAL at 10:15

## 2019-09-15 RX ADMIN — SODIUM CHLORIDE: 9 INJECTION, SOLUTION INTRAVENOUS at 10:13

## 2019-09-15 RX ADMIN — Medication 10 ML: at 20:49

## 2019-09-15 RX ADMIN — ASPIRIN 81 MG 81 MG: 81 TABLET ORAL at 10:15

## 2019-09-15 RX ADMIN — INSULIN GLARGINE 25 UNITS: 100 INJECTION, SOLUTION SUBCUTANEOUS at 20:48

## 2019-09-15 RX ADMIN — CLONAZEPAM 0.5 MG: 0.5 TABLET ORAL at 20:49

## 2019-09-15 RX ADMIN — GABAPENTIN 400 MG: 400 CAPSULE ORAL at 14:25

## 2019-09-15 RX ADMIN — PIPERACILLIN SODIUM AND TAZOBACTAM SODIUM 3.38 G: 3; .375 INJECTION, POWDER, LYOPHILIZED, FOR SOLUTION INTRAVENOUS at 10:14

## 2019-09-15 RX ADMIN — FERROUS SULFATE TAB 325 MG (65 MG ELEMENTAL FE) 325 MG: 325 (65 FE) TAB at 18:25

## 2019-09-15 RX ADMIN — ROSUVASTATIN CALCIUM 10 MG: 10 TABLET, FILM COATED ORAL at 20:49

## 2019-09-15 RX ADMIN — ROSUVASTATIN CALCIUM 10 MG: 10 TABLET, FILM COATED ORAL at 00:27

## 2019-09-15 RX ADMIN — CLONAZEPAM 0.5 MG: 0.5 TABLET ORAL at 10:15

## 2019-09-15 RX ADMIN — GABAPENTIN 400 MG: 400 CAPSULE ORAL at 10:16

## 2019-09-15 RX ADMIN — INSULIN LISPRO 1 UNITS: 100 INJECTION, SOLUTION INTRAVENOUS; SUBCUTANEOUS at 21:43

## 2019-09-15 RX ADMIN — DULOXETINE HYDROCHLORIDE 60 MG: 60 CAPSULE, DELAYED RELEASE ORAL at 10:14

## 2019-09-15 RX ADMIN — GABAPENTIN 400 MG: 400 CAPSULE ORAL at 20:49

## 2019-09-15 RX ADMIN — ENOXAPARIN SODIUM 40 MG: 40 INJECTION SUBCUTANEOUS at 10:14

## 2019-09-15 RX ADMIN — DOCUSATE SODIUM 100 MG: 100 CAPSULE, LIQUID FILLED ORAL at 10:15

## 2019-09-15 RX ADMIN — PIPERACILLIN SODIUM AND TAZOBACTAM SODIUM 3.38 G: 3; .375 INJECTION, POWDER, LYOPHILIZED, FOR SOLUTION INTRAVENOUS at 18:24

## 2019-09-15 RX ADMIN — DOCUSATE SODIUM 100 MG: 100 CAPSULE, LIQUID FILLED ORAL at 20:49

## 2019-09-15 RX ADMIN — ARIPIPRAZOLE 20 MG: 10 TABLET ORAL at 20:49

## 2019-09-15 RX ADMIN — INSULIN LISPRO 4 UNITS: 100 INJECTION, SOLUTION INTRAVENOUS; SUBCUTANEOUS at 10:16

## 2019-09-15 ASSESSMENT — PAIN SCALES - GENERAL
PAINLEVEL_OUTOF10: 0

## 2019-09-16 PROBLEM — E44.0 MODERATE PROTEIN-CALORIE MALNUTRITION (HCC): Chronic | Status: ACTIVE | Noted: 2019-09-16

## 2019-09-16 LAB
ANION GAP SERPL CALCULATED.3IONS-SCNC: 9 MMOL/L (ref 7–16)
ANISOCYTOSIS: ABNORMAL
BASOPHILS ABSOLUTE: 0 E9/L (ref 0–0.2)
BASOPHILS RELATIVE PERCENT: 0 % (ref 0–2)
BUN BLDV-MCNC: 9 MG/DL (ref 8–23)
CALCIUM SERPL-MCNC: 8.5 MG/DL (ref 8.6–10.2)
CHLORIDE BLD-SCNC: 108 MMOL/L (ref 98–107)
CO2: 24 MMOL/L (ref 22–29)
CREAT SERPL-MCNC: 0.7 MG/DL (ref 0.5–1)
EOSINOPHILS ABSOLUTE: 0.3 E9/L (ref 0.05–0.5)
EOSINOPHILS RELATIVE PERCENT: 13.2 % (ref 0–6)
GFR AFRICAN AMERICAN: >60
GFR NON-AFRICAN AMERICAN: >60 ML/MIN/1.73
GLUCOSE BLD-MCNC: 150 MG/DL (ref 74–99)
HCT VFR BLD CALC: 26.3 % (ref 34–48)
HEMOGLOBIN: 8 G/DL (ref 11.5–15.5)
HYPOCHROMIA: ABNORMAL
LYMPHOCYTES ABSOLUTE: 0.97 E9/L (ref 1.5–4)
LYMPHOCYTES RELATIVE PERCENT: 42.1 % (ref 20–42)
MCH RBC QN AUTO: 27.1 PG (ref 26–35)
MCHC RBC AUTO-ENTMCNC: 30.4 % (ref 32–34.5)
MCV RBC AUTO: 89.2 FL (ref 80–99.9)
METER GLUCOSE: 106 MG/DL (ref 74–99)
METER GLUCOSE: 125 MG/DL (ref 74–99)
METER GLUCOSE: 152 MG/DL (ref 74–99)
METER GLUCOSE: 154 MG/DL (ref 74–99)
MONOCYTES ABSOLUTE: 0.12 E9/L (ref 0.1–0.95)
MONOCYTES RELATIVE PERCENT: 5.3 % (ref 2–12)
NEUTROPHILS ABSOLUTE: 0.92 E9/L (ref 1.8–7.3)
NEUTROPHILS RELATIVE PERCENT: 39.5 % (ref 43–80)
OVALOCYTES: ABNORMAL
PDW BLD-RTO: 15.4 FL (ref 11.5–15)
PLATELET # BLD: 162 E9/L (ref 130–450)
PMV BLD AUTO: 11.3 FL (ref 7–12)
POIKILOCYTES: ABNORMAL
POTASSIUM REFLEX MAGNESIUM: 3.9 MMOL/L (ref 3.5–5)
RBC # BLD: 2.95 E12/L (ref 3.5–5.5)
SCHISTOCYTES: ABNORMAL
SODIUM BLD-SCNC: 141 MMOL/L (ref 132–146)
TROPONIN: <0.01 NG/ML (ref 0–0.03)
WBC # BLD: 2.3 E9/L (ref 4.5–11.5)

## 2019-09-16 PROCEDURE — 6370000000 HC RX 637 (ALT 250 FOR IP): Performed by: FAMILY MEDICINE

## 2019-09-16 PROCEDURE — 2580000003 HC RX 258: Performed by: FAMILY MEDICINE

## 2019-09-16 PROCEDURE — 80048 BASIC METABOLIC PNL TOTAL CA: CPT

## 2019-09-16 PROCEDURE — 6360000002 HC RX W HCPCS: Performed by: FAMILY MEDICINE

## 2019-09-16 PROCEDURE — 2580000003 HC RX 258: Performed by: INTERNAL MEDICINE

## 2019-09-16 PROCEDURE — 2580000003 HC RX 258: Performed by: SPECIALIST

## 2019-09-16 PROCEDURE — 36415 COLL VENOUS BLD VENIPUNCTURE: CPT

## 2019-09-16 PROCEDURE — 6360000002 HC RX W HCPCS: Performed by: SPECIALIST

## 2019-09-16 PROCEDURE — 2060000000 HC ICU INTERMEDIATE R&B

## 2019-09-16 PROCEDURE — 84484 ASSAY OF TROPONIN QUANT: CPT

## 2019-09-16 PROCEDURE — 6370000000 HC RX 637 (ALT 250 FOR IP): Performed by: INTERNAL MEDICINE

## 2019-09-16 PROCEDURE — 97166 OT EVAL MOD COMPLEX 45 MIN: CPT

## 2019-09-16 PROCEDURE — 2700000000 HC OXYGEN THERAPY PER DAY

## 2019-09-16 PROCEDURE — 85025 COMPLETE CBC W/AUTO DIFF WBC: CPT

## 2019-09-16 PROCEDURE — 97530 THERAPEUTIC ACTIVITIES: CPT

## 2019-09-16 PROCEDURE — 97162 PT EVAL MOD COMPLEX 30 MIN: CPT

## 2019-09-16 PROCEDURE — 82962 GLUCOSE BLOOD TEST: CPT

## 2019-09-16 PROCEDURE — 97535 SELF CARE MNGMENT TRAINING: CPT

## 2019-09-16 PROCEDURE — 6370000000 HC RX 637 (ALT 250 FOR IP): Performed by: SPECIALIST

## 2019-09-16 RX ORDER — LEVOFLOXACIN 500 MG/1
500 TABLET, FILM COATED ORAL DAILY
Status: DISCONTINUED | OUTPATIENT
Start: 2019-09-16 | End: 2019-09-18 | Stop reason: HOSPADM

## 2019-09-16 RX ORDER — LACTOBACILLUS RHAMNOSUS GG 10B CELL
1 CAPSULE ORAL DAILY
Status: DISCONTINUED | OUTPATIENT
Start: 2019-09-16 | End: 2019-09-18 | Stop reason: HOSPADM

## 2019-09-16 RX ORDER — AMOXICILLIN 250 MG/1
500 CAPSULE ORAL EVERY 8 HOURS SCHEDULED
Status: DISCONTINUED | OUTPATIENT
Start: 2019-09-16 | End: 2019-09-18 | Stop reason: HOSPADM

## 2019-09-16 RX ADMIN — SODIUM CHLORIDE: 9 INJECTION, SOLUTION INTRAVENOUS at 23:02

## 2019-09-16 RX ADMIN — CYCLOBENZAPRINE 5 MG: 10 TABLET, FILM COATED ORAL at 10:51

## 2019-09-16 RX ADMIN — CLONAZEPAM 0.5 MG: 0.5 TABLET ORAL at 14:15

## 2019-09-16 RX ADMIN — LISINOPRIL 5 MG: 5 TABLET ORAL at 10:51

## 2019-09-16 RX ADMIN — DOCUSATE SODIUM 100 MG: 100 CAPSULE, LIQUID FILLED ORAL at 20:40

## 2019-09-16 RX ADMIN — GABAPENTIN 400 MG: 400 CAPSULE ORAL at 14:15

## 2019-09-16 RX ADMIN — ROSUVASTATIN CALCIUM 10 MG: 10 TABLET, FILM COATED ORAL at 20:39

## 2019-09-16 RX ADMIN — AMOXICILLIN 500 MG: 250 CAPSULE ORAL at 21:33

## 2019-09-16 RX ADMIN — AMOXICILLIN 500 MG: 250 CAPSULE ORAL at 14:16

## 2019-09-16 RX ADMIN — GABAPENTIN 400 MG: 400 CAPSULE ORAL at 10:51

## 2019-09-16 RX ADMIN — LEVOTHYROXINE SODIUM 50 MCG: 50 TABLET ORAL at 06:52

## 2019-09-16 RX ADMIN — ARIPIPRAZOLE 20 MG: 10 TABLET ORAL at 20:40

## 2019-09-16 RX ADMIN — CYCLOBENZAPRINE 5 MG: 10 TABLET, FILM COATED ORAL at 20:39

## 2019-09-16 RX ADMIN — GABAPENTIN 400 MG: 400 CAPSULE ORAL at 20:39

## 2019-09-16 RX ADMIN — SODIUM CHLORIDE: 9 INJECTION, SOLUTION INTRAVENOUS at 01:41

## 2019-09-16 RX ADMIN — CLONAZEPAM 0.5 MG: 0.5 TABLET ORAL at 20:40

## 2019-09-16 RX ADMIN — ENOXAPARIN SODIUM 40 MG: 40 INJECTION SUBCUTANEOUS at 10:52

## 2019-09-16 RX ADMIN — Medication 1 CAPSULE: at 14:15

## 2019-09-16 RX ADMIN — ASPIRIN 81 MG 81 MG: 81 TABLET ORAL at 10:51

## 2019-09-16 RX ADMIN — DULOXETINE HYDROCHLORIDE 60 MG: 60 CAPSULE, DELAYED RELEASE ORAL at 10:51

## 2019-09-16 RX ADMIN — INSULIN LISPRO 5 UNITS: 100 INJECTION, SOLUTION INTRAVENOUS; SUBCUTANEOUS at 10:37

## 2019-09-16 RX ADMIN — TRAZODONE HYDROCHLORIDE 200 MG: 150 TABLET ORAL at 20:39

## 2019-09-16 RX ADMIN — INSULIN GLARGINE 25 UNITS: 100 INJECTION, SOLUTION SUBCUTANEOUS at 21:33

## 2019-09-16 RX ADMIN — LEVOFLOXACIN 500 MG: 500 TABLET, FILM COATED ORAL at 14:15

## 2019-09-16 RX ADMIN — CYCLOBENZAPRINE 5 MG: 10 TABLET, FILM COATED ORAL at 14:15

## 2019-09-16 RX ADMIN — CLONAZEPAM 0.5 MG: 0.5 TABLET ORAL at 10:52

## 2019-09-16 RX ADMIN — SODIUM CHLORIDE: 9 INJECTION, SOLUTION INTRAVENOUS at 06:52

## 2019-09-16 RX ADMIN — Medication 10 ML: at 20:40

## 2019-09-16 RX ADMIN — PIPERACILLIN SODIUM AND TAZOBACTAM SODIUM 3.38 G: 3; .375 INJECTION, POWDER, LYOPHILIZED, FOR SOLUTION INTRAVENOUS at 01:42

## 2019-09-16 RX ADMIN — FERROUS SULFATE TAB 325 MG (65 MG ELEMENTAL FE) 325 MG: 325 (65 FE) TAB at 17:47

## 2019-09-16 RX ADMIN — FERROUS SULFATE TAB 325 MG (65 MG ELEMENTAL FE) 325 MG: 325 (65 FE) TAB at 10:52

## 2019-09-16 RX ADMIN — PIPERACILLIN SODIUM AND TAZOBACTAM SODIUM 3.38 G: 3; .375 INJECTION, POWDER, LYOPHILIZED, FOR SOLUTION INTRAVENOUS at 10:36

## 2019-09-16 ASSESSMENT — PAIN SCALES - GENERAL
PAINLEVEL_OUTOF10: 0

## 2019-09-16 NOTE — PROGRESS NOTES
seated, patient denied dizziness with positional change and required assist to maintain balance with strong use of BUEs. After several minutes, no hands on needed but patient continued to use BUEs for balance. Patient tolerated seated EOB x 20 minutes working on trunk musculature control, side to side elbow push ups, and eventually able to sit with hands on her lap and maintain posture/balance. Patient assisted back to supine with call light and tray table in reach. Pillows placed under BLEs and between knees to protect skin/joints/discourge ADD tone. Patient would benefit from Prafo boots to discourage further PF contractures. Patient education  Pt educated on purpose of PT for discharge planning, importance of mobility, safety with mobility, transfers, seated balance, exercise. Patient response to education:   Pt verbalized understanding Pt demonstrated skill Pt requires further education in this area   Yes  Yes with verbal cues and assist Yes      Rehab potential is Good for reaching above PT goals. Pts/ family goals   1. To feel better. Patient and or family understand(s) diagnosis, prognosis, and plan of care. Yes     PLAN  PT care will be provided in accordance with the objectives noted above. Whenever appropriate, clear delegation orders will be provided for nursing staff. Exercises and functional mobility practice will be used as well as appropriate assistive devices or modalities to obtain goals. Patient and family education will also be administered as needed. Frequency of treatments will be 2-5x/week x 3-5 days.     Time in: 0900  Time out: [address] [facility], RENEE YANES   License number:  [id number]

## 2019-09-16 NOTE — PROGRESS NOTES
Occupational Therapy  OCCUPATIONAL THERAPY INITIAL EVALUATION      Date:2019  Patient Name: Mirella Urbina  MRN: [id number]  : 1950  Room: [address] [place], [place] [address]    Evaluating OT:  STEVEN Butterfield, OTR/L #[id number]      AM-PAC Daily Activity Raw Score:    Recommended Adaptive Equipment:  TBD as pt progresses    Reason for Admission:  Pt was transferred from SNF w/ change in mental status    Diagnosis:  Sepsis, UTI     Procedures this admission:  None     Pertinent Medical History:  Chronic Back Pain, DM, Neurogenic Bowel and Bladder, Spinal Cord Infarction, Paraplegia      Precautions:  Falls  Paraplegia  Blankenship/PEG  Carb Control Diet    Home Living: Pt is a Resident at a SNF. Bathroom setup:  [employer], Raised Commode   Equipment owned:  ?? Available Family Assist:   staff assist    Prior Level of Function:  Receives assist w/ all Bed-level ADLs. Reports Nsg staff uses [employer] Board [employer] Addiction [employer] (ABAM) to transfer to [facility] Transfer Board Transfers in Therapy.     Driving:  No  Occupation:  None reported    Pain Level:  denies;  Nsg Notified   Additional Complaints:  None    Vitals/Lab Values:  WFL, Room Air    Cognition: A & O x 3 - generally oriented, some inconsistencies with report of timeline of events of past 6 months/year re: spinal surgery and paraplegia   Able to Follow Multi-Step Commands w/ Min VCs   Memory:  fair   Sequencing:  good    Problem solving:  fair    Judgement/safety:  good   Additional Comments:  Pt verbalized much frustration about current level of physical functioning - angry about events leading to becoming paraplegic reporting she was IND prior to back surgery       Functional Assessment:   Initial Eval Status  Date: 19 Treatment Status  Date: Short Term Goals  Treatment frequency: PRN 2-4 x/week   Feeding SUP/Set up         Grooming SUP/Set up    Initially had some difficulty maintaining Static Sitting balance using UEs to wash hands face seated EOB requiring Mod A to

## 2019-09-16 NOTE — PROGRESS NOTES
Discussed with Dr. Catia Dupree patient is not appropriate for ARU rec: return to [facility] level of care. Please see Dr. Karime Hirsch dictated note when available. Social work, Estelle Dela Cruz notified.

## 2019-09-16 NOTE — PROGRESS NOTES
Department of Internal Oswald Leach M.D.  Progress Note      SUBJECTIVE:  Seems alert ; had adelusion that she was walking outside    OBJECTIVE  abd soft peg in lplace     Medications    Current Facility-Administered Medications: insulin lispro (HUMALOG) injection vial 0-30 Units, 0-30 Units, Subcutaneous, TID   glucose (GLUTOSE) 40 % oral gel 15 g, 15 g, Oral, PRN  dextrose 50 % IV solution, 12.5 g, Intravenous, PRN  glucagon (rDNA) injection 1 mg, 1 mg, Intramuscular, PRN  dextrose 5 % solution, 100 mL/hr, Intravenous, PRN  piperacillin-tazobactam (ZOSYN) 3.375 g in dextrose 5 % 100 mL IVPB extended infusion (mini-bag), 3.375 g, Intravenous, Q8H  ARIPiprazole (ABILIFY) tablet 20 mg, 20 mg, Oral, Nightly  clonazePAM (KLONOPIN) tablet 0.5 mg, 0.5 mg, PEG Tube, TID  cyclobenzaprine (FLEXERIL) tablet 5 mg, 5 mg, Oral, TID  docusate sodium (COLACE) capsule 100 mg, 100 mg, Oral, BID  DULoxetine (CYMBALTA) extended release capsule 60 mg, 60 mg, Oral, Daily  ferrous sulfate tablet 325 mg, 325 mg, PEG Tube, BID WC  gabapentin (NEURONTIN) capsule 400 mg, 400 mg, PEG Tube, TID  insulin glargine (LANTUS) injection vial 25 Units, 25 Units, Subcutaneous, Nightly  levothyroxine (SYNTHROID) tablet 50 mcg, 50 mcg, Oral, Daily  lisinopril (PRINIVIL;ZESTRIL) tablet 5 mg, 5 mg, Oral, Daily  rosuvastatin (CRESTOR) tablet 10 mg, 10 mg, Oral, Nightly  traZODone (DESYREL) tablet 200 mg, 200 mg, Oral, Nightly  sodium chloride flush 0.9 % injection 10 mL, 10 mL, Intravenous, 2 times per day  sodium chloride flush 0.9 % injection 10 mL, 10 mL, Intravenous, PRN  enoxaparin (LOVENOX) injection 40 mg, 40 mg, Subcutaneous, Daily  0.9 % sodium chloride infusion, , Intravenous, Continuous  acetaminophen (TYLENOL) tablet 650 mg, 650 mg, Oral, Q4H PRN  aspirin chewable tablet 81 mg, 81 mg, Oral, Daily  nitroGLYCERIN (NITROSTAT) SL tablet 0.4 mg, 0.4 mg, Sublingual, Q5 Min PRN  Physical    VITALS:  BP (!) 101/55

## 2019-09-16 NOTE — PROGRESS NOTES
Nutrition Assessment (Enteral Nutrition)    Type and Reason for Visit: Initial, Positive Nutrition Screen, Consult    Nutrition Recommendations: Recommend to continue Glucerna 1.2 nocturnal tube feeds @ 70/hr x 12 hours. Recommend to trial Glucerna oral supplement BID d/t decreased po intake of meals. Nutrition Assessment: Pt in bed eating lunch at this time stating poor appetite x 3 months ; Patient meets criteria for moderate malnutrition AEB poor appetite x 3 months and muscle/fat wasting ; Pt at further nutritional risk d/t poor appetite and need for nocturnal tube feedings ; Recommend to continue current tube feed rate and product ; Will trial Glucerna oral supplement     Malnutrition Assessment:  · Malnutrition Status: Meets the criteria for moderate malnutrition  · Context: Chronic illness  · Findings of the 6 clinical characteristics of malnutrition (Minimum of 2 out of 6 clinical characteristics is required to make the diagnosis of moderate or severe Protein Calorie Malnutrition based on AND/ASPEN Guidelines):  1. Energy Intake-Less than or equal to 75% of estimated energy requirement, Greater than or equal to 3 months    2. Weight Loss-Unable to assess, unable to assess  3. Fat Loss-Mild subcutaneous fat loss, Orbital, Triceps  4. Muscle Loss-Moderate muscle mass loss, Temples (temporalis muscle), Clavicles (pectoralis and deltoids)  5. Fluid Accumulation-No significant fluid accumulation,    6.  Strength-Not measured    Nutrition Risk Level: Moderate    Nutrition Needs:  · Estimated Daily Total Kcal: 5420-2227 (1115 x 1.2 SF)  · Estimated Daily Protein (g): 71-83 (1.2-1.4g/kg CBW)  · Estimated Daily Fluid (ml/day): 7499-2693    Nutrition Diagnosis:   · Problem:  Moderate malnutrition, In context of chronic illness  · Etiology: related to Insufficient energy/nutrient consumption     Signs and symptoms:  as evidenced by Patient report of, Diet history of poor intake, Intake 50-75%,

## 2019-09-16 NOTE — PROGRESS NOTES
sulfate  325 mg PEG Tube BID WC    gabapentin  400 mg PEG Tube TID    insulin glargine  25 Units Subcutaneous Nightly    levothyroxine  50 mcg Oral Daily    lisinopril  5 mg Oral Daily    rosuvastatin  10 mg Oral Nightly    traZODone  200 mg Oral Nightly    sodium chloride flush  10 mL Intravenous 2 times per day    enoxaparin  40 mg Subcutaneous Daily    aspirin  81 mg Oral Daily     Continuous Infusions:   dextrose      sodium chloride 50 mL/hr at 09/16/19 1034     PRN Meds:glucose, dextrose, glucagon (rDNA), dextrose, sodium chloride flush, acetaminophen, nitroGLYCERIN    Allergies:  Patient has no known allergies.     Social History:   Social History     Socioeconomic History    Marital status: Single     Spouse name: None    Number of children: None    Years of education: None    Highest education level: None   Occupational History    None   Social Needs    Financial resource strain: None    Food insecurity:     Worry: None     Inability: None    Transportation needs:     Medical: None     Non-medical: None   Tobacco Use    Smoking status: Former Smoker     Packs/day: 0.50    Smokeless tobacco: Never Used   Substance and Sexual Activity    Alcohol use: No    Drug use: No    Sexual activity: None   Lifestyle    Physical activity:     Days per week: None     Minutes per session: None    Stress: None   Relationships    Social connections:     Talks on phone: None     Gets together: None     Attends Protestant service: None     Active member of club or organization: None     Attends meetings of clubs or organizations: None     Relationship status: None    Intimate partner violence:     Fear of current or ex partner: None     Emotionally abused: None     Physically abused: None     Forced sexual activity: None   Other Topics Concern    None   Social History Narrative    None     Tobacco: No  Alcohol: No  Pets: No  Travel: No    Family History:       Problem Relation Age of Onset    Diabetes Father    . Otherwise non-pertinent to the chief complaint. REVIEW OF SYSTEMS:    CONSTITUTIONAL: Positive chills, fevers or night sweats. No loss of weight. EYES:  No double vision or drainage from eyes, ears or throat. HEENT:  No neck stiffness. No dysphagia. No drainage from eyes, ears or throat  RESPIRATORY:  No cough, productive sputum or hemoptysis. CARDIOVASCULAR:  No chest pain, palpitations, orthopnea or dyspnea on exertion. GASTROINTESTINAL:  No nausea, vomiting, diarrhea or constipation or hematochezia PEG  GENITOURINARY:  No frequency burning dysuria or hematuria. Blankenship INTEGUMENT/BREAST:  No rash or breast masses. HEMATOLOGIC/LYMPHATIC:  No lymphadenopathy or blood dyscrasics. ALLERGIC/IMMUNOLOGIC:  No anaphylaxis. ENDOCRINE:  No polyuria or polydipsia or temperature intolerance. MUSCULOSKELETAL:  No myalgia or arthralgia. Full ROM. NEUROLOGICAL:  No focal motor sensory deficit. BEHAVIOR/PSYCH:  No psychosis. PHYSICAL EXAM:    Vitals:    BP (!) 101/55   Pulse 90   Temp 97.4 °F (36.3 °C) (Temporal)   Resp 20   Ht 5' 4.5\" (1.638 m)   Wt 131 lb 12.8 oz (59.8 kg)   SpO2 96%   BMI 22.27 kg/m²   Constitutional: The patient is awake, alert, and oriented. Skin: Warm and dry. No rashes were noted. HEENT: Eyes show round, and reactive pupils. No jaundice. Moist mucous membranes, no ulcerations, no thrush. Neck: Supple to movements. No lymphadenopathy. Chest: No use of accessory muscles to breathe. Symmetrical expansion. Auscultation reveals no wheezing, crackles, or rhonchi. Cardiovascular: S1 and S2 are rhythmic and regular. No murmurs appreciated. Abdomen: Positive bowel sounds to auscultation. Benign to palpation. No masses felt. No hepatosplenomegaly. Extremities: No clubbing, no cyanosis, no edema.   Musculoskeletal: Equal and symmetrical but decreased range of motion  Neurological: Lower extremity paresis  Lines: peripheral      CBC+dif:  Recent Labs Labs     09/13/19  1700 09/13/19  2136   BC 24 Hours- no growth 24 Hours- no growth     No results for input(s): ORG in the last 72 hours. Recent Labs     09/13/19  1719 09/13/19  2136   BLOODCULT2 24 Hours- no growth 24 Hours- no growth     No results for input(s): STREPNEUMAGU in the last 72 hours. No results for input(s): LP1UAG in the last 72 hours. No results for input(s): ASO in the last 72 hours. No results for input(s): CULTRESP in the last 72 hours. Assessment:  · Complicated UTI and sepsis with lactic acidemia    Plan:    ·  Zosyn TO AMOXIL AND LEVAQUIN  · CT of the abdomen pelvis to evaluate for hydronephrosis  · Change Blankenship repeat cultures  · Check cultures  · Baseline ESR, CRP  · Monitor labs  · Will follow with you    Thank you for having us see this patient in consultation. I will be discussing this case with the treating physicians.       Electronically signed by Demetrio Porras MD on 9/16/2019 at 10:44 AM

## 2019-09-16 NOTE — PROGRESS NOTES
disease  [x] Anxiety   [] Gangrene   [] Pneumonia  [] Aphasia   [] Gout    [] Polyneuropathy  [] Asthma   [] Heart Failure (diastolic) [] Post-polio syndrome  [] Atrial fibrillation  [] Heart Failure (left-sided) [] Pseudomonas enteritis   [] Blind    [] Heart Failure (right-sided) [] Pulmonary embolism  [] Cellulitis     [] Heart Failure (systolic) [] Renal dialysis  [] Clostridium difficile  [] Hemiparesis   [] Renal failure  [] Congestive heart failure [x] Hypertension   [] Rheumatoid arthritis  [] COPD   [x] Hyperlipidemia   [] Seizure disorder   [] Coronary Artery Disease [] Hypothyroidism  [] Septicemia   [] Deaf    [] Malnutrition   [x] Sleep apnea  [x] Depression   [] Morbid obesity  [] Spinal cord injury  [x] Diabetes   [] MRSA   [] Stroke  [] Diabetic nephropathy  [] Myocardial infarction  [] Tracheostomy  [] Diabetic neuropathy  [] Osteoarthritis  [] Traumatic brain injury   [] Diabetic retinopathy  [] Osteoporosis   [] Urinary tract infection  [] DVT    [] Pancytopenia  [] Vocal cord paralysis  [x] paraplegia   [x] neurogenic bowel and bladder  [] VRE          Medical/Functional Conditions, Risk for Clinical Complications/Interventions Required:    Medical/Functional Conditions: anxiety, depression, Hypertension ( monitor BP), Diabetes ( monitor Blood sugar), paraplegia, neurogenic bowel and bladder, sleep apnea    Risk for Medical/Clinical Complications: Falls, injury, pain, skin breakdown, abnormal vitals, abnormal labs, DVT, PE, pneumonia, decreased mobility, hypo/hypertension, hypo/hyperglycemia, respiratory distress,     CLINICAL DATA:     Height : 5'4\"     Weight:  131#   BMI: 22.3         Date: 9/16/19 Date:  Date:    temperature 97.4     pulse 90     respirations 20     Blood pressure 101/55     Pulse oximeter 96% on 2L nasal cannula          ALLERGIES: Patient has no known allergies.     DIET : DIET TUBE FEED VOLUME BASED WITH DIET Diabetic (glucernia @70ml/hr start at 18:00 until 06:00am); (peg);  Cyclic; 70; 12  DIET CARB CONTROL; Carb Control: 5 carb choices (75 gms)/meal    Current Lab and Diagnostic Tests:   Recent Results (from the past 24 hour(s))   POCT Glucose    Collection Time: 09/15/19  9:09 AM   Result Value Ref Range    Meter Glucose 223 (H) 74 - 99 mg/dL   POCT Glucose    Collection Time: 09/15/19 12:59 PM   Result Value Ref Range    Meter Glucose 226 (H) 74 - 99 mg/dL   Troponin    Collection Time: 09/15/19  3:10 PM   Result Value Ref Range    Troponin <0.01 0.00 - 0.03 ng/mL   POCT Glucose    Collection Time: 09/15/19  5:34 PM   Result Value Ref Range    Meter Glucose 110 (H) 74 - 99 mg/dL   Troponin    Collection Time: 09/15/19  7:33 PM   Result Value Ref Range    Troponin <0.01 0.00 - 0.03 ng/mL   POCT Glucose    Collection Time: 09/15/19  8:48 PM   Result Value Ref Range    Meter Glucose 146 (H) 74 - 99 mg/dL   Troponin    Collection Time: 09/16/19  3:04 AM   Result Value Ref Range    Troponin <0.01 0.00 - 0.03 ng/mL   Basic Metabolic Panel w/ Reflex to MG    Collection Time: 09/16/19  3:04 AM   Result Value Ref Range    Sodium 141 132 - 146 mmol/L    Potassium reflex Magnesium 3.9 3.5 - 5.0 mmol/L    Chloride 108 (H) 98 - 107 mmol/L    CO2 24 22 - 29 mmol/L    Anion Gap 9 7 - 16 mmol/L    Glucose 150 (H) 74 - 99 mg/dL    BUN 9 8 - 23 mg/dL    CREATININE 0.7 0.5 - 1.0 mg/dL    GFR Non-African American >60 >=60 mL/min/1.73    GFR African American >60     Calcium 8.5 (L) 8.6 - 10.2 mg/dL   CBC Auto Differential    Collection Time: 09/16/19  3:04 AM   Result Value Ref Range    WBC 2.3 (L) 4.5 - 11.5 E9/L    RBC 2.95 (L) 3.50 - 5.50 E12/L    Hemoglobin 8.0 (L) 11.5 - 15.5 g/dL    Hematocrit 26.3 (L) 34.0 - 48.0 %    MCV 89.2 80.0 - 99.9 fL    MCH 27.1 26.0 - 35.0 pg    MCHC 30.4 (L) 32.0 - 34.5 %    RDW 15.4 (H) 11.5 - 15.0 fL    Platelets 874 993 - 332 E9/L    MPV 11.3 7.0 - 12.0 fL    Neutrophils % 39.5 (L) 43.0 - 80.0 %    Lymphocytes % 42.1 (H) 20.0 - 42.0 %    Monocytes % 5.3 2.0 -

## 2019-09-17 LAB
ANION GAP SERPL CALCULATED.3IONS-SCNC: 11 MMOL/L (ref 7–16)
BUN BLDV-MCNC: 9 MG/DL (ref 8–23)
CALCIUM SERPL-MCNC: 8.8 MG/DL (ref 8.6–10.2)
CHLORIDE BLD-SCNC: 103 MMOL/L (ref 98–107)
CO2: 25 MMOL/L (ref 22–29)
CREAT SERPL-MCNC: 0.7 MG/DL (ref 0.5–1)
GFR AFRICAN AMERICAN: >60
GFR NON-AFRICAN AMERICAN: >60 ML/MIN/1.73
GLUCOSE BLD-MCNC: 177 MG/DL (ref 74–99)
HCT VFR BLD CALC: 27.8 % (ref 34–48)
HEMOGLOBIN: 8.5 G/DL (ref 11.5–15.5)
MCH RBC QN AUTO: 27 PG (ref 26–35)
MCHC RBC AUTO-ENTMCNC: 30.6 % (ref 32–34.5)
MCV RBC AUTO: 88.3 FL (ref 80–99.9)
METER GLUCOSE: 100 MG/DL (ref 74–99)
METER GLUCOSE: 121 MG/DL (ref 74–99)
METER GLUCOSE: 130 MG/DL (ref 74–99)
METER GLUCOSE: 153 MG/DL (ref 74–99)
METER GLUCOSE: 156 MG/DL (ref 74–99)
METER GLUCOSE: 172 MG/DL (ref 74–99)
PDW BLD-RTO: 14.7 FL (ref 11.5–15)
PLATELET # BLD: 174 E9/L (ref 130–450)
PMV BLD AUTO: 11.3 FL (ref 7–12)
POTASSIUM REFLEX MAGNESIUM: 3.9 MMOL/L (ref 3.5–5)
RBC # BLD: 3.15 E12/L (ref 3.5–5.5)
SODIUM BLD-SCNC: 139 MMOL/L (ref 132–146)
WBC # BLD: 2.8 E9/L (ref 4.5–11.5)

## 2019-09-17 PROCEDURE — 80048 BASIC METABOLIC PNL TOTAL CA: CPT

## 2019-09-17 PROCEDURE — 2060000000 HC ICU INTERMEDIATE R&B

## 2019-09-17 PROCEDURE — 6360000002 HC RX W HCPCS: Performed by: FAMILY MEDICINE

## 2019-09-17 PROCEDURE — 85027 COMPLETE CBC AUTOMATED: CPT

## 2019-09-17 PROCEDURE — 97110 THERAPEUTIC EXERCISES: CPT

## 2019-09-17 PROCEDURE — 6370000000 HC RX 637 (ALT 250 FOR IP): Performed by: INTERNAL MEDICINE

## 2019-09-17 PROCEDURE — 97530 THERAPEUTIC ACTIVITIES: CPT

## 2019-09-17 PROCEDURE — 36415 COLL VENOUS BLD VENIPUNCTURE: CPT

## 2019-09-17 PROCEDURE — 2580000003 HC RX 258: Performed by: FAMILY MEDICINE

## 2019-09-17 PROCEDURE — 6370000000 HC RX 637 (ALT 250 FOR IP): Performed by: SPECIALIST

## 2019-09-17 PROCEDURE — 97112 NEUROMUSCULAR REEDUCATION: CPT

## 2019-09-17 PROCEDURE — 97535 SELF CARE MNGMENT TRAINING: CPT

## 2019-09-17 PROCEDURE — 82962 GLUCOSE BLOOD TEST: CPT

## 2019-09-17 PROCEDURE — 6370000000 HC RX 637 (ALT 250 FOR IP): Performed by: FAMILY MEDICINE

## 2019-09-17 RX ORDER — DOCUSATE SODIUM 50 MG/5ML
100 LIQUID ORAL 2 TIMES DAILY
Status: DISCONTINUED | OUTPATIENT
Start: 2019-09-18 | End: 2019-09-18 | Stop reason: HOSPADM

## 2019-09-17 RX ADMIN — DOCUSATE SODIUM 100 MG: 100 CAPSULE, LIQUID FILLED ORAL at 09:55

## 2019-09-17 RX ADMIN — TRAZODONE HYDROCHLORIDE 200 MG: 150 TABLET ORAL at 21:39

## 2019-09-17 RX ADMIN — CYCLOBENZAPRINE 5 MG: 10 TABLET, FILM COATED ORAL at 21:38

## 2019-09-17 RX ADMIN — AMOXICILLIN 500 MG: 250 CAPSULE ORAL at 14:36

## 2019-09-17 RX ADMIN — LEVOTHYROXINE SODIUM 50 MCG: 50 TABLET ORAL at 05:05

## 2019-09-17 RX ADMIN — LEVOFLOXACIN 500 MG: 500 TABLET, FILM COATED ORAL at 09:56

## 2019-09-17 RX ADMIN — DULOXETINE HYDROCHLORIDE 60 MG: 60 CAPSULE, DELAYED RELEASE ORAL at 09:56

## 2019-09-17 RX ADMIN — CLONAZEPAM 0.5 MG: 0.5 TABLET ORAL at 09:55

## 2019-09-17 RX ADMIN — DOCUSATE SODIUM 100 MG: 100 CAPSULE, LIQUID FILLED ORAL at 21:39

## 2019-09-17 RX ADMIN — Medication 10 ML: at 14:36

## 2019-09-17 RX ADMIN — CLONAZEPAM 0.5 MG: 0.5 TABLET ORAL at 14:36

## 2019-09-17 RX ADMIN — CLONAZEPAM 0.5 MG: 0.5 TABLET ORAL at 21:39

## 2019-09-17 RX ADMIN — INSULIN LISPRO 5 UNITS: 100 INJECTION, SOLUTION INTRAVENOUS; SUBCUTANEOUS at 09:53

## 2019-09-17 RX ADMIN — CYCLOBENZAPRINE 5 MG: 10 TABLET, FILM COATED ORAL at 14:36

## 2019-09-17 RX ADMIN — ARIPIPRAZOLE 20 MG: 10 TABLET ORAL at 21:38

## 2019-09-17 RX ADMIN — ASPIRIN 81 MG 81 MG: 81 TABLET ORAL at 09:53

## 2019-09-17 RX ADMIN — LISINOPRIL 5 MG: 5 TABLET ORAL at 09:56

## 2019-09-17 RX ADMIN — Medication 1 CAPSULE: at 09:55

## 2019-09-17 RX ADMIN — ROSUVASTATIN CALCIUM 10 MG: 10 TABLET, FILM COATED ORAL at 21:39

## 2019-09-17 RX ADMIN — ENOXAPARIN SODIUM 40 MG: 40 INJECTION SUBCUTANEOUS at 09:51

## 2019-09-17 RX ADMIN — CYCLOBENZAPRINE 5 MG: 10 TABLET, FILM COATED ORAL at 09:57

## 2019-09-17 RX ADMIN — INSULIN GLARGINE 25 UNITS: 100 INJECTION, SOLUTION SUBCUTANEOUS at 22:36

## 2019-09-17 RX ADMIN — FERROUS SULFATE TAB 325 MG (65 MG ELEMENTAL FE) 325 MG: 325 (65 FE) TAB at 18:38

## 2019-09-17 RX ADMIN — GABAPENTIN 400 MG: 400 CAPSULE ORAL at 14:36

## 2019-09-17 RX ADMIN — FERROUS SULFATE TAB 325 MG (65 MG ELEMENTAL FE) 325 MG: 325 (65 FE) TAB at 09:53

## 2019-09-17 RX ADMIN — AMOXICILLIN 500 MG: 250 CAPSULE ORAL at 05:05

## 2019-09-17 RX ADMIN — GABAPENTIN 400 MG: 400 CAPSULE ORAL at 09:56

## 2019-09-17 RX ADMIN — GABAPENTIN 400 MG: 400 CAPSULE ORAL at 21:39

## 2019-09-17 ASSESSMENT — PAIN SCALES - GENERAL
PAINLEVEL_OUTOF10: 0

## 2019-09-17 NOTE — CONSULTS
paraplegia. PHYSICAL EXAMINATION:  GENERAL:  Well-nourished, well-developed white female. HEENT:  Head normocephalic, atraumatic. Eyes, pupils equal, round,  reactive to light. Pharynx normal.  LUNGS:  Clear to P and A. HEART:  Regular rate and rhythm. S1, S2 normal.  No murmurs or gallops. ABDOMEN:  Bowel sounds normal.  Soft, nontender. No masses. EXTREMITIES:  Without clubbing, cyanosis, or edema. There are  contractures in both lowers. MENTAL STATUS:  The patient is somewhat agitated. She is alert and  oriented, but gets agitated when she talks about her medical history. NEUROLOGIC:  Sensation absent below the umbilicus. Neuromuscular, 4/5  in the uppers, complete paraplegia in the lowers. PROBLEM LIST:  1. Complete paraplegia. 2.  Lower extremity contractures. 3.  Neurogenic bowel. 4.  Neurogenic bladder. 5.  History of severe depression. 6.  Type 2 diabetes mellitus, insulin dependent. 7.  Hypertension. RECOMMENDATIONS:  At this point, the patient already had rehab after the  initial spinal cord injury at [facility]. She has got contractures in the  lowers now on top of that. Unfortunately, I do not think that she is  going to be able to improve to the point that she can live independently  and there is really not much to be gained by an acute rehab admission,  so we need to look at discharge back to the skilled nursing facility.         Donavan Larsen MD    D: 09/16/2019 17:54:11       T: 09/16/2019 17:58:29     [email or web address]  Job#: [id number]     Doc#: [id number]    CC:

## 2019-09-18 VITALS
HEART RATE: 103 BPM | RESPIRATION RATE: 20 BRPM | SYSTOLIC BLOOD PRESSURE: 136 MMHG | WEIGHT: 138.6 LBS | TEMPERATURE: 98.1 F | DIASTOLIC BLOOD PRESSURE: 85 MMHG | BODY MASS INDEX: 23.66 KG/M2 | OXYGEN SATURATION: 97 % | HEIGHT: 64 IN

## 2019-09-18 LAB
ANION GAP SERPL CALCULATED.3IONS-SCNC: 12 MMOL/L (ref 7–16)
BLOOD CULTURE, ROUTINE: NORMAL
BLOOD CULTURE, ROUTINE: NORMAL
BUN BLDV-MCNC: 9 MG/DL (ref 8–23)
CALCIUM SERPL-MCNC: 9.1 MG/DL (ref 8.6–10.2)
CHLORIDE BLD-SCNC: 103 MMOL/L (ref 98–107)
CO2: 27 MMOL/L (ref 22–29)
CREAT SERPL-MCNC: 0.7 MG/DL (ref 0.5–1)
CULTURE, BLOOD 2: NORMAL
CULTURE, BLOOD 2: NORMAL
GFR AFRICAN AMERICAN: >60
GFR NON-AFRICAN AMERICAN: >60 ML/MIN/1.73
GLUCOSE BLD-MCNC: 151 MG/DL (ref 74–99)
HCT VFR BLD CALC: 28.6 % (ref 34–48)
HEMOGLOBIN: 8.9 G/DL (ref 11.5–15.5)
MCH RBC QN AUTO: 27.3 PG (ref 26–35)
MCHC RBC AUTO-ENTMCNC: 31.1 % (ref 32–34.5)
MCV RBC AUTO: 87.7 FL (ref 80–99.9)
METER GLUCOSE: 114 MG/DL (ref 74–99)
METER GLUCOSE: 126 MG/DL (ref 74–99)
METER GLUCOSE: 176 MG/DL (ref 74–99)
PDW BLD-RTO: 14.6 FL (ref 11.5–15)
PLATELET # BLD: 177 E9/L (ref 130–450)
PMV BLD AUTO: 11.5 FL (ref 7–12)
POTASSIUM REFLEX MAGNESIUM: 3.7 MMOL/L (ref 3.5–5)
RBC # BLD: 3.26 E12/L (ref 3.5–5.5)
SODIUM BLD-SCNC: 142 MMOL/L (ref 132–146)
WBC # BLD: 2.8 E9/L (ref 4.5–11.5)

## 2019-09-18 PROCEDURE — 82962 GLUCOSE BLOOD TEST: CPT

## 2019-09-18 PROCEDURE — 2580000003 HC RX 258: Performed by: FAMILY MEDICINE

## 2019-09-18 PROCEDURE — 6360000002 HC RX W HCPCS: Performed by: INTERNAL MEDICINE

## 2019-09-18 PROCEDURE — 6360000002 HC RX W HCPCS: Performed by: FAMILY MEDICINE

## 2019-09-18 PROCEDURE — 6370000000 HC RX 637 (ALT 250 FOR IP): Performed by: FAMILY MEDICINE

## 2019-09-18 PROCEDURE — 6370000000 HC RX 637 (ALT 250 FOR IP): Performed by: SPECIALIST

## 2019-09-18 PROCEDURE — 36415 COLL VENOUS BLD VENIPUNCTURE: CPT

## 2019-09-18 PROCEDURE — G0008 ADMIN INFLUENZA VIRUS VAC: HCPCS | Performed by: INTERNAL MEDICINE

## 2019-09-18 PROCEDURE — 90653 IIV ADJUVANT VACCINE IM: CPT | Performed by: INTERNAL MEDICINE

## 2019-09-18 PROCEDURE — 6370000000 HC RX 637 (ALT 250 FOR IP): Performed by: INTERNAL MEDICINE

## 2019-09-18 PROCEDURE — 85027 COMPLETE CBC AUTOMATED: CPT

## 2019-09-18 PROCEDURE — 80048 BASIC METABOLIC PNL TOTAL CA: CPT

## 2019-09-18 RX ORDER — LEVOFLOXACIN 500 MG/1
250 TABLET, FILM COATED ORAL DAILY
Qty: 6 TABLET | Refills: 0 | Status: ON HOLD | DISCHARGE
Start: 2019-09-18 | End: 2019-09-23

## 2019-09-18 RX ORDER — LACTOBACILLUS RHAMNOSUS GG 10B CELL
1 CAPSULE ORAL DAILY
DISCHARGE
Start: 2019-09-18

## 2019-09-18 RX ORDER — LEVOFLOXACIN 500 MG/1
500 TABLET, FILM COATED ORAL DAILY
Qty: 6 TABLET | Refills: 0 | DISCHARGE
Start: 2019-09-18 | End: 2019-09-18

## 2019-09-18 RX ORDER — TRAZODONE HYDROCHLORIDE 150 MG/1
200 TABLET ORAL NIGHTLY
Status: ON HOLD | DISCHARGE
Start: 2019-09-18 | End: 2019-09-27 | Stop reason: HOSPADM

## 2019-09-18 RX ORDER — AMOXICILLIN 500 MG/1
500 CAPSULE ORAL EVERY 8 HOURS SCHEDULED
Qty: 18 CAPSULE | Refills: 0 | Status: ON HOLD | DISCHARGE
Start: 2019-09-18 | End: 2019-09-26 | Stop reason: HOSPADM

## 2019-09-18 RX ORDER — ARIPIPRAZOLE 20 MG/1
20 TABLET ORAL NIGHTLY
Status: ON HOLD | DISCHARGE
Start: 2019-09-18 | End: 2019-09-26 | Stop reason: HOSPADM

## 2019-09-18 RX ADMIN — ENOXAPARIN SODIUM 40 MG: 40 INJECTION SUBCUTANEOUS at 09:30

## 2019-09-18 RX ADMIN — AMOXICILLIN 500 MG: 250 CAPSULE ORAL at 00:18

## 2019-09-18 RX ADMIN — GABAPENTIN 400 MG: 400 CAPSULE ORAL at 09:30

## 2019-09-18 RX ADMIN — INFLUENZA VACCINE, ADJUVANTED 0.5 ML: 15; 15; 15 INJECTION, SUSPENSION INTRAMUSCULAR at 13:27

## 2019-09-18 RX ADMIN — LEVOFLOXACIN 500 MG: 500 TABLET, FILM COATED ORAL at 09:30

## 2019-09-18 RX ADMIN — ASPIRIN 81 MG 81 MG: 81 TABLET ORAL at 09:31

## 2019-09-18 RX ADMIN — Medication 1 CAPSULE: at 09:30

## 2019-09-18 RX ADMIN — AMOXICILLIN 500 MG: 250 CAPSULE ORAL at 06:51

## 2019-09-18 RX ADMIN — FERROUS SULFATE TAB 325 MG (65 MG ELEMENTAL FE) 325 MG: 325 (65 FE) TAB at 09:30

## 2019-09-18 RX ADMIN — CYCLOBENZAPRINE 5 MG: 10 TABLET, FILM COATED ORAL at 13:04

## 2019-09-18 RX ADMIN — CLONAZEPAM 0.5 MG: 0.5 TABLET ORAL at 13:04

## 2019-09-18 RX ADMIN — CYCLOBENZAPRINE 5 MG: 10 TABLET, FILM COATED ORAL at 09:30

## 2019-09-18 RX ADMIN — INSULIN LISPRO 5 UNITS: 100 INJECTION, SOLUTION INTRAVENOUS; SUBCUTANEOUS at 09:29

## 2019-09-18 RX ADMIN — GABAPENTIN 400 MG: 400 CAPSULE ORAL at 13:04

## 2019-09-18 RX ADMIN — LEVOTHYROXINE SODIUM 50 MCG: 50 TABLET ORAL at 06:52

## 2019-09-18 RX ADMIN — Medication 10 ML: at 09:30

## 2019-09-18 RX ADMIN — LISINOPRIL 5 MG: 5 TABLET ORAL at 09:30

## 2019-09-18 RX ADMIN — AMOXICILLIN 500 MG: 250 CAPSULE ORAL at 13:04

## 2019-09-18 RX ADMIN — DULOXETINE HYDROCHLORIDE 60 MG: 60 CAPSULE, DELAYED RELEASE ORAL at 09:30

## 2019-09-18 RX ADMIN — CLONAZEPAM 0.5 MG: 0.5 TABLET ORAL at 09:31

## 2019-09-18 ASSESSMENT — PAIN SCALES - GENERAL
PAINLEVEL_OUTOF10: 0

## 2019-09-18 NOTE — PROGRESS NOTES
[address] [place] [address] [facility]    Consultation Note     Admit Date: 9/13/2019  4:49 PM    Reason for Consult:   Sepsis    Attending Physician:  Zohaib Quintanilla MD     Chief Complaint: Confusion    HISTORY OF PRESENT ILLNESS:   The patient is a 81 y.o.  woman not known to the Infectious Diseases service. The patient is admitted through the emergency room after nursing home had felt that the patient was confused. Recently had her urinary catheter changed but at this point in time she is not able to give me the exact time that that happened. Her urine cultures are growing enterococcus and gram-negative. This was probably taken from the Blankenship that was indwelling. She is less confused but in the emergency room was hypotensive had a lactic acidemia and has been admitted with ID evaluating for sepsis. Chest x-ray is nondiagnostic and BUN and creatinine are 21/.2 with a white count of 4.1 hemoglobin 9.9. She has had temperatures up to 101.7 and blood pressure is low as 94/50.   Past Medical History:        Diagnosis Date    Anemia     Anxiety disorder     Chronic back pain 01/2018    6/10 on the pain scale    Depression     Diabetes mellitus ([address]     Hyperlipidemia     Hypertension     Neurogenic bowel     Neuromuscular dysfunction of bladder     Obstructive sleep apnea     Radiculopathy of lumbar region     Spinal cord infarction ([address]     Suicidal ideations     Vitamin D deficiency      Past Surgical History:        Procedure Laterality Date    BACK SURGERY       Current Medications:   Scheduled Meds:   influenza virus vaccine  0.5 mL Intramuscular Prior to discharge    docusate sodium  100 mg Oral BID    insulin lispro  0-30 Units Subcutaneous TID WC    lactobacillus  1 capsule Oral Daily    levofloxacin  500 mg Oral Daily    amoxicillin  500 mg Oral 3 times per day    ARIPiprazole  20 mg Oral Nightly    clonazePAM  0.5 mg PEG Tube TID    cyclobenzaprine results for input(s): BC in the last 72 hours. No results for input(s): ORG in the last 72 hours. No results for input(s): [place] in the last 72 hours. No results for input(s): STREPNEUMAGU in the last 72 hours. No results for input(s): LP1UAG in the last 72 hours. No results for input(s): ASO in the last 72 hours. No results for input(s): CULTRESP in the last 72 hours. Assessment:  · Complicated UTI and sepsis with lactic acidemia    Plan:    ·  Zosyn TO AMOXIL AND LEVAQUIN  · CT of the abdomen pelvis to evaluate for hydronephrosis  · Change Blankenship repeat cultures-neg  · Check cultures  · Baseline ESR, CRP  · Monitor labs  · Will follow with you    Thank you for having us see this patient in consultation. I will be discussing this case with the treating physicians.       Electronically signed by Kaylyn Blandon MD on 9/18/2019 at 12:21 PM

## 2019-09-22 ENCOUNTER — APPOINTMENT (OUTPATIENT)
Dept: GENERAL RADIOLOGY | Age: 69
DRG: 640 | End: 2019-09-22
Payer: MEDICARE

## 2019-09-22 ENCOUNTER — HOSPITAL ENCOUNTER (INPATIENT)
Age: 69
LOS: 5 days | Discharge: NURSING, REHAB OR LONG-TERM CARE | DRG: 640 | End: 2019-09-27
Attending: EMERGENCY MEDICINE | Admitting: INTERNAL MEDICINE
Payer: MEDICARE

## 2019-09-22 ENCOUNTER — APPOINTMENT (OUTPATIENT)
Dept: CT IMAGING | Age: 69
DRG: 640 | End: 2019-09-22
Payer: MEDICARE

## 2019-09-22 DIAGNOSIS — M25.461 EFFUSION OF RIGHT KNEE: ICD-10-CM

## 2019-09-22 DIAGNOSIS — R65.21 SEPTIC SHOCK (HCC): Primary | ICD-10-CM

## 2019-09-22 DIAGNOSIS — A41.9 SEPTIC SHOCK (HCC): Primary | ICD-10-CM

## 2019-09-22 DIAGNOSIS — R73.9 HYPERGLYCEMIA: ICD-10-CM

## 2019-09-22 LAB
ALBUMIN SERPL-MCNC: 2.5 G/DL (ref 3.5–5.2)
ALBUMIN SERPL-MCNC: 3 G/DL (ref 3.5–5.2)
ALP BLD-CCNC: 36 U/L (ref 35–104)
ALP BLD-CCNC: 42 U/L (ref 35–104)
ALT SERPL-CCNC: 7 U/L (ref 0–32)
ALT SERPL-CCNC: 9 U/L (ref 0–32)
ANION GAP SERPL CALCULATED.3IONS-SCNC: 10 MMOL/L (ref 7–16)
ANION GAP SERPL CALCULATED.3IONS-SCNC: 9 MMOL/L (ref 7–16)
ANISOCYTOSIS: ABNORMAL
APPEARANCE FLUID: NORMAL
AST SERPL-CCNC: 8 U/L (ref 0–31)
AST SERPL-CCNC: 9 U/L (ref 0–31)
B.E.: -1 MMOL/L (ref -3–3)
BACTERIA: ABNORMAL /HPF
BASOPHILS ABSOLUTE: 0 E9/L (ref 0–0.2)
BASOPHILS RELATIVE PERCENT: 0 % (ref 0–2)
BILIRUB SERPL-MCNC: 0.6 MG/DL (ref 0–1.2)
BILIRUB SERPL-MCNC: 0.8 MG/DL (ref 0–1.2)
BILIRUBIN DIRECT: 0.2 MG/DL (ref 0–0.3)
BILIRUBIN URINE: NEGATIVE
BILIRUBIN, INDIRECT: 0.6 MG/DL (ref 0–1)
BLOOD, URINE: ABNORMAL
BUN BLDV-MCNC: 17 MG/DL (ref 8–23)
BUN BLDV-MCNC: 19 MG/DL (ref 8–23)
C-REACTIVE PROTEIN: 12.1 MG/DL (ref 0–0.4)
CALCIUM SERPL-MCNC: 7.7 MG/DL (ref 8.6–10.2)
CALCIUM SERPL-MCNC: 8.7 MG/DL (ref 8.6–10.2)
CELL COUNT FLUID TYPE: NORMAL
CHLORIDE BLD-SCNC: 104 MMOL/L (ref 98–107)
CHLORIDE BLD-SCNC: 98 MMOL/L (ref 98–107)
CLARITY: CLEAR
CO2: 25 MMOL/L (ref 22–29)
CO2: 28 MMOL/L (ref 22–29)
COHB: 0.3 % (ref 0–1.5)
COLOR FLUID: NORMAL
COLOR: YELLOW
CREAT SERPL-MCNC: 0.8 MG/DL (ref 0.5–1)
CREAT SERPL-MCNC: 0.8 MG/DL (ref 0.5–1)
CRITICAL: ABNORMAL
DATE ANALYZED: ABNORMAL
DATE OF COLLECTION: ABNORMAL
EKG ATRIAL RATE: 133 BPM
EKG P AXIS: 78 DEGREES
EKG P-R INTERVAL: 144 MS
EKG Q-T INTERVAL: 296 MS
EKG QRS DURATION: 72 MS
EKG QTC CALCULATION (BAZETT): 440 MS
EKG R AXIS: 43 DEGREES
EKG T AXIS: 62 DEGREES
EKG VENTRICULAR RATE: 133 BPM
EOSINOPHILS ABSOLUTE: 0 E9/L (ref 0.05–0.5)
EOSINOPHILS RELATIVE PERCENT: 0.2 % (ref 0–6)
GFR AFRICAN AMERICAN: >60
GFR AFRICAN AMERICAN: >60
GFR NON-AFRICAN AMERICAN: >60 ML/MIN/1.73
GFR NON-AFRICAN AMERICAN: >60 ML/MIN/1.73
GLUCOSE BLD-MCNC: 170 MG/DL (ref 74–99)
GLUCOSE BLD-MCNC: 340 MG/DL (ref 74–99)
GLUCOSE URINE: NEGATIVE MG/DL
HCO3: 23.4 MMOL/L (ref 22–26)
HCT VFR BLD CALC: 29.4 % (ref 34–48)
HEMOGLOBIN: 9.5 G/DL (ref 11.5–15.5)
HHB: 7.1 % (ref 0–5)
INFLUENZA A BY PCR: NOT DETECTED
INFLUENZA B BY PCR: NOT DETECTED
KETONES, URINE: ABNORMAL MG/DL
LAB: ABNORMAL
LACTIC ACID, SEPSIS: 1.8 MMOL/L (ref 0.5–1.9)
LACTIC ACID, SEPSIS: 1.8 MMOL/L (ref 0.5–1.9)
LACTIC ACID, SEPSIS: 2.1 MMOL/L (ref 0.5–1.9)
LACTIC ACID: 0.9 MMOL/L (ref 0.5–2.2)
LEUKOCYTE ESTERASE, URINE: ABNORMAL
LIPASE: 10 U/L (ref 13–60)
LYMPHOCYTES ABSOLUTE: 0.12 E9/L (ref 1.5–4)
LYMPHOCYTES RELATIVE PERCENT: 1.8 % (ref 20–42)
Lab: ABNORMAL
MCH RBC QN AUTO: 27.6 PG (ref 26–35)
MCHC RBC AUTO-ENTMCNC: 32.3 % (ref 32–34.5)
MCV RBC AUTO: 85.5 FL (ref 80–99.9)
METER GLUCOSE: 206 MG/DL (ref 74–99)
METHB: 0.3 % (ref 0–1.5)
MODE: ABNORMAL
MONOCYTE, FLUID: 34 %
MONOCYTES ABSOLUTE: 0.06 E9/L (ref 0.1–0.95)
MONOCYTES RELATIVE PERCENT: 0.9 % (ref 2–12)
NEUTROPHIL, FLUID: 66 %
NEUTROPHILS ABSOLUTE: 6.01 E9/L (ref 1.8–7.3)
NEUTROPHILS RELATIVE PERCENT: 97.4 % (ref 43–80)
NITRITE, URINE: NEGATIVE
NUCLEATED CELLS FLUID: 583 /UL
O2 CONTENT: 12.7 ML/DL
O2 SATURATION: 92.9 % (ref 92–98.5)
O2HB: 92.3 % (ref 94–97)
OPERATOR ID: ABNORMAL
PATIENT TEMP: 37 C
PCO2: 37.7 MMHG (ref 35–45)
PDW BLD-RTO: 15.4 FL (ref 11.5–15)
PH BLOOD GAS: 7.41 (ref 7.35–7.45)
PH UA: 5 (ref 5–9)
PLATELET # BLD: 311 E9/L (ref 130–450)
PMV BLD AUTO: 11 FL (ref 7–12)
PO2: 70.8 MMHG (ref 60–100)
POLYCHROMASIA: ABNORMAL
POTASSIUM REFLEX MAGNESIUM: 3.9 MMOL/L (ref 3.5–5)
POTASSIUM SERPL-SCNC: 4.5 MMOL/L (ref 3.5–5)
PROCALCITONIN: 1.63 NG/ML (ref 0–0.08)
PROTEIN UA: 100 MG/DL
RBC # BLD: 3.44 E12/L (ref 3.5–5.5)
RBC FLUID: NORMAL /UL
RBC UA: ABNORMAL /HPF (ref 0–2)
SEDIMENTATION RATE, ERYTHROCYTE: 85 MM/HR (ref 0–20)
SODIUM BLD-SCNC: 135 MMOL/L (ref 132–146)
SODIUM BLD-SCNC: 139 MMOL/L (ref 132–146)
SOURCE, BLOOD GAS: ABNORMAL
SPECIFIC GRAVITY UA: 1.02 (ref 1–1.03)
THB: 9.7 G/DL (ref 11.5–16.5)
TIME ANALYZED: 1416
TOTAL PROTEIN: 5 G/DL (ref 6.4–8.3)
TOTAL PROTEIN: 6 G/DL (ref 6.4–8.3)
TROPONIN: 0.01 NG/ML (ref 0–0.03)
TROPONIN: 0.03 NG/ML (ref 0–0.03)
UROBILINOGEN, URINE: 1 E.U./DL
WBC # BLD: 6.2 E9/L (ref 4.5–11.5)
WBC UA: ABNORMAL /HPF (ref 0–5)

## 2019-09-22 PROCEDURE — 82805 BLOOD GASES W/O2 SATURATION: CPT

## 2019-09-22 PROCEDURE — 36415 COLL VENOUS BLD VENIPUNCTURE: CPT

## 2019-09-22 PROCEDURE — 96365 THER/PROPH/DIAG IV INF INIT: CPT

## 2019-09-22 PROCEDURE — 87088 URINE BACTERIA CULTURE: CPT

## 2019-09-22 PROCEDURE — 2580000003 HC RX 258: Performed by: INTERNAL MEDICINE

## 2019-09-22 PROCEDURE — 6360000002 HC RX W HCPCS: Performed by: STUDENT IN AN ORGANIZED HEALTH CARE EDUCATION/TRAINING PROGRAM

## 2019-09-22 PROCEDURE — 6360000002 HC RX W HCPCS: Performed by: INTERNAL MEDICINE

## 2019-09-22 PROCEDURE — 0S9C3ZZ DRAINAGE OF RIGHT KNEE JOINT, PERCUTANEOUS APPROACH: ICD-10-PCS | Performed by: EMERGENCY MEDICINE

## 2019-09-22 PROCEDURE — 2500000003 HC RX 250 WO HCPCS: Performed by: INTERNAL MEDICINE

## 2019-09-22 PROCEDURE — 6370000000 HC RX 637 (ALT 250 FOR IP): Performed by: INTERNAL MEDICINE

## 2019-09-22 PROCEDURE — 71045 X-RAY EXAM CHEST 1 VIEW: CPT

## 2019-09-22 PROCEDURE — 96375 TX/PRO/DX INJ NEW DRUG ADDON: CPT

## 2019-09-22 PROCEDURE — 84145 PROCALCITONIN (PCT): CPT

## 2019-09-22 PROCEDURE — 80076 HEPATIC FUNCTION PANEL: CPT

## 2019-09-22 PROCEDURE — 93010 ELECTROCARDIOGRAM REPORT: CPT | Performed by: INTERNAL MEDICINE

## 2019-09-22 PROCEDURE — 85025 COMPLETE CBC W/AUTO DIFF WBC: CPT

## 2019-09-22 PROCEDURE — 70450 CT HEAD/BRAIN W/O DYE: CPT

## 2019-09-22 PROCEDURE — 83605 ASSAY OF LACTIC ACID: CPT

## 2019-09-22 PROCEDURE — 87205 SMEAR GRAM STAIN: CPT

## 2019-09-22 PROCEDURE — 99285 EMERGENCY DEPT VISIT HI MDM: CPT

## 2019-09-22 PROCEDURE — 85651 RBC SED RATE NONAUTOMATED: CPT

## 2019-09-22 PROCEDURE — 87502 INFLUENZA DNA AMP PROBE: CPT

## 2019-09-22 PROCEDURE — 81001 URINALYSIS AUTO W/SCOPE: CPT

## 2019-09-22 PROCEDURE — 89051 BODY FLUID CELL COUNT: CPT

## 2019-09-22 PROCEDURE — 2500000003 HC RX 250 WO HCPCS: Performed by: STUDENT IN AN ORGANIZED HEALTH CARE EDUCATION/TRAINING PROGRAM

## 2019-09-22 PROCEDURE — 36556 INSERT NON-TUNNEL CV CATH: CPT

## 2019-09-22 PROCEDURE — 20610 DRAIN/INJ JOINT/BURSA W/O US: CPT

## 2019-09-22 PROCEDURE — 87070 CULTURE OTHR SPECIMN AEROBIC: CPT

## 2019-09-22 PROCEDURE — 36592 COLLECT BLOOD FROM PICC: CPT

## 2019-09-22 PROCEDURE — 83690 ASSAY OF LIPASE: CPT

## 2019-09-22 PROCEDURE — 80053 COMPREHEN METABOLIC PANEL: CPT

## 2019-09-22 PROCEDURE — 96366 THER/PROPH/DIAG IV INF ADDON: CPT

## 2019-09-22 PROCEDURE — 2580000003 HC RX 258: Performed by: STUDENT IN AN ORGANIZED HEALTH CARE EDUCATION/TRAINING PROGRAM

## 2019-09-22 PROCEDURE — 84484 ASSAY OF TROPONIN QUANT: CPT

## 2019-09-22 PROCEDURE — 02HV33Z INSERTION OF INFUSION DEVICE INTO SUPERIOR VENA CAVA, PERCUTANEOUS APPROACH: ICD-10-PCS | Performed by: EMERGENCY MEDICINE

## 2019-09-22 PROCEDURE — 93005 ELECTROCARDIOGRAM TRACING: CPT | Performed by: STUDENT IN AN ORGANIZED HEALTH CARE EDUCATION/TRAINING PROGRAM

## 2019-09-22 PROCEDURE — 6370000000 HC RX 637 (ALT 250 FOR IP): Performed by: STUDENT IN AN ORGANIZED HEALTH CARE EDUCATION/TRAINING PROGRAM

## 2019-09-22 PROCEDURE — 87040 BLOOD CULTURE FOR BACTERIA: CPT

## 2019-09-22 PROCEDURE — 86140 C-REACTIVE PROTEIN: CPT

## 2019-09-22 PROCEDURE — 2000000000 HC ICU R&B

## 2019-09-22 PROCEDURE — 73560 X-RAY EXAM OF KNEE 1 OR 2: CPT

## 2019-09-22 PROCEDURE — 82962 GLUCOSE BLOOD TEST: CPT

## 2019-09-22 RX ORDER — 0.9 % SODIUM CHLORIDE 0.9 %
1000 INTRAVENOUS SOLUTION INTRAVENOUS ONCE
Status: COMPLETED | OUTPATIENT
Start: 2019-09-22 | End: 2019-09-22

## 2019-09-22 RX ORDER — DEXTROSE MONOHYDRATE 25 G/50ML
12.5 INJECTION, SOLUTION INTRAVENOUS PRN
Status: DISCONTINUED | OUTPATIENT
Start: 2019-09-22 | End: 2019-09-27 | Stop reason: HOSPADM

## 2019-09-22 RX ORDER — ACETAMINOPHEN 325 MG/1
650 TABLET ORAL EVERY 6 HOURS PRN
Status: DISCONTINUED | OUTPATIENT
Start: 2019-09-22 | End: 2019-09-27 | Stop reason: HOSPADM

## 2019-09-22 RX ORDER — THIAMINE HYDROCHLORIDE 100 MG/ML
200 INJECTION, SOLUTION INTRAMUSCULAR; INTRAVENOUS EVERY 12 HOURS
Status: DISCONTINUED | OUTPATIENT
Start: 2019-09-22 | End: 2019-09-22 | Stop reason: SDUPTHER

## 2019-09-22 RX ORDER — LIDOCAINE HYDROCHLORIDE 10 MG/ML
20 INJECTION, SOLUTION EPIDURAL; INFILTRATION; INTRACAUDAL; PERINEURAL ONCE
Status: COMPLETED | OUTPATIENT
Start: 2019-09-22 | End: 2019-09-22

## 2019-09-22 RX ORDER — SODIUM CHLORIDE 0.9 % (FLUSH) 0.9 %
10 SYRINGE (ML) INJECTION EVERY 12 HOURS SCHEDULED
Status: DISCONTINUED | OUTPATIENT
Start: 2019-09-22 | End: 2019-09-27 | Stop reason: HOSPADM

## 2019-09-22 RX ORDER — DEXTROSE MONOHYDRATE 50 MG/ML
100 INJECTION, SOLUTION INTRAVENOUS PRN
Status: DISCONTINUED | OUTPATIENT
Start: 2019-09-22 | End: 2019-09-27 | Stop reason: HOSPADM

## 2019-09-22 RX ORDER — NICOTINE POLACRILEX 4 MG
15 LOZENGE BUCCAL PRN
Status: DISCONTINUED | OUTPATIENT
Start: 2019-09-22 | End: 2019-09-27 | Stop reason: HOSPADM

## 2019-09-22 RX ORDER — SODIUM CHLORIDE 0.9 % (FLUSH) 0.9 %
10 SYRINGE (ML) INJECTION PRN
Status: DISCONTINUED | OUTPATIENT
Start: 2019-09-22 | End: 2019-09-27 | Stop reason: HOSPADM

## 2019-09-22 RX ORDER — ACETAMINOPHEN 500 MG
1000 TABLET ORAL ONCE
Status: COMPLETED | OUTPATIENT
Start: 2019-09-22 | End: 2019-09-22

## 2019-09-22 RX ORDER — KETOROLAC TROMETHAMINE 30 MG/ML
15 INJECTION, SOLUTION INTRAMUSCULAR; INTRAVENOUS ONCE
Status: COMPLETED | OUTPATIENT
Start: 2019-09-22 | End: 2019-09-22

## 2019-09-22 RX ORDER — PANTOPRAZOLE SODIUM 40 MG/1
40 TABLET, DELAYED RELEASE ORAL
Status: DISCONTINUED | OUTPATIENT
Start: 2019-09-22 | End: 2019-09-27 | Stop reason: HOSPADM

## 2019-09-22 RX ADMIN — ASCORBIC ACID 1500 MG: 500 INJECTION, SOLUTION INTRAMUSCULAR; INTRAVENOUS; SUBCUTANEOUS at 18:11

## 2019-09-22 RX ADMIN — INSULIN LISPRO 2 UNITS: 100 INJECTION, SOLUTION INTRAVENOUS; SUBCUTANEOUS at 21:15

## 2019-09-22 RX ADMIN — HYDROCORTISONE SODIUM SUCCINATE 50 MG: 100 INJECTION, POWDER, FOR SOLUTION INTRAMUSCULAR; INTRAVENOUS at 23:11

## 2019-09-22 RX ADMIN — CEFEPIME HYDROCHLORIDE 2 G: 2 INJECTION, POWDER, FOR SOLUTION INTRAVENOUS at 21:15

## 2019-09-22 RX ADMIN — ASCORBIC ACID 1500 MG: 500 INJECTION, SOLUTION INTRAMUSCULAR; INTRAVENOUS; SUBCUTANEOUS at 23:12

## 2019-09-22 RX ADMIN — Medication 2 MCG/MIN: at 14:08

## 2019-09-22 RX ADMIN — SODIUM CHLORIDE 1000 ML: 9 INJECTION, SOLUTION INTRAVENOUS at 09:24

## 2019-09-22 RX ADMIN — VANCOMYCIN HYDROCHLORIDE 1000 MG: 1 INJECTION, POWDER, LYOPHILIZED, FOR SOLUTION INTRAVENOUS at 23:12

## 2019-09-22 RX ADMIN — SODIUM CHLORIDE 1000 ML: 9 INJECTION, SOLUTION INTRAVENOUS at 11:01

## 2019-09-22 RX ADMIN — Medication 10 ML: at 21:26

## 2019-09-22 RX ADMIN — CEFEPIME HYDROCHLORIDE 2 G: 2 INJECTION, POWDER, FOR SOLUTION INTRAVENOUS at 12:42

## 2019-09-22 RX ADMIN — HYDROCORTISONE SODIUM SUCCINATE 50 MG: 100 INJECTION, POWDER, FOR SOLUTION INTRAMUSCULAR; INTRAVENOUS at 17:48

## 2019-09-22 RX ADMIN — SODIUM CHLORIDE 1000 ML: 9 INJECTION, SOLUTION INTRAVENOUS at 08:27

## 2019-09-22 RX ADMIN — ACETAMINOPHEN 650 MG: 325 TABLET, FILM COATED ORAL at 21:16

## 2019-09-22 RX ADMIN — LIDOCAINE HYDROCHLORIDE 20 ML: 10 INJECTION, SOLUTION EPIDURAL; INFILTRATION; INTRACAUDAL; PERINEURAL at 12:43

## 2019-09-22 RX ADMIN — KETOROLAC TROMETHAMINE 15 MG: 30 INJECTION, SOLUTION INTRAMUSCULAR at 09:27

## 2019-09-22 RX ADMIN — Medication 10 ML: at 23:12

## 2019-09-22 RX ADMIN — ACETAMINOPHEN 1000 MG: 500 TABLET ORAL at 08:27

## 2019-09-22 RX ADMIN — VANCOMYCIN HYDROCHLORIDE 1250 MG: 10 INJECTION, POWDER, LYOPHILIZED, FOR SOLUTION INTRAVENOUS at 11:03

## 2019-09-22 RX ADMIN — THIAMINE HYDROCHLORIDE 200 MG: 100 INJECTION, SOLUTION INTRAMUSCULAR; INTRAVENOUS at 17:48

## 2019-09-22 ASSESSMENT — ENCOUNTER SYMPTOMS
COUGH: 0
EYE REDNESS: 0
VOMITING: 1
WHEEZING: 0
SHORTNESS OF BREATH: 0
SORE THROAT: 0
EYE PAIN: 0
SINUS PRESSURE: 0
DIARRHEA: 0
ABDOMINAL DISTENTION: 0
BACK PAIN: 0
EYE DISCHARGE: 0
NAUSEA: 0

## 2019-09-22 ASSESSMENT — PAIN SCALES - GENERAL
PAINLEVEL_OUTOF10: 0

## 2019-09-23 ENCOUNTER — APPOINTMENT (OUTPATIENT)
Dept: GENERAL RADIOLOGY | Age: 69
DRG: 640 | End: 2019-09-23
Payer: MEDICARE

## 2019-09-23 PROBLEM — E44.0 MODERATE PROTEIN-CALORIE MALNUTRITION (HCC): Chronic | Status: ACTIVE | Noted: 2019-09-23

## 2019-09-23 LAB
AMMONIA: <10 UMOL/L (ref 11–51)
ANION GAP SERPL CALCULATED.3IONS-SCNC: 14 MMOL/L (ref 7–16)
ANISOCYTOSIS: ABNORMAL
ANISOCYTOSIS: ABNORMAL
BASOPHILS ABSOLUTE: 0.04 E9/L (ref 0–0.2)
BASOPHILS ABSOLUTE: 0.04 E9/L (ref 0–0.2)
BASOPHILS RELATIVE PERCENT: 0.9 % (ref 0–2)
BASOPHILS RELATIVE PERCENT: 0.9 % (ref 0–2)
BUN BLDV-MCNC: 16 MG/DL (ref 8–23)
CALCIUM SERPL-MCNC: 8 MG/DL (ref 8.6–10.2)
CHLORIDE BLD-SCNC: 104 MMOL/L (ref 98–107)
CO2: 21 MMOL/L (ref 22–29)
CORTISOL TOTAL: 28.74 MCG/DL (ref 2.68–18.4)
CREAT SERPL-MCNC: 0.6 MG/DL (ref 0.5–1)
EOSINOPHILS ABSOLUTE: 0 E9/L (ref 0.05–0.5)
EOSINOPHILS ABSOLUTE: 0 E9/L (ref 0.05–0.5)
EOSINOPHILS RELATIVE PERCENT: 0 % (ref 0–6)
EOSINOPHILS RELATIVE PERCENT: 0.3 % (ref 0–6)
FERRITIN: 186 NG/ML
FOLATE: 13 NG/ML (ref 4.8–24.2)
GFR AFRICAN AMERICAN: >60
GFR NON-AFRICAN AMERICAN: >60 ML/MIN/1.73
GLUCOSE BLD-MCNC: 232 MG/DL (ref 74–99)
GRAM STAIN ORDERABLE: NORMAL
HCT VFR BLD CALC: 25.7 % (ref 34–48)
HCT VFR BLD CALC: 25.8 % (ref 34–48)
HCT VFR BLD CALC: 25.8 % (ref 34–48)
HCT VFR BLD CALC: 26.1 % (ref 34–48)
HEMOGLOBIN: 7.8 G/DL (ref 11.5–15.5)
HEMOGLOBIN: 7.8 G/DL (ref 11.5–15.5)
HEMOGLOBIN: 7.9 G/DL (ref 11.5–15.5)
HYPOCHROMIA: ABNORMAL
IMMATURE RETIC FRACT: 19.5 % (ref 3–15.9)
IRON SATURATION: 13 % (ref 15–50)
IRON: 26 MCG/DL (ref 37–145)
LACTATE DEHYDROGENASE: 127 U/L (ref 135–214)
LACTIC ACID: 0.9 MMOL/L (ref 0.5–2.2)
LACTIC ACID: 0.9 MMOL/L (ref 0.5–2.2)
LACTIC ACID: 1.5 MMOL/L (ref 0.5–2.2)
LYMPHOCYTES ABSOLUTE: 0.22 E9/L (ref 1.5–4)
LYMPHOCYTES ABSOLUTE: 0.23 E9/L (ref 1.5–4)
LYMPHOCYTES RELATIVE PERCENT: 4.5 % (ref 20–42)
LYMPHOCYTES RELATIVE PERCENT: 6.1 % (ref 20–42)
MAGNESIUM: 1.7 MG/DL (ref 1.6–2.6)
MCH RBC QN AUTO: 26.2 PG (ref 26–35)
MCH RBC QN AUTO: 27 PG (ref 26–35)
MCHC RBC AUTO-ENTMCNC: 29.9 % (ref 32–34.5)
MCHC RBC AUTO-ENTMCNC: 30.6 % (ref 32–34.5)
MCV RBC AUTO: 87.6 FL (ref 80–99.9)
MCV RBC AUTO: 88.1 FL (ref 80–99.9)
METER GLUCOSE: 187 MG/DL (ref 74–99)
METER GLUCOSE: 203 MG/DL (ref 74–99)
METER GLUCOSE: 234 MG/DL (ref 74–99)
METER GLUCOSE: 272 MG/DL (ref 74–99)
METER GLUCOSE: 294 MG/DL (ref 74–99)
METER GLUCOSE: 328 MG/DL (ref 74–99)
MONOCYTES ABSOLUTE: 0 E9/L (ref 0.1–0.95)
MONOCYTES ABSOLUTE: 0.09 E9/L (ref 0.1–0.95)
MONOCYTES RELATIVE PERCENT: 1.8 % (ref 2–12)
MONOCYTES RELATIVE PERCENT: 3.1 % (ref 2–12)
NEUTROPHILS ABSOLUTE: 3.63 E9/L (ref 1.8–7.3)
NEUTROPHILS ABSOLUTE: 4 E9/L (ref 1.8–7.3)
NEUTROPHILS RELATIVE PERCENT: 92.7 % (ref 43–80)
NEUTROPHILS RELATIVE PERCENT: 93 % (ref 43–80)
OVALOCYTES: ABNORMAL
PATHOLOGIST REVIEW: NORMAL
PDW BLD-RTO: 15.5 FL (ref 11.5–15)
PDW BLD-RTO: 15.5 FL (ref 11.5–15)
PHOSPHORUS: 2.7 MG/DL (ref 2.5–4.5)
PLATELET # BLD: 254 E9/L (ref 130–450)
PLATELET # BLD: 297 E9/L (ref 130–450)
PMV BLD AUTO: 11.1 FL (ref 7–12)
PMV BLD AUTO: 11.2 FL (ref 7–12)
POIKILOCYTES: ABNORMAL
POLYCHROMASIA: ABNORMAL
POLYCHROMASIA: ABNORMAL
POTASSIUM SERPL-SCNC: 3.8 MMOL/L (ref 3.5–5)
RBC # BLD: 2.93 E12/L (ref 3.5–5.5)
RBC # BLD: 2.98 E12/L (ref 3.5–5.5)
RETIC HGB EQUIVALENT: 23.1 PG (ref 28.2–36.6)
RETICULOCYTE ABSOLUTE COUNT: 0.09 E12/L
RETICULOCYTE COUNT PCT: 3.1 % (ref 0.4–1.9)
RHEUMATOID FACTOR: 13 IU/ML (ref 0–13)
SODIUM BLD-SCNC: 139 MMOL/L (ref 132–146)
TARGET CELLS: ABNORMAL
TOTAL CK: 49 U/L (ref 20–180)
TOTAL IRON BINDING CAPACITY: 193 MCG/DL (ref 250–450)
VITAMIN B-12: 701 PG/ML (ref 211–946)
WBC # BLD: 3.9 E9/L (ref 4.5–11.5)
WBC # BLD: 4.3 E9/L (ref 4.5–11.5)

## 2019-09-23 PROCEDURE — 82787 IGG 1 2 3 OR 4 EACH: CPT

## 2019-09-23 PROCEDURE — 82746 ASSAY OF FOLIC ACID SERUM: CPT

## 2019-09-23 PROCEDURE — 6360000002 HC RX W HCPCS: Performed by: INTERNAL MEDICINE

## 2019-09-23 PROCEDURE — 97535 SELF CARE MNGMENT TRAINING: CPT

## 2019-09-23 PROCEDURE — 6370000000 HC RX 637 (ALT 250 FOR IP): Performed by: INTERNAL MEDICINE

## 2019-09-23 PROCEDURE — 83550 IRON BINDING TEST: CPT

## 2019-09-23 PROCEDURE — 71045 X-RAY EXAM CHEST 1 VIEW: CPT

## 2019-09-23 PROCEDURE — 87040 BLOOD CULTURE FOR BACTERIA: CPT

## 2019-09-23 PROCEDURE — 97530 THERAPEUTIC ACTIVITIES: CPT

## 2019-09-23 PROCEDURE — 82550 ASSAY OF CK (CPK): CPT

## 2019-09-23 PROCEDURE — 86255 FLUORESCENT ANTIBODY SCREEN: CPT

## 2019-09-23 PROCEDURE — 36415 COLL VENOUS BLD VENIPUNCTURE: CPT

## 2019-09-23 PROCEDURE — 80048 BASIC METABOLIC PNL TOTAL CA: CPT

## 2019-09-23 PROCEDURE — 86038 ANTINUCLEAR ANTIBODIES: CPT

## 2019-09-23 PROCEDURE — 97166 OT EVAL MOD COMPLEX 45 MIN: CPT

## 2019-09-23 PROCEDURE — 82784 ASSAY IGA/IGD/IGG/IGM EACH: CPT

## 2019-09-23 PROCEDURE — 82728 ASSAY OF FERRITIN: CPT

## 2019-09-23 PROCEDURE — 82533 TOTAL CORTISOL: CPT

## 2019-09-23 PROCEDURE — 2580000003 HC RX 258: Performed by: INTERNAL MEDICINE

## 2019-09-23 PROCEDURE — 82140 ASSAY OF AMMONIA: CPT

## 2019-09-23 PROCEDURE — 97162 PT EVAL MOD COMPLEX 30 MIN: CPT

## 2019-09-23 PROCEDURE — 83735 ASSAY OF MAGNESIUM: CPT

## 2019-09-23 PROCEDURE — 2000000000 HC ICU R&B

## 2019-09-23 PROCEDURE — 2500000003 HC RX 250 WO HCPCS: Performed by: INTERNAL MEDICINE

## 2019-09-23 PROCEDURE — 84100 ASSAY OF PHOSPHORUS: CPT

## 2019-09-23 PROCEDURE — 83540 ASSAY OF IRON: CPT

## 2019-09-23 PROCEDURE — 85045 AUTOMATED RETICULOCYTE COUNT: CPT

## 2019-09-23 PROCEDURE — 82962 GLUCOSE BLOOD TEST: CPT

## 2019-09-23 PROCEDURE — 87450 HC DIRECT STREP B ANTIGEN: CPT

## 2019-09-23 PROCEDURE — 85018 HEMOGLOBIN: CPT

## 2019-09-23 PROCEDURE — 82607 VITAMIN B-12: CPT

## 2019-09-23 PROCEDURE — 36592 COLLECT BLOOD FROM PICC: CPT

## 2019-09-23 PROCEDURE — 85014 HEMATOCRIT: CPT

## 2019-09-23 PROCEDURE — 85025 COMPLETE CBC W/AUTO DIFF WBC: CPT

## 2019-09-23 PROCEDURE — 86431 RHEUMATOID FACTOR QUANT: CPT

## 2019-09-23 PROCEDURE — 83615 LACTATE (LD) (LDH) ENZYME: CPT

## 2019-09-23 PROCEDURE — 83605 ASSAY OF LACTIC ACID: CPT

## 2019-09-23 RX ORDER — CLONAZEPAM 0.5 MG/1
0.5 TABLET ORAL 3 TIMES DAILY
Status: ON HOLD | COMMUNITY
End: 2019-09-27 | Stop reason: HOSPADM

## 2019-09-23 RX ORDER — LEVOTHYROXINE SODIUM 0.05 MG/1
50 TABLET ORAL DAILY
Status: DISCONTINUED | OUTPATIENT
Start: 2019-09-23 | End: 2019-09-27 | Stop reason: HOSPADM

## 2019-09-23 RX ORDER — INSULIN GLARGINE 100 [IU]/ML
25 INJECTION, SOLUTION SUBCUTANEOUS NIGHTLY
Status: DISCONTINUED | OUTPATIENT
Start: 2019-09-23 | End: 2019-09-27 | Stop reason: HOSPADM

## 2019-09-23 RX ORDER — MAGNESIUM SULFATE 1 G/100ML
1 INJECTION INTRAVENOUS ONCE
Status: COMPLETED | OUTPATIENT
Start: 2019-09-23 | End: 2019-09-23

## 2019-09-23 RX ORDER — LEVOFLOXACIN 250 MG/1
250 TABLET ORAL DAILY
Status: ON HOLD | COMMUNITY
Start: 2019-09-19 | End: 2019-09-26 | Stop reason: HOSPADM

## 2019-09-23 RX ORDER — DULOXETIN HYDROCHLORIDE 30 MG/1
30 CAPSULE, DELAYED RELEASE ORAL DAILY
Status: DISCONTINUED | OUTPATIENT
Start: 2019-09-23 | End: 2019-09-27

## 2019-09-23 RX ORDER — ARIPIPRAZOLE 10 MG/1
20 TABLET ORAL NIGHTLY
Status: DISCONTINUED | OUTPATIENT
Start: 2019-09-23 | End: 2019-09-24

## 2019-09-23 RX ORDER — LEVOTHYROXINE SODIUM 0.05 MG/1
50 TABLET ORAL DAILY
Status: ON HOLD | COMMUNITY
End: 2019-09-26 | Stop reason: HOSPADM

## 2019-09-23 RX ORDER — INSULIN GLARGINE 100 [IU]/ML
8 INJECTION, SOLUTION SUBCUTANEOUS EVERY 24 HOURS
Status: DISCONTINUED | OUTPATIENT
Start: 2019-09-23 | End: 2019-09-23

## 2019-09-23 RX ORDER — CYCLOBENZAPRINE HCL 10 MG
5 TABLET ORAL 3 TIMES DAILY
Status: DISCONTINUED | OUTPATIENT
Start: 2019-09-23 | End: 2019-09-27 | Stop reason: HOSPADM

## 2019-09-23 RX ORDER — INSULIN GLARGINE 100 [IU]/ML
20 INJECTION, SOLUTION SUBCUTANEOUS NIGHTLY
Status: DISCONTINUED | OUTPATIENT
Start: 2019-09-23 | End: 2019-09-23

## 2019-09-23 RX ORDER — CLONAZEPAM 0.5 MG/1
0.5 TABLET ORAL 2 TIMES DAILY
Status: DISCONTINUED | OUTPATIENT
Start: 2019-09-23 | End: 2019-09-27

## 2019-09-23 RX ORDER — LACTOBACILLUS RHAMNOSUS GG 10B CELL
1 CAPSULE ORAL DAILY
Status: DISCONTINUED | OUTPATIENT
Start: 2019-09-23 | End: 2019-09-27 | Stop reason: HOSPADM

## 2019-09-23 RX ORDER — AMOXICILLIN 250 MG/1
500 CAPSULE ORAL EVERY 8 HOURS SCHEDULED
Status: DISCONTINUED | OUTPATIENT
Start: 2019-09-23 | End: 2019-09-23

## 2019-09-23 RX ORDER — CHOLECALCIFEROL (VITAMIN D3) 50 MCG
4000 TABLET ORAL DAILY
Status: DISCONTINUED | OUTPATIENT
Start: 2019-09-23 | End: 2019-09-27 | Stop reason: HOSPADM

## 2019-09-23 RX ADMIN — ASCORBIC ACID 1500 MG: 500 INJECTION, SOLUTION INTRAMUSCULAR; INTRAVENOUS; SUBCUTANEOUS at 22:56

## 2019-09-23 RX ADMIN — CYCLOBENZAPRINE 5 MG: 10 TABLET, FILM COATED ORAL at 20:57

## 2019-09-23 RX ADMIN — CEFEPIME HYDROCHLORIDE 2 G: 2 INJECTION, POWDER, FOR SOLUTION INTRAVENOUS at 04:52

## 2019-09-23 RX ADMIN — ASCORBIC ACID 1500 MG: 500 INJECTION, SOLUTION INTRAMUSCULAR; INTRAVENOUS; SUBCUTANEOUS at 16:40

## 2019-09-23 RX ADMIN — INSULIN LISPRO 4 UNITS: 100 INJECTION, SOLUTION INTRAVENOUS; SUBCUTANEOUS at 20:59

## 2019-09-23 RX ADMIN — Medication 10 ML: at 21:06

## 2019-09-23 RX ADMIN — Medication 10 ML: at 04:30

## 2019-09-23 RX ADMIN — INSULIN LISPRO 5 UNITS: 100 INJECTION, SOLUTION INTRAVENOUS; SUBCUTANEOUS at 16:46

## 2019-09-23 RX ADMIN — INSULIN LISPRO 3 UNITS: 100 INJECTION, SOLUTION INTRAVENOUS; SUBCUTANEOUS at 00:28

## 2019-09-23 RX ADMIN — GABAPENTIN 400 MG: 300 CAPSULE ORAL at 20:57

## 2019-09-23 RX ADMIN — GABAPENTIN 400 MG: 300 CAPSULE ORAL at 14:42

## 2019-09-23 RX ADMIN — INSULIN GLARGINE 25 UNITS: 100 INJECTION, SOLUTION SUBCUTANEOUS at 20:56

## 2019-09-23 RX ADMIN — INSULIN LISPRO 2 UNITS: 100 INJECTION, SOLUTION INTRAVENOUS; SUBCUTANEOUS at 04:36

## 2019-09-23 RX ADMIN — LEVOTHYROXINE SODIUM 50 MCG: 50 TABLET ORAL at 09:15

## 2019-09-23 RX ADMIN — ASCORBIC ACID 1500 MG: 500 INJECTION, SOLUTION INTRAMUSCULAR; INTRAVENOUS; SUBCUTANEOUS at 10:46

## 2019-09-23 RX ADMIN — Medication 10 ML: at 16:43

## 2019-09-23 RX ADMIN — CYCLOBENZAPRINE 5 MG: 10 TABLET, FILM COATED ORAL at 16:34

## 2019-09-23 RX ADMIN — Medication 4000 UNITS: at 09:15

## 2019-09-23 RX ADMIN — INSULIN LISPRO 5 UNITS: 100 INJECTION, SOLUTION INTRAVENOUS; SUBCUTANEOUS at 12:37

## 2019-09-23 RX ADMIN — THIAMINE HYDROCHLORIDE 200 MG: 100 INJECTION, SOLUTION INTRAMUSCULAR; INTRAVENOUS at 04:59

## 2019-09-23 RX ADMIN — Medication 10 ML: at 04:53

## 2019-09-23 RX ADMIN — INSULIN LISPRO 1 UNITS: 100 INJECTION, SOLUTION INTRAVENOUS; SUBCUTANEOUS at 08:41

## 2019-09-23 RX ADMIN — CLONAZEPAM 0.5 MG: 0.5 TABLET ORAL at 20:57

## 2019-09-23 RX ADMIN — CYCLOBENZAPRINE 5 MG: 10 TABLET, FILM COATED ORAL at 10:46

## 2019-09-23 RX ADMIN — Medication 10 ML: at 04:55

## 2019-09-23 RX ADMIN — HYDROCORTISONE SODIUM SUCCINATE 50 MG: 100 INJECTION, POWDER, FOR SOLUTION INTRAMUSCULAR; INTRAVENOUS at 04:55

## 2019-09-23 RX ADMIN — DULOXETINE HYDROCHLORIDE 30 MG: 30 CAPSULE, DELAYED RELEASE ORAL at 09:15

## 2019-09-23 RX ADMIN — INSULIN LISPRO 5 UNITS: 100 INJECTION, SOLUTION INTRAVENOUS; SUBCUTANEOUS at 08:36

## 2019-09-23 RX ADMIN — ARIPIPRAZOLE 20 MG: 10 TABLET ORAL at 20:57

## 2019-09-23 RX ADMIN — Medication 10 ML: at 08:45

## 2019-09-23 RX ADMIN — GABAPENTIN 400 MG: 300 CAPSULE ORAL at 09:14

## 2019-09-23 RX ADMIN — INSULIN LISPRO 2 UNITS: 100 INJECTION, SOLUTION INTRAVENOUS; SUBCUTANEOUS at 12:31

## 2019-09-23 RX ADMIN — THIAMINE HYDROCHLORIDE 200 MG: 100 INJECTION, SOLUTION INTRAMUSCULAR; INTRAVENOUS at 16:39

## 2019-09-23 RX ADMIN — TRAZODONE HYDROCHLORIDE 200 MG: 150 TABLET ORAL at 20:57

## 2019-09-23 RX ADMIN — ASCORBIC ACID 1500 MG: 500 INJECTION, SOLUTION INTRAMUSCULAR; INTRAVENOUS; SUBCUTANEOUS at 04:55

## 2019-09-23 RX ADMIN — INSULIN LISPRO 3 UNITS: 100 INJECTION, SOLUTION INTRAVENOUS; SUBCUTANEOUS at 16:46

## 2019-09-23 RX ADMIN — CLONAZEPAM 0.5 MG: 0.5 TABLET ORAL at 09:14

## 2019-09-23 RX ADMIN — Medication 1 CAPSULE: at 09:14

## 2019-09-23 RX ADMIN — MAGNESIUM SULFATE 1 G: 1 INJECTION INTRAVENOUS at 08:36

## 2019-09-23 RX ADMIN — HYDROCORTISONE SODIUM SUCCINATE 50 MG: 100 INJECTION, POWDER, FOR SOLUTION INTRAMUSCULAR; INTRAVENOUS at 10:46

## 2019-09-23 ASSESSMENT — PAIN SCALES - GENERAL
PAINLEVEL_OUTOF10: 0

## 2019-09-24 ENCOUNTER — APPOINTMENT (OUTPATIENT)
Dept: CT IMAGING | Age: 69
DRG: 640 | End: 2019-09-24
Payer: MEDICARE

## 2019-09-24 LAB
ANION GAP SERPL CALCULATED.3IONS-SCNC: 10 MMOL/L (ref 7–16)
ANION GAP SERPL CALCULATED.3IONS-SCNC: 4 MMOL/L (ref 7–16)
ANISOCYTOSIS: ABNORMAL
ANISOCYTOSIS: ABNORMAL
ANTI-NUCLEAR ANTIBODY (ANA): NEGATIVE
BASOPHILS ABSOLUTE: 0 E9/L (ref 0–0.2)
BASOPHILS ABSOLUTE: 0 E9/L (ref 0–0.2)
BASOPHILS RELATIVE PERCENT: 0.3 % (ref 0–2)
BASOPHILS RELATIVE PERCENT: 0.5 % (ref 0–2)
BUN BLDV-MCNC: 10 MG/DL (ref 8–23)
BUN BLDV-MCNC: 11 MG/DL (ref 8–23)
CALCIUM SERPL-MCNC: 8.5 MG/DL (ref 8.6–10.2)
CALCIUM SERPL-MCNC: 9.1 MG/DL (ref 8.6–10.2)
CHLORIDE BLD-SCNC: 108 MMOL/L (ref 98–107)
CHLORIDE BLD-SCNC: 111 MMOL/L (ref 98–107)
CO2: 25 MMOL/L (ref 22–29)
CO2: 29 MMOL/L (ref 22–29)
CREAT SERPL-MCNC: 0.5 MG/DL (ref 0.5–1)
CREAT SERPL-MCNC: 0.6 MG/DL (ref 0.5–1)
EOSINOPHILS ABSOLUTE: 0.03 E9/L (ref 0.05–0.5)
EOSINOPHILS ABSOLUTE: 0.21 E9/L (ref 0.05–0.5)
EOSINOPHILS RELATIVE PERCENT: 0.9 % (ref 0–6)
EOSINOPHILS RELATIVE PERCENT: 5.3 % (ref 0–6)
GFR AFRICAN AMERICAN: >60
GFR AFRICAN AMERICAN: >60
GFR NON-AFRICAN AMERICAN: >60 ML/MIN/1.73
GFR NON-AFRICAN AMERICAN: >60 ML/MIN/1.73
GLUCOSE BLD-MCNC: 104 MG/DL (ref 74–99)
GLUCOSE BLD-MCNC: 201 MG/DL (ref 74–99)
HCT VFR BLD CALC: 25.8 % (ref 34–48)
HCT VFR BLD CALC: 27.4 % (ref 34–48)
HEMOGLOBIN: 8 G/DL (ref 11.5–15.5)
HEMOGLOBIN: 8 G/DL (ref 11.5–15.5)
IGA: 149 MG/DL (ref 70–400)
IGG: 562 MG/DL (ref 700–1600)
IGM: 28 MG/DL (ref 40–230)
L. PNEUMOPHILA SEROGP 1 UR AG: NORMAL
LYMPHOCYTES ABSOLUTE: 0.86 E9/L (ref 1.5–4)
LYMPHOCYTES ABSOLUTE: 1.37 E9/L (ref 1.5–4)
LYMPHOCYTES RELATIVE PERCENT: 25.7 % (ref 20–42)
LYMPHOCYTES RELATIVE PERCENT: 35.4 % (ref 20–42)
MAGNESIUM: 1.9 MG/DL (ref 1.6–2.6)
MAGNESIUM: 2.1 MG/DL (ref 1.6–2.6)
MCH RBC QN AUTO: 25.7 PG (ref 26–35)
MCH RBC QN AUTO: 27.1 PG (ref 26–35)
MCHC RBC AUTO-ENTMCNC: 29.2 % (ref 32–34.5)
MCHC RBC AUTO-ENTMCNC: 31 % (ref 32–34.5)
MCV RBC AUTO: 87.5 FL (ref 80–99.9)
MCV RBC AUTO: 88.1 FL (ref 80–99.9)
METER GLUCOSE: 101 MG/DL (ref 74–99)
METER GLUCOSE: 110 MG/DL (ref 74–99)
METER GLUCOSE: 150 MG/DL (ref 74–99)
METER GLUCOSE: 151 MG/DL (ref 74–99)
METER GLUCOSE: 166 MG/DL (ref 74–99)
METER GLUCOSE: 178 MG/DL (ref 74–99)
METER GLUCOSE: 275 MG/DL (ref 74–99)
MONOCYTES ABSOLUTE: 0.16 E9/L (ref 0.1–0.95)
MONOCYTES ABSOLUTE: 0.27 E9/L (ref 0.1–0.95)
MONOCYTES RELATIVE PERCENT: 5.3 % (ref 2–12)
MONOCYTES RELATIVE PERCENT: 7.1 % (ref 2–12)
NEUTROPHILS ABSOLUTE: 2.03 E9/L (ref 1.8–7.3)
NEUTROPHILS ABSOLUTE: 2.24 E9/L (ref 1.8–7.3)
NEUTROPHILS RELATIVE PERCENT: 52.2 % (ref 43–80)
NEUTROPHILS RELATIVE PERCENT: 68.1 % (ref 43–80)
OVALOCYTES: ABNORMAL
PDW BLD-RTO: 15.2 FL (ref 11.5–15)
PDW BLD-RTO: 15.2 FL (ref 11.5–15)
PHOSPHORUS: 2.7 MG/DL (ref 2.5–4.5)
PHOSPHORUS: 2.8 MG/DL (ref 2.5–4.5)
PLATELET # BLD: 279 E9/L (ref 130–450)
PLATELET # BLD: 294 E9/L (ref 130–450)
PMV BLD AUTO: 10.5 FL (ref 7–12)
PMV BLD AUTO: 10.8 FL (ref 7–12)
POIKILOCYTES: ABNORMAL
POIKILOCYTES: ABNORMAL
POLYCHROMASIA: ABNORMAL
POLYCHROMASIA: ABNORMAL
POTASSIUM SERPL-SCNC: 3 MMOL/L (ref 3.5–5)
POTASSIUM SERPL-SCNC: 4.3 MMOL/L (ref 3.5–5)
RBC # BLD: 2.95 E12/L (ref 3.5–5.5)
RBC # BLD: 3.11 E12/L (ref 3.5–5.5)
SODIUM BLD-SCNC: 143 MMOL/L (ref 132–146)
SODIUM BLD-SCNC: 144 MMOL/L (ref 132–146)
STREP PNEUMONIAE ANTIGEN, URINE: NORMAL
TARGET CELLS: ABNORMAL
TARGET CELLS: ABNORMAL
URINE CULTURE, ROUTINE: NORMAL
WBC # BLD: 3.3 E9/L (ref 4.5–11.5)
WBC # BLD: 3.9 E9/L (ref 4.5–11.5)

## 2019-09-24 PROCEDURE — 2580000003 HC RX 258: Performed by: INTERNAL MEDICINE

## 2019-09-24 PROCEDURE — 6370000000 HC RX 637 (ALT 250 FOR IP): Performed by: INTERNAL MEDICINE

## 2019-09-24 PROCEDURE — 82962 GLUCOSE BLOOD TEST: CPT

## 2019-09-24 PROCEDURE — 2500000003 HC RX 250 WO HCPCS: Performed by: INTERNAL MEDICINE

## 2019-09-24 PROCEDURE — 6360000002 HC RX W HCPCS: Performed by: INTERNAL MEDICINE

## 2019-09-24 PROCEDURE — 97530 THERAPEUTIC ACTIVITIES: CPT

## 2019-09-24 PROCEDURE — 36415 COLL VENOUS BLD VENIPUNCTURE: CPT

## 2019-09-24 PROCEDURE — 97110 THERAPEUTIC EXERCISES: CPT

## 2019-09-24 PROCEDURE — 84100 ASSAY OF PHOSPHORUS: CPT

## 2019-09-24 PROCEDURE — 97535 SELF CARE MNGMENT TRAINING: CPT

## 2019-09-24 PROCEDURE — 85025 COMPLETE CBC W/AUTO DIFF WBC: CPT

## 2019-09-24 PROCEDURE — 2060000000 HC ICU INTERMEDIATE R&B

## 2019-09-24 PROCEDURE — 80048 BASIC METABOLIC PNL TOTAL CA: CPT

## 2019-09-24 PROCEDURE — 83735 ASSAY OF MAGNESIUM: CPT

## 2019-09-24 PROCEDURE — 71250 CT THORAX DX C-: CPT

## 2019-09-24 RX ORDER — POTASSIUM CHLORIDE 29.8 MG/ML
20 INJECTION INTRAVENOUS
Status: COMPLETED | OUTPATIENT
Start: 2019-09-24 | End: 2019-09-24

## 2019-09-24 RX ORDER — ARIPIPRAZOLE 15 MG/1
15 TABLET ORAL NIGHTLY
Status: DISCONTINUED | OUTPATIENT
Start: 2019-09-24 | End: 2019-09-27 | Stop reason: HOSPADM

## 2019-09-24 RX ADMIN — INSULIN LISPRO 1 UNITS: 100 INJECTION, SOLUTION INTRAVENOUS; SUBCUTANEOUS at 10:25

## 2019-09-24 RX ADMIN — CLONAZEPAM 0.5 MG: 0.5 TABLET ORAL at 21:22

## 2019-09-24 RX ADMIN — CYCLOBENZAPRINE 5 MG: 10 TABLET, FILM COATED ORAL at 21:22

## 2019-09-24 RX ADMIN — POTASSIUM CHLORIDE 20 MEQ: 29.8 INJECTION, SOLUTION INTRAVENOUS at 09:40

## 2019-09-24 RX ADMIN — THIAMINE HYDROCHLORIDE 200 MG: 100 INJECTION, SOLUTION INTRAMUSCULAR; INTRAVENOUS at 17:15

## 2019-09-24 RX ADMIN — DULOXETINE HYDROCHLORIDE 30 MG: 30 CAPSULE, DELAYED RELEASE ORAL at 08:44

## 2019-09-24 RX ADMIN — ASCORBIC ACID 1500 MG: 500 INJECTION, SOLUTION INTRAMUSCULAR; INTRAVENOUS; SUBCUTANEOUS at 18:48

## 2019-09-24 RX ADMIN — Medication 10 ML: at 21:41

## 2019-09-24 RX ADMIN — CLONAZEPAM 0.5 MG: 0.5 TABLET ORAL at 08:43

## 2019-09-24 RX ADMIN — POTASSIUM CHLORIDE 20 MEQ: 29.8 INJECTION, SOLUTION INTRAVENOUS at 11:34

## 2019-09-24 RX ADMIN — INSULIN GLARGINE 25 UNITS: 100 INJECTION, SOLUTION SUBCUTANEOUS at 21:23

## 2019-09-24 RX ADMIN — ARIPIPRAZOLE 15 MG: 15 TABLET ORAL at 21:21

## 2019-09-24 RX ADMIN — INSULIN LISPRO 3 UNITS: 100 INJECTION, SOLUTION INTRAVENOUS; SUBCUTANEOUS at 01:24

## 2019-09-24 RX ADMIN — POTASSIUM CHLORIDE 20 MEQ: 29.8 INJECTION, SOLUTION INTRAVENOUS at 14:00

## 2019-09-24 RX ADMIN — CYCLOBENZAPRINE 5 MG: 10 TABLET, FILM COATED ORAL at 08:43

## 2019-09-24 RX ADMIN — CYCLOBENZAPRINE 5 MG: 10 TABLET, FILM COATED ORAL at 14:53

## 2019-09-24 RX ADMIN — LEVOTHYROXINE SODIUM 50 MCG: 50 TABLET ORAL at 05:59

## 2019-09-24 RX ADMIN — ASCORBIC ACID 1500 MG: 500 INJECTION, SOLUTION INTRAMUSCULAR; INTRAVENOUS; SUBCUTANEOUS at 10:50

## 2019-09-24 RX ADMIN — INSULIN LISPRO 1 UNITS: 100 INJECTION, SOLUTION INTRAVENOUS; SUBCUTANEOUS at 05:59

## 2019-09-24 RX ADMIN — Medication 1 CAPSULE: at 09:41

## 2019-09-24 RX ADMIN — GABAPENTIN 400 MG: 300 CAPSULE ORAL at 14:52

## 2019-09-24 RX ADMIN — TRAZODONE HYDROCHLORIDE 200 MG: 150 TABLET ORAL at 21:21

## 2019-09-24 RX ADMIN — GABAPENTIN 400 MG: 300 CAPSULE ORAL at 08:43

## 2019-09-24 RX ADMIN — Medication 4000 UNITS: at 08:44

## 2019-09-24 RX ADMIN — INSULIN LISPRO 1 UNITS: 100 INJECTION, SOLUTION INTRAVENOUS; SUBCUTANEOUS at 21:23

## 2019-09-24 RX ADMIN — Medication 10 ML: at 08:44

## 2019-09-24 RX ADMIN — PANTOPRAZOLE SODIUM 40 MG: 40 TABLET, DELAYED RELEASE ORAL at 05:59

## 2019-09-24 RX ADMIN — POTASSIUM CHLORIDE 20 MEQ: 29.8 INJECTION, SOLUTION INTRAVENOUS at 08:43

## 2019-09-24 RX ADMIN — ACETAMINOPHEN 650 MG: 325 TABLET, FILM COATED ORAL at 21:22

## 2019-09-24 RX ADMIN — THIAMINE HYDROCHLORIDE 200 MG: 100 INJECTION, SOLUTION INTRAMUSCULAR; INTRAVENOUS at 04:31

## 2019-09-24 RX ADMIN — ASCORBIC ACID 1500 MG: 500 INJECTION, SOLUTION INTRAMUSCULAR; INTRAVENOUS; SUBCUTANEOUS at 04:31

## 2019-09-24 ASSESSMENT — PAIN SCALES - GENERAL
PAINLEVEL_OUTOF10: 0
PAINLEVEL_OUTOF10: 3
PAINLEVEL_OUTOF10: 8
PAINLEVEL_OUTOF10: 0

## 2019-09-25 ENCOUNTER — APPOINTMENT (OUTPATIENT)
Dept: GENERAL RADIOLOGY | Age: 69
DRG: 640 | End: 2019-09-25
Payer: MEDICARE

## 2019-09-25 ENCOUNTER — APPOINTMENT (OUTPATIENT)
Dept: ULTRASOUND IMAGING | Age: 69
DRG: 640 | End: 2019-09-25
Payer: MEDICARE

## 2019-09-25 LAB
ANION GAP SERPL CALCULATED.3IONS-SCNC: 13 MMOL/L (ref 7–16)
BASOPHILS ABSOLUTE: 0.06 E9/L (ref 0–0.2)
BASOPHILS RELATIVE PERCENT: 1.8 % (ref 0–2)
BUN BLDV-MCNC: 11 MG/DL (ref 8–23)
CALCIUM SERPL-MCNC: 8.7 MG/DL (ref 8.6–10.2)
CHLORIDE BLD-SCNC: 108 MMOL/L (ref 98–107)
CO2: 23 MMOL/L (ref 22–29)
CREAT SERPL-MCNC: 0.6 MG/DL (ref 0.5–1)
CRITICAL: ABNORMAL
DATE ANALYZED: ABNORMAL
DATE OF COLLECTION: ABNORMAL
EOSINOPHILS ABSOLUTE: 0.13 E9/L (ref 0.05–0.5)
EOSINOPHILS RELATIVE PERCENT: 3.6 % (ref 0–6)
FLUID TYPE: NORMAL
FLUID TYPE: NORMAL
GFR AFRICAN AMERICAN: >60
GFR NON-AFRICAN AMERICAN: >60 ML/MIN/1.73
GLUCOSE BLD-MCNC: 161 MG/DL (ref 74–99)
GLUCOSE, FLUID: 202 MG/DL
HCT VFR BLD CALC: 27.3 % (ref 34–48)
HEMOGLOBIN: 8.1 G/DL (ref 11.5–15.5)
HYPOCHROMIA: ABNORMAL
LAB: ABNORMAL
LD, FLUID: 52 U/L
LYMPHOCYTES ABSOLUTE: 1.37 E9/L (ref 1.5–4)
LYMPHOCYTES RELATIVE PERCENT: 38.2 % (ref 20–42)
Lab: ABNORMAL
Lab: ABNORMAL
MAGNESIUM: 1.7 MG/DL (ref 1.6–2.6)
MCH RBC QN AUTO: 26.6 PG (ref 26–35)
MCHC RBC AUTO-ENTMCNC: 29.7 % (ref 32–34.5)
MCV RBC AUTO: 89.5 FL (ref 80–99.9)
METER GLUCOSE: 144 MG/DL (ref 74–99)
METER GLUCOSE: 148 MG/DL (ref 74–99)
METER GLUCOSE: 156 MG/DL (ref 74–99)
METER GLUCOSE: 208 MG/DL (ref 74–99)
METER GLUCOSE: 240 MG/DL (ref 74–99)
MONOCYTES ABSOLUTE: 0.22 E9/L (ref 0.1–0.95)
MONOCYTES RELATIVE PERCENT: 5.5 % (ref 2–12)
NEUTROPHILS ABSOLUTE: 1.84 E9/L (ref 1.8–7.3)
NEUTROPHILS RELATIVE PERCENT: 50.9 % (ref 43–80)
OPERATOR ID: ABNORMAL
PDW BLD-RTO: 15.4 FL (ref 11.5–15)
PH FLUID: ABNORMAL
PH, BODY FLUID: 7.83
PHOSPHORUS: 2.9 MG/DL (ref 2.5–4.5)
PLATELET # BLD: 325 E9/L (ref 130–450)
PMV BLD AUTO: 10.8 FL (ref 7–12)
POLYCHROMASIA: ABNORMAL
POTASSIUM SERPL-SCNC: 4.1 MMOL/L (ref 3.5–5)
PROTEIN FLUID: 1.5 G/DL
RBC # BLD: 3.05 E12/L (ref 3.5–5.5)
SODIUM BLD-SCNC: 144 MMOL/L (ref 132–146)
SOURCE, BLOOD GAS: ABNORMAL
TIME ANALYZED: 1417
WBC # BLD: 3.6 E9/L (ref 4.5–11.5)

## 2019-09-25 PROCEDURE — 6370000000 HC RX 637 (ALT 250 FOR IP): Performed by: INTERNAL MEDICINE

## 2019-09-25 PROCEDURE — 83615 LACTATE (LD) (LDH) ENZYME: CPT

## 2019-09-25 PROCEDURE — 87205 SMEAR GRAM STAIN: CPT

## 2019-09-25 PROCEDURE — 97112 NEUROMUSCULAR REEDUCATION: CPT

## 2019-09-25 PROCEDURE — 2580000003 HC RX 258: Performed by: INTERNAL MEDICINE

## 2019-09-25 PROCEDURE — 83986 ASSAY PH BODY FLUID NOS: CPT

## 2019-09-25 PROCEDURE — 87070 CULTURE OTHR SPECIMN AEROBIC: CPT

## 2019-09-25 PROCEDURE — 83735 ASSAY OF MAGNESIUM: CPT

## 2019-09-25 PROCEDURE — 97110 THERAPEUTIC EXERCISES: CPT

## 2019-09-25 PROCEDURE — 84157 ASSAY OF PROTEIN OTHER: CPT

## 2019-09-25 PROCEDURE — 0W9B3ZZ DRAINAGE OF LEFT PLEURAL CAVITY, PERCUTANEOUS APPROACH: ICD-10-PCS | Performed by: INTERNAL MEDICINE

## 2019-09-25 PROCEDURE — 71045 X-RAY EXAM CHEST 1 VIEW: CPT

## 2019-09-25 PROCEDURE — 51702 INSERT TEMP BLADDER CATH: CPT

## 2019-09-25 PROCEDURE — 84100 ASSAY OF PHOSPHORUS: CPT

## 2019-09-25 PROCEDURE — 85025 COMPLETE CBC W/AUTO DIFF WBC: CPT

## 2019-09-25 PROCEDURE — 6360000002 HC RX W HCPCS: Performed by: INTERNAL MEDICINE

## 2019-09-25 PROCEDURE — 32555 ASPIRATE PLEURA W/ IMAGING: CPT

## 2019-09-25 PROCEDURE — 80048 BASIC METABOLIC PNL TOTAL CA: CPT

## 2019-09-25 PROCEDURE — 2060000000 HC ICU INTERMEDIATE R&B

## 2019-09-25 PROCEDURE — 97535 SELF CARE MNGMENT TRAINING: CPT

## 2019-09-25 PROCEDURE — 99221 1ST HOSP IP/OBS SF/LOW 40: CPT | Performed by: PSYCHIATRY & NEUROLOGY

## 2019-09-25 PROCEDURE — 2500000003 HC RX 250 WO HCPCS: Performed by: INTERNAL MEDICINE

## 2019-09-25 PROCEDURE — 89051 BODY FLUID CELL COUNT: CPT

## 2019-09-25 PROCEDURE — 36415 COLL VENOUS BLD VENIPUNCTURE: CPT

## 2019-09-25 PROCEDURE — 97530 THERAPEUTIC ACTIVITIES: CPT

## 2019-09-25 PROCEDURE — 82962 GLUCOSE BLOOD TEST: CPT

## 2019-09-25 PROCEDURE — 88305 TISSUE EXAM BY PATHOLOGIST: CPT

## 2019-09-25 PROCEDURE — 82947 ASSAY GLUCOSE BLOOD QUANT: CPT

## 2019-09-25 PROCEDURE — 88112 CYTOPATH CELL ENHANCE TECH: CPT

## 2019-09-25 RX ADMIN — INSULIN LISPRO 2 UNITS: 100 INJECTION, SOLUTION INTRAVENOUS; SUBCUTANEOUS at 06:18

## 2019-09-25 RX ADMIN — LEVOTHYROXINE SODIUM 50 MCG: 50 TABLET ORAL at 06:07

## 2019-09-25 RX ADMIN — PANTOPRAZOLE SODIUM 40 MG: 40 TABLET, DELAYED RELEASE ORAL at 05:54

## 2019-09-25 RX ADMIN — TRAZODONE HYDROCHLORIDE 200 MG: 150 TABLET ORAL at 20:56

## 2019-09-25 RX ADMIN — ASCORBIC ACID 1500 MG: 500 INJECTION, SOLUTION INTRAMUSCULAR; INTRAVENOUS; SUBCUTANEOUS at 00:15

## 2019-09-25 RX ADMIN — ACETAMINOPHEN 650 MG: 325 TABLET, FILM COATED ORAL at 12:46

## 2019-09-25 RX ADMIN — THIAMINE HYDROCHLORIDE 200 MG: 100 INJECTION, SOLUTION INTRAMUSCULAR; INTRAVENOUS at 05:57

## 2019-09-25 RX ADMIN — ARIPIPRAZOLE 15 MG: 15 TABLET ORAL at 20:56

## 2019-09-25 RX ADMIN — ASCORBIC ACID 1500 MG: 500 INJECTION, SOLUTION INTRAMUSCULAR; INTRAVENOUS; SUBCUTANEOUS at 05:55

## 2019-09-25 RX ADMIN — CLONAZEPAM 0.5 MG: 0.5 TABLET ORAL at 20:55

## 2019-09-25 RX ADMIN — Medication 10 ML: at 20:56

## 2019-09-25 RX ADMIN — Medication 4000 UNITS: at 09:01

## 2019-09-25 RX ADMIN — Medication 10 ML: at 05:56

## 2019-09-25 RX ADMIN — DULOXETINE HYDROCHLORIDE 30 MG: 30 CAPSULE, DELAYED RELEASE ORAL at 09:01

## 2019-09-25 RX ADMIN — INSULIN LISPRO 5 UNITS: 100 INJECTION, SOLUTION INTRAVENOUS; SUBCUTANEOUS at 17:19

## 2019-09-25 RX ADMIN — CLONAZEPAM 0.5 MG: 0.5 TABLET ORAL at 09:01

## 2019-09-25 RX ADMIN — CYCLOBENZAPRINE 5 MG: 10 TABLET, FILM COATED ORAL at 09:01

## 2019-09-25 RX ADMIN — INSULIN LISPRO 1 UNITS: 100 INJECTION, SOLUTION INTRAVENOUS; SUBCUTANEOUS at 03:07

## 2019-09-25 RX ADMIN — INSULIN LISPRO 10 UNITS: 100 INJECTION, SOLUTION INTRAVENOUS; SUBCUTANEOUS at 11:33

## 2019-09-25 RX ADMIN — INSULIN GLARGINE 25 UNITS: 100 INJECTION, SOLUTION SUBCUTANEOUS at 20:56

## 2019-09-25 RX ADMIN — CYCLOBENZAPRINE 5 MG: 10 TABLET, FILM COATED ORAL at 14:45

## 2019-09-25 RX ADMIN — CYCLOBENZAPRINE 5 MG: 10 TABLET, FILM COATED ORAL at 20:56

## 2019-09-25 RX ADMIN — Medication 1 CAPSULE: at 09:01

## 2019-09-25 RX ADMIN — Medication 10 ML: at 09:01

## 2019-09-25 ASSESSMENT — PAIN SCALES - GENERAL
PAINLEVEL_OUTOF10: 0
PAINLEVEL_OUTOF10: 10
PAINLEVEL_OUTOF10: 0

## 2019-09-25 ASSESSMENT — PAIN DESCRIPTION - LOCATION: LOCATION: KNEE

## 2019-09-25 ASSESSMENT — PAIN DESCRIPTION - FREQUENCY: FREQUENCY: INTERMITTENT

## 2019-09-25 ASSESSMENT — PAIN DESCRIPTION - ORIENTATION: ORIENTATION: RIGHT

## 2019-09-25 ASSESSMENT — PAIN DESCRIPTION - PAIN TYPE: TYPE: ACUTE PAIN

## 2019-09-25 ASSESSMENT — PAIN DESCRIPTION - DESCRIPTORS: DESCRIPTORS: BURNING

## 2019-09-26 LAB
ANION GAP SERPL CALCULATED.3IONS-SCNC: 11 MMOL/L (ref 7–16)
BASOPHILS ABSOLUTE: 0.02 E9/L (ref 0–0.2)
BASOPHILS RELATIVE PERCENT: 0.5 % (ref 0–2)
BUN BLDV-MCNC: 15 MG/DL (ref 8–23)
CALCIUM SERPL-MCNC: 9 MG/DL (ref 8.6–10.2)
CEA: 1.7 NG/ML (ref 0–5.2)
CHLORIDE BLD-SCNC: 106 MMOL/L (ref 98–107)
CO2: 27 MMOL/L (ref 22–29)
CREAT SERPL-MCNC: 0.6 MG/DL (ref 0.5–1)
EOSINOPHILS ABSOLUTE: 0.13 E9/L (ref 0.05–0.5)
EOSINOPHILS RELATIVE PERCENT: 3.3 % (ref 0–6)
GFR AFRICAN AMERICAN: >60
GFR NON-AFRICAN AMERICAN: >60 ML/MIN/1.73
GLUCOSE BLD-MCNC: 215 MG/DL (ref 74–99)
GRAM STAIN ORDERABLE: NORMAL
HCT VFR BLD CALC: 28.3 % (ref 34–48)
HEMOGLOBIN: 8.8 G/DL (ref 11.5–15.5)
IGG 1: 385 MG/DL (ref 240–1118)
IGG 2: 77 MG/DL (ref 124–549)
IGG 3: 25 MG/DL (ref 21–134)
IGG 4: 29 MG/DL (ref 1–123)
IMMATURE GRANULOCYTES #: 0.02 E9/L
IMMATURE GRANULOCYTES %: 0.5 % (ref 0–5)
LACTATE DEHYDROGENASE: 142 U/L (ref 135–214)
LYMPHOCYTES ABSOLUTE: 0.98 E9/L (ref 1.5–4)
LYMPHOCYTES RELATIVE PERCENT: 25.1 % (ref 20–42)
MCH RBC QN AUTO: 26.7 PG (ref 26–35)
MCHC RBC AUTO-ENTMCNC: 31.1 % (ref 32–34.5)
MCV RBC AUTO: 86 FL (ref 80–99.9)
METER GLUCOSE: 108 MG/DL (ref 74–99)
METER GLUCOSE: 139 MG/DL (ref 74–99)
METER GLUCOSE: 171 MG/DL (ref 74–99)
METER GLUCOSE: 208 MG/DL (ref 74–99)
MONOCYTES ABSOLUTE: 0.22 E9/L (ref 0.1–0.95)
MONOCYTES RELATIVE PERCENT: 5.6 % (ref 2–12)
NEUTROPHILS ABSOLUTE: 2.53 E9/L (ref 1.8–7.3)
NEUTROPHILS RELATIVE PERCENT: 65 % (ref 43–80)
PDW BLD-RTO: 15 FL (ref 11.5–15)
PLATELET # BLD: 344 E9/L (ref 130–450)
PMV BLD AUTO: 10.8 FL (ref 7–12)
POTASSIUM SERPL-SCNC: 4.4 MMOL/L (ref 3.5–5)
RBC # BLD: 3.29 E12/L (ref 3.5–5.5)
SODIUM BLD-SCNC: 144 MMOL/L (ref 132–146)
WBC # BLD: 3.9 E9/L (ref 4.5–11.5)

## 2019-09-26 PROCEDURE — 85025 COMPLETE CBC W/AUTO DIFF WBC: CPT

## 2019-09-26 PROCEDURE — 82378 CARCINOEMBRYONIC ANTIGEN: CPT

## 2019-09-26 PROCEDURE — 2060000000 HC ICU INTERMEDIATE R&B

## 2019-09-26 PROCEDURE — 6370000000 HC RX 637 (ALT 250 FOR IP): Performed by: INTERNAL MEDICINE

## 2019-09-26 PROCEDURE — 36415 COLL VENOUS BLD VENIPUNCTURE: CPT

## 2019-09-26 PROCEDURE — 80048 BASIC METABOLIC PNL TOTAL CA: CPT

## 2019-09-26 PROCEDURE — 82962 GLUCOSE BLOOD TEST: CPT

## 2019-09-26 PROCEDURE — 2580000003 HC RX 258: Performed by: INTERNAL MEDICINE

## 2019-09-26 PROCEDURE — 83615 LACTATE (LD) (LDH) ENZYME: CPT

## 2019-09-26 PROCEDURE — 82787 IGG 1 2 3 OR 4 EACH: CPT

## 2019-09-26 RX ORDER — ARIPIPRAZOLE 15 MG/1
15 TABLET ORAL NIGHTLY
Qty: 30 TABLET | Refills: 3 | DISCHARGE
Start: 2019-09-26

## 2019-09-26 RX ORDER — PANTOPRAZOLE SODIUM 40 MG/1
40 TABLET, DELAYED RELEASE ORAL
Qty: 30 TABLET | Refills: 3 | DISCHARGE
Start: 2019-09-27

## 2019-09-26 RX ORDER — LEVOTHYROXINE SODIUM 0.05 MG/1
50 TABLET ORAL DAILY
Qty: 30 TABLET | Refills: 3 | Status: ON HOLD | DISCHARGE
Start: 2019-09-27 | End: 2019-10-07 | Stop reason: HOSPADM

## 2019-09-26 RX ADMIN — INSULIN GLARGINE 25 UNITS: 100 INJECTION, SOLUTION SUBCUTANEOUS at 21:11

## 2019-09-26 RX ADMIN — LEVOTHYROXINE SODIUM 50 MCG: 50 TABLET ORAL at 06:13

## 2019-09-26 RX ADMIN — Medication 10 ML: at 09:38

## 2019-09-26 RX ADMIN — Medication 10 ML: at 21:12

## 2019-09-26 RX ADMIN — PANTOPRAZOLE SODIUM 40 MG: 40 TABLET, DELAYED RELEASE ORAL at 06:13

## 2019-09-26 RX ADMIN — Medication 1 CAPSULE: at 09:38

## 2019-09-26 RX ADMIN — INSULIN LISPRO 10 UNITS: 100 INJECTION, SOLUTION INTRAVENOUS; SUBCUTANEOUS at 06:22

## 2019-09-26 RX ADMIN — CYCLOBENZAPRINE 5 MG: 10 TABLET, FILM COATED ORAL at 09:37

## 2019-09-26 RX ADMIN — DULOXETINE HYDROCHLORIDE 30 MG: 30 CAPSULE, DELAYED RELEASE ORAL at 09:38

## 2019-09-26 RX ADMIN — CYCLOBENZAPRINE 5 MG: 10 TABLET, FILM COATED ORAL at 13:52

## 2019-09-26 RX ADMIN — Medication 4000 UNITS: at 09:37

## 2019-09-26 RX ADMIN — CLONAZEPAM 0.5 MG: 0.5 TABLET ORAL at 09:37

## 2019-09-26 ASSESSMENT — PAIN SCALES - GENERAL
PAINLEVEL_OUTOF10: 0

## 2019-09-27 VITALS
OXYGEN SATURATION: 99 % | SYSTOLIC BLOOD PRESSURE: 141 MMHG | DIASTOLIC BLOOD PRESSURE: 93 MMHG | WEIGHT: 139.5 LBS | HEIGHT: 67 IN | BODY MASS INDEX: 21.9 KG/M2 | HEART RATE: 113 BPM | TEMPERATURE: 95.8 F | RESPIRATION RATE: 18 BRPM

## 2019-09-27 LAB
ANCA IFA: NORMAL
ANION GAP SERPL CALCULATED.3IONS-SCNC: 14 MMOL/L (ref 7–16)
APPEARANCE FLUID: CLEAR
BASOPHILS ABSOLUTE: 0.03 E9/L (ref 0–0.2)
BASOPHILS RELATIVE PERCENT: 0.5 % (ref 0–2)
BLOOD CULTURE, ROUTINE: NORMAL
BLOOD CULTURE, ROUTINE: NORMAL
BODY FLUID CULTURE, STERILE: NORMAL
BODY FLUID CULTURE, STERILE: NORMAL
BUN BLDV-MCNC: 13 MG/DL (ref 8–23)
CALCIUM SERPL-MCNC: 9.6 MG/DL (ref 8.6–10.2)
CELL COUNT FLUID TYPE: NORMAL
CHLORIDE BLD-SCNC: 101 MMOL/L (ref 98–107)
CO2: 27 MMOL/L (ref 22–29)
COLOR FLUID: YELLOW
CREAT SERPL-MCNC: 0.6 MG/DL (ref 0.5–1)
CULTURE, BLOOD 2: NORMAL
EOSINOPHILS ABSOLUTE: 0.07 E9/L (ref 0.05–0.5)
EOSINOPHILS RELATIVE PERCENT: 1.1 % (ref 0–6)
GFR AFRICAN AMERICAN: >60
GFR NON-AFRICAN AMERICAN: >60 ML/MIN/1.73
GLUCOSE BLD-MCNC: 239 MG/DL (ref 74–99)
GRAM STAIN RESULT: NORMAL
GRAM STAIN RESULT: NORMAL
HCT VFR BLD CALC: 32.6 % (ref 34–48)
HEMOGLOBIN: 9.7 G/DL (ref 11.5–15.5)
IMMATURE GRANULOCYTES #: 0.02 E9/L
IMMATURE GRANULOCYTES %: 0.3 % (ref 0–5)
LYMPHOCYTES ABSOLUTE: 1.25 E9/L (ref 1.5–4)
LYMPHOCYTES RELATIVE PERCENT: 20.1 % (ref 20–42)
MCH RBC QN AUTO: 25.9 PG (ref 26–35)
MCHC RBC AUTO-ENTMCNC: 29.8 % (ref 32–34.5)
MCV RBC AUTO: 87.2 FL (ref 80–99.9)
METER GLUCOSE: 111 MG/DL (ref 74–99)
METER GLUCOSE: 184 MG/DL (ref 74–99)
MONOCYTE, FLUID: 93 %
MONOCYTES ABSOLUTE: 0.41 E9/L (ref 0.1–0.95)
MONOCYTES RELATIVE PERCENT: 6.6 % (ref 2–12)
NEUTROPHIL, FLUID: 7 %
NEUTROPHILS ABSOLUTE: 4.45 E9/L (ref 1.8–7.3)
NEUTROPHILS RELATIVE PERCENT: 71.4 % (ref 43–80)
NUCLEATED CELLS FLUID: 151 /UL
PDW BLD-RTO: 15.3 FL (ref 11.5–15)
PLATELET # BLD: 370 E9/L (ref 130–450)
PMV BLD AUTO: 10.4 FL (ref 7–12)
POTASSIUM SERPL-SCNC: 4.4 MMOL/L (ref 3.5–5)
RBC # BLD: 3.74 E12/L (ref 3.5–5.5)
RBC FLUID: <2000 /UL
SODIUM BLD-SCNC: 142 MMOL/L (ref 132–146)
WBC # BLD: 6.2 E9/L (ref 4.5–11.5)

## 2019-09-27 PROCEDURE — 99231 SBSQ HOSP IP/OBS SF/LOW 25: CPT | Performed by: NURSE PRACTITIONER

## 2019-09-27 PROCEDURE — 36415 COLL VENOUS BLD VENIPUNCTURE: CPT

## 2019-09-27 PROCEDURE — 85025 COMPLETE CBC W/AUTO DIFF WBC: CPT

## 2019-09-27 PROCEDURE — 99231 SBSQ HOSP IP/OBS SF/LOW 25: CPT | Performed by: ORTHOPAEDIC SURGERY

## 2019-09-27 PROCEDURE — 6370000000 HC RX 637 (ALT 250 FOR IP): Performed by: INTERNAL MEDICINE

## 2019-09-27 PROCEDURE — 2580000003 HC RX 258: Performed by: INTERNAL MEDICINE

## 2019-09-27 PROCEDURE — 82962 GLUCOSE BLOOD TEST: CPT

## 2019-09-27 PROCEDURE — 80048 BASIC METABOLIC PNL TOTAL CA: CPT

## 2019-09-27 RX ADMIN — Medication 1 CAPSULE: at 09:05

## 2019-09-27 RX ADMIN — DULOXETINE HYDROCHLORIDE 30 MG: 30 CAPSULE, DELAYED RELEASE ORAL at 09:06

## 2019-09-27 RX ADMIN — INSULIN LISPRO 5 UNITS: 100 INJECTION, SOLUTION INTRAVENOUS; SUBCUTANEOUS at 09:05

## 2019-09-27 RX ADMIN — LEVOTHYROXINE SODIUM 50 MCG: 50 TABLET ORAL at 05:19

## 2019-09-27 RX ADMIN — Medication 4000 UNITS: at 09:05

## 2019-09-27 RX ADMIN — PANTOPRAZOLE SODIUM 40 MG: 40 TABLET, DELAYED RELEASE ORAL at 05:19

## 2019-09-27 RX ADMIN — Medication 10 ML: at 09:06

## 2019-09-27 ASSESSMENT — PAIN SCALES - GENERAL
PAINLEVEL_OUTOF10: 0
PAINLEVEL_OUTOF10: 0

## 2019-09-28 LAB
CULTURE, BLOOD 2: NORMAL
IGG 1: 412 MG/DL (ref 240–1118)
IGG 2: 72 MG/DL (ref 124–549)
IGG 3: 24 MG/DL (ref 21–134)
IGG 4: 26 MG/DL (ref 1–123)

## 2019-09-30 ENCOUNTER — HOSPITAL ENCOUNTER (INPATIENT)
Age: 69
LOS: 7 days | Discharge: NURSING, REHAB OR LONG-TERM CARE | DRG: 640 | End: 2019-10-07
Attending: EMERGENCY MEDICINE | Admitting: INTERNAL MEDICINE
Payer: MEDICARE

## 2019-09-30 ENCOUNTER — APPOINTMENT (OUTPATIENT)
Dept: GENERAL RADIOLOGY | Age: 69
DRG: 640 | End: 2019-09-30
Payer: MEDICARE

## 2019-09-30 ENCOUNTER — APPOINTMENT (OUTPATIENT)
Dept: CT IMAGING | Age: 69
DRG: 640 | End: 2019-09-30
Payer: MEDICARE

## 2019-09-30 DIAGNOSIS — R41.0 DISORIENTATION: Primary | ICD-10-CM

## 2019-09-30 DIAGNOSIS — F32.2 AGITATED DEPRESSION (HCC): ICD-10-CM

## 2019-09-30 LAB
ALBUMIN SERPL-MCNC: 3.9 G/DL (ref 3.5–5.2)
ALP BLD-CCNC: 60 U/L (ref 35–104)
ALT SERPL-CCNC: 13 U/L (ref 0–32)
ANION GAP SERPL CALCULATED.3IONS-SCNC: 16 MMOL/L (ref 7–16)
AST SERPL-CCNC: 23 U/L (ref 0–31)
BACTERIA: ABNORMAL /HPF
BASOPHILS ABSOLUTE: 0.05 E9/L (ref 0–0.2)
BASOPHILS RELATIVE PERCENT: 0.5 % (ref 0–2)
BILIRUB SERPL-MCNC: 1.3 MG/DL (ref 0–1.2)
BILIRUBIN URINE: ABNORMAL
BLOOD, URINE: NEGATIVE
BUN BLDV-MCNC: 20 MG/DL (ref 8–23)
CALCIUM SERPL-MCNC: 10.2 MG/DL (ref 8.6–10.2)
CHLORIDE BLD-SCNC: 101 MMOL/L (ref 98–107)
CHP ED QC CHECK: NORMAL
CLARITY: CLEAR
CO2: 27 MMOL/L (ref 22–29)
COLOR: YELLOW
CREAT SERPL-MCNC: 0.8 MG/DL (ref 0.5–1)
CRYSTALS, UA: ABNORMAL
EKG ATRIAL RATE: 122 BPM
EKG P AXIS: 64 DEGREES
EKG P-R INTERVAL: 140 MS
EKG Q-T INTERVAL: 326 MS
EKG QRS DURATION: 72 MS
EKG QTC CALCULATION (BAZETT): 464 MS
EKG R AXIS: 35 DEGREES
EKG T AXIS: 83 DEGREES
EKG VENTRICULAR RATE: 122 BPM
EOSINOPHILS ABSOLUTE: 0.03 E9/L (ref 0.05–0.5)
EOSINOPHILS RELATIVE PERCENT: 0.3 % (ref 0–6)
FILM ARRAY ADENOVIRUS: NORMAL
FILM ARRAY BORDETELLA PERTUSSIS: NORMAL
FILM ARRAY CHLAMYDOPHILIA PNEUMONIAE: NORMAL
FILM ARRAY CORONAVIRUS 229E: NORMAL
FILM ARRAY CORONAVIRUS HKU1: NORMAL
FILM ARRAY CORONAVIRUS NL63: NORMAL
FILM ARRAY CORONAVIRUS OC43: NORMAL
FILM ARRAY INFLUENZA A VIRUS 09H1: NORMAL
FILM ARRAY INFLUENZA A VIRUS H1: NORMAL
FILM ARRAY INFLUENZA A VIRUS H3: NORMAL
FILM ARRAY INFLUENZA A VIRUS: NORMAL
FILM ARRAY INFLUENZA B: NORMAL
FILM ARRAY METAPNEUMOVIRUS: NORMAL
FILM ARRAY MYCOPLASMA PNEUMONIAE: NORMAL
FILM ARRAY PARAINFLUENZA VIRUS 1: NORMAL
FILM ARRAY PARAINFLUENZA VIRUS 2: NORMAL
FILM ARRAY PARAINFLUENZA VIRUS 3: NORMAL
FILM ARRAY PARAINFLUENZA VIRUS 4: NORMAL
FILM ARRAY RESPIRATORY SYNCITIAL VIRUS: NORMAL
FILM ARRAY RHINOVIRUS/ENTEROVIRUS: NORMAL
GFR AFRICAN AMERICAN: >60
GFR NON-AFRICAN AMERICAN: >60 ML/MIN/1.73
GLUCOSE BLD-MCNC: 127 MG/DL
GLUCOSE BLD-MCNC: 135 MG/DL (ref 74–99)
GLUCOSE URINE: NEGATIVE MG/DL
HCT VFR BLD CALC: 42 % (ref 34–48)
HEMOGLOBIN: 12.5 G/DL (ref 11.5–15.5)
IMMATURE GRANULOCYTES #: 0.08 E9/L
IMMATURE GRANULOCYTES %: 0.8 % (ref 0–5)
KETONES, URINE: 15 MG/DL
LACTIC ACID, SEPSIS: 1.6 MMOL/L (ref 0.5–1.9)
LEUKOCYTE ESTERASE, URINE: NEGATIVE
LYMPHOCYTES ABSOLUTE: 1.79 E9/L (ref 1.5–4)
LYMPHOCYTES RELATIVE PERCENT: 17 % (ref 20–42)
MCH RBC QN AUTO: 25.6 PG (ref 26–35)
MCHC RBC AUTO-ENTMCNC: 29.8 % (ref 32–34.5)
MCV RBC AUTO: 85.9 FL (ref 80–99.9)
METER GLUCOSE: 127 MG/DL (ref 74–99)
MONOCYTES ABSOLUTE: 0.94 E9/L (ref 0.1–0.95)
MONOCYTES RELATIVE PERCENT: 8.9 % (ref 2–12)
NEUTROPHILS ABSOLUTE: 7.62 E9/L (ref 1.8–7.3)
NEUTROPHILS RELATIVE PERCENT: 72.5 % (ref 43–80)
NITRITE, URINE: NEGATIVE
PDW BLD-RTO: 16 FL (ref 11.5–15)
PH UA: 5 (ref 5–9)
PLATELET # BLD: 588 E9/L (ref 130–450)
PMV BLD AUTO: 10.6 FL (ref 7–12)
POTASSIUM SERPL-SCNC: 4.3 MMOL/L (ref 3.5–5)
PROCALCITONIN: 0.26 NG/ML (ref 0–0.08)
PROTEIN UA: ABNORMAL MG/DL
RBC # BLD: 4.89 E12/L (ref 3.5–5.5)
RBC UA: ABNORMAL /HPF (ref 0–2)
SODIUM BLD-SCNC: 144 MMOL/L (ref 132–146)
SPECIFIC GRAVITY UA: >=1.03 (ref 1–1.03)
TOTAL PROTEIN: 7.3 G/DL (ref 6.4–8.3)
TROPONIN: 0.01 NG/ML (ref 0–0.03)
UROBILINOGEN, URINE: 0.2 E.U./DL
WBC # BLD: 10.5 E9/L (ref 4.5–11.5)
WBC UA: ABNORMAL /HPF (ref 0–5)
YEAST: ABNORMAL

## 2019-09-30 PROCEDURE — 2580000003 HC RX 258: Performed by: EMERGENCY MEDICINE

## 2019-09-30 PROCEDURE — 99285 EMERGENCY DEPT VISIT HI MDM: CPT

## 2019-09-30 PROCEDURE — 81001 URINALYSIS AUTO W/SCOPE: CPT

## 2019-09-30 PROCEDURE — 85025 COMPLETE CBC W/AUTO DIFF WBC: CPT

## 2019-09-30 PROCEDURE — 93010 ELECTROCARDIOGRAM REPORT: CPT | Performed by: INTERNAL MEDICINE

## 2019-09-30 PROCEDURE — 71045 X-RAY EXAM CHEST 1 VIEW: CPT

## 2019-09-30 PROCEDURE — 83605 ASSAY OF LACTIC ACID: CPT

## 2019-09-30 PROCEDURE — 87486 CHLMYD PNEUM DNA AMP PROBE: CPT

## 2019-09-30 PROCEDURE — 87633 RESP VIRUS 12-25 TARGETS: CPT

## 2019-09-30 PROCEDURE — 2060000000 HC ICU INTERMEDIATE R&B

## 2019-09-30 PROCEDURE — 80053 COMPREHEN METABOLIC PANEL: CPT

## 2019-09-30 PROCEDURE — 87040 BLOOD CULTURE FOR BACTERIA: CPT

## 2019-09-30 PROCEDURE — 84484 ASSAY OF TROPONIN QUANT: CPT

## 2019-09-30 PROCEDURE — 36415 COLL VENOUS BLD VENIPUNCTURE: CPT

## 2019-09-30 PROCEDURE — 6370000000 HC RX 637 (ALT 250 FOR IP): Performed by: EMERGENCY MEDICINE

## 2019-09-30 PROCEDURE — 87798 DETECT AGENT NOS DNA AMP: CPT

## 2019-09-30 PROCEDURE — 82962 GLUCOSE BLOOD TEST: CPT

## 2019-09-30 PROCEDURE — 84145 PROCALCITONIN (PCT): CPT

## 2019-09-30 PROCEDURE — 70450 CT HEAD/BRAIN W/O DYE: CPT

## 2019-09-30 PROCEDURE — 87581 M.PNEUMON DNA AMP PROBE: CPT

## 2019-09-30 PROCEDURE — 93005 ELECTROCARDIOGRAM TRACING: CPT | Performed by: STUDENT IN AN ORGANIZED HEALTH CARE EDUCATION/TRAINING PROGRAM

## 2019-09-30 RX ORDER — ACETAMINOPHEN 325 MG/1
650 TABLET ORAL EVERY 4 HOURS PRN
Status: DISCONTINUED | OUTPATIENT
Start: 2019-09-30 | End: 2019-10-07 | Stop reason: HOSPADM

## 2019-09-30 RX ORDER — SODIUM CHLORIDE 0.9 % (FLUSH) 0.9 %
10 SYRINGE (ML) INJECTION PRN
Status: DISCONTINUED | OUTPATIENT
Start: 2019-09-30 | End: 2019-10-07 | Stop reason: HOSPADM

## 2019-09-30 RX ORDER — CHOLECALCIFEROL (VITAMIN D3) 50 MCG
4000 TABLET ORAL DAILY
Status: DISCONTINUED | OUTPATIENT
Start: 2019-10-01 | End: 2019-10-07 | Stop reason: HOSPADM

## 2019-09-30 RX ORDER — ARIPIPRAZOLE 15 MG/1
15 TABLET ORAL NIGHTLY
Status: DISCONTINUED | OUTPATIENT
Start: 2019-10-01 | End: 2019-10-02

## 2019-09-30 RX ORDER — SODIUM CHLORIDE 9 MG/ML
INJECTION, SOLUTION INTRAVENOUS ONCE
Status: COMPLETED | OUTPATIENT
Start: 2019-09-30 | End: 2019-10-01

## 2019-09-30 RX ORDER — DOCUSATE SODIUM 100 MG/1
100 CAPSULE, LIQUID FILLED ORAL 2 TIMES DAILY
Status: DISCONTINUED | OUTPATIENT
Start: 2019-10-01 | End: 2019-10-02

## 2019-09-30 RX ORDER — LEVOTHYROXINE SODIUM 0.05 MG/1
50 TABLET ORAL DAILY
Status: DISCONTINUED | OUTPATIENT
Start: 2019-10-01 | End: 2019-10-01

## 2019-09-30 RX ORDER — SODIUM CHLORIDE 9 MG/ML
INJECTION, SOLUTION INTRAVENOUS CONTINUOUS
Status: DISCONTINUED | OUTPATIENT
Start: 2019-10-01 | End: 2019-10-04

## 2019-09-30 RX ORDER — FERROUS SULFATE 325(65) MG
325 TABLET ORAL 2 TIMES DAILY WITH MEALS
Status: DISCONTINUED | OUTPATIENT
Start: 2019-10-01 | End: 2019-10-07 | Stop reason: HOSPADM

## 2019-09-30 RX ORDER — 0.9 % SODIUM CHLORIDE 0.9 %
1000 INTRAVENOUS SOLUTION INTRAVENOUS ONCE
Status: DISCONTINUED | OUTPATIENT
Start: 2019-09-30 | End: 2019-09-30

## 2019-09-30 RX ORDER — LISINOPRIL 5 MG/1
5 TABLET ORAL DAILY
Status: DISCONTINUED | OUTPATIENT
Start: 2019-10-01 | End: 2019-10-01

## 2019-09-30 RX ORDER — SODIUM CHLORIDE 0.9 % (FLUSH) 0.9 %
10 SYRINGE (ML) INJECTION EVERY 12 HOURS SCHEDULED
Status: DISCONTINUED | OUTPATIENT
Start: 2019-10-01 | End: 2019-10-07 | Stop reason: HOSPADM

## 2019-09-30 RX ORDER — INSULIN GLARGINE 100 [IU]/ML
25 INJECTION, SOLUTION SUBCUTANEOUS NIGHTLY
Status: DISCONTINUED | OUTPATIENT
Start: 2019-10-01 | End: 2019-10-03

## 2019-09-30 RX ORDER — 0.9 % SODIUM CHLORIDE 0.9 %
1000 INTRAVENOUS SOLUTION INTRAVENOUS ONCE
Status: COMPLETED | OUTPATIENT
Start: 2019-09-30 | End: 2019-09-30

## 2019-09-30 RX ORDER — ACETAMINOPHEN 650 MG/1
650 SUPPOSITORY RECTAL ONCE
Status: COMPLETED | OUTPATIENT
Start: 2019-09-30 | End: 2019-09-30

## 2019-09-30 RX ORDER — ONDANSETRON 2 MG/ML
4 INJECTION INTRAMUSCULAR; INTRAVENOUS EVERY 6 HOURS PRN
Status: DISCONTINUED | OUTPATIENT
Start: 2019-09-30 | End: 2019-10-07 | Stop reason: HOSPADM

## 2019-09-30 RX ORDER — LACTOBACILLUS RHAMNOSUS GG 10B CELL
1 CAPSULE ORAL DAILY
Status: DISCONTINUED | OUTPATIENT
Start: 2019-10-01 | End: 2019-10-07 | Stop reason: HOSPADM

## 2019-09-30 RX ORDER — ROSUVASTATIN CALCIUM 10 MG/1
10 TABLET, COATED ORAL NIGHTLY
Status: DISCONTINUED | OUTPATIENT
Start: 2019-10-01 | End: 2019-10-02

## 2019-09-30 RX ORDER — PANTOPRAZOLE SODIUM 40 MG/1
40 TABLET, DELAYED RELEASE ORAL
Status: DISCONTINUED | OUTPATIENT
Start: 2019-10-01 | End: 2019-10-02

## 2019-09-30 RX ORDER — GABAPENTIN 400 MG/1
400 CAPSULE ORAL 3 TIMES DAILY
Status: DISCONTINUED | OUTPATIENT
Start: 2019-10-01 | End: 2019-10-02

## 2019-09-30 RX ADMIN — SODIUM CHLORIDE 1000 ML: 9 INJECTION, SOLUTION INTRAVENOUS at 12:50

## 2019-09-30 RX ADMIN — ACETAMINOPHEN 650 MG: 650 SUPPOSITORY RECTAL at 12:50

## 2019-09-30 ASSESSMENT — PAIN SCALES - GENERAL: PAINLEVEL_OUTOF10: 0

## 2019-10-01 LAB
ALBUMIN SERPL-MCNC: 3.5 G/DL (ref 3.5–5.2)
ALP BLD-CCNC: 53 U/L (ref 35–104)
ALT SERPL-CCNC: 12 U/L (ref 0–32)
AMMONIA: 20 UMOL/L (ref 11–51)
ANION GAP SERPL CALCULATED.3IONS-SCNC: 15 MMOL/L (ref 7–16)
ANION GAP SERPL CALCULATED.3IONS-SCNC: 20 MMOL/L (ref 7–16)
AST SERPL-CCNC: 22 U/L (ref 0–31)
B.E.: -4 MMOL/L (ref -3–0)
BASOPHILS ABSOLUTE: 0.04 E9/L (ref 0–0.2)
BASOPHILS RELATIVE PERCENT: 0.4 % (ref 0–2)
BILIRUB SERPL-MCNC: 1.1 MG/DL (ref 0–1.2)
BUN BLDV-MCNC: 19 MG/DL (ref 8–23)
BUN BLDV-MCNC: 19 MG/DL (ref 8–23)
CALCIUM SERPL-MCNC: 8.6 MG/DL (ref 8.6–10.2)
CALCIUM SERPL-MCNC: 9.3 MG/DL (ref 8.6–10.2)
CHLORIDE BLD-SCNC: 104 MMOL/L (ref 98–107)
CHLORIDE BLD-SCNC: 109 MMOL/L (ref 98–107)
CO2: 19 MMOL/L (ref 22–29)
CO2: 20 MMOL/L (ref 22–29)
CREAT SERPL-MCNC: 0.6 MG/DL (ref 0.5–1)
CREAT SERPL-MCNC: 0.6 MG/DL (ref 0.5–1)
DELIVERY SYSTEMS: ABNORMAL
DEVICE: ABNORMAL
EOSINOPHILS ABSOLUTE: 0.06 E9/L (ref 0.05–0.5)
EOSINOPHILS RELATIVE PERCENT: 0.5 % (ref 0–6)
GFR AFRICAN AMERICAN: >60
GFR AFRICAN AMERICAN: >60
GFR NON-AFRICAN AMERICAN: >60 ML/MIN/1.73
GFR NON-AFRICAN AMERICAN: >60 ML/MIN/1.73
GLUCOSE BLD-MCNC: 115 MG/DL (ref 74–99)
GLUCOSE BLD-MCNC: 133 MG/DL (ref 74–99)
HCO3 ARTERIAL: 19.6 MMOL/L (ref 22–26)
HCT VFR BLD CALC: 36.3 % (ref 34–48)
HCT,ARTERIAL: 26 % (ref 34–48)
HEMOGLOBIN: 10.8 G/DL (ref 11.5–15.5)
HGB, ARTERIAL: 9 G/DL (ref 11.5–15.5)
IMMATURE GRANULOCYTES #: 0.04 E9/L
IMMATURE GRANULOCYTES %: 0.4 % (ref 0–5)
LACTIC ACID: 0.8 MMOL/L (ref 0.5–2.2)
LYMPHOCYTES ABSOLUTE: 1.68 E9/L (ref 1.5–4)
LYMPHOCYTES RELATIVE PERCENT: 14.9 % (ref 20–42)
MAGNESIUM: 1.8 MG/DL (ref 1.6–2.6)
MCH RBC QN AUTO: 26 PG (ref 26–35)
MCHC RBC AUTO-ENTMCNC: 29.8 % (ref 32–34.5)
MCV RBC AUTO: 87.3 FL (ref 80–99.9)
METER GLUCOSE: 105 MG/DL (ref 74–99)
METER GLUCOSE: 110 MG/DL (ref 74–99)
METER GLUCOSE: 123 MG/DL (ref 74–99)
METER GLUCOSE: 163 MG/DL (ref 74–99)
METER GLUCOSE: 182 MG/DL (ref 74–99)
MONOCYTES ABSOLUTE: 0.93 E9/L (ref 0.1–0.95)
MONOCYTES RELATIVE PERCENT: 8.2 % (ref 2–12)
NEUTROPHILS ABSOLUTE: 8.54 E9/L (ref 1.8–7.3)
NEUTROPHILS RELATIVE PERCENT: 75.6 % (ref 43–80)
O2 SATURATION: 97.9 % (ref 92–98.5)
OPERATOR ID: 1661
PCO2 ARTERIAL: 29.7 MMHG (ref 35–45)
PDW BLD-RTO: 16.1 FL (ref 11.5–15)
PH BLOOD GAS: 7.43 (ref 7.35–7.45)
PHOSPHORUS: 3.2 MG/DL (ref 2.5–4.5)
PLATELET # BLD: 500 E9/L (ref 130–450)
PMV BLD AUTO: 10.8 FL (ref 7–12)
PO2 ARTERIAL: 98.5 MMHG (ref 60–80)
POTASSIUM REFLEX MAGNESIUM: 3.8 MMOL/L (ref 3.5–5)
POTASSIUM SERPL-SCNC: 4 MMOL/L (ref 3.5–5)
PROCALCITONIN: 0.16 NG/ML (ref 0–0.08)
RBC # BLD: 4.16 E12/L (ref 3.5–5.5)
SODIUM BLD-SCNC: 143 MMOL/L (ref 132–146)
SODIUM BLD-SCNC: 144 MMOL/L (ref 132–146)
SOURCE, BLOOD GAS: ABNORMAL
TOTAL PROTEIN: 6.7 G/DL (ref 6.4–8.3)
TSH SERPL DL<=0.05 MIU/L-ACNC: 4.33 UIU/ML (ref 0.27–4.2)
WBC # BLD: 11.3 E9/L (ref 4.5–11.5)

## 2019-10-01 PROCEDURE — 80048 BASIC METABOLIC PNL TOTAL CA: CPT

## 2019-10-01 PROCEDURE — 83735 ASSAY OF MAGNESIUM: CPT

## 2019-10-01 PROCEDURE — 83605 ASSAY OF LACTIC ACID: CPT

## 2019-10-01 PROCEDURE — 6370000000 HC RX 637 (ALT 250 FOR IP): Performed by: INTERNAL MEDICINE

## 2019-10-01 PROCEDURE — 6360000002 HC RX W HCPCS: Performed by: INTERNAL MEDICINE

## 2019-10-01 PROCEDURE — 82962 GLUCOSE BLOOD TEST: CPT

## 2019-10-01 PROCEDURE — 84100 ASSAY OF PHOSPHORUS: CPT

## 2019-10-01 PROCEDURE — 2500000003 HC RX 250 WO HCPCS: Performed by: INTERNAL MEDICINE

## 2019-10-01 PROCEDURE — 2580000003 HC RX 258: Performed by: INTERNAL MEDICINE

## 2019-10-01 PROCEDURE — 2060000000 HC ICU INTERMEDIATE R&B

## 2019-10-01 PROCEDURE — 80053 COMPREHEN METABOLIC PANEL: CPT

## 2019-10-01 PROCEDURE — 82140 ASSAY OF AMMONIA: CPT

## 2019-10-01 PROCEDURE — 2500000003 HC RX 250 WO HCPCS

## 2019-10-01 PROCEDURE — 36415 COLL VENOUS BLD VENIPUNCTURE: CPT

## 2019-10-01 PROCEDURE — 82803 BLOOD GASES ANY COMBINATION: CPT

## 2019-10-01 PROCEDURE — 97162 PT EVAL MOD COMPLEX 30 MIN: CPT | Performed by: PHYSICAL THERAPIST

## 2019-10-01 PROCEDURE — 2580000003 HC RX 258: Performed by: STUDENT IN AN ORGANIZED HEALTH CARE EDUCATION/TRAINING PROGRAM

## 2019-10-01 PROCEDURE — 84443 ASSAY THYROID STIM HORMONE: CPT

## 2019-10-01 PROCEDURE — 84145 PROCALCITONIN (PCT): CPT

## 2019-10-01 PROCEDURE — 85025 COMPLETE CBC W/AUTO DIFF WBC: CPT

## 2019-10-01 RX ORDER — LABETALOL HYDROCHLORIDE 5 MG/ML
10 INJECTION, SOLUTION INTRAVENOUS EVERY 4 HOURS PRN
Status: DISCONTINUED | OUTPATIENT
Start: 2019-10-01 | End: 2019-10-07 | Stop reason: HOSPADM

## 2019-10-01 RX ORDER — DEXTROSE MONOHYDRATE 25 G/50ML
12.5 INJECTION, SOLUTION INTRAVENOUS PRN
Status: DISCONTINUED | OUTPATIENT
Start: 2019-10-01 | End: 2019-10-07 | Stop reason: HOSPADM

## 2019-10-01 RX ORDER — DEXTROSE MONOHYDRATE 50 MG/ML
100 INJECTION, SOLUTION INTRAVENOUS PRN
Status: DISCONTINUED | OUTPATIENT
Start: 2019-10-01 | End: 2019-10-07 | Stop reason: HOSPADM

## 2019-10-01 RX ORDER — MAGNESIUM SULFATE IN WATER 40 MG/ML
2 INJECTION, SOLUTION INTRAVENOUS ONCE
Status: COMPLETED | OUTPATIENT
Start: 2019-10-01 | End: 2019-10-01

## 2019-10-01 RX ORDER — LABETALOL HYDROCHLORIDE 5 MG/ML
INJECTION, SOLUTION INTRAVENOUS
Status: COMPLETED
Start: 2019-10-01 | End: 2019-10-01

## 2019-10-01 RX ORDER — CHLORHEXIDINE GLUCONATE 0.12 MG/ML
15 RINSE ORAL 2 TIMES DAILY
Status: DISCONTINUED | OUTPATIENT
Start: 2019-10-01 | End: 2019-10-07 | Stop reason: HOSPADM

## 2019-10-01 RX ORDER — METOPROLOL SUCCINATE 25 MG/1
25 TABLET, EXTENDED RELEASE ORAL EVERY 12 HOURS
Status: DISCONTINUED | OUTPATIENT
Start: 2019-10-01 | End: 2019-10-01

## 2019-10-01 RX ORDER — NICOTINE POLACRILEX 4 MG
15 LOZENGE BUCCAL PRN
Status: DISCONTINUED | OUTPATIENT
Start: 2019-10-01 | End: 2019-10-07 | Stop reason: HOSPADM

## 2019-10-01 RX ORDER — LEVOTHYROXINE SODIUM 0.07 MG/1
75 TABLET ORAL DAILY
Status: DISCONTINUED | OUTPATIENT
Start: 2019-10-02 | End: 2019-10-02

## 2019-10-01 RX ORDER — LABETALOL HYDROCHLORIDE 5 MG/ML
10 INJECTION, SOLUTION INTRAVENOUS ONCE
Status: COMPLETED | OUTPATIENT
Start: 2019-10-01 | End: 2019-10-01

## 2019-10-01 RX ADMIN — LABETALOL HYDROCHLORIDE 10 MG: 5 INJECTION, SOLUTION INTRAVENOUS at 02:47

## 2019-10-01 RX ADMIN — Medication 4000 UNITS: at 08:30

## 2019-10-01 RX ADMIN — CHLORHEXIDINE GLUCONATE 0.12% ORAL RINSE 15 ML: 1.2 LIQUID ORAL at 06:18

## 2019-10-01 RX ADMIN — SODIUM CHLORIDE: 9 INJECTION, SOLUTION INTRAVENOUS at 06:16

## 2019-10-01 RX ADMIN — LABETALOL HYDROCHLORIDE 10 MG: 5 INJECTION INTRAVENOUS at 21:48

## 2019-10-01 RX ADMIN — SODIUM CHLORIDE: 9 INJECTION, SOLUTION INTRAVENOUS at 15:09

## 2019-10-01 RX ADMIN — SODIUM CHLORIDE: 9 INJECTION, SOLUTION INTRAVENOUS at 02:44

## 2019-10-01 RX ADMIN — MAGNESIUM SULFATE 2 G: 2 INJECTION INTRAVENOUS at 02:45

## 2019-10-01 RX ADMIN — INSULIN GLARGINE 25 UNITS: 100 INJECTION, SOLUTION SUBCUTANEOUS at 21:48

## 2019-10-01 RX ADMIN — FERROUS SULFATE TAB 325 MG (65 MG ELEMENTAL FE) 325 MG: 325 (65 FE) TAB at 08:31

## 2019-10-01 RX ADMIN — ARIPIPRAZOLE 15 MG: 15 TABLET ORAL at 21:48

## 2019-10-01 RX ADMIN — ENOXAPARIN SODIUM 40 MG: 40 INJECTION SUBCUTANEOUS at 08:30

## 2019-10-01 RX ADMIN — INSULIN LISPRO 5 UNITS: 100 INJECTION, SOLUTION INTRAVENOUS; SUBCUTANEOUS at 11:14

## 2019-10-01 RX ADMIN — GABAPENTIN 400 MG: 400 CAPSULE ORAL at 22:03

## 2019-10-01 RX ADMIN — Medication 1 CAPSULE: at 08:30

## 2019-10-01 RX ADMIN — GABAPENTIN 400 MG: 400 CAPSULE ORAL at 08:30

## 2019-10-01 RX ADMIN — METOPROLOL SUCCINATE 25 MG: 25 TABLET, EXTENDED RELEASE ORAL at 08:31

## 2019-10-01 RX ADMIN — ROSUVASTATIN CALCIUM 10 MG: 10 TABLET, FILM COATED ORAL at 21:48

## 2019-10-01 RX ADMIN — LABETALOL HYDROCHLORIDE 10 MG: 5 INJECTION INTRAVENOUS at 02:47

## 2019-10-01 ASSESSMENT — PAIN SCALES - GENERAL
PAINLEVEL_OUTOF10: 0

## 2019-10-01 ASSESSMENT — PAIN SCALES - WONG BAKER: WONGBAKER_NUMERICALRESPONSE: 0

## 2019-10-02 ENCOUNTER — APPOINTMENT (OUTPATIENT)
Dept: GENERAL RADIOLOGY | Age: 69
DRG: 640 | End: 2019-10-02
Payer: MEDICARE

## 2019-10-02 LAB
METER GLUCOSE: 150 MG/DL (ref 74–99)
METER GLUCOSE: 155 MG/DL (ref 74–99)
METER GLUCOSE: 190 MG/DL (ref 74–99)
METER GLUCOSE: 220 MG/DL (ref 74–99)

## 2019-10-02 PROCEDURE — 6370000000 HC RX 637 (ALT 250 FOR IP): Performed by: INTERNAL MEDICINE

## 2019-10-02 PROCEDURE — 97165 OT EVAL LOW COMPLEX 30 MIN: CPT

## 2019-10-02 PROCEDURE — 74230 X-RAY XM SWLNG FUNCJ C+: CPT

## 2019-10-02 PROCEDURE — 82962 GLUCOSE BLOOD TEST: CPT

## 2019-10-02 PROCEDURE — 2500000003 HC RX 250 WO HCPCS: Performed by: PHYSICIAN ASSISTANT

## 2019-10-02 PROCEDURE — 2060000000 HC ICU INTERMEDIATE R&B

## 2019-10-02 PROCEDURE — 2500000003 HC RX 250 WO HCPCS: Performed by: INTERNAL MEDICINE

## 2019-10-02 PROCEDURE — 2580000003 HC RX 258: Performed by: INTERNAL MEDICINE

## 2019-10-02 PROCEDURE — 6360000002 HC RX W HCPCS: Performed by: INTERNAL MEDICINE

## 2019-10-02 RX ORDER — METOPROLOL TARTRATE 5 MG/5ML
5 INJECTION INTRAVENOUS EVERY 8 HOURS
Status: DISCONTINUED | OUTPATIENT
Start: 2019-10-02 | End: 2019-10-03

## 2019-10-02 RX ORDER — METOPROLOL SUCCINATE 25 MG/1
25 TABLET, EXTENDED RELEASE ORAL EVERY 12 HOURS
Status: DISCONTINUED | OUTPATIENT
Start: 2019-10-02 | End: 2019-10-02

## 2019-10-02 RX ORDER — ARIPIPRAZOLE 15 MG/1
15 TABLET ORAL NIGHTLY
Status: DISCONTINUED | OUTPATIENT
Start: 2019-10-02 | End: 2019-10-07 | Stop reason: HOSPADM

## 2019-10-02 RX ORDER — LEVOTHYROXINE SODIUM 0.07 MG/1
75 TABLET ORAL DAILY
Status: DISCONTINUED | OUTPATIENT
Start: 2019-10-03 | End: 2019-10-07 | Stop reason: HOSPADM

## 2019-10-02 RX ORDER — TRAZODONE HYDROCHLORIDE 50 MG/1
100 TABLET ORAL NIGHTLY
Status: DISCONTINUED | OUTPATIENT
Start: 2019-10-02 | End: 2019-10-03

## 2019-10-02 RX ORDER — LACTULOSE 10 G/15ML
20 SOLUTION ORAL
Status: DISCONTINUED | OUTPATIENT
Start: 2019-10-02 | End: 2019-10-03

## 2019-10-02 RX ORDER — TRAZODONE HYDROCHLORIDE 50 MG/1
100 TABLET ORAL NIGHTLY
Status: DISCONTINUED | OUTPATIENT
Start: 2019-10-02 | End: 2019-10-02

## 2019-10-02 RX ADMIN — SODIUM CHLORIDE: 9 INJECTION, SOLUTION INTRAVENOUS at 21:08

## 2019-10-02 RX ADMIN — Medication 10 ML: at 08:56

## 2019-10-02 RX ADMIN — METFORMIN HYDROCHLORIDE 1000 MG: 1000 TABLET, FILM COATED ORAL at 16:42

## 2019-10-02 RX ADMIN — FERROUS SULFATE TAB 325 MG (65 MG ELEMENTAL FE) 325 MG: 325 (65 FE) TAB at 08:57

## 2019-10-02 RX ADMIN — CHLORHEXIDINE GLUCONATE 0.12% ORAL RINSE 15 ML: 1.2 LIQUID ORAL at 08:55

## 2019-10-02 RX ADMIN — FERROUS SULFATE TAB 325 MG (65 MG ELEMENTAL FE) 325 MG: 325 (65 FE) TAB at 16:43

## 2019-10-02 RX ADMIN — ARIPIPRAZOLE 15 MG: 15 TABLET ORAL at 21:01

## 2019-10-02 RX ADMIN — LACTULOSE 20 G: 20 SOLUTION ORAL at 08:56

## 2019-10-02 RX ADMIN — LACTULOSE 20 G: 20 SOLUTION ORAL at 23:41

## 2019-10-02 RX ADMIN — PANTOPRAZOLE SODIUM 40 MG: 40 TABLET, DELAYED RELEASE ORAL at 07:19

## 2019-10-02 RX ADMIN — LACTULOSE 20 G: 20 SOLUTION ORAL at 12:12

## 2019-10-02 RX ADMIN — CHLORHEXIDINE GLUCONATE 0.12% ORAL RINSE 15 ML: 1.2 LIQUID ORAL at 21:01

## 2019-10-02 RX ADMIN — ENOXAPARIN SODIUM 40 MG: 40 INJECTION SUBCUTANEOUS at 08:56

## 2019-10-02 RX ADMIN — INSULIN GLARGINE 25 UNITS: 100 INJECTION, SOLUTION SUBCUTANEOUS at 21:05

## 2019-10-02 RX ADMIN — Medication 1 CAPSULE: at 08:56

## 2019-10-02 RX ADMIN — LEVOTHYROXINE SODIUM 75 MCG: 75 TABLET ORAL at 07:19

## 2019-10-02 RX ADMIN — BARIUM SULFATE 45 G: 0.6 CREAM ORAL at 09:54

## 2019-10-02 RX ADMIN — SODIUM CHLORIDE: 9 INJECTION, SOLUTION INTRAVENOUS at 02:30

## 2019-10-02 RX ADMIN — LACTULOSE 20 G: 20 SOLUTION ORAL at 21:01

## 2019-10-02 RX ADMIN — METOPROLOL TARTRATE 5 MG: 5 INJECTION INTRAVENOUS at 15:59

## 2019-10-02 RX ADMIN — METOPROLOL TARTRATE 5 MG: 5 INJECTION INTRAVENOUS at 08:56

## 2019-10-02 RX ADMIN — TRAZODONE HYDROCHLORIDE 100 MG: 50 TABLET ORAL at 21:01

## 2019-10-02 RX ADMIN — LABETALOL HYDROCHLORIDE 10 MG: 5 INJECTION INTRAVENOUS at 16:58

## 2019-10-02 RX ADMIN — METOPROLOL TARTRATE 5 MG: 5 INJECTION INTRAVENOUS at 23:41

## 2019-10-02 RX ADMIN — BARIUM SULFATE 58 G: 960 POWDER, FOR SUSPENSION ORAL at 09:55

## 2019-10-02 RX ADMIN — METFORMIN HYDROCHLORIDE 1000 MG: 1000 TABLET, FILM COATED ORAL at 08:56

## 2019-10-02 RX ADMIN — INSULIN LISPRO 5 UNITS: 100 INJECTION, SOLUTION INTRAVENOUS; SUBCUTANEOUS at 16:43

## 2019-10-02 RX ADMIN — Medication 4000 UNITS: at 08:57

## 2019-10-02 RX ADMIN — LACTULOSE 20 G: 20 SOLUTION ORAL at 16:42

## 2019-10-02 RX ADMIN — INSULIN LISPRO 5 UNITS: 100 INJECTION, SOLUTION INTRAVENOUS; SUBCUTANEOUS at 07:37

## 2019-10-02 ASSESSMENT — PAIN SCALES - GENERAL
PAINLEVEL_OUTOF10: 0

## 2019-10-02 ASSESSMENT — PAIN SCALES - WONG BAKER: WONGBAKER_NUMERICALRESPONSE: 0

## 2019-10-03 LAB
METER GLUCOSE: 120 MG/DL (ref 74–99)
METER GLUCOSE: 142 MG/DL (ref 74–99)
METER GLUCOSE: 186 MG/DL (ref 74–99)
METER GLUCOSE: 192 MG/DL (ref 74–99)

## 2019-10-03 PROCEDURE — 6370000000 HC RX 637 (ALT 250 FOR IP): Performed by: INTERNAL MEDICINE

## 2019-10-03 PROCEDURE — 2580000003 HC RX 258: Performed by: INTERNAL MEDICINE

## 2019-10-03 PROCEDURE — 82962 GLUCOSE BLOOD TEST: CPT

## 2019-10-03 PROCEDURE — 6360000002 HC RX W HCPCS: Performed by: INTERNAL MEDICINE

## 2019-10-03 PROCEDURE — 1200000000 HC SEMI PRIVATE

## 2019-10-03 RX ORDER — INSULIN GLARGINE 100 [IU]/ML
30 INJECTION, SOLUTION SUBCUTANEOUS NIGHTLY
Status: DISCONTINUED | OUTPATIENT
Start: 2019-10-03 | End: 2019-10-05

## 2019-10-03 RX ORDER — TRAZODONE HYDROCHLORIDE 150 MG/1
150 TABLET ORAL NIGHTLY
Status: DISCONTINUED | OUTPATIENT
Start: 2019-10-03 | End: 2019-10-07 | Stop reason: HOSPADM

## 2019-10-03 RX ORDER — CLONAZEPAM 0.5 MG/1
1 TABLET ORAL 3 TIMES DAILY
Status: DISCONTINUED | OUTPATIENT
Start: 2019-10-03 | End: 2019-10-04

## 2019-10-03 RX ORDER — METOPROLOL TARTRATE 50 MG/1
50 TABLET, FILM COATED ORAL 2 TIMES DAILY
Status: DISCONTINUED | OUTPATIENT
Start: 2019-10-03 | End: 2019-10-04

## 2019-10-03 RX ADMIN — METOPROLOL TARTRATE 50 MG: 50 TABLET ORAL at 21:22

## 2019-10-03 RX ADMIN — ENOXAPARIN SODIUM 40 MG: 40 INJECTION SUBCUTANEOUS at 09:59

## 2019-10-03 RX ADMIN — FERROUS SULFATE TAB 325 MG (65 MG ELEMENTAL FE) 325 MG: 325 (65 FE) TAB at 09:59

## 2019-10-03 RX ADMIN — Medication 1 CAPSULE: at 10:00

## 2019-10-03 RX ADMIN — CHLORHEXIDINE GLUCONATE 0.12% ORAL RINSE 15 ML: 1.2 LIQUID ORAL at 09:59

## 2019-10-03 RX ADMIN — Medication 10 ML: at 21:23

## 2019-10-03 RX ADMIN — TRAZODONE HYDROCHLORIDE 150 MG: 150 TABLET ORAL at 21:26

## 2019-10-03 RX ADMIN — INSULIN LISPRO 5 UNITS: 100 INJECTION, SOLUTION INTRAVENOUS; SUBCUTANEOUS at 06:52

## 2019-10-03 RX ADMIN — LEVOTHYROXINE SODIUM 75 MCG: 75 TABLET ORAL at 06:43

## 2019-10-03 RX ADMIN — CLONAZEPAM 1 MG: 0.5 TABLET ORAL at 21:19

## 2019-10-03 RX ADMIN — INSULIN LISPRO 5 UNITS: 100 INJECTION, SOLUTION INTRAVENOUS; SUBCUTANEOUS at 12:01

## 2019-10-03 RX ADMIN — INSULIN GLARGINE 30 UNITS: 100 INJECTION, SOLUTION SUBCUTANEOUS at 21:19

## 2019-10-03 RX ADMIN — CHLORHEXIDINE GLUCONATE 0.12% ORAL RINSE 15 ML: 1.2 LIQUID ORAL at 21:19

## 2019-10-03 RX ADMIN — METFORMIN HYDROCHLORIDE 1000 MG: 1000 TABLET, FILM COATED ORAL at 16:07

## 2019-10-03 RX ADMIN — FERROUS SULFATE TAB 325 MG (65 MG ELEMENTAL FE) 325 MG: 325 (65 FE) TAB at 16:07

## 2019-10-03 RX ADMIN — ARIPIPRAZOLE 15 MG: 15 TABLET ORAL at 21:26

## 2019-10-03 RX ADMIN — LACTULOSE 20 G: 20 SOLUTION ORAL at 04:09

## 2019-10-03 RX ADMIN — METOPROLOL TARTRATE 50 MG: 50 TABLET ORAL at 10:01

## 2019-10-03 RX ADMIN — METFORMIN HYDROCHLORIDE 1000 MG: 1000 TABLET, FILM COATED ORAL at 10:01

## 2019-10-03 RX ADMIN — Medication 4000 UNITS: at 10:00

## 2019-10-03 ASSESSMENT — PAIN SCALES - GENERAL
PAINLEVEL_OUTOF10: 0

## 2019-10-04 LAB
ALBUMIN SERPL-MCNC: 2.9 G/DL (ref 3.5–5.2)
ALP BLD-CCNC: 48 U/L (ref 35–104)
ALT SERPL-CCNC: 15 U/L (ref 0–32)
ANION GAP SERPL CALCULATED.3IONS-SCNC: 12 MMOL/L (ref 7–16)
AST SERPL-CCNC: 18 U/L (ref 0–31)
BILIRUB SERPL-MCNC: 0.4 MG/DL (ref 0–1.2)
BUN BLDV-MCNC: 13 MG/DL (ref 8–23)
CALCIUM SERPL-MCNC: 9 MG/DL (ref 8.6–10.2)
CHLORIDE BLD-SCNC: 106 MMOL/L (ref 98–107)
CO2: 25 MMOL/L (ref 22–29)
CREAT SERPL-MCNC: 0.5 MG/DL (ref 0.5–1)
GFR AFRICAN AMERICAN: >60
GFR NON-AFRICAN AMERICAN: >60 ML/MIN/1.73
GLUCOSE BLD-MCNC: 181 MG/DL (ref 74–99)
METER GLUCOSE: 110 MG/DL (ref 74–99)
METER GLUCOSE: 110 MG/DL (ref 74–99)
METER GLUCOSE: 174 MG/DL (ref 74–99)
METER GLUCOSE: 92 MG/DL (ref 74–99)
POTASSIUM SERPL-SCNC: 3.5 MMOL/L (ref 3.5–5)
SODIUM BLD-SCNC: 143 MMOL/L (ref 132–146)
TOTAL PROTEIN: 5.7 G/DL (ref 6.4–8.3)

## 2019-10-04 PROCEDURE — 36415 COLL VENOUS BLD VENIPUNCTURE: CPT

## 2019-10-04 PROCEDURE — 6370000000 HC RX 637 (ALT 250 FOR IP): Performed by: INTERNAL MEDICINE

## 2019-10-04 PROCEDURE — 82962 GLUCOSE BLOOD TEST: CPT

## 2019-10-04 PROCEDURE — 80053 COMPREHEN METABOLIC PANEL: CPT

## 2019-10-04 PROCEDURE — 6360000002 HC RX W HCPCS: Performed by: INTERNAL MEDICINE

## 2019-10-04 PROCEDURE — 1200000000 HC SEMI PRIVATE

## 2019-10-04 RX ORDER — CLONAZEPAM 0.5 MG/1
0.5 TABLET ORAL 3 TIMES DAILY
Status: DISCONTINUED | OUTPATIENT
Start: 2019-10-04 | End: 2019-10-05

## 2019-10-04 RX ADMIN — CLONAZEPAM 0.5 MG: 0.5 TABLET ORAL at 08:59

## 2019-10-04 RX ADMIN — METOPROLOL TARTRATE 75 MG: 50 TABLET ORAL at 09:00

## 2019-10-04 RX ADMIN — Medication 1 CAPSULE: at 08:59

## 2019-10-04 RX ADMIN — CLONAZEPAM 0.5 MG: 0.5 TABLET ORAL at 21:29

## 2019-10-04 RX ADMIN — ARIPIPRAZOLE 15 MG: 15 TABLET ORAL at 21:29

## 2019-10-04 RX ADMIN — CHLORHEXIDINE GLUCONATE 0.12% ORAL RINSE 15 ML: 1.2 LIQUID ORAL at 09:01

## 2019-10-04 RX ADMIN — Medication 4000 UNITS: at 08:59

## 2019-10-04 RX ADMIN — INSULIN LISPRO 5 UNITS: 100 INJECTION, SOLUTION INTRAVENOUS; SUBCUTANEOUS at 09:02

## 2019-10-04 RX ADMIN — CLONAZEPAM 0.5 MG: 0.5 TABLET ORAL at 13:49

## 2019-10-04 RX ADMIN — LEVOTHYROXINE SODIUM 75 MCG: 75 TABLET ORAL at 06:39

## 2019-10-04 RX ADMIN — INSULIN GLARGINE 30 UNITS: 100 INJECTION, SOLUTION SUBCUTANEOUS at 21:29

## 2019-10-04 RX ADMIN — FERROUS SULFATE TAB 325 MG (65 MG ELEMENTAL FE) 325 MG: 325 (65 FE) TAB at 09:01

## 2019-10-04 RX ADMIN — METFORMIN HYDROCHLORIDE 1000 MG: 1000 TABLET, FILM COATED ORAL at 08:59

## 2019-10-04 RX ADMIN — TRAZODONE HYDROCHLORIDE 150 MG: 150 TABLET ORAL at 21:29

## 2019-10-04 RX ADMIN — ENOXAPARIN SODIUM 40 MG: 40 INJECTION SUBCUTANEOUS at 08:59

## 2019-10-04 RX ADMIN — METFORMIN HYDROCHLORIDE 1000 MG: 1000 TABLET, FILM COATED ORAL at 16:34

## 2019-10-04 RX ADMIN — FERROUS SULFATE TAB 325 MG (65 MG ELEMENTAL FE) 325 MG: 325 (65 FE) TAB at 16:34

## 2019-10-04 RX ADMIN — METOPROLOL TARTRATE 75 MG: 50 TABLET ORAL at 21:29

## 2019-10-04 ASSESSMENT — PAIN SCALES - GENERAL
PAINLEVEL_OUTOF10: 0
PAINLEVEL_OUTOF10: 0

## 2019-10-05 LAB
ALBUMIN SERPL-MCNC: 3 G/DL (ref 3.5–5.2)
ALP BLD-CCNC: 45 U/L (ref 35–104)
ALT SERPL-CCNC: 12 U/L (ref 0–32)
ANION GAP SERPL CALCULATED.3IONS-SCNC: 13 MMOL/L (ref 7–16)
AST SERPL-CCNC: 11 U/L (ref 0–31)
BACTERIA: NORMAL /HPF
BILIRUB SERPL-MCNC: 0.4 MG/DL (ref 0–1.2)
BILIRUBIN URINE: NEGATIVE
BLOOD CULTURE, ROUTINE: NORMAL
BLOOD, URINE: NEGATIVE
BUN BLDV-MCNC: 15 MG/DL (ref 8–23)
CALCIUM SERPL-MCNC: 9.4 MG/DL (ref 8.6–10.2)
CHLORIDE BLD-SCNC: 100 MMOL/L (ref 98–107)
CLARITY: CLEAR
CO2: 26 MMOL/L (ref 22–29)
COLOR: YELLOW
CREAT SERPL-MCNC: 0.6 MG/DL (ref 0.5–1)
GFR AFRICAN AMERICAN: >60
GFR NON-AFRICAN AMERICAN: >60 ML/MIN/1.73
GLUCOSE BLD-MCNC: 179 MG/DL (ref 74–99)
GLUCOSE URINE: NEGATIVE MG/DL
HCT VFR BLD CALC: 29.9 % (ref 34–48)
HEMOGLOBIN: 8.9 G/DL (ref 11.5–15.5)
KETONES, URINE: ABNORMAL MG/DL
LEUKOCYTE ESTERASE, URINE: ABNORMAL
MCH RBC QN AUTO: 25.5 PG (ref 26–35)
MCHC RBC AUTO-ENTMCNC: 29.8 % (ref 32–34.5)
MCV RBC AUTO: 85.7 FL (ref 80–99.9)
METER GLUCOSE: 106 MG/DL (ref 74–99)
METER GLUCOSE: 140 MG/DL (ref 74–99)
METER GLUCOSE: 158 MG/DL (ref 74–99)
METER GLUCOSE: 198 MG/DL (ref 74–99)
NITRITE, URINE: NEGATIVE
PDW BLD-RTO: 15.6 FL (ref 11.5–15)
PH UA: 6 (ref 5–9)
PLATELET # BLD: 316 E9/L (ref 130–450)
PMV BLD AUTO: 10.9 FL (ref 7–12)
POTASSIUM SERPL-SCNC: 3.8 MMOL/L (ref 3.5–5)
PREALBUMIN: 14 MG/DL (ref 20–40)
PROTEIN UA: NEGATIVE MG/DL
RBC # BLD: 3.49 E12/L (ref 3.5–5.5)
RBC UA: NORMAL /HPF (ref 0–2)
SODIUM BLD-SCNC: 139 MMOL/L (ref 132–146)
SPECIFIC GRAVITY UA: 1.01 (ref 1–1.03)
TOTAL PROTEIN: 5.6 G/DL (ref 6.4–8.3)
UROBILINOGEN, URINE: 0.2 E.U./DL
WBC # BLD: 6.1 E9/L (ref 4.5–11.5)
WBC UA: NORMAL /HPF (ref 0–5)

## 2019-10-05 PROCEDURE — 6370000000 HC RX 637 (ALT 250 FOR IP): Performed by: INTERNAL MEDICINE

## 2019-10-05 PROCEDURE — 84134 ASSAY OF PREALBUMIN: CPT

## 2019-10-05 PROCEDURE — 82962 GLUCOSE BLOOD TEST: CPT

## 2019-10-05 PROCEDURE — 1200000000 HC SEMI PRIVATE

## 2019-10-05 PROCEDURE — 81001 URINALYSIS AUTO W/SCOPE: CPT

## 2019-10-05 PROCEDURE — 85027 COMPLETE CBC AUTOMATED: CPT

## 2019-10-05 PROCEDURE — 80053 COMPREHEN METABOLIC PANEL: CPT

## 2019-10-05 PROCEDURE — 6360000002 HC RX W HCPCS: Performed by: INTERNAL MEDICINE

## 2019-10-05 PROCEDURE — 36415 COLL VENOUS BLD VENIPUNCTURE: CPT

## 2019-10-05 RX ORDER — INSULIN GLARGINE 100 [IU]/ML
28 INJECTION, SOLUTION SUBCUTANEOUS NIGHTLY
Status: DISCONTINUED | OUTPATIENT
Start: 2019-10-05 | End: 2019-10-07 | Stop reason: HOSPADM

## 2019-10-05 RX ORDER — CLONAZEPAM 0.5 MG/1
0.5 TABLET ORAL 2 TIMES DAILY
Status: DISCONTINUED | OUTPATIENT
Start: 2019-10-05 | End: 2019-10-07 | Stop reason: HOSPADM

## 2019-10-05 RX ADMIN — ENOXAPARIN SODIUM 40 MG: 40 INJECTION SUBCUTANEOUS at 09:08

## 2019-10-05 RX ADMIN — Medication 4000 UNITS: at 09:07

## 2019-10-05 RX ADMIN — METOPROLOL TARTRATE 75 MG: 50 TABLET ORAL at 09:07

## 2019-10-05 RX ADMIN — CLONAZEPAM 0.5 MG: 0.5 TABLET ORAL at 09:08

## 2019-10-05 RX ADMIN — CHLORHEXIDINE GLUCONATE 0.12% ORAL RINSE 15 ML: 1.2 LIQUID ORAL at 21:25

## 2019-10-05 RX ADMIN — INSULIN LISPRO 5 UNITS: 100 INJECTION, SOLUTION INTRAVENOUS; SUBCUTANEOUS at 09:08

## 2019-10-05 RX ADMIN — LEVOTHYROXINE SODIUM 75 MCG: 75 TABLET ORAL at 07:09

## 2019-10-05 RX ADMIN — CHLORHEXIDINE GLUCONATE 0.12% ORAL RINSE 15 ML: 1.2 LIQUID ORAL at 09:08

## 2019-10-05 RX ADMIN — METFORMIN HYDROCHLORIDE 1000 MG: 1000 TABLET, FILM COATED ORAL at 09:07

## 2019-10-05 RX ADMIN — CLONAZEPAM 0.5 MG: 0.5 TABLET ORAL at 21:25

## 2019-10-05 RX ADMIN — FERROUS SULFATE TAB 325 MG (65 MG ELEMENTAL FE) 325 MG: 325 (65 FE) TAB at 09:07

## 2019-10-05 RX ADMIN — ARIPIPRAZOLE 15 MG: 15 TABLET ORAL at 21:25

## 2019-10-05 RX ADMIN — Medication 1 CAPSULE: at 09:08

## 2019-10-05 RX ADMIN — TRAZODONE HYDROCHLORIDE 150 MG: 150 TABLET ORAL at 21:25

## 2019-10-05 RX ADMIN — METFORMIN HYDROCHLORIDE 500 MG: 500 TABLET ORAL at 16:38

## 2019-10-05 RX ADMIN — INSULIN GLARGINE 28 UNITS: 100 INJECTION, SOLUTION SUBCUTANEOUS at 21:26

## 2019-10-05 RX ADMIN — FERROUS SULFATE TAB 325 MG (65 MG ELEMENTAL FE) 325 MG: 325 (65 FE) TAB at 16:38

## 2019-10-05 RX ADMIN — METOPROLOL TARTRATE 75 MG: 50 TABLET ORAL at 21:25

## 2019-10-05 ASSESSMENT — PAIN SCALES - GENERAL
PAINLEVEL_OUTOF10: 0

## 2019-10-06 LAB
ALBUMIN SERPL-MCNC: 3 G/DL (ref 3.5–5.2)
ALP BLD-CCNC: 45 U/L (ref 35–104)
ALT SERPL-CCNC: 11 U/L (ref 0–32)
ANION GAP SERPL CALCULATED.3IONS-SCNC: 8 MMOL/L (ref 7–16)
AST SERPL-CCNC: 10 U/L (ref 0–31)
BASOPHILS ABSOLUTE: 0.03 E9/L (ref 0–0.2)
BASOPHILS RELATIVE PERCENT: 0.6 % (ref 0–2)
BILIRUB SERPL-MCNC: 0.4 MG/DL (ref 0–1.2)
BUN BLDV-MCNC: 16 MG/DL (ref 8–23)
CALCIUM SERPL-MCNC: 9.5 MG/DL (ref 8.6–10.2)
CHLORIDE BLD-SCNC: 103 MMOL/L (ref 98–107)
CO2: 31 MMOL/L (ref 22–29)
CREAT SERPL-MCNC: 0.7 MG/DL (ref 0.5–1)
CULTURE, BLOOD 2: ABNORMAL
EOSINOPHILS ABSOLUTE: 0.12 E9/L (ref 0.05–0.5)
EOSINOPHILS RELATIVE PERCENT: 2.5 % (ref 0–6)
GFR AFRICAN AMERICAN: >60
GFR NON-AFRICAN AMERICAN: >60 ML/MIN/1.73
GLUCOSE BLD-MCNC: 161 MG/DL (ref 74–99)
HCT VFR BLD CALC: 31.7 % (ref 34–48)
HEMOGLOBIN: 9.2 G/DL (ref 11.5–15.5)
IMMATURE GRANULOCYTES #: 0.02 E9/L
IMMATURE GRANULOCYTES %: 0.4 % (ref 0–5)
LYMPHOCYTES ABSOLUTE: 1.21 E9/L (ref 1.5–4)
LYMPHOCYTES RELATIVE PERCENT: 24.9 % (ref 20–42)
MCH RBC QN AUTO: 25 PG (ref 26–35)
MCHC RBC AUTO-ENTMCNC: 29 % (ref 32–34.5)
MCV RBC AUTO: 86.1 FL (ref 80–99.9)
METER GLUCOSE: 158 MG/DL (ref 74–99)
METER GLUCOSE: 161 MG/DL (ref 74–99)
METER GLUCOSE: 175 MG/DL (ref 74–99)
METER GLUCOSE: 91 MG/DL (ref 74–99)
MONOCYTES ABSOLUTE: 0.33 E9/L (ref 0.1–0.95)
MONOCYTES RELATIVE PERCENT: 6.8 % (ref 2–12)
NEUTROPHILS ABSOLUTE: 3.14 E9/L (ref 1.8–7.3)
NEUTROPHILS RELATIVE PERCENT: 64.8 % (ref 43–80)
ORGANISM: ABNORMAL
PDW BLD-RTO: 15.5 FL (ref 11.5–15)
PLATELET # BLD: 296 E9/L (ref 130–450)
PMV BLD AUTO: 10.8 FL (ref 7–12)
POTASSIUM SERPL-SCNC: 4.2 MMOL/L (ref 3.5–5)
RBC # BLD: 3.68 E12/L (ref 3.5–5.5)
SODIUM BLD-SCNC: 142 MMOL/L (ref 132–146)
TOTAL PROTEIN: 6 G/DL (ref 6.4–8.3)
WBC # BLD: 4.9 E9/L (ref 4.5–11.5)

## 2019-10-06 PROCEDURE — 6370000000 HC RX 637 (ALT 250 FOR IP): Performed by: INTERNAL MEDICINE

## 2019-10-06 PROCEDURE — 82962 GLUCOSE BLOOD TEST: CPT

## 2019-10-06 PROCEDURE — 36415 COLL VENOUS BLD VENIPUNCTURE: CPT

## 2019-10-06 PROCEDURE — 85025 COMPLETE CBC W/AUTO DIFF WBC: CPT

## 2019-10-06 PROCEDURE — 6360000002 HC RX W HCPCS: Performed by: INTERNAL MEDICINE

## 2019-10-06 PROCEDURE — 80053 COMPREHEN METABOLIC PANEL: CPT

## 2019-10-06 PROCEDURE — 1200000000 HC SEMI PRIVATE

## 2019-10-06 RX ADMIN — Medication 4000 UNITS: at 09:02

## 2019-10-06 RX ADMIN — ENOXAPARIN SODIUM 40 MG: 40 INJECTION SUBCUTANEOUS at 09:02

## 2019-10-06 RX ADMIN — INSULIN GLARGINE 28 UNITS: 100 INJECTION, SOLUTION SUBCUTANEOUS at 20:58

## 2019-10-06 RX ADMIN — METOPROLOL TARTRATE 75 MG: 50 TABLET ORAL at 20:57

## 2019-10-06 RX ADMIN — METOPROLOL TARTRATE 75 MG: 50 TABLET ORAL at 09:03

## 2019-10-06 RX ADMIN — FERROUS SULFATE TAB 325 MG (65 MG ELEMENTAL FE) 325 MG: 325 (65 FE) TAB at 17:20

## 2019-10-06 RX ADMIN — CLONAZEPAM 0.5 MG: 0.5 TABLET ORAL at 09:03

## 2019-10-06 RX ADMIN — LEVOTHYROXINE SODIUM 75 MCG: 75 TABLET ORAL at 06:46

## 2019-10-06 RX ADMIN — METFORMIN HYDROCHLORIDE 500 MG: 500 TABLET ORAL at 17:21

## 2019-10-06 RX ADMIN — INSULIN LISPRO 5 UNITS: 100 INJECTION, SOLUTION INTRAVENOUS; SUBCUTANEOUS at 09:03

## 2019-10-06 RX ADMIN — FERROUS SULFATE TAB 325 MG (65 MG ELEMENTAL FE) 325 MG: 325 (65 FE) TAB at 09:03

## 2019-10-06 RX ADMIN — INSULIN LISPRO 5 UNITS: 100 INJECTION, SOLUTION INTRAVENOUS; SUBCUTANEOUS at 12:00

## 2019-10-06 RX ADMIN — ARIPIPRAZOLE 15 MG: 15 TABLET ORAL at 20:56

## 2019-10-06 RX ADMIN — METFORMIN HYDROCHLORIDE 500 MG: 500 TABLET ORAL at 09:02

## 2019-10-06 RX ADMIN — CHLORHEXIDINE GLUCONATE 0.12% ORAL RINSE 15 ML: 1.2 LIQUID ORAL at 20:56

## 2019-10-06 RX ADMIN — Medication 1 CAPSULE: at 09:02

## 2019-10-06 RX ADMIN — ACETAMINOPHEN 650 MG: 325 TABLET, FILM COATED ORAL at 21:07

## 2019-10-06 RX ADMIN — TRAZODONE HYDROCHLORIDE 150 MG: 150 TABLET ORAL at 20:57

## 2019-10-06 RX ADMIN — CHLORHEXIDINE GLUCONATE 0.12% ORAL RINSE 15 ML: 1.2 LIQUID ORAL at 09:02

## 2019-10-06 RX ADMIN — CLONAZEPAM 0.5 MG: 0.5 TABLET ORAL at 20:56

## 2019-10-06 ASSESSMENT — PAIN SCALES - GENERAL
PAINLEVEL_OUTOF10: 0
PAINLEVEL_OUTOF10: 6
PAINLEVEL_OUTOF10: 0

## 2019-10-07 VITALS
HEIGHT: 63 IN | DIASTOLIC BLOOD PRESSURE: 80 MMHG | OXYGEN SATURATION: 96 % | BODY MASS INDEX: 23.91 KG/M2 | HEART RATE: 94 BPM | TEMPERATURE: 96.7 F | WEIGHT: 134.92 LBS | SYSTOLIC BLOOD PRESSURE: 148 MMHG | RESPIRATION RATE: 16 BRPM

## 2019-10-07 LAB
ALBUMIN SERPL-MCNC: 2.9 G/DL (ref 3.5–5.2)
ALP BLD-CCNC: 50 U/L (ref 35–104)
ALT SERPL-CCNC: 10 U/L (ref 0–32)
ANION GAP SERPL CALCULATED.3IONS-SCNC: 8 MMOL/L (ref 7–16)
AST SERPL-CCNC: 11 U/L (ref 0–31)
BILIRUB SERPL-MCNC: 0.4 MG/DL (ref 0–1.2)
BUN BLDV-MCNC: 17 MG/DL (ref 8–23)
CALCIUM SERPL-MCNC: 9.5 MG/DL (ref 8.6–10.2)
CHLORIDE BLD-SCNC: 103 MMOL/L (ref 98–107)
CO2: 30 MMOL/L (ref 22–29)
CREAT SERPL-MCNC: 0.7 MG/DL (ref 0.5–1)
GFR AFRICAN AMERICAN: >60
GFR NON-AFRICAN AMERICAN: >60 ML/MIN/1.73
GLUCOSE BLD-MCNC: 183 MG/DL (ref 74–99)
HCT VFR BLD CALC: 31.2 % (ref 34–48)
HEMOGLOBIN: 9.1 G/DL (ref 11.5–15.5)
MCH RBC QN AUTO: 25.1 PG (ref 26–35)
MCHC RBC AUTO-ENTMCNC: 29.2 % (ref 32–34.5)
MCV RBC AUTO: 86.2 FL (ref 80–99.9)
METER GLUCOSE: 145 MG/DL (ref 74–99)
METER GLUCOSE: 205 MG/DL (ref 74–99)
PDW BLD-RTO: 15.5 FL (ref 11.5–15)
PLATELET # BLD: 298 E9/L (ref 130–450)
PMV BLD AUTO: 11.3 FL (ref 7–12)
POTASSIUM SERPL-SCNC: 4.1 MMOL/L (ref 3.5–5)
RBC # BLD: 3.62 E12/L (ref 3.5–5.5)
SODIUM BLD-SCNC: 141 MMOL/L (ref 132–146)
TOTAL PROTEIN: 6 G/DL (ref 6.4–8.3)
WBC # BLD: 5.3 E9/L (ref 4.5–11.5)

## 2019-10-07 PROCEDURE — 82962 GLUCOSE BLOOD TEST: CPT

## 2019-10-07 PROCEDURE — 85027 COMPLETE CBC AUTOMATED: CPT

## 2019-10-07 PROCEDURE — 80053 COMPREHEN METABOLIC PANEL: CPT

## 2019-10-07 PROCEDURE — 6370000000 HC RX 637 (ALT 250 FOR IP): Performed by: INTERNAL MEDICINE

## 2019-10-07 PROCEDURE — 36415 COLL VENOUS BLD VENIPUNCTURE: CPT

## 2019-10-07 PROCEDURE — 6360000002 HC RX W HCPCS: Performed by: INTERNAL MEDICINE

## 2019-10-07 RX ORDER — METOPROLOL TARTRATE 75 MG/1
75 TABLET, FILM COATED ORAL 2 TIMES DAILY
Qty: 60 TABLET | Refills: 3 | DISCHARGE
Start: 2019-10-07

## 2019-10-07 RX ORDER — CHLORHEXIDINE GLUCONATE 0.12 MG/ML
15 RINSE ORAL 2 TIMES DAILY
Qty: 420 ML | Refills: 0 | DISCHARGE
Start: 2019-10-07 | End: 2019-10-21

## 2019-10-07 RX ORDER — CLONAZEPAM 0.5 MG/1
0.5 TABLET ORAL 2 TIMES DAILY
Qty: 60 TABLET | Refills: 3 | Status: SHIPPED | DISCHARGE
Start: 2019-10-07 | End: 2020-01-14

## 2019-10-07 RX ORDER — TRAZODONE HYDROCHLORIDE 150 MG/1
150 TABLET ORAL NIGHTLY
DISCHARGE
Start: 2019-10-07

## 2019-10-07 RX ORDER — LEVOTHYROXINE SODIUM 0.07 MG/1
75 TABLET ORAL DAILY
Qty: 30 TABLET | Refills: 3 | DISCHARGE
Start: 2019-10-08

## 2019-10-07 RX ORDER — INSULIN GLARGINE 100 [IU]/ML
28 INJECTION, SOLUTION SUBCUTANEOUS NIGHTLY
Qty: 1 VIAL | Refills: 3 | DISCHARGE
Start: 2019-10-07

## 2019-10-07 RX ADMIN — CLONAZEPAM 0.5 MG: 0.5 TABLET ORAL at 09:47

## 2019-10-07 RX ADMIN — LEVOTHYROXINE SODIUM 75 MCG: 75 TABLET ORAL at 06:34

## 2019-10-07 RX ADMIN — INSULIN LISPRO 10 UNITS: 100 INJECTION, SOLUTION INTRAVENOUS; SUBCUTANEOUS at 09:46

## 2019-10-07 RX ADMIN — Medication 4000 UNITS: at 09:45

## 2019-10-07 RX ADMIN — FERROUS SULFATE TAB 325 MG (65 MG ELEMENTAL FE) 325 MG: 325 (65 FE) TAB at 09:48

## 2019-10-07 RX ADMIN — METFORMIN HYDROCHLORIDE 500 MG: 500 TABLET ORAL at 09:46

## 2019-10-07 RX ADMIN — ENOXAPARIN SODIUM 40 MG: 40 INJECTION SUBCUTANEOUS at 09:48

## 2019-10-07 RX ADMIN — Medication 1 CAPSULE: at 09:48

## 2019-10-07 RX ADMIN — METOPROLOL TARTRATE 75 MG: 50 TABLET ORAL at 09:48

## 2019-10-07 ASSESSMENT — PAIN SCALES - GENERAL
PAINLEVEL_OUTOF10: 0

## 2019-10-11 ENCOUNTER — OFFICE VISIT (OUTPATIENT)
Dept: NEUROSURGERY | Age: 69
End: 2019-10-11
Payer: MEDICARE

## 2019-10-11 VITALS — DIASTOLIC BLOOD PRESSURE: 60 MMHG | SYSTOLIC BLOOD PRESSURE: 99 MMHG

## 2019-10-11 DIAGNOSIS — G82.20 PARAPLEGIA (HCC): Primary | ICD-10-CM

## 2019-10-11 PROCEDURE — 99203 OFFICE O/P NEW LOW 30 MIN: CPT | Performed by: NEUROLOGICAL SURGERY

## 2019-10-11 PROCEDURE — 3017F COLORECTAL CA SCREEN DOC REV: CPT | Performed by: NEUROLOGICAL SURGERY

## 2019-10-11 PROCEDURE — G8420 CALC BMI NORM PARAMETERS: HCPCS | Performed by: NEUROLOGICAL SURGERY

## 2019-10-11 PROCEDURE — 1123F ACP DISCUSS/DSCN MKR DOCD: CPT | Performed by: NEUROLOGICAL SURGERY

## 2019-10-11 PROCEDURE — G8400 PT W/DXA NO RESULTS DOC: HCPCS | Performed by: NEUROLOGICAL SURGERY

## 2019-10-11 PROCEDURE — G8482 FLU IMMUNIZE ORDER/ADMIN: HCPCS | Performed by: NEUROLOGICAL SURGERY

## 2019-10-11 PROCEDURE — 1111F DSCHRG MED/CURRENT MED MERGE: CPT | Performed by: NEUROLOGICAL SURGERY

## 2019-10-11 PROCEDURE — 1036F TOBACCO NON-USER: CPT | Performed by: NEUROLOGICAL SURGERY

## 2019-10-11 PROCEDURE — G8427 DOCREV CUR MEDS BY ELIG CLIN: HCPCS | Performed by: NEUROLOGICAL SURGERY

## 2019-10-11 PROCEDURE — 1090F PRES/ABSN URINE INCON ASSESS: CPT | Performed by: NEUROLOGICAL SURGERY

## 2019-10-11 PROCEDURE — 4040F PNEUMOC VAC/ADMIN/RCVD: CPT | Performed by: NEUROLOGICAL SURGERY

## 2019-10-11 ASSESSMENT — ENCOUNTER SYMPTOMS
EYES NEGATIVE: 1
BOWEL INCONTINENCE: 1
RESPIRATORY NEGATIVE: 1
BACK PAIN: 1
ABDOMINAL PAIN: 0

## 2019-11-11 DIAGNOSIS — F51.01 IDIOPATHIC INSOMNIA: Primary | ICD-10-CM

## 2020-12-08 ENCOUNTER — HOSPITAL ENCOUNTER (EMERGENCY)
Age: 70
Discharge: HOME | End: 2020-12-08
Attending: EMERGENCY MEDICINE
Payer: MEDICARE

## 2020-12-08 ENCOUNTER — APPOINTMENT (OUTPATIENT)
Dept: GENERAL RADIOLOGY | Age: 70
End: 2020-12-08
Payer: MEDICARE

## 2020-12-08 VITALS
RESPIRATION RATE: 18 BRPM | SYSTOLIC BLOOD PRESSURE: 105 MMHG | DIASTOLIC BLOOD PRESSURE: 53 MMHG | BODY MASS INDEX: 23.92 KG/M2 | WEIGHT: 135 LBS | HEIGHT: 63 IN | HEART RATE: 75 BPM | OXYGEN SATURATION: 97 % | TEMPERATURE: 98.1 F

## 2020-12-08 LAB
ALBUMIN SERPL-MCNC: 3.5 G/DL (ref 3.5–5.2)
ALP BLD-CCNC: 71 U/L (ref 35–104)
ALT SERPL-CCNC: 9 U/L (ref 0–32)
ANION GAP SERPL CALCULATED.3IONS-SCNC: 7 MMOL/L (ref 7–16)
AST SERPL-CCNC: 13 U/L (ref 0–31)
BASOPHILS ABSOLUTE: 0.05 E9/L (ref 0–0.2)
BASOPHILS RELATIVE PERCENT: 0.6 % (ref 0–2)
BILIRUB SERPL-MCNC: 0.6 MG/DL (ref 0–1.2)
BUN BLDV-MCNC: 23 MG/DL (ref 8–23)
CALCIUM SERPL-MCNC: 9.9 MG/DL (ref 8.6–10.2)
CHLORIDE BLD-SCNC: 97 MMOL/L (ref 98–107)
CO2: 34 MMOL/L (ref 22–29)
CREAT SERPL-MCNC: 0.6 MG/DL (ref 0.5–1)
EOSINOPHILS ABSOLUTE: 0.21 E9/L (ref 0.05–0.5)
EOSINOPHILS RELATIVE PERCENT: 2.4 % (ref 0–6)
GFR AFRICAN AMERICAN: >60
GFR NON-AFRICAN AMERICAN: >60 ML/MIN/1.73
GLUCOSE BLD-MCNC: 152 MG/DL (ref 74–99)
HCT VFR BLD CALC: 39.2 % (ref 34–48)
HEMOGLOBIN: 11.6 G/DL (ref 11.5–15.5)
IMMATURE GRANULOCYTES #: 0.02 E9/L
IMMATURE GRANULOCYTES %: 0.2 % (ref 0–5)
LACTIC ACID: 1.2 MMOL/L (ref 0.5–2.2)
LYMPHOCYTES ABSOLUTE: 1.89 E9/L (ref 1.5–4)
LYMPHOCYTES RELATIVE PERCENT: 21.7 % (ref 20–42)
MCH RBC QN AUTO: 24.6 PG (ref 26–35)
MCHC RBC AUTO-ENTMCNC: 29.6 % (ref 32–34.5)
MCV RBC AUTO: 83.2 FL (ref 80–99.9)
MONOCYTES ABSOLUTE: 0.67 E9/L (ref 0.1–0.95)
MONOCYTES RELATIVE PERCENT: 7.7 % (ref 2–12)
NEUTROPHILS ABSOLUTE: 5.86 E9/L (ref 1.8–7.3)
NEUTROPHILS RELATIVE PERCENT: 67.4 % (ref 43–80)
PDW BLD-RTO: 16.6 FL (ref 11.5–15)
PLATELET # BLD: 282 E9/L (ref 130–450)
PMV BLD AUTO: 11.7 FL (ref 7–12)
POTASSIUM REFLEX MAGNESIUM: 5 MMOL/L (ref 3.5–5)
RBC # BLD: 4.71 E12/L (ref 3.5–5.5)
SODIUM BLD-SCNC: 138 MMOL/L (ref 132–146)
TOTAL PROTEIN: 7.2 G/DL (ref 6.4–8.3)
TROPONIN: 0.02 NG/ML (ref 0–0.03)
WBC # BLD: 8.7 E9/L (ref 4.5–11.5)

## 2020-12-08 PROCEDURE — 84484 ASSAY OF TROPONIN QUANT: CPT

## 2020-12-08 PROCEDURE — 80053 COMPREHEN METABOLIC PANEL: CPT

## 2020-12-08 PROCEDURE — 2580000003 HC RX 258: Performed by: EMERGENCY MEDICINE

## 2020-12-08 PROCEDURE — 99284 EMERGENCY DEPT VISIT MOD MDM: CPT

## 2020-12-08 PROCEDURE — 71045 X-RAY EXAM CHEST 1 VIEW: CPT

## 2020-12-08 PROCEDURE — 85025 COMPLETE CBC W/AUTO DIFF WBC: CPT

## 2020-12-08 PROCEDURE — 83605 ASSAY OF LACTIC ACID: CPT

## 2020-12-08 PROCEDURE — 93005 ELECTROCARDIOGRAM TRACING: CPT | Performed by: EMERGENCY MEDICINE

## 2020-12-08 RX ORDER — 0.9 % SODIUM CHLORIDE 0.9 %
1000 INTRAVENOUS SOLUTION INTRAVENOUS ONCE
Status: COMPLETED | OUTPATIENT
Start: 2020-12-08 | End: 2020-12-08

## 2020-12-08 RX ADMIN — SODIUM CHLORIDE 1000 ML: 9 INJECTION, SOLUTION INTRAVENOUS at 18:14

## 2020-12-08 NOTE — ED PROVIDER NOTES
HPI:  12/8/20,   Time: 5:53 PM DON Tenorio is a 78 y.o. female presenting to the ED for covid/pale, beginning unk ago. The complaint has been constant, moderate in severity, and worsened by nothing. From nh, known covid, on o2, sent due to pale skin. Pt difficult to elicit hx from due to chronic med issues. No n/v/d/cp/leg swelling    Review of Systems:   Pertinent positives and negatives are stated within HPI, all other systems reviewed and are negative.          --------------------------------------------- PAST HISTORY ---------------------------------------------  Past Medical History:  has a past medical history of Anemia, Anxiety disorder, Chronic back pain, Depression, Diabetes mellitus ([facility] [place] [address] Hyperlipidemia, Hypertension, Neurogenic bowel, Neuromuscular dysfunction of bladder, Obstructive sleep apnea, Radiculopathy of lumbar region, Spinal cord infarction [facility]), Suicidal ideations, and Vitamin D deficiency. Past Surgical History:  has a past surgical history that includes back surgery. Social History:  reports that she has quit smoking. She smoked 0.50 packs per day. She has never used smokeless tobacco. She reports that she does not drink alcohol or use drugs. Family History: family history includes Diabetes in her father. The patients home medications have been reviewed. Allergies: Patient has no known allergies.         ---------------------------------------------------PHYSICAL EXAM--------------------------------------    Constitutional/General: Alert and oriented x1, chronically ill appearing  Head: Normocephalic and atraumatic  Eyes: PERRL, EOMI, conjunctive normal, sclera non icteric  Mouth: Oropharynx clear, handling secretions, no trismus, no asymmetry of the posterior oropharynx or uvular edema  Neck: Supple, full ROM, non tender to palpation in the midline, no stridor, no crepitus, no meningeal signs  Respiratory: Lungs clear to auscultation bilaterally, no wheezes, rales, or rhonchi. Not in respiratory distress  Cardiovascular:  Regular rate. Regular rhythm. No murmurs, gallops, or rubs. 2+ distal pulses  Chest: No chest wall tenderness  GI:  Abdomen Soft, Non tender, Non distended. +BS. No organomegaly, no palpable masses,  No rebound, guarding, or rigidity. Musculoskeletal: lower ext contracted  Integument: skin warm and dry. No rashes. Lymphatic: no lymphadenopathy noted  Neurologic: GCS 14, no focal deficits, sPsychiatric: flat Affect    -------------------------------------------------- RESULTS -------------------------------------------------  I have personally reviewed all laboratory and imaging results for this patient. Results are listed below.      LABS:  Results for orders placed or performed during the hospital encounter of 12/08/20   Comprehensive Metabolic Panel w/ Reflex to MG   Result Value Ref Range    Sodium 138 132 - 146 mmol/L    Potassium reflex Magnesium 5.0 3.5 - 5.0 mmol/L    Chloride 97 (L) 98 - 107 mmol/L    CO2 34 (H) 22 - 29 mmol/L    Anion Gap 7 7 - 16 mmol/L    Glucose 152 (H) 74 - 99 mg/dL    BUN 23 8 - 23 mg/dL    CREATININE 0.6 0.5 - 1.0 mg/dL    GFR Non-African American >60 >=60 mL/min/1.73    GFR African American >60     Calcium 9.9 8.6 - 10.2 mg/dL    Total Protein 7.2 6.4 - 8.3 g/dL    Alb 3.5 3.5 - 5.2 g/dL    Total Bilirubin 0.6 0.0 - 1.2 mg/dL    Alkaline Phosphatase 71 35 - 104 U/L    ALT 9 0 - 32 U/L    AST 13 0 - 31 U/L   CBC Auto Differential   Result Value Ref Range    WBC 8.7 4.5 - 11.5 E9/L    RBC 4.71 3.50 - 5.50 E12/L    Hemoglobin 11.6 11.5 - 15.5 g/dL    Hematocrit 39.2 34.0 - 48.0 %    MCV 83.2 80.0 - 99.9 fL    MCH 24.6 (L) 26.0 - 35.0 pg    MCHC 29.6 (L) 32.0 - 34.5 %    RDW 16.6 (H) 11.5 - 15.0 fL    Platelets 971 520 - 828 E9/L    MPV 11.7 7.0 - 12.0 fL    Neutrophils % 67.4 43.0 - 80.0 %    Immature Granulocytes % 0.2 0.0 - 5.0 %    Lymphocytes % 21.7 20.0 - 42.0 %    Monocytes % 7.7 2.0 - 12.0 % Eosinophils % 2.4 0.0 - 6.0 %    Basophils % 0.6 0.0 - 2.0 %    Neutrophils Absolute 5.86 1.80 - 7.30 E9/L    Immature Granulocytes # 0.02 E9/L    Lymphocytes Absolute 1.89 1.50 - 4.00 E9/L    Monocytes Absolute 0.67 0.10 - 0.95 E9/L    Eosinophils Absolute 0.21 0.05 - 0.50 E9/L    Basophils Absolute 0.05 0.00 - 0.20 E9/L   Troponin   Result Value Ref Range    Troponin 0.02 0.00 - 0.03 ng/mL   Lactic Acid, Plasma   Result Value Ref Range    Lactic Acid 1.2 0.5 - 2.2 mmol/L   EKG 12 Lead   Result Value Ref Range    Ventricular Rate 79 BPM    Atrial Rate 79 BPM    P-R Interval 166 ms    QRS Duration 76 ms    Q-T Interval 388 ms    QTc Calculation (Bazett) 444 ms    P Axis 60 degrees    R Axis -23 degrees    T Axis 44 degrees       RADIOLOGY:  Interpreted by Radiologist.  XR CHEST PORTABLE   Final Result   Mild increased peribronchial/interstitial thickening in the medial right lung   base may be secondary to progressive scarring, early developing   bronchopneumonia or pneumonitis. EKG: This EKG is signed and interpreted by the EP. Time: 1752  Rate: 80  Rhythm: Sinus  Interpretation: non-specific EKG  Comparison: None      ------------------------- NURSING NOTES AND VITALS REVIEWED ---------------------------   The nursing notes within the ED encounter and vital signs as below have been reviewed by myself. BP (!) 105/53   Pulse 76   Temp 98.1 °F (36.7 °C) (Oral)   Resp 18   Ht 5' 3\" (1.6 m)   Wt 135 lb (61.2 kg)   SpO2 100%   BMI 23.91 kg/m²   Oxygen Saturation Interpretation: Normal    The patients available past medical records and past encounters were reviewed.         ------------------------------ ED COURSE/MEDICAL DECISION MAKING----------------------  Medications   0.9 % sodium chloride bolus (0 mLs Intravenous Stopped 12/8/20 2005)         ED COURSE:       Medical Decision Making:    Pt stable on o2, is from nh, vss, w/u neg, low o2 of 2l, can be dc to nh for further care at this time      This patient's ED course included: a personal history and physicial examination    This patient has remained hemodynamically stable during their ED course. Re-Evaluations:             Re-evaluation. Patients symptoms show no change    Re-examination  12/8/20   5:53 PM EST          Vital Signs:   Vitals:    12/08/20 1701 12/08/20 1730 12/08/20 2004   BP: 92/61 113/83 (!) 105/53   Pulse: 84 79 76   Resp: 18 20 18   Temp: 96.7 °F (35.9 °C)  98.1 °F (36.7 °C)   TempSrc:   Oral   SpO2: 100% 100% 100%   Weight: 135 lb (61.2 kg)     Height: 5' 3\" (1.6 m)       Card/Pulm:  Rhythm: normal rate. Heart Sounds: no murmurs, gallops, or rubs. clear to auscultation, no wheezes or rales and unlabored breathing. Capillary Refill: normal.  Radial Pulse:  equal.  Skin:  Warm. Consultations:                 Critical Care:   35 min           --------------------------------- IMPRESSION AND DISPOSITION ---------------------------------    IMPRESSION  1. COVID-19    2. Hypoxia        DISPOSITION  Disposition: Discharge to home  Patient condition is stable    NOTE: This report was transcribed using voice recognition software.  Every effort was made to ensure accuracy; however, inadvertent computerized transcription errors may be present        Misael Law MD  12/08/20 5582       Misael Law MD  12/08/20 3102

## 2020-12-09 LAB
EKG ATRIAL RATE: 79 BPM
EKG P AXIS: 60 DEGREES
EKG P-R INTERVAL: 166 MS
EKG Q-T INTERVAL: 388 MS
EKG QRS DURATION: 76 MS
EKG QTC CALCULATION (BAZETT): 444 MS
EKG R AXIS: -23 DEGREES
EKG T AXIS: 44 DEGREES
EKG VENTRICULAR RATE: 79 BPM

## 2020-12-09 PROCEDURE — 93010 ELECTROCARDIOGRAM REPORT: CPT | Performed by: INTERNAL MEDICINE

## 2020-12-09 NOTE — ED NOTES
This nurse to contact [facility] to determine ETA. Per [facility] should arrive \"within the hour\".   States they will be here shortly to drop off a patient and once that trip is complete, they will take this patient back to the SNF she came from     [facility], [address] [place] [address]  12/08/20 0978

## 2021-04-26 ENCOUNTER — APPOINTMENT (OUTPATIENT)
Dept: GENERAL RADIOLOGY | Age: 71
End: 2021-04-26
Payer: MEDICARE

## 2021-04-26 ENCOUNTER — APPOINTMENT (OUTPATIENT)
Dept: CT IMAGING | Age: 71
End: 2021-04-26
Payer: MEDICARE

## 2021-04-26 ENCOUNTER — HOSPITAL ENCOUNTER (EMERGENCY)
Age: 71
Discharge: HOME | End: 2021-04-26
Attending: EMERGENCY MEDICINE
Payer: MEDICARE

## 2021-04-26 VITALS
DIASTOLIC BLOOD PRESSURE: 76 MMHG | OXYGEN SATURATION: 93 % | HEART RATE: 88 BPM | HEIGHT: 63 IN | RESPIRATION RATE: 18 BRPM | BODY MASS INDEX: 23.92 KG/M2 | WEIGHT: 135 LBS | TEMPERATURE: 96 F | SYSTOLIC BLOOD PRESSURE: 118 MMHG

## 2021-04-26 DIAGNOSIS — J18.9 PNEUMONIA DUE TO ORGANISM: ICD-10-CM

## 2021-04-26 DIAGNOSIS — N39.0 URINARY TRACT INFECTION WITHOUT HEMATURIA, SITE UNSPECIFIED: Primary | ICD-10-CM

## 2021-04-26 LAB
ALBUMIN SERPL-MCNC: 3.5 G/DL (ref 3.5–5.2)
ALP BLD-CCNC: 69 U/L (ref 35–104)
ALT SERPL-CCNC: 8 U/L (ref 0–32)
ANION GAP SERPL CALCULATED.3IONS-SCNC: 12 MMOL/L (ref 7–16)
AST SERPL-CCNC: 19 U/L (ref 0–31)
BACTERIA: ABNORMAL /HPF
BASOPHILS ABSOLUTE: 0.05 E9/L (ref 0–0.2)
BASOPHILS RELATIVE PERCENT: 0.4 % (ref 0–2)
BILIRUB SERPL-MCNC: 0.8 MG/DL (ref 0–1.2)
BILIRUBIN URINE: NEGATIVE
BLOOD, URINE: ABNORMAL
BUN BLDV-MCNC: 25 MG/DL (ref 6–23)
CALCIUM SERPL-MCNC: 9.5 MG/DL (ref 8.6–10.2)
CHLORIDE BLD-SCNC: 97 MMOL/L (ref 98–107)
CLARITY: ABNORMAL
CO2: 24 MMOL/L (ref 22–29)
COLOR: YELLOW
CREAT SERPL-MCNC: 0.5 MG/DL (ref 0.5–1)
CRYSTALS, UA: ABNORMAL /HPF
EOSINOPHILS ABSOLUTE: 0.24 E9/L (ref 0.05–0.5)
EOSINOPHILS RELATIVE PERCENT: 1.9 % (ref 0–6)
GFR AFRICAN AMERICAN: >60
GFR NON-AFRICAN AMERICAN: >60 ML/MIN/1.73
GLUCOSE BLD-MCNC: 243 MG/DL (ref 74–99)
GLUCOSE URINE: NEGATIVE MG/DL
HCT VFR BLD CALC: 38 % (ref 34–48)
HEMOGLOBIN: 12.2 G/DL (ref 11.5–15.5)
IMMATURE GRANULOCYTES #: 0.05 E9/L
IMMATURE GRANULOCYTES %: 0.4 % (ref 0–5)
KETONES, URINE: NEGATIVE MG/DL
LACTIC ACID: 1.5 MMOL/L (ref 0.5–2.2)
LEUKOCYTE ESTERASE, URINE: ABNORMAL
LIPASE: 72 U/L (ref 13–60)
LYMPHOCYTES ABSOLUTE: 1.3 E9/L (ref 1.5–4)
LYMPHOCYTES RELATIVE PERCENT: 10.4 % (ref 20–42)
MAGNESIUM: 2.1 MG/DL (ref 1.6–2.6)
MCH RBC QN AUTO: 27 PG (ref 26–35)
MCHC RBC AUTO-ENTMCNC: 32.1 % (ref 32–34.5)
MCV RBC AUTO: 84.1 FL (ref 80–99.9)
MONOCYTES ABSOLUTE: 0.42 E9/L (ref 0.1–0.95)
MONOCYTES RELATIVE PERCENT: 3.4 % (ref 2–12)
NEUTROPHILS ABSOLUTE: 10.46 E9/L (ref 1.8–7.3)
NEUTROPHILS RELATIVE PERCENT: 83.5 % (ref 43–80)
NITRITE, URINE: POSITIVE
PDW BLD-RTO: 15.9 FL (ref 11.5–15)
PH UA: 7 (ref 5–9)
PLATELET # BLD: 211 E9/L (ref 130–450)
PMV BLD AUTO: 12.2 FL (ref 7–12)
POTASSIUM SERPL-SCNC: 4.8 MMOL/L (ref 3.5–5)
PROTEIN UA: NEGATIVE MG/DL
RBC # BLD: 4.52 E12/L (ref 3.5–5.5)
RBC UA: ABNORMAL /HPF (ref 0–2)
SARS-COV-2, NAAT: NOT DETECTED
SODIUM BLD-SCNC: 133 MMOL/L (ref 132–146)
SPECIFIC GRAVITY UA: 1.01 (ref 1–1.03)
TOTAL PROTEIN: 7 G/DL (ref 6.4–8.3)
TROPONIN: 0.01 NG/ML (ref 0–0.03)
UROBILINOGEN, URINE: 1 E.U./DL
WBC # BLD: 12.5 E9/L (ref 4.5–11.5)
WBC UA: >20 /HPF (ref 0–5)

## 2021-04-26 PROCEDURE — 85025 COMPLETE CBC W/AUTO DIFF WBC: CPT

## 2021-04-26 PROCEDURE — 6360000002 HC RX W HCPCS: Performed by: NURSE PRACTITIONER

## 2021-04-26 PROCEDURE — 96375 TX/PRO/DX INJ NEW DRUG ADDON: CPT

## 2021-04-26 PROCEDURE — 2500000003 HC RX 250 WO HCPCS: Performed by: EMERGENCY MEDICINE

## 2021-04-26 PROCEDURE — 87635 SARS-COV-2 COVID-19 AMP PRB: CPT

## 2021-04-26 PROCEDURE — 2580000003 HC RX 258: Performed by: NURSE PRACTITIONER

## 2021-04-26 PROCEDURE — 96367 TX/PROPH/DG ADDL SEQ IV INF: CPT

## 2021-04-26 PROCEDURE — 87077 CULTURE AEROBIC IDENTIFY: CPT

## 2021-04-26 PROCEDURE — 83690 ASSAY OF LIPASE: CPT

## 2021-04-26 PROCEDURE — 87040 BLOOD CULTURE FOR BACTERIA: CPT

## 2021-04-26 PROCEDURE — 87088 URINE BACTERIA CULTURE: CPT

## 2021-04-26 PROCEDURE — 87186 SC STD MICRODIL/AGAR DIL: CPT

## 2021-04-26 PROCEDURE — 93005 ELECTROCARDIOGRAM TRACING: CPT | Performed by: NURSE PRACTITIONER

## 2021-04-26 PROCEDURE — 6360000002 HC RX W HCPCS: Performed by: EMERGENCY MEDICINE

## 2021-04-26 PROCEDURE — 83735 ASSAY OF MAGNESIUM: CPT

## 2021-04-26 PROCEDURE — 99284 EMERGENCY DEPT VISIT MOD MDM: CPT

## 2021-04-26 PROCEDURE — 84484 ASSAY OF TROPONIN QUANT: CPT

## 2021-04-26 PROCEDURE — 71046 X-RAY EXAM CHEST 2 VIEWS: CPT

## 2021-04-26 PROCEDURE — 81001 URINALYSIS AUTO W/SCOPE: CPT

## 2021-04-26 PROCEDURE — 74176 CT ABD & PELVIS W/O CONTRAST: CPT

## 2021-04-26 PROCEDURE — 83605 ASSAY OF LACTIC ACID: CPT

## 2021-04-26 PROCEDURE — 96365 THER/PROPH/DIAG IV INF INIT: CPT

## 2021-04-26 PROCEDURE — 2580000003 HC RX 258: Performed by: EMERGENCY MEDICINE

## 2021-04-26 PROCEDURE — 80053 COMPREHEN METABOLIC PANEL: CPT

## 2021-04-26 RX ORDER — ONDANSETRON 2 MG/ML
4 INJECTION INTRAMUSCULAR; INTRAVENOUS ONCE
Status: COMPLETED | OUTPATIENT
Start: 2021-04-26 | End: 2021-04-26

## 2021-04-26 RX ORDER — CEFDINIR 250 MG/5ML
250 POWDER, FOR SUSPENSION ORAL 2 TIMES DAILY
Qty: 100 ML | Refills: 0 | Status: SHIPPED | OUTPATIENT
Start: 2021-04-26 | End: 2021-05-06

## 2021-04-26 RX ORDER — AZITHROMYCIN 200 MG/5ML
500 SUSPENSION, RECONSTITUTED, ORAL (ML) ORAL DAILY
Qty: 62.5 ML | Refills: 0 | Status: SHIPPED | OUTPATIENT
Start: 2021-04-26 | End: 2021-05-01

## 2021-04-26 RX ADMIN — ONDANSETRON 4 MG: 2 INJECTION INTRAMUSCULAR; INTRAVENOUS at 16:28

## 2021-04-26 RX ADMIN — METRONIDAZOLE 500 MG: 500 INJECTION, SOLUTION INTRAVENOUS at 16:34

## 2021-04-26 RX ADMIN — CEFTRIAXONE SODIUM 2000 MG: 2 INJECTION, POWDER, FOR SOLUTION INTRAMUSCULAR; INTRAVENOUS at 16:27

## 2021-04-26 RX ADMIN — DOXYCYCLINE 100 MG: 100 INJECTION, POWDER, LYOPHILIZED, FOR SOLUTION INTRAVENOUS at 18:06

## 2021-04-26 NOTE — ED PROVIDER NOTES
ED Attending  CC: No    HPI:  4/26/21, Time: 11:55 AM EDT         Ayse Padilla is a 78 y.o. female presenting to the ED for cough with concern for aspiration, beginning prior to arrival ago. The complaint has been persistent, mild in severity, and worsened by nothing. By EMS from local nursing facility where the patient resides on a permanent basis. She does have a PEG tube in place as well as a indwelling Blankenship catheter. Per EMS staff the nursing staff felt as though her urine was getting more concentrated so they gave her some water by mouth which the patient then began coughing and they sent her in for evaluation to rule out aspiration. The patient appears in no acute distress. Her pulse oximetry per EMS was 89% initially on room air however momentarily improved to 93%. She is awake and alert. ROS:   Pertinent positives and negatives are stated within HPI, all other systems reviewed and are negative.  --------------------------------------------- PAST HISTORY ---------------------------------------------  Past Medical History:  has a past medical history of Anemia, Anxiety disorder, Chronic back pain, Depression, Diabetes mellitus ([address] Hyperlipidemia, Hypertension, Neurogenic bowel, Neuromuscular dysfunction of bladder, Obstructive sleep apnea, Radiculopathy of lumbar region, Spinal cord infarction [facility]), Suicidal ideations, and Vitamin D deficiency. Past Surgical History:  has a past surgical history that includes back surgery. Social History:  reports that she has quit smoking. She smoked 0.50 packs per day. She has never used smokeless tobacco. She reports that she does not drink alcohol or use drugs. Family History: family history includes Diabetes in her father. The patients home medications have been reviewed. Allergies: Patient has no known allergies.     ---------------------------------------------------PHYSICAL EXAM--------------------------------------    Constitutional/General: Alert and oriented x3, well appearing, non toxic in NAD  Head: Normocephalic and atraumatic  Eyes: PERRL, EOMI  Mouth: Oropharynx clear, handling secretions, no trismus  Neck: Supple, full ROM, non tender to palpation in the midline, no stridor, no crepitus, no meningeal signs  Pulmonary: Lungs diminsihed to auscultation bilaterally, no wheezes, rales, or rhonchi. Not in respiratory distress  Cardiovascular:  Regular rate. Regular rhythm. No murmurs, gallops, or rubs. 2+ distal pulses  Chest: no chest wall tenderness  Abdomen: Soft. Non tender. Non distended. +BS. No rebound, guarding, or rigidity. No pulsatile masses appreciated. G-tube intact and currently plugged. Blankenship catheter in place yellow urine draining in the drainage bag  Musculoskeletal: Moves all extremities x 4. Warm and well perfused, no clubbing, cyanosis, or edema. Capillary refill <3 seconds, patient does have contractures to the bilateral lower extremities. Skin: warm and dry. No rashes. Neurologic: GCS 15, CN 2-12 grossly intact, no focal deficits, symmetric strength 5/5 in the upper and lower extremities bilaterally  Psych: Normal Affect    -------------------------------------------------- RESULTS -------------------------------------------------  I have personally reviewed all laboratory and imaging results for this patient. Results are listed below.      LABS:  Results for orders placed or performed during the hospital encounter of 04/26/21   Troponin   Result Value Ref Range    Troponin 0.01 0.00 - 0.03 ng/mL   CBC Auto Differential   Result Value Ref Range    WBC 12.5 (H) 4.5 - 11.5 E9/L    RBC 4.52 3.50 - 5.50 E12/L    Hemoglobin 12.2 11.5 - 15.5 g/dL    Hematocrit 38.0 34.0 - 48.0 %    MCV 84.1 80.0 - 99.9 fL    MCH 27.0 26.0 - 35.0 pg    MCHC 32.1 32.0 - 34.5 %    RDW 15.9 (H) 11.5 - 15.0 fL    Platelets 278 140 - 813 E9/L    MPV 12.2 (H) 7.0 - 12.0 fL Neutrophils % 83.5 (H) 43.0 - 80.0 %    Immature Granulocytes % 0.4 0.0 - 5.0 %    Lymphocytes % 10.4 (L) 20.0 - 42.0 %    Monocytes % 3.4 2.0 - 12.0 %    Eosinophils % 1.9 0.0 - 6.0 %    Basophils % 0.4 0.0 - 2.0 %    Neutrophils Absolute 10.46 (H) 1.80 - 7.30 E9/L    Immature Granulocytes # 0.05 E9/L    Lymphocytes Absolute 1.30 (L) 1.50 - 4.00 E9/L    Monocytes Absolute 0.42 0.10 - 0.95 E9/L    Eosinophils Absolute 0.24 0.05 - 0.50 E9/L    Basophils Absolute 0.05 0.00 - 0.20 E9/L   Comprehensive Metabolic Panel   Result Value Ref Range    Sodium 133 132 - 146 mmol/L    Potassium 4.8 3.5 - 5.0 mmol/L    Chloride 97 (L) 98 - 107 mmol/L    CO2 24 22 - 29 mmol/L    Anion Gap 12 7 - 16 mmol/L    Glucose 243 (H) 74 - 99 mg/dL    BUN 25 (H) 6 - 23 mg/dL    CREATININE 0.5 0.5 - 1.0 mg/dL    GFR Non-African American >60 >=60 mL/min/1.73    GFR African American >60     Calcium 9.5 8.6 - 10.2 mg/dL    Total Protein 7.0 6.4 - 8.3 g/dL    Albumin 3.5 3.5 - 5.2 g/dL    Total Bilirubin 0.8 0.0 - 1.2 mg/dL    Alkaline Phosphatase 69 35 - 104 U/L    ALT 8 0 - 32 U/L    AST 19 0 - 31 U/L   Magnesium   Result Value Ref Range    Magnesium 2.1 1.6 - 2.6 mg/dL   Lactic Acid, Plasma   Result Value Ref Range    Lactic Acid 1.5 0.5 - 2.2 mmol/L   Lipase   Result Value Ref Range    Lipase 72 (H) 13 - 60 U/L   Urinalysis   Result Value Ref Range    Color, UA Yellow Straw/Yellow    Clarity, UA SL CLOUDY Clear    Glucose, Ur Negative Negative mg/dL    Bilirubin Urine Negative Negative    Ketones, Urine Negative Negative mg/dL    Specific Gravity, UA 1.015 1.005 - 1.030    Blood, Urine TRACE-INTACT Negative    pH, UA 7.0 5.0 - 9.0    Protein, UA Negative Negative mg/dL    Urobilinogen, Urine 1.0 <2.0 E.U./dL    Nitrite, Urine POSITIVE (A) Negative    Leukocyte Esterase, Urine LARGE (A) Negative   Microscopic Urinalysis   Result Value Ref Range    WBC, UA >20 (A) 0 - 5 /HPF    RBC, UA 1-3 0 - 2 /HPF    Bacteria, UA FEW (A) None Seen /HPF Crystals, UA Rare (A) None Seen /HPF       RADIOLOGY:  Interpreted by Radiologist.  CT ABDOMEN PELVIS WO CONTRAST Additional Contrast? None   Final Result   Patchy infiltrates in the right lower lobe concerning for pneumonia. 4 mm nonspecific nodule in the left lower lobe. Consider surveillance. Distended gallbladder with wall thickening. Please correlate with   ultrasonography. Constipation with thickened distended rectum concerning for proctitis. Severe compression deformity and erosion of T12. Consider MRI surveillance      Collapsed urinary bladder with wall thickening. Please correlate with   urinalysis. Posterior subluxation of the left hip. XR CHEST (2 VW)   Final Result   No acute cardiopulmonary process. EKG Interpretation  Interpreted by emergency department physician    Rhythm: normal sinus   Rate: normal  Axis: normal  Conduction: normal  ST Segments: no acute change  T Waves: no acute change    Clinical Impression: no acute changes  Comparison to prior EKG: stable as compared to patient's most recent EKG      ------------------------- NURSING NOTES AND VITALS REVIEWED ---------------------------   The nursing notes within the ED encounter and vital signs as below have been reviewed by myself. BP (!) 157/87   Pulse 82   Temp 96 °F (35.6 °C)   Resp 18   Ht 5' 3\" (1.6 m)   Wt 135 lb (61.2 kg)   SpO2 93%   BMI 23.91 kg/m²   Oxygen Saturation Interpretation: Normal    The patients available past medical records and past encounters were reviewed.         ------------------------------ ED COURSE/MEDICAL DECISION MAKING----------------------  Medications   doxycycline (VIBRAMYCIN) 100 mg in dextrose 5 % 100 mL IVPB (has no administration in time range)   metronidazole (FLAGYL) 500 mg in NaCl 100 mL IVPB premix (500 mg Intravenous New Bag 4/26/21 1634)   cefTRIAXone (ROCEPHIN) 2,000 mg in sterile water 20 mL IV syringe (2,000 mg Intravenous Given 4/26/21

## 2021-04-27 LAB
EKG ATRIAL RATE: 88 BPM
EKG P AXIS: 51 DEGREES
EKG P-R INTERVAL: 176 MS
EKG Q-T INTERVAL: 382 MS
EKG QRS DURATION: 80 MS
EKG QTC CALCULATION (BAZETT): 462 MS
EKG R AXIS: -26 DEGREES
EKG T AXIS: 51 DEGREES
EKG VENTRICULAR RATE: 88 BPM

## 2021-04-27 NOTE — ED NOTES
[facility] will be here for transport in approx another 60 minutes.      [address] [facility] 121, RN  04/26/21 2056

## 2021-04-27 NOTE — ED NOTES
Transport will be here approx 15 more minutes.      [address] [facility] 121, RN  04/26/21 2126

## 2021-04-28 LAB
ORGANISM: ABNORMAL
URINE CULTURE, ROUTINE: ABNORMAL

## 2021-04-30 NOTE — ED NOTES
Reviewed patients after hours culture results. Culture results reviewed. Urine culture positive for Pseudomonas aeruginosa >100k.  The patient was discharged on cefdinir + azithromycin.  Discussed with Dr. Pancho Chappell, will recommend change to levofloxacin 750mg daily x 7 days. Results faxed to [facility] for review by facility physician. No need for further intervention at this time.     Samantha Cooley, PharmD, BCCCP 4/29/2021 9:51 PM  Clinical Pharmacy Specialist, Emergency Medicine  [phone]

## 2021-05-01 LAB
BLOOD CULTURE, ROUTINE: NORMAL
CULTURE, BLOOD 2: NORMAL

## 2021-10-01 ENCOUNTER — APPOINTMENT (OUTPATIENT)
Dept: GENERAL RADIOLOGY | Age: 71
End: 2021-10-01
Payer: MEDICARE

## 2021-10-01 ENCOUNTER — HOSPITAL ENCOUNTER (EMERGENCY)
Age: 71
Discharge: NURSING, REHAB OR LONG-TERM CARE | End: 2021-10-02
Attending: EMERGENCY MEDICINE
Payer: MEDICARE

## 2021-10-01 VITALS
TEMPERATURE: 98 F | SYSTOLIC BLOOD PRESSURE: 123 MMHG | DIASTOLIC BLOOD PRESSURE: 98 MMHG | RESPIRATION RATE: 18 BRPM | HEART RATE: 94 BPM | OXYGEN SATURATION: 98 %

## 2021-10-01 DIAGNOSIS — K94.23 PEG TUBE MALFUNCTION (HCC): Primary | ICD-10-CM

## 2021-10-01 PROCEDURE — 43762 RPLC GTUBE NO REVJ TRC: CPT

## 2021-10-01 PROCEDURE — 74018 RADEX ABDOMEN 1 VIEW: CPT

## 2021-10-01 PROCEDURE — 99283 EMERGENCY DEPT VISIT LOW MDM: CPT

## 2021-10-01 PROCEDURE — 6360000004 HC RX CONTRAST MEDICATION: Performed by: EMERGENCY MEDICINE

## 2021-10-01 RX ADMIN — DIATRIZOATE MEGLUMINE AND DIATRIZOATE SODIUM 30 ML: 600; 100 SOLUTION ORAL; RECTAL at 22:38

## 2021-10-02 NOTE — PROCEDURES
pts gown from facility soiled with blood from peg tube change, gave patient new gown and put facility gown in patient belonging bag.

## 2021-10-02 NOTE — ED PROVIDER NOTES
HPI:  10/1/21,   Time: 9:52 PM EDT       Ruth Reagan is a 73 y.o. female presenting to the ED for peg tube leaking, beginning 1 day ago. The complaint has been persistent, mild in severity, and worsened by nothing. From nh, no pain, peg inserted in 2019, not changed since, no n/v/d    Review of Systems:   Pertinent positives and negatives are stated within HPI, all other systems reviewed and are negative.          --------------------------------------------- PAST HISTORY ---------------------------------------------  Past Medical History:  has a past medical history of Anemia, Anxiety disorder, Chronic back pain, Depression, Diabetes mellitus ([address] Hyperlipidemia, Hypertension, Neurogenic bowel, Neuromuscular dysfunction of bladder, Obstructive sleep apnea, Radiculopathy of lumbar region, Spinal cord infarction [facility]), Suicidal ideations, and Vitamin D deficiency. Past Surgical History:  has a past surgical history that includes back surgery. Social History:  reports that she has quit smoking. She smoked 0.50 packs per day. She has never used smokeless tobacco. She reports that she does not drink alcohol and does not use drugs. Family History: family history includes Diabetes in her father. The patients home medications have been reviewed. Allergies: Patient has no known allergies. ---------------------------------------------------PHYSICAL EXAM--------------------------------------    Constitutional/General: Alert and oriented x3, well appearing, non toxic in NAD  Head: Normocephalic and atraumatic  Eyes: PERRL, EOMI, conjunctive normal, sclera non icteric  Mouth: Oropharynx clear, handling secretions, no trismus, no asymmetry of the posterior oropharynx or uvular edema  Neck: Supple, full ROM,  Respiratory: Not in respiratory distress  Cardiovascular:   2+ distal pulses  GI:  Abdomen Soft, Non tender, Non distended.   Peg tube in luq leaking  Musculoskeletal: Moves all extremities x 4. Warm and well perfused, no clubbing, cyanosis, or edema. Capillary refill <3 seconds  Integument: skin warm and dry. No rashes. Lymphatic: no lymphadenopathy noted  Neurologic: GCS 15, no focal deficits,   Psychiatric: Normal Affect    -------------------------------------------------- RESULTS -------------------------------------------------  I have personally reviewed all laboratory and imaging results for this patient. Results are listed below. LABS:  No results found for this visit on 10/01/21. RADIOLOGY:  Interpreted by Radiologist.  XR ABDOMEN FOR NG/OG/NE TUBE PLACEMENT   Final Result   Feeding tube present. Following injection of an oral contrast material,   small bowel is outlined. EKG:  This EKG is signed and interpreted by the EP. Time:   Rate:   Rhythm:   Interpretation:   Comparison:       ------------------------- NURSING NOTES AND VITALS REVIEWED ---------------------------   The nursing notes within the ED encounter and vital signs as below have been reviewed by myself. BP (!) 123/98   Pulse 94   Temp 98 °F (36.7 °C)   Resp 18   SpO2 98%   Oxygen Saturation Interpretation: Normal    The patients available past medical records and past encounters were reviewed. ------------------------------ ED COURSE/MEDICAL DECISION MAKING----------------------  Medications   diatrizoate meglumine-sodium (GASTROGRAFIN) 66-10 % solution 30 mL (30 mLs PEG Tube Given 10/1/21 2238)         ED COURSE:       Medical Decision Making:      PROCEDURE  10/1/21       Time: 2153    FEEDING TUBE REPLACEMENT  Risks, benefits and alternatives (for applicable procedures below) described. Performed By: Kayley Coughlin MD.    Indication: Tube malfunction. Informed consent: Verbal consent obtained. The patient was counseled regarding the procedure in person, it's indications, risks, potential complications and alternatives and any questions were answered. Verbal consent was obtained.   Local Anesthesia:  not required/indicated. Procedure: A feeding tube was replaced using a 20 Swazi gastrostomy tube and the bulb was inflated using 15 cc of sterile water. Tube was secured to skin pre-attached rubber skin bumper device                                                          . Post-Procedure: Tube position confirmed by x-ray with contrast injection. Patient tolerated the procedure well. Complications:  None. This patient's ED course included: a personal history and physicial examination    This patient has remained hemodynamically stable during their ED course. t ube replaced, xr confirms placement, dc to nh      Counseling: The emergency provider has spoken with the patient and discussed todays results, in addition to providing specific details for the plan of care and counseling regarding the diagnosis and prognosis. Questions are answered at this time and they are agreeable with the plan.       --------------------------------- IMPRESSION AND DISPOSITION ---------------------------------    IMPRESSION  1. PEG tube malfunction ([facility] [address]        DISPOSITION  Disposition: Discharge to nursing home  Patient condition is stable    NOTE: This report was transcribed using voice recognition software.  Every effort was made to ensure accuracy; however, inadvertent computerized transcription errors may be present        Macdonald Gosselin, MD  10/02/21 9902

## 2021-10-02 NOTE — ED NOTES
[facility] new arrival time was delayed for another 4 hours.  New transport time is 1030 this am.      Pratima Lees RN  10/02/21 6661

## 2021-10-02 NOTE — ED NOTES
Per Melissa Hearing at [address] [facility] dispatch, they are adding 4 hours to Pt's transport time.  ETA is not ELICEO Peterson, [facility]  10/02/21 6135

## 2021-10-02 NOTE — ED NOTES
Bed: [facility]  Expected date:   Expected time:   Means of arrival:   Comments:  11 Analisa Plummer RN  10/01/21 5107

## 2021-12-18 ENCOUNTER — APPOINTMENT (OUTPATIENT)
Dept: GENERAL RADIOLOGY | Age: 71
End: 2021-12-18
Payer: MEDICARE

## 2021-12-18 ENCOUNTER — HOSPITAL ENCOUNTER (EMERGENCY)
Age: 71
Discharge: HOME | End: 2021-12-18
Attending: EMERGENCY MEDICINE
Payer: MEDICARE

## 2021-12-18 VITALS
DIASTOLIC BLOOD PRESSURE: 62 MMHG | SYSTOLIC BLOOD PRESSURE: 108 MMHG | OXYGEN SATURATION: 97 % | RESPIRATION RATE: 16 BRPM | TEMPERATURE: 97.6 F | HEART RATE: 63 BPM

## 2021-12-18 DIAGNOSIS — K94.23 PEG TUBE MALFUNCTION (HCC): Primary | ICD-10-CM

## 2021-12-18 PROCEDURE — 43762 RPLC GTUBE NO REVJ TRC: CPT

## 2021-12-18 PROCEDURE — 99284 EMERGENCY DEPT VISIT MOD MDM: CPT

## 2021-12-18 PROCEDURE — 74018 RADEX ABDOMEN 1 VIEW: CPT

## 2021-12-18 PROCEDURE — 6360000004 HC RX CONTRAST MEDICATION: Performed by: EMERGENCY MEDICINE

## 2021-12-18 RX ADMIN — DIATRIZOATE MEGLUMINE AND DIATRIZOATE SODIUM 30 ML: 600; 100 SOLUTION ORAL; RECTAL at 13:18

## 2021-12-18 ASSESSMENT — ENCOUNTER SYMPTOMS
EYE REDNESS: 0
NAUSEA: 0
SORE THROAT: 0
ABDOMINAL DISTENTION: 0
SHORTNESS OF BREATH: 0
DIARRHEA: 0
BACK PAIN: 0
EYE PAIN: 0
WHEEZING: 0
EYE DISCHARGE: 0
VOMITING: 0
COUGH: 0
SINUS PRESSURE: 0

## 2021-12-18 NOTE — ED PROVIDER NOTES
Patient presents with a pulled PEG tube. Patient states that the facility reports that she pulled her out but she is on certain of exactly how pulled out. She denies any other complaints other than needing her PEG tube replaced including but not limited to fever, chills, chest pain, shortness of breath, nausea, vomiting, abdominal pain. Chart review shows that she had a 20 [place] Islander PEG tube placed approximately 2 and half months ago after being pulled again. The history is provided by the patient and the EMS personnel. No  was used. Review of Systems   Constitutional: Negative for chills and fever. HENT: Negative for ear pain, sinus pressure and sore throat. Eyes: Negative for pain, discharge and redness. Respiratory: Negative for cough, shortness of breath and wheezing. Cardiovascular: Negative for chest pain. Gastrointestinal: Negative for abdominal distention, diarrhea, nausea and vomiting. Genitourinary: Negative for dysuria and frequency. Musculoskeletal: Negative for arthralgias and back pain. Skin: Negative for rash and wound. Neurological: Negative for weakness and headaches. Hematological: Negative for adenopathy. All other systems reviewed and are negative. Physical Exam  Vitals and nursing note reviewed. Constitutional:       General: She is not in acute distress. Appearance: She is well-developed. HENT:      Head: Normocephalic and atraumatic. Eyes:      Conjunctiva/sclera: Conjunctivae normal.   Cardiovascular:      Rate and Rhythm: Normal rate and regular rhythm. Heart sounds: Normal heart sounds. No murmur heard. Pulmonary:      Effort: Pulmonary effort is normal. No respiratory distress. Breath sounds: Normal breath sounds. No wheezing or rales. Abdominal:      General: Bowel sounds are normal.      Palpations: Abdomen is soft. Tenderness: There is no abdominal tenderness. There is no guarding or rebound. Comments: PEG tube stoma apparently patent with some erythema surrounding. Nontender   Musculoskeletal:      Cervical back: Normal range of motion and neck supple. Skin:     General: Skin is warm and dry. Neurological:      Mental Status: She is alert and oriented to person, place, and time. Cranial Nerves: No cranial nerve deficit. Coordination: Coordination normal.          Procedures     MDM  Number of Diagnoses or Management Options  Diagnosis management comments: Patient presents with a pulled PEG tube. Chart review shows she has a 20 [place] PEG tube. 20 Icelandic PEG tube was placed and filled with 15 cc of sterile water. X-ray was obtained to confirm placement and showed appropriate placement. At this point there is not appear to be any emergent processes ongoing. Patient was informed the results and plan is agreeable. Patient be discharged back to facility with instructions to follow-up with her PCP for further evaluation care.                  --------------------------------------------- PAST HISTORY ---------------------------------------------  Past Medical History:  has a past medical history of Anemia, Anxiety disorder, Chronic back pain, Depression, Diabetes mellitus ([address] Hyperlipidemia, Hypertension, Neurogenic bowel, Neuromuscular dysfunction of bladder, Obstructive sleep apnea, Radiculopathy of lumbar region, Spinal cord infarction [facility]), Suicidal ideations, and Vitamin D deficiency. Past Surgical History:  has a past surgical history that includes back surgery. Social History:  reports that she has quit smoking. She smoked 0.50 packs per day. She has never used smokeless tobacco. She reports that she does not drink alcohol and does not use drugs. Family History: family history includes Diabetes in her father. The patients home medications have been reviewed. Allergies: Patient has no known allergies.     -------------------------------------------------- RESULTS -------------------------------------------------  Labs:  No results found for this visit on 12/18/21. Radiology:  XR ABDOMEN FOR NG/OG/NE TUBE PLACEMENT   Final Result   With G-tube Gastrografin injection, satisfactory contrast entry into the   duodenum. Only limited contrast distension of the stomach. No evidence of   contrast leakage or peritoneal contrast extravasation.             ------------------------- NURSING NOTES AND VITALS REVIEWED ---------------------------  Date / Time Roomed:  12/18/2021 12:47 PM  ED Bed Assignment:  [facility] [id number]    The nursing notes within the ED encounter and vital signs as below have been reviewed. /62   Pulse 63   Temp 97.6 °F (36.4 °C)   Resp 16   SpO2 97%   Oxygen Saturation Interpretation: Normal      ------------------------------------------ PROGRESS NOTES ------------------------------------------  1:07 PM EST  I have spoken with the patient and discussed todays results, in addition to providing specific details for the plan of care and counseling regarding the diagnosis and prognosis. Their questions are answered at this time and they are agreeable with the plan. I discussed at length with them reasons for immediate return here for re evaluation. They will followup with their primary care physician by calling their office tomorrow. --------------------------------- ADDITIONAL PROVIDER NOTES ---------------------------------  At this time the patient is without objective evidence of an acute process requiring hospitalization or inpatient management. They have remained hemodynamically stable throughout their entire ED visit and are stable for discharge with outpatient follow-up. The plan has been discussed in detail and they are aware of the specific conditions for emergent return, as well as the importance of follow-up. New Prescriptions    No medications on file       Diagnosis:  1.  PEG tube malfunction ([facility] [address] Disposition:  Patient's disposition: Discharge to nursing home  Patient's condition is stable. Patient was seen and evaluated by both myself and Melonie Michael 35, DO  Resident  12/18/21 1321    ATTENDING PROVIDER ATTESTATION:     I have personally performed and/or participated in the history, exam, medical decision making, and procedures and agree with all pertinent clinical information unless otherwise noted. I have also reviewed and agree with the past medical, family and social history unless otherwise noted. I have discussed this patient in detail with the resident and provided the instruction and education regarding the evidence-based evaluation and treatment of Peg tube dislodgement  My plan: Symptomatic and supportive care. Patient seen and examined. PEG tube was replaced it was confirmed successfully replaced on x-ray. Patient discharged back to nursing home.     Electronically signed by Jules Hung DO on 12/18/21 at 3:37 PM EST           Jules Hung DO  12/18/21 4817

## 2021-12-18 NOTE — ED NOTES
Pt picked up by Najma Ray to transport back to [facility].      Sahca Cifuentes, LILIANA  12/18/21 1252

## 2021-12-18 NOTE — ED NOTES
Bed:  [place] [address]  Expected date:   Expected time:   Means of arrival:   Comments:  [id number] Elsa Blackwell RN  12/18/21 7129

## 2021-12-18 NOTE — ED NOTES
Report called to [facility].      Evalina Areas, RN  12/18/21 5508

## 2022-05-08 ENCOUNTER — HOSPITAL ENCOUNTER (EMERGENCY)
Age: 72
Discharge: HOME HEALTH | End: 2022-05-08
Attending: EMERGENCY MEDICINE
Payer: MEDICARE

## 2022-05-08 ENCOUNTER — APPOINTMENT (OUTPATIENT)
Dept: GENERAL RADIOLOGY | Age: 72
End: 2022-05-08
Payer: MEDICARE

## 2022-05-08 VITALS
OXYGEN SATURATION: 99 % | RESPIRATION RATE: 18 BRPM | SYSTOLIC BLOOD PRESSURE: 122 MMHG | HEART RATE: 72 BPM | TEMPERATURE: 98 F | DIASTOLIC BLOOD PRESSURE: 72 MMHG | BODY MASS INDEX: 23.92 KG/M2 | WEIGHT: 135 LBS | HEIGHT: 63 IN

## 2022-05-08 DIAGNOSIS — Z43.1 ATTENTION TO G-TUBE (HCC): Primary | ICD-10-CM

## 2022-05-08 PROCEDURE — 6360000004 HC RX CONTRAST MEDICATION: Performed by: RADIOLOGY

## 2022-05-08 PROCEDURE — 99283 EMERGENCY DEPT VISIT LOW MDM: CPT

## 2022-05-08 PROCEDURE — 74018 RADEX ABDOMEN 1 VIEW: CPT

## 2022-05-08 PROCEDURE — 43762 RPLC GTUBE NO REVJ TRC: CPT

## 2022-05-08 RX ADMIN — DIATRIZOATE MEGLUMINE AND DIATRIZOATE SODIUM 30 ML: 600; 100 SOLUTION ORAL; RECTAL at 01:11

## 2022-05-08 ASSESSMENT — PAIN - FUNCTIONAL ASSESSMENT: PAIN_FUNCTIONAL_ASSESSMENT: NONE - DENIES PAIN

## 2022-05-08 NOTE — ED NOTES
Spoke with [facility]  time ETA 2hours. ..  3AM to transport patient back to [facility] [address], LILIANA  05/08/22 4926

## 2022-07-28 NOTE — ED NOTES
Please advise Received report from Bautista Pérez, [facility]. Patient in [place] at this time.       Dipti Peralta RN  07/21/19 5669

## 2022-10-18 ENCOUNTER — CARE COORDINATION (OUTPATIENT)
Dept: CARE COORDINATION | Age: 72
End: 2022-10-18

## 2022-10-18 NOTE — CARE COORDINATION
Contacted [facility] to inquire about if pt is a LTC resident. Left VM on SW line asking for a call back, contact information given.

## 2022-10-19 NOTE — CARE COORDINATION
Received TRICIA from Kennedy Han, Josue Blackwell from [facility] to notify ACM that pt is a long term care resident.  Pt will be permanently excluded from the care coordination eligibility list.

## 2022-12-19 ENCOUNTER — HOSPITAL ENCOUNTER (EMERGENCY)
Age: 72
Discharge: HOME | End: 2022-12-19
Attending: STUDENT IN AN ORGANIZED HEALTH CARE EDUCATION/TRAINING PROGRAM
Payer: MEDICARE

## 2022-12-19 ENCOUNTER — APPOINTMENT (OUTPATIENT)
Dept: GENERAL RADIOLOGY | Age: 72
End: 2022-12-19
Payer: MEDICARE

## 2022-12-19 VITALS
WEIGHT: 139.8 LBS | RESPIRATION RATE: 16 BRPM | HEART RATE: 68 BPM | DIASTOLIC BLOOD PRESSURE: 62 MMHG | SYSTOLIC BLOOD PRESSURE: 110 MMHG | BODY MASS INDEX: 24.76 KG/M2 | OXYGEN SATURATION: 97 % | TEMPERATURE: 98 F

## 2022-12-19 DIAGNOSIS — K94.23 PEG TUBE MALFUNCTION (HCC): Primary | ICD-10-CM

## 2022-12-19 PROCEDURE — 74018 RADEX ABDOMEN 1 VIEW: CPT

## 2022-12-19 PROCEDURE — 99283 EMERGENCY DEPT VISIT LOW MDM: CPT

## 2022-12-19 PROCEDURE — 43762 RPLC GTUBE NO REVJ TRC: CPT

## 2022-12-19 ASSESSMENT — ENCOUNTER SYMPTOMS
VOMITING: 0
DIARRHEA: 0
NAUSEA: 0
SHORTNESS OF BREATH: 0
ABDOMINAL PAIN: 0

## 2022-12-19 ASSESSMENT — PAIN - FUNCTIONAL ASSESSMENT: PAIN_FUNCTIONAL_ASSESSMENT: NONE - DENIES PAIN

## 2022-12-19 NOTE — ED NOTES
Physician's ambulance called for transport to return to /Amaury Freire, [facility]  12/19/22 1264

## 2022-12-19 NOTE — ED PROVIDER NOTES
[address] ENCOUNTER      Pt Name: Roger Goddard  MRN: [id number]  [place] 1950  Date of evaluation: 12/19/2022      CHIEF COMPLAINT       Chief Complaint   Patient presents with    Other     Pt Gtube is split and is here to have replaced from nursing home          HPI  Roger Goddard is a 81 y.o. female presenting to the emergency department from a nursing home for a G-tube replacement. She states for the past few weeks it has been leaking due to a crack. She has had this G-tube for the past few years. She reports no other complaints at this time. Patient is able to eat, however receives the majority of her nutrition through her PEG tube. She denies any irritation, or redness around the PEG tube site. She denies any fevers, chills, vomiting, chest pain, abdominal pain, or shortness of breath. Except as noted above the remainder of the review of systems was reviewed and negative. Review of Systems   Constitutional:  Negative for chills and fever. Respiratory:  Negative for shortness of breath. Cardiovascular:  Negative for chest pain. Gastrointestinal:  Negative for abdominal pain, diarrhea, nausea and vomiting. Neurological:  Negative for headaches. Psychiatric/Behavioral:  Negative for behavioral problems. The patient is not nervous/anxious. Physical Exam  Constitutional:       General: She is not in acute distress. Appearance: Normal appearance. HENT:      Head: Normocephalic and atraumatic. Right Ear: External ear normal.      Left Ear: External ear normal.      Nose: Nose normal.      Mouth/Throat:      Mouth: Mucous membranes are moist.      Pharynx: No oropharyngeal exudate. Eyes:      Extraocular Movements: Extraocular movements intact. Conjunctiva/sclera: Conjunctivae normal.      Pupils: Pupils are equal, round, and reactive to light.    Cardiovascular:      Rate and Rhythm: Normal rate. Pulses: Normal pulses. Pulmonary:      Effort: No respiratory distress. Breath sounds: Normal breath sounds. Abdominal:      Palpations: Abdomen is soft. Tenderness: There is no abdominal tenderness. There is no guarding or rebound. Comments: PEG tube in place with no surrounding erythema. Musculoskeletal:         General: No deformity or signs of injury. Cervical back: Neck supple. No rigidity. Skin:     General: Skin is warm and dry. Neurological:      General: No focal deficit present. Mental Status: She is alert and oriented to person, place, and time. Mental status is at baseline. Motor: Weakness present. Comments: Chronic paralysis of bilateral lower extremities   Psychiatric:         Mood and Affect: Mood normal.        Procedures   PROCEDURE  12/19/22       Time: 3:22 PM      FEEDING TUBE REPLACEMENT  Risks, benefits and alternatives (for applicable procedures below) described. Performed By: EM Resident. Indication: Tube malfunction. Informed consent: Verbal consent obtained. The patient was counseled regarding the procedure in person, it's indications, risks, potential complications and alternatives and any questions were answered. Verbal consent was obtained. Local Anesthesia:  not required/indicated. Procedure: A feeding tube was replaced using a 22 Yakut gastrostomy tube and the bulb was inflated using 15 cc of sterile water   Tube was secured to skin pre-attached rubber skin bumper device                                                          . Post-Procedure: Tube position confirmed by x-ray with contrast injection. Patient tolerated the procedure well. Complications:  None. MDM     This is a 73-year-old female presented from a nursing home for a PEG tube replacement. She is well-appearing with no other complaints at this time. Her G-tube was replaced and confirmed via x-ray with contrast injection.     Patient is awake alert, hemodynamic stable, afebrile and in no respiratory distress. Discussed with patient plan for close outpatient follow-up with the patient's PCP as well as return precautions and the patient understands and agrees to the plan.                  --------------------------------------------- PAST HISTORY ---------------------------------------------  Past Medical History:  has a past medical history of Anemia, Anxiety disorder, Chronic back pain, Depression, Diabetes mellitus ([address] Hyperlipidemia, Hypertension, Neurogenic bowel, Neuromuscular dysfunction of bladder, Obstructive sleep apnea, Radiculopathy of lumbar region, Spinal cord infarction [facility]), Suicidal ideations, and Vitamin D deficiency. Past Surgical History:  has a past surgical history that includes back surgery. Social History:  reports that she has quit smoking. She smoked an average of .5 packs per day. She has never used smokeless tobacco. She reports that she does not drink alcohol and does not use drugs. Family History: family history includes Diabetes in her father. The patients home medications have been reviewed. Allergies: Patient has no known allergies. -------------------------------------------------- RESULTS -------------------------------------------------  Labs:  No results found for this visit on 12/19/22. Radiology:  XR ABDOMEN FOR NG/OG/NE TUBE PLACEMENT   Final Result   Addendum (preliminary) 1 of 1   ADDENDUM:   Please note, the impression should read as follows \"intraluminal position    of   the G-tube opacifying the stomach and duodenum. \"         Final   Intravenous coil position of G-tube opacifying the stomach and duodenum. ------------------------- NURSING NOTES AND VITALS REVIEWED ---------------------------  Date / Time Roomed:  12/19/2022  1:45 PM  ED Bed Assignment:  QUINTIN ALLAN/QUINTIN-F    The nursing notes within the ED encounter and vital signs as below have been reviewed.    BP 110/62   Pulse 68   Temp 98 °F (36.7 °C)   Resp 16   SpO2 97%   Oxygen Saturation Interpretation: normal        --------------------------------- ADDITIONAL PROVIDER NOTES ---------------------------------  At this time the patient is without objective evidence of an acute process requiring hospitalization or inpatient management. They have remained hemodynamically stable throughout their entire ED visit and are stable for discharge with outpatient follow-up. The plan has been discussed in detail and they are aware of the specific conditions for emergent return, as well as the importance of follow-up. New Prescriptions    No medications on file       Diagnosis:  1. PEG tube malfunction ([facility] [address]        Disposition:  Patient's disposition: Discharge to nursing home  Patient's condition is stable. Patient was seen and evaluated by both myself and Moe Delarosa DO.       Rina Hudson DO  Resident  12/19/22 6646

## 2023-03-17 ENCOUNTER — HOSPITAL ENCOUNTER (EMERGENCY)
Age: 73
Discharge: HOME | End: 2023-03-18
Attending: STUDENT IN AN ORGANIZED HEALTH CARE EDUCATION/TRAINING PROGRAM
Payer: MEDICARE

## 2023-03-17 ENCOUNTER — APPOINTMENT (OUTPATIENT)
Dept: GENERAL RADIOLOGY | Age: 73
End: 2023-03-17
Payer: MEDICARE

## 2023-03-17 VITALS
DIASTOLIC BLOOD PRESSURE: 61 MMHG | BODY MASS INDEX: 27.49 KG/M2 | OXYGEN SATURATION: 98 % | HEIGHT: 64 IN | TEMPERATURE: 98.2 F | WEIGHT: 161 LBS | RESPIRATION RATE: 16 BRPM | SYSTOLIC BLOOD PRESSURE: 112 MMHG | HEART RATE: 80 BPM

## 2023-03-17 DIAGNOSIS — K94.23 PEG TUBE MALFUNCTION (HCC): Primary | ICD-10-CM

## 2023-03-17 PROCEDURE — 43762 RPLC GTUBE NO REVJ TRC: CPT

## 2023-03-17 PROCEDURE — 74018 RADEX ABDOMEN 1 VIEW: CPT

## 2023-03-17 PROCEDURE — 99283 EMERGENCY DEPT VISIT LOW MDM: CPT

## 2023-03-17 NOTE — ED NOTES
Called [facility] for transportation back to [facility] and was given ETA of 2 hours     Abhishek Rodriguez RN  03/17/23 0650

## 2023-03-17 NOTE — ED PROVIDER NOTES
[address] ENCOUNTER      Pt Name: Linette Grant  MRN: [id number]  [place] 1950  Date of evaluation: 3/17/2023  Provider: Belinda Garrett DO    CHIEF COMPLAINT       Chief Complaint   Patient presents with    G Tube Complications     G TUBE NEEDS REPLACED         HISTORY OF PRESENT ILLNESS    Linette Grant is a 81 y.o. female who presents to the emergency department with EMS for broken PEG tube. Patient states this has been broken for the past 24 hours. She has this in place for supplemental feeds. She states that she is still able to tolerate food by mouth although this was placed to supplement only. She otherwise has no complaints at this time she states the PEG tube has been in place for quite some time she is uncertain of the surgeon that placed it. No other complaints at this time. The history is provided by the patient. Nursing Notes were reviewed. REVIEW OF SYSTEMS    (2-9 systems for level 4, 10 or more for level 5)   CONSTITUTIONAL: Endorses PEG tube problem. Denies fever, sweats, chills. NEURO: Denies difficulty walking, numbness, weakness, tingling, headache. HEENT: Denies sore throat, rhinorrhea, epistaxis, changes in vision. CARDIO: Denies chest pain, palpitations. PULM: Denies shortness of breath, cough. GI: Denies abdominal pain, nausea, vomiting, diarrhea  MSK: Denies recent trauma. SKIN: Denies rash, lesions.      PAST MEDICAL HISTORY     Past Medical History:   Diagnosis Date    Anemia     Anxiety disorder     Chronic back pain 01/2018    6/10 on the pain scale    Depression     Diabetes mellitus ([facility] [address]     Hyperlipidemia     Hypertension     Neurogenic bowel     Neuromuscular dysfunction of bladder     Obstructive sleep apnea     Radiculopathy of lumbar region     Spinal cord infarction [facility])     Suicidal ideations     Vitamin D deficiency          SURGICAL HISTORY       Past Surgical History:   Procedure Laterality Date    BACK SURGERY           CURRENT MEDICATIONS       Discharge Medication List as of 3/17/2023  5:37 PM        CONTINUE these medications which have NOT CHANGED    Details   clonazePAM (KLONOPIN) 0.5 MG tablet 1 tablet by PEG Tube route 2 times daily for 99 days. , Disp-60 tablet, R-3DC to SNF      traZODone (DESYREL) 150 MG tablet 1 tablet by PEG Tube route nightlyDC to SNF      insulin glargine (LANTUS) 100 UNIT/ML injection vial Inject 28 Units into the skin nightly, Disp-1 vial, R-3DC to SNF      metFORMIN (GLUCOPHAGE) 500 MG tablet 1 tablet by PEG Tube route 2 times daily (with meals), Disp-60 tablet, R-3DC to SNF      metoprolol tartrate 75 MG TABS 75 mg by PEG Tube route 2 times daily, Disp-60 tablet, R-3DC to SNF      levothyroxine (SYNTHROID) 75 MCG tablet 1 tablet by PEG Tube route Daily, Disp-30 tablet, R-3DC to SNF      ARIPiprazole (ABILIFY) 15 MG tablet Take 1 tablet by mouth nightly, Disp-30 tablet, R-3DC to SNF      pantoprazole (PROTONIX) 40 MG tablet Take 1 tablet by mouth every morning (before breakfast), Disp-30 tablet, R-3DC to SNF      insulin lispro (HUMALOG) 100 UNIT/ML injection vial Inject into the skin 3 times daily (before meals) Per Sliding Scale:     = 0 units  140-199 = 5 units  200-249 = 10 units  250-299 = 15 units  300-349 = 20 units  350-399 = 25 units  > 400 = 30 unitsHistorical Med      lactobacillus (CULTURELLE) CAPS capsule Take 1 capsule by mouth dailyDC to SNF      vitamin D (CHOLECALCIFEROL) 1000 UNIT TABS tablet 4,000 Units by PEG Tube route dailyHistorical Med      gabapentin (NEURONTIN) 400 MG capsule 1 capsule by PEG Tube route 3 times daily for 90 days. , Disp-90 capsule, R-3DC to SNF      ferrous sulfate 325 (65 Fe) MG tablet 1 tablet by PEG Tube route 2 times daily (with meals), Disp-30 tablet, R-3DC to SNF      docusate sodium (COLACE) 100 MG capsule Take 100 mg by mouth 2 times dailyHistorical Med      rosuvastatin (CRESTOR) 10 MG tablet Take 10 mg by mouth nightlyHistorical Med      acetaminophen (TYLENOL) 325 MG tablet Take 650 mg by mouth every 4 hours as needed for PainHistorical Med      lisinopril (PRINIVIL;ZESTRIL) 5 MG tablet Take 5 mg by mouth daily Historical Med             ALLERGIES     Patient has no known allergies. FAMILY HISTORY       Family History   Problem Relation Age of Onset    Diabetes Father           SOCIAL HISTORY       Social History     Socioeconomic History    Marital status:      Spouse name: None    Number of children: None    Years of education: None    Highest education level: None   Tobacco Use    Smoking status: Former     Packs/day: 0.50     Types: Cigarettes    Smokeless tobacco: Never   Vaping Use    Vaping Use: Unknown   Substance and Sexual Activity    Alcohol use: No    Drug use: No       SCREENINGS        [place] Coma Scale  Eye Opening: Spontaneous  Best Verbal Response: Oriented  Best Motor Response: Obeys commands  Darrick Coma Scale Score: 15               PHYSICAL EXAM    (up to 7 for level 4, 8 or more for level 5)     ED Triage Vitals   BP Temp Temp src Heart Rate Resp SpO2 Height Weight   03/17/23 1546 03/17/23 1546 -- 03/17/23 1546 03/17/23 1546 03/17/23 1546 03/17/23 1543 03/17/23 1543   112/61 98.2 °F (36.8 °C)  80 16 98 % 5' 4\" (1.626 m) 161 lb (73 kg)       VS: As documented in the triage note from today's date and EMR flowsheet were reviewed. Gen: Well developed. No acute distress. Seated in bed. Appears nontoxic. Skin: Warm. Dry. Intact. No rashes or lesions. PEG tube over the epigastric region no surrounding cellulitis or drainage. The tube itself is cracked at the Y junction. Eyes: Pupils equally round and reactive to light. Clear sclera. HENT: Atraumatic appearance. Mucosal membranes moist. No oral lesions, uvula midline, airway patent. CV: Regular rate and regular rhythm. S1, S2. No pedal edema. Warm extremities.   Resp: Nonlabored breathing Clear to auscultation bilaterally. No increased work of breathing. GI: Soft and nontender. No rebound or guarding. Bowel sounds x4 present. Jayne Bolk sign McBurney's point tenderness is negative no CVA tenderness. MSK: Symmetric muscle bulk. No joint swelling in the extremities. Compartments are soft. Neurovascularly intact x4 extremities. Neuro: Alert. Speech fluent. Moving all extremities. No focal deficits. Psych: Appropriate. [place]. DIAGNOSTIC RESULTS     RADIOLOGY:   Non-plain film images such as CT, Ultrasound and MRI are read by the radiologist. Plain radiographic images are visualized and preliminarily interpreted by the emergency physician with the below findings: X-ray imaging with appropriately placed G-tube. Interpretation per the Radiologist below, if available at the time of this note:    XR ABDOMEN FOR NG/OG/NE TUBE PLACEMENT   Final Result   G-tube position within the gastric lumen. Normal gastric outflow. No   unexpected findings. ED BEDSIDE ULTRASOUND:   Performed by ED Physician - none    LABS:  Labs Reviewed - No data to display    All other labs were within normal range or not returned as of this dictation. EMERGENCY DEPARTMENT COURSE/MDM:   Vitals:    Vitals:    03/17/23 1543 03/17/23 1546   BP:  112/61   Pulse:  80   Resp:  16   Temp:  98.2 °F (36.8 °C)   SpO2:  98%   Weight: 161 lb (73 kg)    Height: 5' 4\" (1.626 m)        I reviewed the patient's triage vitals and they are within normal range. Due to the above findings the following was ordered KUB with Gastrografin for placement. Patient overall well-appearing with normal vitals no abdominal pain she has a PEG tube in place for supplemental feeds there is no surrounding cellulitis or discharge at the site. The Y junction is broken and leaking. Due to this I received consent verbally from her to remove the PEG tubes and place Blankenship catheter. The PEG tube balloon was depleted and easily expelled.   The tract was solidified otherwise no bleeding. A 16 French Blankenship catheter was lubricated and easily inserted in the left was over 5 cc of sterile water. It was pulled back and gastric content was expelled from the tube. Cath was placed and secured in the usual fashion with no complications. X-ray imaging with Gastrografin confirmed GI placement. Patient is aware that she will need to follow-up with GI for replacement of the PEG tube and is a temporizing measure. She was given appropriate return precautions follow-up and discharged in stable condition    Patient was counseled regarding labs, imaging, likely diagnosis, and plan. All questions were answered. ED Medications administered this visit:  Medications - No data to display    Follow-up:  Anthony Galarza MD  [phone]      As needed    Ari Huff, [facility]  [phone]    In 2 days        Final Impression:   1. PEG tube malfunction ([address]          (Please note that portions of this note were completed with a voice recognition program.  Efforts were made to edit the dictations but occasionally words are mis-transcribed. Rosemarie Devries,   03/18/23 6365

## 2023-03-17 NOTE — DISCHARGE INSTRUCTIONS
Your feeding tube was broken therefore it was replaced with a Blaknenship catheter this is a temporizing measure and you will need to follow-up with GI for replacement of your formal PEG tube. Should you begin experiencing abdominal pain clogged tube inability tolerate food or fluids fevers or pain return immediately or call 911 otherwise follow-up with your primary physician and GI.

## 2023-03-17 NOTE — ED NOTES
Called and gave report to [facility] RN, she is aware PEG tube in temporary and pt needs to follow up with her GI, Dr. Merle Martinez, RN  03/17/23 4718

## 2023-03-17 NOTE — ED NOTES
Attempted to call Suki to give report so had to leave message on voicemail for [facility]  03/17/23 0483

## 2023-03-18 NOTE — ED NOTES
[facility] called and pt ETA will be delayed until 2200 and they can not outsource     Tala Client, LILIANA  03/17/23 2032

## 2023-05-07 ENCOUNTER — APPOINTMENT (OUTPATIENT)
Dept: GENERAL RADIOLOGY | Age: 73
End: 2023-05-07
Payer: MEDICARE

## 2023-05-07 ENCOUNTER — HOSPITAL ENCOUNTER (EMERGENCY)
Age: 73
Discharge: HOME | End: 2023-05-07
Attending: EMERGENCY MEDICINE
Payer: MEDICARE

## 2023-05-07 VITALS
WEIGHT: 160 LBS | RESPIRATION RATE: 17 BRPM | HEART RATE: 64 BPM | OXYGEN SATURATION: 95 % | SYSTOLIC BLOOD PRESSURE: 119 MMHG | BODY MASS INDEX: 27.31 KG/M2 | HEIGHT: 64 IN | TEMPERATURE: 98.2 F | DIASTOLIC BLOOD PRESSURE: 66 MMHG

## 2023-05-07 DIAGNOSIS — K94.23 PEG TUBE MALFUNCTION (HCC): Primary | ICD-10-CM

## 2023-05-07 DIAGNOSIS — T85.528A DISLODGED GASTROSTOMY TUBE: ICD-10-CM

## 2023-05-07 PROCEDURE — 43762 RPLC GTUBE NO REVJ TRC: CPT

## 2023-05-07 PROCEDURE — 99283 EMERGENCY DEPT VISIT LOW MDM: CPT

## 2023-05-07 PROCEDURE — 74018 RADEX ABDOMEN 1 VIEW: CPT

## 2023-05-07 ASSESSMENT — PAIN SCALES - GENERAL: PAINLEVEL_OUTOF10: 0

## 2023-09-16 ENCOUNTER — HOSPITAL ENCOUNTER (EMERGENCY)
Age: 73
Discharge: HOME | End: 2023-09-17
Attending: STUDENT IN AN ORGANIZED HEALTH CARE EDUCATION/TRAINING PROGRAM
Payer: MEDICARE

## 2023-09-16 DIAGNOSIS — K94.23 PEG TUBE MALFUNCTION (HCC): ICD-10-CM

## 2023-09-16 DIAGNOSIS — K94.23 LEAKING PEG TUBE (HCC): Primary | ICD-10-CM

## 2023-09-16 PROCEDURE — 99283 EMERGENCY DEPT VISIT LOW MDM: CPT

## 2023-09-16 PROCEDURE — 51702 INSERT TEMP BLADDER CATH: CPT

## 2023-09-16 PROCEDURE — 43762 RPLC GTUBE NO REVJ TRC: CPT

## 2023-09-16 ASSESSMENT — ENCOUNTER SYMPTOMS
COUGH: 0
BACK PAIN: 0
NAUSEA: 0
ABDOMINAL PAIN: 0
DIARRHEA: 0
SHORTNESS OF BREATH: 0
COLOR CHANGE: 0
VOMITING: 0

## 2023-09-16 ASSESSMENT — PAIN - FUNCTIONAL ASSESSMENT: PAIN_FUNCTIONAL_ASSESSMENT: NONE - DENIES PAIN

## 2023-09-16 NOTE — ED NOTES
Attempt x4 to call [facility]	[place]. Was able to reach recieptionist during one call to advise of this nurse attempting to call report for return of pt.  This nurse was transferred at that time with inability to reach staff member again since that time     Igor Marie RN  09/16/23 2003

## 2023-09-16 NOTE — DISCHARGE INSTRUCTIONS
There are any further concerns or issues please return back to emergency department. Please follow-up as needed. Watch for further drainage fevers, abdominal pain.

## 2023-09-16 NOTE — ED PROVIDER NOTES
HPI   74-year-old female patient presents emergency department from nursing facility. She is here for leaking around PEG tube site as per staff. Patient states herself that she is unaware of any issues. She states the site normally has some redness around it. She has no abdominal pain no vomiting or any other complaints. Review of Systems   Constitutional:  Negative for chills and fever. HENT:  Negative for congestion. Respiratory:  Negative for cough and shortness of breath. Cardiovascular:  Negative for chest pain. Gastrointestinal:  Negative for abdominal pain, diarrhea, nausea and vomiting. Leaking around PEG tube site   Genitourinary:  Negative for difficulty urinating, dysuria and hematuria. Musculoskeletal:  Negative for back pain. Skin:  Negative for color change. All other systems reviewed and are negative. Physical Exam  Vitals and nursing note reviewed. HENT:      Head: Normocephalic and atraumatic. Nose: Nose normal. No congestion. Mouth/Throat:      Pharynx: Oropharynx is clear. Eyes:      Conjunctiva/sclera: Conjunctivae normal.   Cardiovascular:      Rate and Rhythm: Normal rate and regular rhythm. Pulses: Normal pulses. Heart sounds: Normal heart sounds. Pulmonary:      Effort: Pulmonary effort is normal.      Breath sounds: Normal breath sounds. Abdominal:      General: There is no distension. Tenderness: There is no abdominal tenderness. There is no guarding or rebound. Comments: PEG tube in place, balloon is intact, there is resistance when pulling on the PEG tube, it flushes and pulls back easily, there is no leaking around the site with flushing. There is no abdominal pain there is very mild redness around PEG tube site but no drainage. Musculoskeletal:         General: Normal range of motion. Cervical back: Neck supple. Skin:     General: Skin is warm.       Capillary Refill: Capillary refill takes less than 2

## 2023-09-16 NOTE — ED NOTES
PEG tube flushed with no resistance or draining around the site.       Sarah Stuart RN  09/16/23 1922

## 2023-09-17 ENCOUNTER — APPOINTMENT (OUTPATIENT)
Dept: GENERAL RADIOLOGY | Age: 73
End: 2023-09-17
Payer: MEDICARE

## 2023-09-17 VITALS
RESPIRATION RATE: 16 BRPM | BODY MASS INDEX: 27.31 KG/M2 | WEIGHT: 160 LBS | TEMPERATURE: 98.4 F | DIASTOLIC BLOOD PRESSURE: 68 MMHG | HEIGHT: 64 IN | OXYGEN SATURATION: 99 % | HEART RATE: 69 BPM | SYSTOLIC BLOOD PRESSURE: 116 MMHG

## 2023-09-17 PROCEDURE — 74018 RADEX ABDOMEN 1 VIEW: CPT

## 2023-09-17 PROCEDURE — 6360000004 HC RX CONTRAST MEDICATION: Performed by: RADIOLOGY

## 2023-09-17 RX ADMIN — DIATRIZOATE MEGLUMINE AND DIATRIZOATE SODIUM 30 ML: 660; 100 LIQUID ORAL; RECTAL at 07:27

## 2023-09-17 NOTE — ED NOTES
Called [facility] for an updated ETA on pt. [facility] states the pt is still in will call and were unaware of pt being ready to be transported.  [facility] notified me the new ETA is Louise Watson  09/17/23 6607

## 2023-09-17 NOTE — ED NOTES
Rounding on patient at this time, went to adjust bedding and pt peg tube was completely out. Dr. Augie Almeida notified. No 18fr peg tubes available here or at central supply. 18fr glass placed by Dr. Augie Almeida.       [facility] Naz RN  09/17/23 9738

## 2023-09-17 NOTE — ED NOTES
Report called to Arnulfo Grande at [facility].       [facility] Naz, RN  09/17/23 0531

## 2023-10-31 ENCOUNTER — HOSPITAL ENCOUNTER (EMERGENCY)
Age: 73
Discharge: HOME | End: 2023-11-01
Attending: EMERGENCY MEDICINE
Payer: MEDICARE

## 2023-10-31 DIAGNOSIS — K94.23 MALFUNCTION OF GASTROSTOMY TUBE (HCC): Primary | ICD-10-CM

## 2023-10-31 LAB
ALBUMIN SERPL-MCNC: 3.9 G/DL (ref 3.5–5.2)
ALP SERPL-CCNC: 70 U/L (ref 35–104)
ALT SERPL-CCNC: 8 U/L (ref 0–32)
ANION GAP SERPL CALCULATED.3IONS-SCNC: 8 MMOL/L (ref 7–16)
AST SERPL-CCNC: 11 U/L (ref 0–31)
BASOPHILS # BLD: 0.03 K/UL (ref 0–0.2)
BASOPHILS NFR BLD: 1 % (ref 0–2)
BILIRUB SERPL-MCNC: 0.8 MG/DL (ref 0–1.2)
BUN SERPL-MCNC: 15 MG/DL (ref 6–23)
CALCIUM SERPL-MCNC: 9.6 MG/DL (ref 8.6–10.2)
CHLORIDE SERPL-SCNC: 101 MMOL/L (ref 98–107)
CO2 SERPL-SCNC: 29 MMOL/L (ref 22–29)
CREAT SERPL-MCNC: 0.6 MG/DL (ref 0.5–1)
EOSINOPHIL # BLD: 0.33 K/UL (ref 0.05–0.5)
EOSINOPHILS RELATIVE PERCENT: 6 % (ref 0–6)
ERYTHROCYTE [DISTWIDTH] IN BLOOD BY AUTOMATED COUNT: 14.6 % (ref 11.5–15)
GFR SERPL CREATININE-BSD FRML MDRD: >60 ML/MIN/1.73M2
GLUCOSE SERPL-MCNC: 145 MG/DL (ref 74–99)
HCT VFR BLD AUTO: 39.7 % (ref 34–48)
HGB BLD-MCNC: 12.9 G/DL (ref 11.5–15.5)
IMM GRANULOCYTES # BLD AUTO: <0.03 K/UL (ref 0–0.58)
IMM GRANULOCYTES NFR BLD: 0 % (ref 0–5)
LACTATE BLDV-SCNC: 1.2 MMOL/L (ref 0.5–2.2)
LIPASE SERPL-CCNC: 47 U/L (ref 13–60)
LYMPHOCYTES NFR BLD: 1.93 K/UL (ref 1.5–4)
LYMPHOCYTES RELATIVE PERCENT: 38 % (ref 20–42)
MCH RBC QN AUTO: 28 PG (ref 26–35)
MCHC RBC AUTO-ENTMCNC: 32.5 G/DL (ref 32–34.5)
MCV RBC AUTO: 86.1 FL (ref 80–99.9)
MONOCYTES NFR BLD: 0.29 K/UL (ref 0.1–0.95)
MONOCYTES NFR BLD: 6 % (ref 2–12)
NEUTROPHILS NFR BLD: 50 % (ref 43–80)
NEUTS SEG NFR BLD: 2.56 K/UL (ref 1.8–7.3)
PLATELET # BLD AUTO: 217 K/UL (ref 130–450)
PMV BLD AUTO: 11.4 FL (ref 7–12)
POTASSIUM SERPL-SCNC: 4.3 MMOL/L (ref 3.5–5)
PROT SERPL-MCNC: 7 G/DL (ref 6.4–8.3)
RBC # BLD AUTO: 4.61 M/UL (ref 3.5–5.5)
SODIUM SERPL-SCNC: 138 MMOL/L (ref 132–146)
WBC OTHER # BLD: 5.2 K/UL (ref 4.5–11.5)

## 2023-10-31 PROCEDURE — 80053 COMPREHEN METABOLIC PANEL: CPT

## 2023-10-31 PROCEDURE — 99284 EMERGENCY DEPT VISIT MOD MDM: CPT

## 2023-10-31 PROCEDURE — 43762 RPLC GTUBE NO REVJ TRC: CPT

## 2023-10-31 PROCEDURE — 83605 ASSAY OF LACTIC ACID: CPT

## 2023-10-31 PROCEDURE — 83690 ASSAY OF LIPASE: CPT

## 2023-10-31 PROCEDURE — 85025 COMPLETE CBC W/AUTO DIFF WBC: CPT

## 2023-10-31 ASSESSMENT — LIFESTYLE VARIABLES
HOW MANY STANDARD DRINKS CONTAINING ALCOHOL DO YOU HAVE ON A TYPICAL DAY: PATIENT DOES NOT DRINK
HOW OFTEN DO YOU HAVE A DRINK CONTAINING ALCOHOL: NEVER

## 2023-10-31 ASSESSMENT — PAIN - FUNCTIONAL ASSESSMENT: PAIN_FUNCTIONAL_ASSESSMENT: 0-10

## 2023-10-31 ASSESSMENT — PAIN SCALES - GENERAL: PAINLEVEL_OUTOF10: 3

## 2023-11-01 ENCOUNTER — APPOINTMENT (OUTPATIENT)
Dept: GENERAL RADIOLOGY | Age: 73
End: 2023-11-01
Payer: MEDICARE

## 2023-11-01 VITALS
SYSTOLIC BLOOD PRESSURE: 124 MMHG | WEIGHT: 150 LBS | BODY MASS INDEX: 25.61 KG/M2 | HEART RATE: 95 BPM | HEIGHT: 64 IN | TEMPERATURE: 97.7 F | OXYGEN SATURATION: 95 % | RESPIRATION RATE: 20 BRPM | DIASTOLIC BLOOD PRESSURE: 76 MMHG

## 2023-11-01 PROCEDURE — 74018 RADEX ABDOMEN 1 VIEW: CPT

## 2023-11-01 ASSESSMENT — PAIN - FUNCTIONAL ASSESSMENT: PAIN_FUNCTIONAL_ASSESSMENT: NONE - DENIES PAIN

## 2024-03-24 ENCOUNTER — APPOINTMENT (OUTPATIENT)
Dept: GENERAL RADIOLOGY | Age: 74
DRG: 871 | End: 2024-03-24
Payer: MEDICARE

## 2024-03-24 ENCOUNTER — HOSPITAL ENCOUNTER (EMERGENCY)
Age: 74
Discharge: HOME | DRG: 871 | End: 2024-03-24
Attending: EMERGENCY MEDICINE
Payer: MEDICARE

## 2024-03-24 VITALS
HEART RATE: 84 BPM | DIASTOLIC BLOOD PRESSURE: 71 MMHG | RESPIRATION RATE: 18 BRPM | SYSTOLIC BLOOD PRESSURE: 125 MMHG | OXYGEN SATURATION: 98 % | TEMPERATURE: 97.7 F

## 2024-03-24 DIAGNOSIS — N39.0 URINARY TRACT INFECTION IN FEMALE: ICD-10-CM

## 2024-03-24 DIAGNOSIS — S72.001A CLOSED FRACTURE OF RIGHT HIP, INITIAL ENCOUNTER (HCC): Primary | ICD-10-CM

## 2024-03-24 LAB
ANION GAP SERPL CALCULATED.3IONS-SCNC: 13 MMOL/L (ref 7–16)
BACTERIA URNS QL MICRO: ABNORMAL
BASOPHILS # BLD: 0 K/UL (ref 0–0.2)
BASOPHILS NFR BLD: 0 % (ref 0–2)
BILIRUB UR QL STRIP: NEGATIVE
BUN SERPL-MCNC: 18 MG/DL (ref 6–23)
CALCIUM SERPL-MCNC: 9.3 MG/DL (ref 8.6–10.2)
CHLORIDE SERPL-SCNC: 97 MMOL/L (ref 98–107)
CLARITY UR: ABNORMAL
CO2 SERPL-SCNC: 24 MMOL/L (ref 22–29)
COLOR UR: YELLOW
CREAT SERPL-MCNC: 0.8 MG/DL (ref 0.5–1)
EOSINOPHIL # BLD: 0 K/UL (ref 0.05–0.5)
EOSINOPHILS RELATIVE PERCENT: 0 % (ref 0–6)
ERYTHROCYTE [DISTWIDTH] IN BLOOD BY AUTOMATED COUNT: 14.4 % (ref 11.5–15)
GFR SERPL CREATININE-BSD FRML MDRD: >60 ML/MIN/1.73M2
GLUCOSE BLD-MCNC: 177 MG/DL (ref 74–99)
GLUCOSE BLD-MCNC: 182 MG/DL (ref 74–99)
GLUCOSE SERPL-MCNC: 206 MG/DL (ref 74–99)
GLUCOSE UR STRIP-MCNC: NEGATIVE MG/DL
HCT VFR BLD AUTO: 36.6 % (ref 34–48)
HGB BLD-MCNC: 11.8 G/DL (ref 11.5–15.5)
HGB UR QL STRIP.AUTO: NEGATIVE
INR PPP: 1.3
KETONES UR STRIP-MCNC: NEGATIVE MG/DL
LEUKOCYTE ESTERASE UR QL STRIP: ABNORMAL
LYMPHOCYTES NFR BLD: 1.76 K/UL (ref 1.5–4)
LYMPHOCYTES RELATIVE PERCENT: 10 % (ref 20–42)
MCH RBC QN AUTO: 28.4 PG (ref 26–35)
MCHC RBC AUTO-ENTMCNC: 32.2 G/DL (ref 32–34.5)
MCV RBC AUTO: 88.2 FL (ref 80–99.9)
MONOCYTES NFR BLD: 0.32 K/UL (ref 0.1–0.95)
MONOCYTES NFR BLD: 2 % (ref 2–12)
NEUTROPHILS NFR BLD: 89 % (ref 43–80)
NEUTS SEG NFR BLD: 16.32 K/UL (ref 1.8–7.3)
NITRITE UR QL STRIP: NEGATIVE
PARTIAL THROMBOPLASTIN TIME: 29.2 SEC (ref 24.5–35.1)
PH UR STRIP: 7 [PH] (ref 5–9)
PLATELET # BLD AUTO: 306 K/UL (ref 130–450)
PMV BLD AUTO: 11.6 FL (ref 7–12)
POTASSIUM SERPL-SCNC: 4.4 MMOL/L (ref 3.5–5)
PROT UR STRIP-MCNC: 30 MG/DL
PROTHROMBIN TIME: 14.6 SEC (ref 9.3–12.4)
RBC # BLD AUTO: 4.15 M/UL (ref 3.5–5.5)
RBC # BLD: ABNORMAL 10*6/UL
RBC #/AREA URNS HPF: ABNORMAL /HPF
SODIUM SERPL-SCNC: 134 MMOL/L (ref 132–146)
SP GR UR STRIP: 1.01 (ref 1–1.03)
UROBILINOGEN UR STRIP-ACNC: 2 EU/DL (ref 0–1)
WBC #/AREA URNS HPF: ABNORMAL /HPF
WBC OTHER # BLD: 18.4 K/UL (ref 4.5–11.5)

## 2024-03-24 PROCEDURE — 6370000000 HC RX 637 (ALT 250 FOR IP)

## 2024-03-24 PROCEDURE — 85610 PROTHROMBIN TIME: CPT

## 2024-03-24 PROCEDURE — 73502 X-RAY EXAM HIP UNI 2-3 VIEWS: CPT

## 2024-03-24 PROCEDURE — 80048 BASIC METABOLIC PNL TOTAL CA: CPT

## 2024-03-24 PROCEDURE — 51701 INSERT BLADDER CATHETER: CPT

## 2024-03-24 PROCEDURE — 71045 X-RAY EXAM CHEST 1 VIEW: CPT

## 2024-03-24 PROCEDURE — 2580000003 HC RX 258

## 2024-03-24 PROCEDURE — 82962 GLUCOSE BLOOD TEST: CPT

## 2024-03-24 PROCEDURE — 85025 COMPLETE CBC W/AUTO DIFF WBC: CPT

## 2024-03-24 PROCEDURE — 73560 X-RAY EXAM OF KNEE 1 OR 2: CPT

## 2024-03-24 PROCEDURE — 6360000002 HC RX W HCPCS

## 2024-03-24 PROCEDURE — 85730 THROMBOPLASTIN TIME PARTIAL: CPT

## 2024-03-24 PROCEDURE — 99284 EMERGENCY DEPT VISIT MOD MDM: CPT

## 2024-03-24 PROCEDURE — 87077 CULTURE AEROBIC IDENTIFY: CPT

## 2024-03-24 PROCEDURE — 87086 URINE CULTURE/COLONY COUNT: CPT

## 2024-03-24 PROCEDURE — 81001 URINALYSIS AUTO W/SCOPE: CPT

## 2024-03-24 PROCEDURE — 96374 THER/PROPH/DIAG INJ IV PUSH: CPT

## 2024-03-24 RX ORDER — ONDANSETRON 4 MG/1
4 TABLET, FILM COATED ORAL EVERY 4 HOURS PRN
COMMUNITY

## 2024-03-24 RX ORDER — DULOXETIN HYDROCHLORIDE 30 MG/1
30 CAPSULE, DELAYED RELEASE ORAL 2 TIMES DAILY
COMMUNITY

## 2024-03-24 RX ORDER — ONDANSETRON 4 MG/1
4 TABLET, FILM COATED ORAL ONCE
Status: COMPLETED | OUTPATIENT
Start: 2024-03-24 | End: 2024-03-24

## 2024-03-24 RX ORDER — GINSENG 100 MG
CAPSULE ORAL DAILY
COMMUNITY

## 2024-03-24 RX ORDER — BISACODYL 10 MG
10 SUPPOSITORY, RECTAL RECTAL DAILY PRN
COMMUNITY

## 2024-03-24 RX ORDER — LANOLIN ALCOHOL/MO/W.PET/CERES
6 CREAM (GRAM) TOPICAL NIGHTLY PRN
COMMUNITY

## 2024-03-24 RX ORDER — 0.9 % SODIUM CHLORIDE 0.9 %
1000 INTRAVENOUS SOLUTION INTRAVENOUS ONCE
Status: COMPLETED | OUTPATIENT
Start: 2024-03-24 | End: 2024-03-24

## 2024-03-24 RX ORDER — CEFDINIR 250 MG/5ML
300 POWDER, FOR SUSPENSION ORAL 2 TIMES DAILY
Qty: 150 ML | Refills: 0 | Status: ON HOLD | OUTPATIENT
Start: 2024-03-24 | End: 2024-04-01 | Stop reason: HOSPADM

## 2024-03-24 RX ORDER — BACLOFEN 10 MG/1
10 TABLET ORAL 3 TIMES DAILY
COMMUNITY

## 2024-03-24 RX ORDER — FAMOTIDINE 20 MG/1
20 TABLET, FILM COATED ORAL DAILY
COMMUNITY

## 2024-03-24 RX ORDER — TRAZODONE HYDROCHLORIDE 50 MG/1
50 TABLET ORAL NIGHTLY
COMMUNITY

## 2024-03-24 RX ORDER — SITAGLIPTIN AND METFORMIN HYDROCHLORIDE 50; 500 MG/1; MG/1
1 TABLET, FILM COATED, EXTENDED RELEASE ORAL 2 TIMES DAILY
COMMUNITY

## 2024-03-24 RX ORDER — CEFDINIR 250 MG/5ML
300 POWDER, FOR SUSPENSION ORAL 2 TIMES DAILY
Qty: 150 ML | Refills: 0 | Status: SHIPPED | OUTPATIENT
Start: 2024-03-24 | End: 2024-03-24

## 2024-03-24 RX ORDER — LOPERAMIDE HYDROCHLORIDE 2 MG/1
2 CAPSULE ORAL 4 TIMES DAILY PRN
COMMUNITY

## 2024-03-24 RX ORDER — SENNOSIDES A AND B 8.6 MG/1
1 TABLET, FILM COATED ORAL DAILY
COMMUNITY

## 2024-03-24 RX ORDER — METOCLOPRAMIDE 5 MG/1
5 TABLET ORAL 4 TIMES DAILY
COMMUNITY

## 2024-03-24 RX ORDER — GABAPENTIN 400 MG/1
400 CAPSULE ORAL 2 TIMES DAILY
COMMUNITY

## 2024-03-24 RX ORDER — LORAZEPAM 0.5 MG/1
0.5 TABLET ORAL 2 TIMES DAILY
Status: ON HOLD | COMMUNITY
End: 2024-04-01 | Stop reason: HOSPADM

## 2024-03-24 RX ADMIN — SODIUM CHLORIDE 1000 ML: 9 INJECTION, SOLUTION INTRAVENOUS at 20:40

## 2024-03-24 RX ADMIN — WATER 2000 MG: 1 INJECTION INTRAMUSCULAR; INTRAVENOUS; SUBCUTANEOUS at 21:47

## 2024-03-24 RX ADMIN — ONDANSETRON HYDROCHLORIDE 4 MG: 4 TABLET, FILM COATED ORAL at 12:43

## 2024-03-24 ASSESSMENT — PAIN SCALES - GENERAL: PAINLEVEL_OUTOF10: 0

## 2024-03-24 ASSESSMENT — PAIN - FUNCTIONAL ASSESSMENT
PAIN_FUNCTIONAL_ASSESSMENT: NONE - DENIES PAIN
PAIN_FUNCTIONAL_ASSESSMENT: NONE - DENIES PAIN
PAIN_FUNCTIONAL_ASSESSMENT: 0-10

## 2024-03-24 NOTE — CONSULTS
Department of Orthopedic Surgery  Resident Consult Note    Reason for Consult: Right hip fracture    HISTORY OF PRESENT ILLNESS:       Patient is a 73 y.o. female who presents with right hip fracture.  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  She currently is a [place].  Patient reports earlier today she was pulling on her right lower extremity to show facility how far she can move her leg when they noted abnormality in the leg.  Patient denies any pain about right hip, right knee.  Reports she was brought in secondary to nursing staff concern for her right leg.  Reports she cannot feel bilateral lower extremities.  No other acute complaints at this time    Past Medical History:        Diagnosis Date    Anemia     Anxiety disorder     Chronic back pain 01/2018    6/10 on the pain scale    Depression     Diabetes mellitus (HCC)     Hyperlipidemia     Hypertension     Neurogenic bowel     Neuromuscular dysfunction of bladder     Obstructive sleep apnea     Radiculopathy of lumbar region     Spinal cord infarction (HCC)     Suicidal ideations     Vitamin D deficiency      Past Surgical History:        Procedure Laterality Date    BACK SURGERY       Current Medications:   No current facility-administered medications for this encounter.  Allergies:  Patient has no known allergies.    Social History:   TOBACCO:   reports that she has quit smoking. She has never used smokeless tobacco.  ETOH:   reports no history of alcohol use.  DRUGS:   reports no history of drug use.  ACTIVITIES OF DAILY LIVING:    OCCUPATION:   Family History:       Problem Relation Age of Onset    Diabetes Father        REVIEW OF SYSTEMS:  CONSTITUTIONAL:  negative for  fevers, chills  EYES:  negative for blurred vision, visual disturbance  HEENT:  negative for  hearing loss, voice change  RESPIRATORY:  negative for  dyspnea, wheezing  CARDIOVASCULAR:   Review:  X-ray  Multiple views pelvis/right hip, left hip demonstrating right intertrochanteric fracture, varus angulated.  Diffuse osteoporosis about entire lower extremity, degenerative changes about left femoral acetabular joint    IMPRESSION:  Closed right intertrochanteric fracture    PLAN:  Physical exam and imaging findings reviewed with patient  Discussed with patient regarding no pain, no evidence of skin breakdown, and with distal paralysis, recommend nonoperative management regarding right intertrochanteric fracture  Nonweightbearing right lower extremity  Pain control per ED  Follow-up outpatient with Dr. Soto for repeat imaging/management  All patient questions sought and answered to best ability, patient is currently agreeable to plan.  Discussed with attending    Electronically signed by Brant Araya DO on 3/24/2024 at 4:52 PM

## 2024-03-24 NOTE — ED NOTES
Patient grabbed Left leg and displayed hypermobility flexion and extension. RN advised patient to stop rotating leg at knee joint as further injury could occur. Patient stated, \"I think its broke now.\" Patient has swelling to left knee. ED attending made aware.

## 2024-03-25 ENCOUNTER — HOSPITAL ENCOUNTER (INPATIENT)
Age: 74
LOS: 6 days | Discharge: NURSING, REHAB OR LONG-TERM CARE | DRG: 871 | End: 2024-04-01
Attending: EMERGENCY MEDICINE | Admitting: INTERNAL MEDICINE
Payer: MEDICARE

## 2024-03-25 ENCOUNTER — APPOINTMENT (OUTPATIENT)
Dept: GENERAL RADIOLOGY | Age: 74
DRG: 871 | End: 2024-03-25
Payer: MEDICARE

## 2024-03-25 DIAGNOSIS — R65.21 SEPTIC SHOCK (HCC): Primary | ICD-10-CM

## 2024-03-25 DIAGNOSIS — R41.82 ALTERED MENTAL STATUS, UNSPECIFIED ALTERED MENTAL STATUS TYPE: ICD-10-CM

## 2024-03-25 DIAGNOSIS — D64.9 NORMOCYTIC ANEMIA: ICD-10-CM

## 2024-03-25 DIAGNOSIS — R31.9 URINARY TRACT INFECTION WITH HEMATURIA, SITE UNSPECIFIED: ICD-10-CM

## 2024-03-25 DIAGNOSIS — K56.41 FECAL IMPACTION IN RECTUM (HCC): ICD-10-CM

## 2024-03-25 DIAGNOSIS — K52.9 COLITIS: ICD-10-CM

## 2024-03-25 DIAGNOSIS — A41.9 SEPTIC SHOCK (HCC): Primary | ICD-10-CM

## 2024-03-25 DIAGNOSIS — N39.0 URINARY TRACT INFECTION WITH HEMATURIA, SITE UNSPECIFIED: ICD-10-CM

## 2024-03-25 LAB
B PARAP IS1001 DNA NPH QL NAA+NON-PROBE: NOT DETECTED
B PERT DNA SPEC QL NAA+PROBE: NOT DETECTED
B.E.: -8.7 MMOL/L (ref -3–3)
BACTERIA URNS QL MICRO: ABNORMAL
BASOPHILS # BLD: 0.04 K/UL (ref 0–0.2)
BASOPHILS NFR BLD: 0 % (ref 0–2)
BILIRUB UR QL STRIP: NEGATIVE
C PNEUM DNA NPH QL NAA+NON-PROBE: NOT DETECTED
CLARITY UR: ABNORMAL
COHB: 0.4 % (ref 0–1.5)
COLOR UR: YELLOW
CRITICAL: ABNORMAL
DATE ANALYZED: ABNORMAL
DATE OF COLLECTION: ABNORMAL
EOSINOPHIL # BLD: 0 K/UL (ref 0.05–0.5)
EOSINOPHILS RELATIVE PERCENT: 0 % (ref 0–6)
ERYTHROCYTE [DISTWIDTH] IN BLOOD BY AUTOMATED COUNT: 15.5 % (ref 11.5–15)
FLUAV RNA NPH QL NAA+NON-PROBE: NOT DETECTED
FLUBV RNA NPH QL NAA+NON-PROBE: NOT DETECTED
GLUCOSE UR STRIP-MCNC: NEGATIVE MG/DL
HADV DNA NPH QL NAA+NON-PROBE: NOT DETECTED
HCO3: 15.4 MMOL/L (ref 22–26)
HCOV 229E RNA NPH QL NAA+NON-PROBE: NOT DETECTED
HCOV HKU1 RNA NPH QL NAA+NON-PROBE: NOT DETECTED
HCOV NL63 RNA NPH QL NAA+NON-PROBE: NOT DETECTED
HCOV OC43 RNA NPH QL NAA+NON-PROBE: NOT DETECTED
HCT VFR BLD AUTO: 22.9 % (ref 34–48)
HGB BLD-MCNC: 7.4 G/DL (ref 11.5–15.5)
HGB UR QL STRIP.AUTO: ABNORMAL
HHB: 1.1 % (ref 0–5)
HMPV RNA NPH QL NAA+NON-PROBE: NOT DETECTED
HPIV1 RNA NPH QL NAA+NON-PROBE: NOT DETECTED
HPIV2 RNA NPH QL NAA+NON-PROBE: NOT DETECTED
HPIV3 RNA NPH QL NAA+NON-PROBE: NOT DETECTED
HPIV4 RNA NPH QL NAA+NON-PROBE: NOT DETECTED
IMM GRANULOCYTES # BLD AUTO: 0.04 K/UL (ref 0–0.58)
IMM GRANULOCYTES NFR BLD: 0 % (ref 0–5)
INR PPP: 1.7
KETONES UR STRIP-MCNC: 15 MG/DL
LAB: ABNORMAL
LACTATE BLDV-SCNC: 4.9 MMOL/L (ref 0.5–1.9)
LEUKOCYTE ESTERASE UR QL STRIP: ABNORMAL
LIPASE SERPL-CCNC: 10 U/L (ref 13–60)
LYMPHOCYTES NFR BLD: 1.8 K/UL (ref 1.5–4)
LYMPHOCYTES RELATIVE PERCENT: 19 % (ref 20–42)
Lab: 2110
M PNEUMO DNA NPH QL NAA+NON-PROBE: NOT DETECTED
MCH RBC QN AUTO: 28.7 PG (ref 26–35)
MCHC RBC AUTO-ENTMCNC: 32.3 G/DL (ref 32–34.5)
MCV RBC AUTO: 88.8 FL (ref 80–99.9)
METHB: 0.7 % (ref 0–1.5)
MODE: ABNORMAL
MONOCYTES NFR BLD: 1.13 K/UL (ref 0.1–0.95)
MONOCYTES NFR BLD: 12 % (ref 2–12)
NEUTROPHILS NFR BLD: 69 % (ref 43–80)
NEUTS SEG NFR BLD: 6.61 K/UL (ref 1.8–7.3)
NITRITE UR QL STRIP: NEGATIVE
O2 SATURATION: 98.9 % (ref 92–98.5)
O2HB: 97.8 % (ref 94–97)
OPERATOR ID: ABNORMAL
PATIENT TEMP: 37 C
PCO2: 25.7 MMHG (ref 35–45)
PH BLOOD GAS: 7.39 (ref 7.35–7.45)
PH UR STRIP: 5.5 [PH] (ref 5–9)
PLATELET # BLD AUTO: 180 K/UL (ref 130–450)
PMV BLD AUTO: 11.9 FL (ref 7–12)
PO2: 380.1 MMHG (ref 75–100)
PROT UR STRIP-MCNC: 30 MG/DL
PROTHROMBIN TIME: 18.6 SEC (ref 9.3–12.4)
RBC # BLD AUTO: 2.58 M/UL (ref 3.5–5.5)
RBC #/AREA URNS HPF: ABNORMAL /HPF
RSV RNA NPH QL NAA+NON-PROBE: NOT DETECTED
RV+EV RNA NPH QL NAA+NON-PROBE: NOT DETECTED
SARS-COV-2 RNA NPH QL NAA+NON-PROBE: NOT DETECTED
SOURCE, BLOOD GAS: ABNORMAL
SP GR UR STRIP: >1.03 (ref 1–1.03)
SPECIMEN DESCRIPTION: NORMAL
THB: 6.3 G/DL (ref 11.5–16.5)
TIME ANALYZED: 2111
UROBILINOGEN UR STRIP-ACNC: 1 EU/DL (ref 0–1)
WBC #/AREA URNS HPF: ABNORMAL /HPF
WBC OTHER # BLD: 9.6 K/UL (ref 4.5–11.5)

## 2024-03-25 PROCEDURE — 2580000003 HC RX 258: Performed by: EMERGENCY MEDICINE

## 2024-03-25 PROCEDURE — 86850 RBC ANTIBODY SCREEN: CPT

## 2024-03-25 PROCEDURE — 86901 BLOOD TYPING SEROLOGIC RH(D): CPT

## 2024-03-25 PROCEDURE — 85025 COMPLETE CBC W/AUTO DIFF WBC: CPT

## 2024-03-25 PROCEDURE — 96375 TX/PRO/DX INJ NEW DRUG ADDON: CPT

## 2024-03-25 PROCEDURE — 93005 ELECTROCARDIOGRAM TRACING: CPT

## 2024-03-25 PROCEDURE — 99285 EMERGENCY DEPT VISIT HI MDM: CPT

## 2024-03-25 PROCEDURE — 0202U NFCT DS 22 TRGT SARS-COV-2: CPT

## 2024-03-25 PROCEDURE — 96367 TX/PROPH/DG ADDL SEQ IV INF: CPT

## 2024-03-25 PROCEDURE — 86900 BLOOD TYPING SEROLOGIC ABO: CPT

## 2024-03-25 PROCEDURE — 86923 COMPATIBILITY TEST ELECTRIC: CPT

## 2024-03-25 PROCEDURE — 71045 X-RAY EXAM CHEST 1 VIEW: CPT

## 2024-03-25 PROCEDURE — 85610 PROTHROMBIN TIME: CPT

## 2024-03-25 PROCEDURE — 87086 URINE CULTURE/COLONY COUNT: CPT

## 2024-03-25 PROCEDURE — P9016 RBC LEUKOCYTES REDUCED: HCPCS

## 2024-03-25 PROCEDURE — 83605 ASSAY OF LACTIC ACID: CPT

## 2024-03-25 PROCEDURE — 87040 BLOOD CULTURE FOR BACTERIA: CPT

## 2024-03-25 PROCEDURE — 96365 THER/PROPH/DIAG IV INF INIT: CPT

## 2024-03-25 PROCEDURE — 6360000002 HC RX W HCPCS: Performed by: EMERGENCY MEDICINE

## 2024-03-25 PROCEDURE — 81001 URINALYSIS AUTO W/SCOPE: CPT

## 2024-03-25 PROCEDURE — 36556 INSERT NON-TUNNEL CV CATH: CPT

## 2024-03-25 PROCEDURE — 86920 COMPATIBILITY TEST SPIN: CPT

## 2024-03-25 PROCEDURE — 83690 ASSAY OF LIPASE: CPT

## 2024-03-25 PROCEDURE — 82805 BLOOD GASES W/O2 SATURATION: CPT

## 2024-03-25 RX ADMIN — CEFEPIME 2000 MG: 2 INJECTION, POWDER, FOR SOLUTION INTRAVENOUS at 22:51

## 2024-03-25 ASSESSMENT — PAIN - FUNCTIONAL ASSESSMENT: PAIN_FUNCTIONAL_ASSESSMENT: 0-10

## 2024-03-25 ASSESSMENT — PAIN SCALES - GENERAL: PAINLEVEL_OUTOF10: 8

## 2024-03-25 NOTE — FLOWSHEET NOTE
Discharge instructions reviewed with the Pt and NH, both parties have no further questions at this time.

## 2024-03-25 NOTE — DISCHARGE INSTRUCTIONS
Please follow-up with orthopedic surgery at the information provided.  
Extraction Method: 18 gauge needle, cotton-tipped applicators and gentle lateral pressure
Render Post-Care Instructions In Note?: yes
Consent was obtained and risks were reviewed including but not limited to scarring, infection, bleeding, scabbing, incomplete removal, and allergy to anesthesia.
Acne Type: Comedonal Lesions
Detail Level: Zone
Hemostasis: Pressure

## 2024-03-25 NOTE — ED PROVIDER NOTES
Nose: Nose normal. No congestion or rhinorrhea.   Eyes:      General:         Right eye: No discharge.         Left eye: No discharge.      Pupils: Pupils are equal, round, and reactive to light.   Cardiovascular:      Rate and Rhythm: Normal rate and regular rhythm.      Pulses: Normal pulses.      Heart sounds: No murmur heard.  Pulmonary:      Effort: Pulmonary effort is normal. No respiratory distress.      Breath sounds: No stridor. No wheezing, rhonchi or rales.   Abdominal:      General: Abdomen is flat. There is no distension.      Tenderness: There is no abdominal tenderness.   Musculoskeletal:         General: No swelling or deformity.   Skin:     Coloration: Skin is not jaundiced.      Findings: No bruising.   Neurological:      General: No focal deficit present.      Mental Status: She is alert and oriented to person, place, and time.      Cranial Nerves: No cranial nerve deficit.      Sensory: Sensory deficit present.      Motor: Weakness (Chronic bilateral lower extremity numbness and weakness) present.   Psychiatric:         Mood and Affect: Mood normal.           DIAGNOSTIC RESULTS   LABS:    Labs Reviewed   CBC WITH AUTO DIFFERENTIAL - Abnormal; Notable for the following components:       Result Value    WBC 18.4 (*)     Neutrophils % 89 (*)     Lymphocytes % 10 (*)     Neutrophils Absolute 16.32 (*)     Eosinophils Absolute 0.00 (*)     All other components within normal limits   BASIC METABOLIC PANEL - Abnormal; Notable for the following components:    Chloride 97 (*)     Glucose 206 (*)     All other components within normal limits   PROTIME-INR - Abnormal; Notable for the following components:    Protime 14.6 (*)     All other components within normal limits   URINALYSIS WITH MICROSCOPIC - Abnormal; Notable for the following components:    Turbidity UA Cloudy (*)     Protein, UA 30 (*)     Urobilinogen, Urine 2.0 (*)     Leukocyte Esterase, Urine SMALL (*)     WBC, UA 21 TO 50 (*)           ED Course User Index  [SS] Leeann Ruano MD            Chronic Conditions:   Past Medical History:   Diagnosis Date    Anemia     Anxiety disorder     Chronic back pain 01/2018    6/10 on the pain scale    Depression     Diabetes mellitus (HCC)     Hyperlipidemia     Hypertension     Neurogenic bowel     Neuromuscular dysfunction of bladder     Obstructive sleep apnea     Radiculopathy of lumbar region     Spinal cord infarction (HCC)     Suicidal ideations     Vitamin D deficiency          Records Reviewed: Note from 10/31/2023 for G-tube dysfunction    CONSULTS: (Who and What was discussed)  IP CONSULT TO ORTHOPEDIC SURGERY  Surgery resident evaluated patient at bedside.  Patient is not an operative emergency.  Patient will be stable to follow-up outpatient.    FINAL IMPRESSION      1. Closed fracture of right hip, initial encounter ([facility])    2. Urinary tract infection in female          DISPOSITION/PLAN     DISPOSITION Decision To Discharge 03/24/2024 09:38:48 PM      PATIENT REFERRED TO:  Denton Velasquez MD  [address] [facility]  [address]  [place] [address]  [phone]    Go in 3 days      [facility] Emergency Department  [address] [place] [address]  [phone]  Go to   As needed, If symptoms worsen    Adonay Soto MD  [facility] [place] [address]  [phone]    Call in 1 day        DISCHARGE MEDICATIONS:  Current Discharge Medication List        START taking these medications    Details   cefdinir (OMNICEF) 250 MG/5ML suspension 6 mLs by Per G Tube route 2 times daily for 10 days  Qty: 150 mL, Refills: 0                  (Please note that portions of this note were completed with a voice recognition program.  Efforts were made to edit the dictations but occasionally words are mis-transcribed.)    Lio Corey DO (electronically signed)

## 2024-03-26 ENCOUNTER — APPOINTMENT (OUTPATIENT)
Dept: GENERAL RADIOLOGY | Age: 74
DRG: 871 | End: 2024-03-26
Payer: MEDICARE

## 2024-03-26 ENCOUNTER — APPOINTMENT (OUTPATIENT)
Dept: CT IMAGING | Age: 74
DRG: 871 | End: 2024-03-26
Payer: MEDICARE

## 2024-03-26 LAB
ALBUMIN SERPL-MCNC: 2.5 G/DL (ref 3.5–5.2)
ALP SERPL-CCNC: 42 U/L (ref 35–104)
ALT SERPL-CCNC: 16 U/L (ref 0–32)
ANION GAP SERPL CALCULATED.3IONS-SCNC: 10 MMOL/L (ref 7–16)
AST SERPL-CCNC: 42 U/L (ref 0–31)
BILIRUB SERPL-MCNC: 0.8 MG/DL (ref 0–1.2)
BUN SERPL-MCNC: 37 MG/DL (ref 6–23)
CALCIUM SERPL-MCNC: 7.5 MG/DL (ref 8.6–10.2)
CHLORIDE SERPL-SCNC: 103 MMOL/L (ref 98–107)
CO2 SERPL-SCNC: 22 MMOL/L (ref 22–29)
CREAT SERPL-MCNC: 1.4 MG/DL (ref 0.5–1)
EKG ATRIAL RATE: 113 BPM
EKG P AXIS: 84 DEGREES
EKG P-R INTERVAL: 172 MS
EKG Q-T INTERVAL: 348 MS
EKG QRS DURATION: 74 MS
EKG QTC CALCULATION (BAZETT): 477 MS
EKG R AXIS: 7 DEGREES
EKG T AXIS: 66 DEGREES
EKG VENTRICULAR RATE: 113 BPM
GFR SERPL CREATININE-BSD FRML MDRD: 41 ML/MIN/1.73M2
GLUCOSE BLD-MCNC: 148 MG/DL (ref 74–99)
GLUCOSE BLD-MCNC: 176 MG/DL (ref 74–99)
GLUCOSE BLD-MCNC: 176 MG/DL (ref 74–99)
GLUCOSE SERPL-MCNC: 141 MG/DL (ref 74–99)
HCT VFR BLD AUTO: 21.4 % (ref 34–48)
HGB BLD-MCNC: 7.2 G/DL (ref 11.5–15.5)
LACTATE BLDV-SCNC: 1.3 MMOL/L (ref 0.5–1.9)
LACTATE BLDV-SCNC: 1.3 MMOL/L (ref 0.5–1.9)
POTASSIUM SERPL-SCNC: 4.1 MMOL/L (ref 3.5–5)
PROT SERPL-MCNC: 4.4 G/DL (ref 6.4–8.3)
SODIUM SERPL-SCNC: 135 MMOL/L (ref 132–146)
TROPONIN I SERPL HS-MCNC: 58 NG/L (ref 0–9)
TROPONIN I SERPL HS-MCNC: 60 NG/L (ref 0–9)

## 2024-03-26 PROCEDURE — 3E033XZ INTRODUCTION OF VASOPRESSOR INTO PERIPHERAL VEIN, PERCUTANEOUS APPROACH: ICD-10-PCS

## 2024-03-26 PROCEDURE — C9113 INJ PANTOPRAZOLE SODIUM, VIA: HCPCS | Performed by: INTERNAL MEDICINE

## 2024-03-26 PROCEDURE — 2000000000 HC ICU R&B

## 2024-03-26 PROCEDURE — 93010 ELECTROCARDIOGRAM REPORT: CPT | Performed by: INTERNAL MEDICINE

## 2024-03-26 PROCEDURE — 85014 HEMATOCRIT: CPT

## 2024-03-26 PROCEDURE — 6370000000 HC RX 637 (ALT 250 FOR IP): Performed by: INTERNAL MEDICINE

## 2024-03-26 PROCEDURE — 6360000004 HC RX CONTRAST MEDICATION: Performed by: INTERNAL MEDICINE

## 2024-03-26 PROCEDURE — 84484 ASSAY OF TROPONIN QUANT: CPT

## 2024-03-26 PROCEDURE — 70450 CT HEAD/BRAIN W/O DYE: CPT

## 2024-03-26 PROCEDURE — A4216 STERILE WATER/SALINE, 10 ML: HCPCS | Performed by: INTERNAL MEDICINE

## 2024-03-26 PROCEDURE — 6360000002 HC RX W HCPCS: Performed by: INTERNAL MEDICINE

## 2024-03-26 PROCEDURE — 74177 CT ABD & PELVIS W/CONTRAST: CPT

## 2024-03-26 PROCEDURE — 99291 CRITICAL CARE FIRST HOUR: CPT | Performed by: INTERNAL MEDICINE

## 2024-03-26 PROCEDURE — 83605 ASSAY OF LACTIC ACID: CPT

## 2024-03-26 PROCEDURE — 6360000002 HC RX W HCPCS: Performed by: STUDENT IN AN ORGANIZED HEALTH CARE EDUCATION/TRAINING PROGRAM

## 2024-03-26 PROCEDURE — 36556 INSERT NON-TUNNEL CV CATH: CPT

## 2024-03-26 PROCEDURE — 85018 HEMOGLOBIN: CPT

## 2024-03-26 PROCEDURE — 2500000003 HC RX 250 WO HCPCS

## 2024-03-26 PROCEDURE — 30233N1 TRANSFUSION OF NONAUTOLOGOUS RED BLOOD CELLS INTO PERIPHERAL VEIN, PERCUTANEOUS APPROACH: ICD-10-PCS | Performed by: INTERNAL MEDICINE

## 2024-03-26 PROCEDURE — 2580000003 HC RX 258: Performed by: INTERNAL MEDICINE

## 2024-03-26 PROCEDURE — 71045 X-RAY EXAM CHEST 1 VIEW: CPT

## 2024-03-26 PROCEDURE — 80053 COMPREHEN METABOLIC PANEL: CPT

## 2024-03-26 PROCEDURE — 02HV33Z INSERTION OF INFUSION DEVICE INTO SUPERIOR VENA CAVA, PERCUTANEOUS APPROACH: ICD-10-PCS | Performed by: INTERNAL MEDICINE

## 2024-03-26 PROCEDURE — 82962 GLUCOSE BLOOD TEST: CPT

## 2024-03-26 RX ORDER — BISACODYL 10 MG
10 SUPPOSITORY, RECTAL RECTAL DAILY PRN
Status: DISCONTINUED | OUTPATIENT
Start: 2024-03-26 | End: 2024-04-01 | Stop reason: HOSPADM

## 2024-03-26 RX ORDER — GLUCAGON 1 MG/ML
1 KIT INJECTION PRN
Status: DISCONTINUED | OUTPATIENT
Start: 2024-03-26 | End: 2024-04-01 | Stop reason: HOSPADM

## 2024-03-26 RX ORDER — LANOLIN ALCOHOL/MO/W.PET/CERES
6 CREAM (GRAM) TOPICAL NIGHTLY PRN
Status: DISCONTINUED | OUTPATIENT
Start: 2024-03-26 | End: 2024-04-01 | Stop reason: HOSPADM

## 2024-03-26 RX ORDER — TRAZODONE HYDROCHLORIDE 50 MG/1
50 TABLET ORAL NIGHTLY
Status: DISCONTINUED | OUTPATIENT
Start: 2024-03-26 | End: 2024-04-01 | Stop reason: HOSPADM

## 2024-03-26 RX ORDER — DEXTROSE MONOHYDRATE 100 MG/ML
INJECTION, SOLUTION INTRAVENOUS CONTINUOUS PRN
Status: DISCONTINUED | OUTPATIENT
Start: 2024-03-26 | End: 2024-04-01 | Stop reason: HOSPADM

## 2024-03-26 RX ORDER — ACETAMINOPHEN 160 MG/5ML
650 LIQUID ORAL EVERY 4 HOURS PRN
Status: DISCONTINUED | OUTPATIENT
Start: 2024-03-26 | End: 2024-03-26 | Stop reason: SDUPTHER

## 2024-03-26 RX ORDER — ACETAMINOPHEN 650 MG/1
650 SUPPOSITORY RECTAL EVERY 6 HOURS PRN
Status: DISCONTINUED | OUTPATIENT
Start: 2024-03-26 | End: 2024-04-01 | Stop reason: HOSPADM

## 2024-03-26 RX ORDER — SENNOSIDES A AND B 8.6 MG/1
1 TABLET, FILM COATED ORAL DAILY
Status: DISCONTINUED | OUTPATIENT
Start: 2024-03-26 | End: 2024-04-01 | Stop reason: HOSPADM

## 2024-03-26 RX ORDER — ROSUVASTATIN CALCIUM 10 MG/1
10 TABLET, COATED ORAL NIGHTLY
Status: DISCONTINUED | OUTPATIENT
Start: 2024-03-26 | End: 2024-04-01 | Stop reason: HOSPADM

## 2024-03-26 RX ORDER — GABAPENTIN 400 MG/1
400 CAPSULE ORAL 2 TIMES DAILY
Status: DISCONTINUED | OUTPATIENT
Start: 2024-03-26 | End: 2024-04-01 | Stop reason: HOSPADM

## 2024-03-26 RX ORDER — DOCUSATE SODIUM 50 MG/5ML
200 LIQUID ORAL
Status: DISCONTINUED | OUTPATIENT
Start: 2024-03-26 | End: 2024-04-01 | Stop reason: HOSPADM

## 2024-03-26 RX ORDER — GABAPENTIN 250 MG/5ML
250 SOLUTION ORAL EVERY 8 HOURS SCHEDULED
Status: DISCONTINUED | OUTPATIENT
Start: 2024-03-26 | End: 2024-03-26 | Stop reason: SDUPTHER

## 2024-03-26 RX ORDER — INSULIN LISPRO 100 [IU]/ML
0-4 INJECTION, SOLUTION INTRAVENOUS; SUBCUTANEOUS EVERY 4 HOURS
Status: DISCONTINUED | OUTPATIENT
Start: 2024-03-26 | End: 2024-03-30

## 2024-03-26 RX ORDER — NOREPINEPHRINE BITARTRATE 0.06 MG/ML
1-100 INJECTION, SOLUTION INTRAVENOUS CONTINUOUS
Status: DISCONTINUED | OUTPATIENT
Start: 2024-03-26 | End: 2024-03-28

## 2024-03-26 RX ORDER — SODIUM CHLORIDE 0.9 % (FLUSH) 0.9 %
5-40 SYRINGE (ML) INJECTION EVERY 12 HOURS SCHEDULED
Status: DISCONTINUED | OUTPATIENT
Start: 2024-03-26 | End: 2024-04-01 | Stop reason: HOSPADM

## 2024-03-26 RX ORDER — LACTOBACILLUS RHAMNOSUS GG 10B CELL
1 CAPSULE ORAL DAILY
Status: DISCONTINUED | OUTPATIENT
Start: 2024-03-26 | End: 2024-04-01 | Stop reason: HOSPADM

## 2024-03-26 RX ORDER — METRONIDAZOLE 500 MG/100ML
500 INJECTION, SOLUTION INTRAVENOUS ONCE
Status: COMPLETED | OUTPATIENT
Start: 2024-03-26 | End: 2024-03-26

## 2024-03-26 RX ORDER — METOCLOPRAMIDE 5 MG/1
5 TABLET ORAL 4 TIMES DAILY
Status: DISCONTINUED | OUTPATIENT
Start: 2024-03-26 | End: 2024-04-01 | Stop reason: HOSPADM

## 2024-03-26 RX ORDER — MAGNESIUM HYDROXIDE/ALUMINUM HYDROXICE/SIMETHICONE 120; 1200; 1200 MG/30ML; MG/30ML; MG/30ML
30 SUSPENSION ORAL EVERY 4 HOURS PRN
Status: DISCONTINUED | OUTPATIENT
Start: 2024-03-26 | End: 2024-04-01 | Stop reason: HOSPADM

## 2024-03-26 RX ORDER — MIDODRINE HYDROCHLORIDE 5 MG/1
15 TABLET ORAL
Status: DISCONTINUED | OUTPATIENT
Start: 2024-03-26 | End: 2024-03-31

## 2024-03-26 RX ORDER — SODIUM CHLORIDE 9 MG/ML
INJECTION, SOLUTION INTRAVENOUS PRN
Status: DISCONTINUED | OUTPATIENT
Start: 2024-03-26 | End: 2024-04-01 | Stop reason: HOSPADM

## 2024-03-26 RX ORDER — LOPERAMIDE HYDROCHLORIDE 2 MG/1
2 CAPSULE ORAL 4 TIMES DAILY PRN
Status: DISCONTINUED | OUTPATIENT
Start: 2024-03-26 | End: 2024-04-01 | Stop reason: HOSPADM

## 2024-03-26 RX ORDER — ONDANSETRON 4 MG/1
4 TABLET, FILM COATED ORAL EVERY 4 HOURS PRN
Status: DISCONTINUED | OUTPATIENT
Start: 2024-03-26 | End: 2024-04-01 | Stop reason: HOSPADM

## 2024-03-26 RX ORDER — DULOXETIN HYDROCHLORIDE 30 MG/1
30 CAPSULE, DELAYED RELEASE ORAL 2 TIMES DAILY
Status: DISCONTINUED | OUTPATIENT
Start: 2024-03-26 | End: 2024-04-01 | Stop reason: HOSPADM

## 2024-03-26 RX ORDER — BACITRACIN ZINC 500 [USP'U]/G
OINTMENT TOPICAL DAILY
Status: DISCONTINUED | OUTPATIENT
Start: 2024-03-26 | End: 2024-04-01 | Stop reason: HOSPADM

## 2024-03-26 RX ORDER — SODIUM CHLORIDE 0.9 % (FLUSH) 0.9 %
5-40 SYRINGE (ML) INJECTION PRN
Status: DISCONTINUED | OUTPATIENT
Start: 2024-03-26 | End: 2024-04-01 | Stop reason: HOSPADM

## 2024-03-26 RX ORDER — ACETAMINOPHEN 325 MG/1
650 TABLET ORAL EVERY 6 HOURS PRN
Status: DISCONTINUED | OUTPATIENT
Start: 2024-03-26 | End: 2024-04-01 | Stop reason: HOSPADM

## 2024-03-26 RX ORDER — ENOXAPARIN SODIUM 100 MG/ML
40 INJECTION SUBCUTANEOUS DAILY
Status: DISCONTINUED | OUTPATIENT
Start: 2024-03-26 | End: 2024-04-01 | Stop reason: HOSPADM

## 2024-03-26 RX ORDER — SODIUM CHLORIDE, SODIUM LACTATE, POTASSIUM CHLORIDE, CALCIUM CHLORIDE 600; 310; 30; 20 MG/100ML; MG/100ML; MG/100ML; MG/100ML
INJECTION, SOLUTION INTRAVENOUS CONTINUOUS
Status: ACTIVE | OUTPATIENT
Start: 2024-03-26 | End: 2024-03-28

## 2024-03-26 RX ORDER — BACLOFEN 10 MG/1
5 TABLET ORAL 3 TIMES DAILY
Status: DISCONTINUED | OUTPATIENT
Start: 2024-03-26 | End: 2024-04-01 | Stop reason: HOSPADM

## 2024-03-26 RX ADMIN — SENNOSIDES 8.6 MG: 8.6 TABLET, FILM COATED ORAL at 10:46

## 2024-03-26 RX ADMIN — MIDODRINE HYDROCHLORIDE 15 MG: 10 TABLET ORAL at 17:02

## 2024-03-26 RX ADMIN — Medication 5 MCG/MIN: at 04:34

## 2024-03-26 RX ADMIN — SODIUM CHLORIDE, POTASSIUM CHLORIDE, SODIUM LACTATE AND CALCIUM CHLORIDE: 600; 310; 30; 20 INJECTION, SOLUTION INTRAVENOUS at 10:05

## 2024-03-26 RX ADMIN — ENOXAPARIN SODIUM 40 MG: 100 INJECTION SUBCUTANEOUS at 10:45

## 2024-03-26 RX ADMIN — IOPAMIDOL 75 ML: 755 INJECTION, SOLUTION INTRAVENOUS at 03:43

## 2024-03-26 RX ADMIN — Medication 1 CAPSULE: at 11:12

## 2024-03-26 RX ADMIN — SODIUM CHLORIDE, PRESERVATIVE FREE 10 ML: 5 INJECTION INTRAVENOUS at 20:20

## 2024-03-26 RX ADMIN — CEFEPIME 2000 MG: 2 INJECTION, POWDER, FOR SOLUTION INTRAVENOUS at 21:47

## 2024-03-26 RX ADMIN — Medication 6 MG: at 23:26

## 2024-03-26 RX ADMIN — BACLOFEN 5 MG: 10 TABLET ORAL at 10:46

## 2024-03-26 RX ADMIN — PANTOPRAZOLE SODIUM 40 MG: 40 INJECTION, POWDER, FOR SOLUTION INTRAVENOUS at 10:59

## 2024-03-26 RX ADMIN — DULOXETINE HYDROCHLORIDE 30 MG: 30 CAPSULE, DELAYED RELEASE ORAL at 20:18

## 2024-03-26 RX ADMIN — METOCLOPRAMIDE 5 MG: 5 TABLET ORAL at 17:02

## 2024-03-26 RX ADMIN — METOCLOPRAMIDE 5 MG: 5 TABLET ORAL at 13:24

## 2024-03-26 RX ADMIN — BACLOFEN 5 MG: 10 TABLET ORAL at 13:24

## 2024-03-26 RX ADMIN — ROSUVASTATIN CALCIUM 10 MG: 10 TABLET, FILM COATED ORAL at 20:19

## 2024-03-26 RX ADMIN — METRONIDAZOLE 500 MG: 500 INJECTION, SOLUTION INTRAVENOUS at 04:38

## 2024-03-26 RX ADMIN — METOCLOPRAMIDE 5 MG: 5 TABLET ORAL at 20:19

## 2024-03-26 RX ADMIN — LEVOTHYROXINE SODIUM 75 MCG: 0.07 TABLET ORAL at 10:46

## 2024-03-26 RX ADMIN — BACITRACIN ZINC: 500 OINTMENT TOPICAL at 10:47

## 2024-03-26 RX ADMIN — GABAPENTIN 400 MG: 400 CAPSULE ORAL at 20:19

## 2024-03-26 RX ADMIN — VANCOMYCIN HYDROCHLORIDE 1750 MG: 10 INJECTION, POWDER, LYOPHILIZED, FOR SOLUTION INTRAVENOUS at 17:17

## 2024-03-26 RX ADMIN — MIDODRINE HYDROCHLORIDE 15 MG: 10 TABLET ORAL at 10:45

## 2024-03-26 RX ADMIN — CEFEPIME 2000 MG: 2 INJECTION, POWDER, FOR SOLUTION INTRAVENOUS at 11:02

## 2024-03-26 RX ADMIN — BACLOFEN 5 MG: 10 TABLET ORAL at 20:36

## 2024-03-26 RX ADMIN — SODIUM CHLORIDE, POTASSIUM CHLORIDE, SODIUM LACTATE AND CALCIUM CHLORIDE: 600; 310; 30; 20 INJECTION, SOLUTION INTRAVENOUS at 17:14

## 2024-03-26 RX ADMIN — TRAZODONE HYDROCHLORIDE 50 MG: 50 TABLET ORAL at 20:36

## 2024-03-26 RX ADMIN — DOCUSATE SODIUM LIQUID 200 MG: 100 LIQUID ORAL at 20:18

## 2024-03-26 RX ADMIN — SODIUM CHLORIDE, POTASSIUM CHLORIDE, SODIUM LACTATE AND CALCIUM CHLORIDE: 600; 310; 30; 20 INJECTION, SOLUTION INTRAVENOUS at 23:35

## 2024-03-26 RX ADMIN — GABAPENTIN 400 MG: 400 CAPSULE ORAL at 10:59

## 2024-03-26 ASSESSMENT — PAIN SCALES - GENERAL
PAINLEVEL_OUTOF10: 0

## 2024-03-26 NOTE — H&P
Comprehensive Metabolic Panel w/ Reflex to MG    Collection Time: 03/26/24  3:45 AM   Result Value Ref Range    Sodium 135 132 - 146 mmol/L    Potassium 4.1 3.5 - 5.0 mmol/L    Chloride 103 98 - 107 mmol/L    CO2 22 22 - 29 mmol/L    Anion Gap 10 7 - 16 mmol/L    Glucose 141 (H) 74 - 99 mg/dL    BUN 37 (H) 6 - 23 mg/dL    Creatinine 1.4 (H) 0.50 - 1.00 mg/dL    Est, Glom Filt Rate 41 (L) >60 mL/min/1.73m2    Calcium 7.5 (L) 8.6 - 10.2 mg/dL    Total Protein 4.4 (L) 6.4 - 8.3 g/dL    Albumin 2.5 (L) 3.5 - 5.2 g/dL    Total Bilirubin 0.8 0.0 - 1.2 mg/dL    Alkaline Phosphatase 42 35 - 104 U/L    ALT 16 0 - 32 U/L    AST 42 (H) 0 - 31 U/L   Troponin    Collection Time: 03/26/24  3:45 AM   Result Value Ref Range    Troponin, High Sensitivity 58 (H) 0 - 9 ng/L   Hemoglobin and Hematocrit    Collection Time: 03/26/24  3:45 AM   Result Value Ref Range    Hemoglobin 7.2 (L) 11.5 - 15.5 g/dL    Hematocrit 21.4 (L) 34.0 - 48.0 %   Troponin    Collection Time: 03/26/24  5:27 AM   Result Value Ref Range    Troponin, High Sensitivity 60 (H) 0 - 9 ng/L   POCT Glucose    Collection Time: 03/26/24 11:03 AM   Result Value Ref Range    POC Glucose 148 (H) 74 - 99 mg/dL   Lactate, Sepsis    Collection Time: 03/26/24 11:11 AM   Result Value Ref Range    Lactic Acid, Sepsis 1.3 0.5 - 1.9 mmol/L       XR CHEST PORTABLE   Final Result   Left jugular central venous catheter tip with good positioning at the mid SVC.         CT HEAD WO CONTRAST   Final Result   No acute intracranial abnormality.         CT ABDOMEN PELVIS W IV CONTRAST Additional Contrast? None   Final Result   1. 9.3 cm rectal stool impaction with surrounding rectosigmoid wall   thickening suggesting stercoral colitis.   2. Moderate retrocardiac hiatal hernia. Esophageal wall thickening suggesting   esophagitis.   3. Fracture deformities of the bilateral intertrochanteric proximal femurs,   uncertain chronicity.  Clinical correlation recommended.      RECOMMENDATIONS:

## 2024-03-26 NOTE — ACP (ADVANCE CARE PLANNING)
Advance Care Planning   Healthcare Decision Maker:    Primary Decision Maker: Ayanna Colby - [phone]    Click here to complete Healthcare Decision Makers including selection of the Healthcare Decision Maker Relationship (ie \"Primary\").

## 2024-03-26 NOTE — ED PROVIDER NOTES
by mouth every 4 hours as needed for Pain      lisinopril (PRINIVIL;ZESTRIL) 5 MG tablet Take 1 tablet by mouth daily             ALLERGIES     Patient has no known allergies.    FAMILYHISTORY       Family History   Problem Relation Age of Onset    Diabetes Father         SOCIAL HISTORY       Social History     Tobacco Use    Smoking status: Former     Current packs/day: 0.50     Types: Cigarettes    Smokeless tobacco: Never   Vaping Use    Vaping Use: Unknown   Substance Use Topics    Alcohol use: No    Drug use: No       SCREENINGS        [place] Coma Scale  Eye Opening: Spontaneous  Best Verbal Response: Confused  Best Motor Response: Obeys commands  [place] Coma Scale Score: 14                CIWA Assessment  BP: 118/71  Pulse: (!) 106           PHYSICAL EXAM  1 or more Elements     ED Triage Vitals   BP Temp Temp Source Pulse Respirations SpO2 Height Weight - Scale   03/25/24 2106 03/25/24 2138 03/25/24 2155 03/25/24 2103 03/25/24 2103 03/25/24 2108 03/25/24 2103 03/25/24 2103   (!) 90/57 97.3 °F (36.3 °C) Oral (!) 111 20 100 % 1.626 m (5' 4\") 68 kg (150 lb)         Physical Exam  Vitals and nursing note reviewed.   Constitutional:       General: She is not in acute distress.     Appearance: She is ill-appearing. She is not toxic-appearing.   HENT:      Head: Normocephalic and atraumatic.      Right Ear: External ear normal.      Left Ear: External ear normal.      Nose: Nose normal. No congestion or rhinorrhea.   Eyes:      General: No scleral icterus.        Right eye: No discharge.         Left eye: No discharge.      Pupils: Pupils are equal, round, and reactive to light.   Cardiovascular:      Rate and Rhythm: Normal rate and regular rhythm.      Pulses: Normal pulses.      Heart sounds: No murmur heard.  Pulmonary:      Effort: Pulmonary effort is normal. No respiratory distress.      Breath sounds: No stridor. No wheezing, rhonchi or rales.   Abdominal:      General: Abdomen is flat. There is no

## 2024-03-26 NOTE — CARE COORDINATION
Case Management Assessment  Initial Evaluation    Date/Time of Evaluation: 3/26/2024 10:47 AM  Assessment Completed by: Monica Garcia RN    If patient is discharged prior to next notation, then this note serves as note for discharge by case management.    Patient Name: Cait Kent                   YOB: 1950  Diagnosis: Colitis [K52.9]  Normocytic anemia [D64.9]  Fecal impaction in rectum (HCC) [K56.41]  Septic shock (HCC) [A41.9, R65.21]  Urinary tract infection with hematuria, site unspecified [N39.0, R31.9]  Altered mental status, unspecified altered mental status type [R41.82]                   Date / Time: 3/25/2024  9:01 PM    Patient Admission Status: Inpatient   Readmission Risk (Low < 19, Mod (19-27), High > 27): No data recorded  Current PCP: Denton Velasquze MD  PCP verified by CM? Yes    Chart Reviewed: Yes      History Provided by: Medical Record  Patient Orientation: Unable to Assess    Patient Cognition: Alert    Hospitalization in the last 30 days (Readmission):  No    If yes, Readmission Assessment in CM Navigator will be completed.    Advance Directives:      Code Status: Full Code   Patient's Primary Decision Maker is: Legal Next of Kin    Primary Decision Maker: Ayanna Colby - [phone]    Discharge Planning:    Patient lives with: Alone Type of Home: Long-Term Care  Primary Care Giver: Other (Comment) ([facility])  Patient Support Systems include: Children   Current Financial resources:    Current community resources:    Current services prior to admission: Long Term Acute Care            Current DME:              Type of Home Care services:  None    ADLS  Prior functional level: Assistance with the following:, Mobility, Shopping, Housework, Cooking, Feeding, Toileting, Dressing, Bathing  Current functional level: Other (see comment) (ICU level, will need assessed closer to discharge)    PT AM-PAC:   /24  OT AM-PAC:   /24    Family can provide assistance at DC:

## 2024-03-26 NOTE — CARE COORDINATION
Care Coordination: Admit today from [facility], Per liaison, pt was long term care and can return. Call to susanne Fonseca, Left .   Return VM from olegario, states she is currently in [place], some calls are not going through but go directly to , if she does not answer, please leave message.  I called back, left her detailed message with unit number, provided brief update and I will plan on her return to [facility] when medically cleared.  Pt admitted ICU, severe sepsis with septic shock 2/2 UTI, YO, metabolic encephalopathy. Levophed, cefepime. 4 ltr nc.     Electronically signed by Monica Garcia RN on 3/26/2024 at 10:44 AM     The Plan for Transition of Care is related to the following treatment goals: dc    The Patient and/or patient representative susanne fonseca was provided with a choice of provider and agrees   with the discharge plan. [x] Yes [] No    Freedom of choice list was provided with basic dialogue that supports the patient's individualized plan of care/goals, treatment preferences and shares the quality data associated with the providers. [x] Yes [] No

## 2024-03-27 ENCOUNTER — APPOINTMENT (OUTPATIENT)
Dept: CT IMAGING | Age: 74
DRG: 871 | End: 2024-03-27
Payer: MEDICARE

## 2024-03-27 LAB
ALBUMIN SERPL-MCNC: 2.7 G/DL (ref 3.5–5.2)
ALP SERPL-CCNC: 52 U/L (ref 35–104)
ALT SERPL-CCNC: 18 U/L (ref 0–32)
ANION GAP SERPL CALCULATED.3IONS-SCNC: 10 MMOL/L (ref 7–16)
ANION GAP SERPL CALCULATED.3IONS-SCNC: 11 MMOL/L (ref 7–16)
AST SERPL-CCNC: 35 U/L (ref 0–31)
BASOPHILS # BLD: 0.03 K/UL (ref 0–0.2)
BASOPHILS NFR BLD: 1 % (ref 0–2)
BILIRUB SERPL-MCNC: 1.2 MG/DL (ref 0–1.2)
BUN SERPL-MCNC: 11 MG/DL (ref 6–23)
BUN SERPL-MCNC: 16 MG/DL (ref 6–23)
CALCIUM SERPL-MCNC: 8.2 MG/DL (ref 8.6–10.2)
CALCIUM SERPL-MCNC: 8.3 MG/DL (ref 8.6–10.2)
CHLORIDE SERPL-SCNC: 104 MMOL/L (ref 98–107)
CHLORIDE SERPL-SCNC: 106 MMOL/L (ref 98–107)
CO2 SERPL-SCNC: 23 MMOL/L (ref 22–29)
CO2 SERPL-SCNC: 23 MMOL/L (ref 22–29)
CREAT SERPL-MCNC: 0.5 MG/DL (ref 0.5–1)
CREAT SERPL-MCNC: 0.6 MG/DL (ref 0.5–1)
DATE LAST DOSE: NORMAL
EOSINOPHIL # BLD: 0.14 K/UL (ref 0.05–0.5)
EOSINOPHILS RELATIVE PERCENT: 3 % (ref 0–6)
ERYTHROCYTE [DISTWIDTH] IN BLOOD BY AUTOMATED COUNT: 15.1 % (ref 11.5–15)
GFR SERPL CREATININE-BSD FRML MDRD: >90 ML/MIN/1.73M2
GFR SERPL CREATININE-BSD FRML MDRD: >90 ML/MIN/1.73M2
GLUCOSE BLD-MCNC: 156 MG/DL (ref 74–99)
GLUCOSE BLD-MCNC: 159 MG/DL (ref 74–99)
GLUCOSE BLD-MCNC: 170 MG/DL (ref 74–99)
GLUCOSE BLD-MCNC: 176 MG/DL (ref 74–99)
GLUCOSE BLD-MCNC: 181 MG/DL (ref 74–99)
GLUCOSE BLD-MCNC: 185 MG/DL (ref 74–99)
GLUCOSE SERPL-MCNC: 205 MG/DL (ref 74–99)
GLUCOSE SERPL-MCNC: 206 MG/DL (ref 74–99)
HCT VFR BLD AUTO: 20.1 % (ref 34–48)
HCT VFR BLD AUTO: 25 % (ref 34–48)
HGB BLD-MCNC: 6.5 G/DL (ref 11.5–15.5)
HGB BLD-MCNC: 8.2 G/DL (ref 11.5–15.5)
IMM GRANULOCYTES # BLD AUTO: 0.03 K/UL (ref 0–0.58)
IMM GRANULOCYTES NFR BLD: 1 % (ref 0–5)
LACTATE BLDV-SCNC: 1.3 MMOL/L (ref 0.5–2.2)
LYMPHOCYTES NFR BLD: 0.96 K/UL (ref 1.5–4)
LYMPHOCYTES RELATIVE PERCENT: 19 % (ref 20–42)
MAGNESIUM SERPL-MCNC: 1.3 MG/DL (ref 1.6–2.6)
MAGNESIUM SERPL-MCNC: 1.7 MG/DL (ref 1.6–2.6)
MCH RBC QN AUTO: 29.4 PG (ref 26–35)
MCHC RBC AUTO-ENTMCNC: 32.3 G/DL (ref 32–34.5)
MCV RBC AUTO: 91 FL (ref 80–99.9)
MICROORGANISM SPEC CULT: ABNORMAL
MICROORGANISM SPEC CULT: ABNORMAL
MONOCYTES NFR BLD: 0.53 K/UL (ref 0.1–0.95)
MONOCYTES NFR BLD: 10 % (ref 2–12)
NEUTROPHILS NFR BLD: 67 % (ref 43–80)
NEUTS SEG NFR BLD: 3.51 K/UL (ref 1.8–7.3)
PHOSPHATE SERPL-MCNC: 1.6 MG/DL (ref 2.5–4.5)
PHOSPHATE SERPL-MCNC: 2.7 MG/DL (ref 2.5–4.5)
PLATELET, FLUORESCENCE: 133 K/UL (ref 130–450)
PMV BLD AUTO: 11.4 FL (ref 7–12)
POTASSIUM SERPL-SCNC: 4.1 MMOL/L (ref 3.5–5)
POTASSIUM SERPL-SCNC: 4.1 MMOL/L (ref 3.5–5)
PROCALCITONIN SERPL-MCNC: 0.29 NG/ML (ref 0–0.08)
PROT SERPL-MCNC: 4.8 G/DL (ref 6.4–8.3)
RBC # BLD AUTO: 2.21 M/UL (ref 3.5–5.5)
SODIUM SERPL-SCNC: 137 MMOL/L (ref 132–146)
SODIUM SERPL-SCNC: 140 MMOL/L (ref 132–146)
SPECIMEN DESCRIPTION: ABNORMAL
SPECIMEN DESCRIPTION: ABNORMAL
TME LAST DOSE: NORMAL H
VANCOMYCIN DOSE: NORMAL MG
VANCOMYCIN SERPL-MCNC: 11.7 UG/ML (ref 5–40)
WBC OTHER # BLD: 5.2 K/UL (ref 4.5–11.5)

## 2024-03-27 PROCEDURE — 85018 HEMOGLOBIN: CPT

## 2024-03-27 PROCEDURE — 2000000000 HC ICU R&B

## 2024-03-27 PROCEDURE — 99291 CRITICAL CARE FIRST HOUR: CPT | Performed by: INTERNAL MEDICINE

## 2024-03-27 PROCEDURE — 36592 COLLECT BLOOD FROM PICC: CPT

## 2024-03-27 PROCEDURE — 2580000003 HC RX 258: Performed by: NURSE PRACTITIONER

## 2024-03-27 PROCEDURE — 80048 BASIC METABOLIC PNL TOTAL CA: CPT

## 2024-03-27 PROCEDURE — C9113 INJ PANTOPRAZOLE SODIUM, VIA: HCPCS | Performed by: INTERNAL MEDICINE

## 2024-03-27 PROCEDURE — 6370000000 HC RX 637 (ALT 250 FOR IP): Performed by: INTERNAL MEDICINE

## 2024-03-27 PROCEDURE — P9016 RBC LEUKOCYTES REDUCED: HCPCS

## 2024-03-27 PROCEDURE — 6360000002 HC RX W HCPCS: Performed by: INTERNAL MEDICINE

## 2024-03-27 PROCEDURE — 83735 ASSAY OF MAGNESIUM: CPT

## 2024-03-27 PROCEDURE — 84145 PROCALCITONIN (PCT): CPT

## 2024-03-27 PROCEDURE — 97162 PT EVAL MOD COMPLEX 30 MIN: CPT

## 2024-03-27 PROCEDURE — 97530 THERAPEUTIC ACTIVITIES: CPT

## 2024-03-27 PROCEDURE — 80202 ASSAY OF VANCOMYCIN: CPT

## 2024-03-27 PROCEDURE — 82962 GLUCOSE BLOOD TEST: CPT

## 2024-03-27 PROCEDURE — 85014 HEMATOCRIT: CPT

## 2024-03-27 PROCEDURE — 2580000003 HC RX 258: Performed by: INTERNAL MEDICINE

## 2024-03-27 PROCEDURE — 85025 COMPLETE CBC W/AUTO DIFF WBC: CPT

## 2024-03-27 PROCEDURE — 6360000002 HC RX W HCPCS: Performed by: NURSE PRACTITIONER

## 2024-03-27 PROCEDURE — 80053 COMPREHEN METABOLIC PANEL: CPT

## 2024-03-27 PROCEDURE — 92610 EVALUATE SWALLOWING FUNCTION: CPT

## 2024-03-27 PROCEDURE — 2700000000 HC OXYGEN THERAPY PER DAY

## 2024-03-27 PROCEDURE — A4216 STERILE WATER/SALINE, 10 ML: HCPCS | Performed by: INTERNAL MEDICINE

## 2024-03-27 PROCEDURE — 36430 TRANSFUSION BLD/BLD COMPNT: CPT

## 2024-03-27 PROCEDURE — 84100 ASSAY OF PHOSPHORUS: CPT

## 2024-03-27 PROCEDURE — 97166 OT EVAL MOD COMPLEX 45 MIN: CPT

## 2024-03-27 PROCEDURE — 83605 ASSAY OF LACTIC ACID: CPT

## 2024-03-27 PROCEDURE — 2500000003 HC RX 250 WO HCPCS: Performed by: NURSE PRACTITIONER

## 2024-03-27 PROCEDURE — 74176 CT ABD & PELVIS W/O CONTRAST: CPT

## 2024-03-27 RX ORDER — SODIUM CHLORIDE 9 MG/ML
INJECTION, SOLUTION INTRAVENOUS PRN
Status: DISCONTINUED | OUTPATIENT
Start: 2024-03-27 | End: 2024-04-01 | Stop reason: HOSPADM

## 2024-03-27 RX ORDER — MAGNESIUM SULFATE IN WATER 40 MG/ML
2000 INJECTION, SOLUTION INTRAVENOUS ONCE
Status: COMPLETED | OUTPATIENT
Start: 2024-03-27 | End: 2024-03-27

## 2024-03-27 RX ADMIN — MEROPENEM 1000 MG: 1 INJECTION, POWDER, FOR SOLUTION INTRAVENOUS at 18:21

## 2024-03-27 RX ADMIN — SODIUM CHLORIDE, PRESERVATIVE FREE 10 ML: 5 INJECTION INTRAVENOUS at 09:30

## 2024-03-27 RX ADMIN — DULOXETINE HYDROCHLORIDE 30 MG: 30 CAPSULE, DELAYED RELEASE ORAL at 20:47

## 2024-03-27 RX ADMIN — SODIUM CHLORIDE, PRESERVATIVE FREE 10 ML: 5 INJECTION INTRAVENOUS at 20:48

## 2024-03-27 RX ADMIN — SODIUM PHOSPHATE, MONOBASIC, MONOHYDRATE AND SODIUM PHOSPHATE, DIBASIC, ANHYDROUS 15 MMOL: 142; 276 INJECTION, SOLUTION INTRAVENOUS at 09:38

## 2024-03-27 RX ADMIN — DULOXETINE HYDROCHLORIDE 30 MG: 30 CAPSULE, DELAYED RELEASE ORAL at 09:26

## 2024-03-27 RX ADMIN — Medication 1 CAPSULE: at 09:30

## 2024-03-27 RX ADMIN — SODIUM CHLORIDE, POTASSIUM CHLORIDE, SODIUM LACTATE AND CALCIUM CHLORIDE: 600; 310; 30; 20 INJECTION, SOLUTION INTRAVENOUS at 06:36

## 2024-03-27 RX ADMIN — LEVOTHYROXINE SODIUM 75 MCG: 0.07 TABLET ORAL at 06:45

## 2024-03-27 RX ADMIN — GABAPENTIN 400 MG: 400 CAPSULE ORAL at 09:25

## 2024-03-27 RX ADMIN — SODIUM CHLORIDE, POTASSIUM CHLORIDE, SODIUM LACTATE AND CALCIUM CHLORIDE: 600; 310; 30; 20 INJECTION, SOLUTION INTRAVENOUS at 12:14

## 2024-03-27 RX ADMIN — TRAZODONE HYDROCHLORIDE 50 MG: 50 TABLET ORAL at 20:47

## 2024-03-27 RX ADMIN — BACLOFEN 5 MG: 10 TABLET ORAL at 09:33

## 2024-03-27 RX ADMIN — METOCLOPRAMIDE 5 MG: 5 TABLET ORAL at 12:35

## 2024-03-27 RX ADMIN — METOCLOPRAMIDE 5 MG: 5 TABLET ORAL at 17:25

## 2024-03-27 RX ADMIN — MEROPENEM 1000 MG: 1 INJECTION, POWDER, FOR SOLUTION INTRAVENOUS at 12:38

## 2024-03-27 RX ADMIN — CEFEPIME 2000 MG: 2 INJECTION, POWDER, FOR SOLUTION INTRAVENOUS at 09:24

## 2024-03-27 RX ADMIN — MIDODRINE HYDROCHLORIDE 15 MG: 10 TABLET ORAL at 17:25

## 2024-03-27 RX ADMIN — METOCLOPRAMIDE 5 MG: 5 TABLET ORAL at 20:47

## 2024-03-27 RX ADMIN — MIDODRINE HYDROCHLORIDE 15 MG: 10 TABLET ORAL at 11:20

## 2024-03-27 RX ADMIN — SODIUM CHLORIDE: 9 INJECTION, SOLUTION INTRAVENOUS at 09:23

## 2024-03-27 RX ADMIN — ROSUVASTATIN CALCIUM 10 MG: 10 TABLET, FILM COATED ORAL at 20:47

## 2024-03-27 RX ADMIN — SENNOSIDES 8.6 MG: 8.6 TABLET, FILM COATED ORAL at 09:25

## 2024-03-27 RX ADMIN — MAGNESIUM SULFATE HEPTAHYDRATE 2000 MG: 40 INJECTION, SOLUTION INTRAVENOUS at 14:07

## 2024-03-27 RX ADMIN — BACLOFEN 5 MG: 10 TABLET ORAL at 20:47

## 2024-03-27 RX ADMIN — DOCUSATE SODIUM LIQUID 200 MG: 100 LIQUID ORAL at 20:47

## 2024-03-27 RX ADMIN — SODIUM CHLORIDE, POTASSIUM CHLORIDE, SODIUM LACTATE AND CALCIUM CHLORIDE: 600; 310; 30; 20 INJECTION, SOLUTION INTRAVENOUS at 18:20

## 2024-03-27 RX ADMIN — MIDODRINE HYDROCHLORIDE 15 MG: 10 TABLET ORAL at 09:25

## 2024-03-27 RX ADMIN — PANTOPRAZOLE SODIUM 40 MG: 40 INJECTION, POWDER, FOR SOLUTION INTRAVENOUS at 09:26

## 2024-03-27 RX ADMIN — BACLOFEN 5 MG: 10 TABLET ORAL at 12:35

## 2024-03-27 RX ADMIN — METOCLOPRAMIDE 5 MG: 5 TABLET ORAL at 09:25

## 2024-03-27 RX ADMIN — BACITRACIN ZINC: 500 OINTMENT TOPICAL at 09:25

## 2024-03-27 RX ADMIN — MAGNESIUM SULFATE HEPTAHYDRATE 2000 MG: 40 INJECTION, SOLUTION INTRAVENOUS at 06:45

## 2024-03-27 RX ADMIN — GABAPENTIN 400 MG: 400 CAPSULE ORAL at 20:47

## 2024-03-27 ASSESSMENT — PAIN SCALES - GENERAL
PAINLEVEL_OUTOF10: 0

## 2024-03-27 NOTE — CONSENT
Informed Consent for Blood Component Transfusion Note    We have discussed with the daughter Ayanna the rationale for blood component transfusion; its benefits in treating or preventing fatigue, organ damage, or death; and its risk which includes mild transfusion reactions, rare risk of blood borne infection, or more serious but rare reactions. I have discussed the alternatives to transfusion, including the risk and consequences of not receiving transfusion. The daughter had an opportunity to ask questions and had agreed to proceed with transfusion of blood components.    Electronically signed by Erasto Pierre MD on 3/27/24 at 10:41 AM EDT

## 2024-03-28 LAB
ABO/RH: NORMAL
ALBUMIN SERPL-MCNC: 2.6 G/DL (ref 3.5–5.2)
ALP SERPL-CCNC: 57 U/L (ref 35–104)
ALT SERPL-CCNC: 16 U/L (ref 0–32)
ANION GAP SERPL CALCULATED.3IONS-SCNC: 12 MMOL/L (ref 7–16)
ANTIBODY SCREEN: NEGATIVE
ARM BAND NUMBER: NORMAL
AST SERPL-CCNC: 25 U/L (ref 0–31)
BASOPHILS # BLD: 0.02 K/UL (ref 0–0.2)
BASOPHILS NFR BLD: 0 % (ref 0–2)
BILIRUB SERPL-MCNC: 1.5 MG/DL (ref 0–1.2)
BLOOD BANK BLOOD PRODUCT EXPIRATION DATE: NORMAL
BLOOD BANK BLOOD PRODUCT EXPIRATION DATE: NORMAL
BLOOD BANK DISPENSE STATUS: NORMAL
BLOOD BANK DISPENSE STATUS: NORMAL
BLOOD BANK ISBT PRODUCT BLOOD TYPE: 5100
BLOOD BANK ISBT PRODUCT BLOOD TYPE: 9500
BLOOD BANK PRODUCT CODE: NORMAL
BLOOD BANK PRODUCT CODE: NORMAL
BLOOD BANK SAMPLE EXPIRATION: NORMAL
BLOOD BANK UNIT TYPE AND RH: NORMAL
BLOOD BANK UNIT TYPE AND RH: NORMAL
BPU ID: NORMAL
BPU ID: NORMAL
BUN SERPL-MCNC: 8 MG/DL (ref 6–23)
CALCIUM SERPL-MCNC: 8.1 MG/DL (ref 8.6–10.2)
CHLORIDE SERPL-SCNC: 101 MMOL/L (ref 98–107)
CO2 SERPL-SCNC: 21 MMOL/L (ref 22–29)
COMPONENT: NORMAL
COMPONENT: NORMAL
CREAT SERPL-MCNC: 0.5 MG/DL (ref 0.5–1)
CROSSMATCH RESULT: NORMAL
CROSSMATCH RESULT: NORMAL
EOSINOPHIL # BLD: 0.2 K/UL (ref 0.05–0.5)
EOSINOPHILS RELATIVE PERCENT: 4 % (ref 0–6)
ERYTHROCYTE [DISTWIDTH] IN BLOOD BY AUTOMATED COUNT: 15.5 % (ref 11.5–15)
GFR SERPL CREATININE-BSD FRML MDRD: >90 ML/MIN/1.73M2
GLUCOSE BLD-MCNC: 145 MG/DL (ref 74–99)
GLUCOSE BLD-MCNC: 188 MG/DL (ref 74–99)
GLUCOSE BLD-MCNC: 198 MG/DL (ref 74–99)
GLUCOSE BLD-MCNC: 216 MG/DL (ref 74–99)
GLUCOSE BLD-MCNC: 224 MG/DL (ref 74–99)
GLUCOSE SERPL-MCNC: 207 MG/DL (ref 74–99)
HCT VFR BLD AUTO: 25.3 % (ref 34–48)
HGB BLD-MCNC: 8.4 G/DL (ref 11.5–15.5)
IMM GRANULOCYTES # BLD AUTO: 0.03 K/UL (ref 0–0.58)
IMM GRANULOCYTES NFR BLD: 1 % (ref 0–5)
LYMPHOCYTES NFR BLD: 0.86 K/UL (ref 1.5–4)
LYMPHOCYTES RELATIVE PERCENT: 16 % (ref 20–42)
MAGNESIUM SERPL-MCNC: 1.6 MG/DL (ref 1.6–2.6)
MCH RBC QN AUTO: 29.4 PG (ref 26–35)
MCHC RBC AUTO-ENTMCNC: 33.2 G/DL (ref 32–34.5)
MCV RBC AUTO: 88.5 FL (ref 80–99.9)
MONOCYTES NFR BLD: 0.6 K/UL (ref 0.1–0.95)
MONOCYTES NFR BLD: 12 % (ref 2–12)
NEUTROPHILS NFR BLD: 67 % (ref 43–80)
NEUTS SEG NFR BLD: 3.53 K/UL (ref 1.8–7.3)
PHOSPHATE SERPL-MCNC: 1.7 MG/DL (ref 2.5–4.5)
PLATELET # BLD AUTO: 143 K/UL (ref 130–450)
PMV BLD AUTO: 11.7 FL (ref 7–12)
POTASSIUM SERPL-SCNC: 3.8 MMOL/L (ref 3.5–5)
PROT SERPL-MCNC: 4.9 G/DL (ref 6.4–8.3)
RBC # BLD AUTO: 2.86 M/UL (ref 3.5–5.5)
SODIUM SERPL-SCNC: 134 MMOL/L (ref 132–146)
TRANSFUSION STATUS: NORMAL
TRANSFUSION STATUS: NORMAL
UNIT DIVISION: 0
UNIT DIVISION: 0
UNIT ISSUE DATE/TIME: NORMAL
UNIT ISSUE DATE/TIME: NORMAL
UNIT TAG COMMENT: NORMAL
WBC OTHER # BLD: 5.2 K/UL (ref 4.5–11.5)

## 2024-03-28 PROCEDURE — 2060000000 HC ICU INTERMEDIATE R&B

## 2024-03-28 PROCEDURE — 2500000003 HC RX 250 WO HCPCS: Performed by: NURSE PRACTITIONER

## 2024-03-28 PROCEDURE — 84100 ASSAY OF PHOSPHORUS: CPT

## 2024-03-28 PROCEDURE — 99291 CRITICAL CARE FIRST HOUR: CPT | Performed by: INTERNAL MEDICINE

## 2024-03-28 PROCEDURE — 2700000000 HC OXYGEN THERAPY PER DAY

## 2024-03-28 PROCEDURE — 6370000000 HC RX 637 (ALT 250 FOR IP): Performed by: INTERNAL MEDICINE

## 2024-03-28 PROCEDURE — 80053 COMPREHEN METABOLIC PANEL: CPT

## 2024-03-28 PROCEDURE — 6360000002 HC RX W HCPCS: Performed by: NURSE PRACTITIONER

## 2024-03-28 PROCEDURE — 2580000003 HC RX 258: Performed by: INTERNAL MEDICINE

## 2024-03-28 PROCEDURE — 85025 COMPLETE CBC W/AUTO DIFF WBC: CPT

## 2024-03-28 PROCEDURE — 6360000002 HC RX W HCPCS: Performed by: INTERNAL MEDICINE

## 2024-03-28 PROCEDURE — A4216 STERILE WATER/SALINE, 10 ML: HCPCS | Performed by: INTERNAL MEDICINE

## 2024-03-28 PROCEDURE — 83735 ASSAY OF MAGNESIUM: CPT

## 2024-03-28 PROCEDURE — 82962 GLUCOSE BLOOD TEST: CPT

## 2024-03-28 PROCEDURE — 2580000003 HC RX 258: Performed by: NURSE PRACTITIONER

## 2024-03-28 PROCEDURE — C9113 INJ PANTOPRAZOLE SODIUM, VIA: HCPCS | Performed by: INTERNAL MEDICINE

## 2024-03-28 RX ORDER — MAGNESIUM SULFATE IN WATER 40 MG/ML
2000 INJECTION, SOLUTION INTRAVENOUS ONCE
Status: COMPLETED | OUTPATIENT
Start: 2024-03-28 | End: 2024-03-28

## 2024-03-28 RX ADMIN — BACITRACIN ZINC: 500 OINTMENT TOPICAL at 08:34

## 2024-03-28 RX ADMIN — LEVOTHYROXINE SODIUM 75 MCG: 0.07 TABLET ORAL at 06:50

## 2024-03-28 RX ADMIN — MEROPENEM 1000 MG: 1 INJECTION, POWDER, FOR SOLUTION INTRAVENOUS at 04:16

## 2024-03-28 RX ADMIN — MEROPENEM 1000 MG: 1 INJECTION, POWDER, FOR SOLUTION INTRAVENOUS at 12:32

## 2024-03-28 RX ADMIN — METOCLOPRAMIDE 5 MG: 5 TABLET ORAL at 12:33

## 2024-03-28 RX ADMIN — SENNOSIDES 8.6 MG: 8.6 TABLET, FILM COATED ORAL at 08:34

## 2024-03-28 RX ADMIN — METOCLOPRAMIDE 5 MG: 5 TABLET ORAL at 08:34

## 2024-03-28 RX ADMIN — SODIUM CHLORIDE, POTASSIUM CHLORIDE, SODIUM LACTATE AND CALCIUM CHLORIDE: 600; 310; 30; 20 INJECTION, SOLUTION INTRAVENOUS at 02:29

## 2024-03-28 RX ADMIN — BACLOFEN 5 MG: 10 TABLET ORAL at 11:17

## 2024-03-28 RX ADMIN — SODIUM CHLORIDE: 9 INJECTION, SOLUTION INTRAVENOUS at 04:14

## 2024-03-28 RX ADMIN — MAGNESIUM SULFATE HEPTAHYDRATE 2000 MG: 40 INJECTION, SOLUTION INTRAVENOUS at 06:19

## 2024-03-28 RX ADMIN — MEROPENEM 1000 MG: 1 INJECTION, POWDER, FOR SOLUTION INTRAVENOUS at 19:32

## 2024-03-28 RX ADMIN — PANTOPRAZOLE SODIUM 40 MG: 40 INJECTION, POWDER, FOR SOLUTION INTRAVENOUS at 08:35

## 2024-03-28 RX ADMIN — POTASSIUM PHOSPHATE, MONOBASIC AND POTASSIUM PHOSPHATE, DIBASIC 15 MMOL: 224; 236 INJECTION, SOLUTION, CONCENTRATE INTRAVENOUS at 10:35

## 2024-03-28 RX ADMIN — Medication 1 CAPSULE: at 08:35

## 2024-03-28 RX ADMIN — METOCLOPRAMIDE 5 MG: 5 TABLET ORAL at 21:56

## 2024-03-28 RX ADMIN — SODIUM CHLORIDE, PRESERVATIVE FREE 10 ML: 5 INJECTION INTRAVENOUS at 10:37

## 2024-03-28 RX ADMIN — MIDODRINE HYDROCHLORIDE 15 MG: 10 TABLET ORAL at 08:30

## 2024-03-28 RX ADMIN — GABAPENTIN 400 MG: 400 CAPSULE ORAL at 21:55

## 2024-03-28 RX ADMIN — MIDODRINE HYDROCHLORIDE 15 MG: 10 TABLET ORAL at 17:55

## 2024-03-28 RX ADMIN — SODIUM CHLORIDE, PRESERVATIVE FREE 10 ML: 5 INJECTION INTRAVENOUS at 21:56

## 2024-03-28 RX ADMIN — DOCUSATE SODIUM LIQUID 200 MG: 100 LIQUID ORAL at 21:55

## 2024-03-28 RX ADMIN — GABAPENTIN 400 MG: 400 CAPSULE ORAL at 08:34

## 2024-03-28 RX ADMIN — ENOXAPARIN SODIUM 40 MG: 100 INJECTION SUBCUTANEOUS at 10:33

## 2024-03-28 RX ADMIN — BACLOFEN 5 MG: 10 TABLET ORAL at 16:50

## 2024-03-28 RX ADMIN — INSULIN LISPRO 1 UNITS: 100 INJECTION, SOLUTION INTRAVENOUS; SUBCUTANEOUS at 11:41

## 2024-03-28 RX ADMIN — ONDANSETRON HYDROCHLORIDE 4 MG: 4 TABLET, FILM COATED ORAL at 22:09

## 2024-03-28 RX ADMIN — MIDODRINE HYDROCHLORIDE 15 MG: 10 TABLET ORAL at 12:32

## 2024-03-28 RX ADMIN — INSULIN LISPRO 1 UNITS: 100 INJECTION, SOLUTION INTRAVENOUS; SUBCUTANEOUS at 06:20

## 2024-03-28 RX ADMIN — METOCLOPRAMIDE 5 MG: 5 TABLET ORAL at 17:55

## 2024-03-28 RX ADMIN — TRAZODONE HYDROCHLORIDE 50 MG: 50 TABLET ORAL at 21:55

## 2024-03-28 RX ADMIN — ROSUVASTATIN CALCIUM 10 MG: 10 TABLET, FILM COATED ORAL at 21:55

## 2024-03-28 RX ADMIN — BACLOFEN 5 MG: 10 TABLET ORAL at 22:03

## 2024-03-28 ASSESSMENT — PAIN SCALES - GENERAL
PAINLEVEL_OUTOF10: 0

## 2024-03-28 NOTE — CARE COORDINATION
Care Coordination: LOS 2 day.  Lakisha, restraints. Ptx and otx on chart. Pt is long term from [facility], no precert needed. Per daughter, plan to return  Len, transport completed    Electronically signed by Monica Garcia RN on 3/28/2024 at 11:15 AM

## 2024-03-28 NOTE — DISCHARGE INSTR - COC
Continuity of Care Form    Patient Name: Cait Kent   :  1950  MRN:  [id number]    Admit date:  3/25/2024  Discharge date:  24    Code Status Order: Full Code   Advance Directives:     Admitting Physician:  Denton Velasquez MD  PCP: Denton Velasquez MD    Discharging Nurse: Keeley Gray  Discharging Hospital Unit/Room#: 4405/4405-A  Discharging Unit Phone Number: [phone]    Emergency Contact:   Extended Emergency Contact Information  Primary Emergency Contact: Ayanna Colby  Address: [facility]  Home Phone: [phone]  Mobile Phone: [phone]  Relation: Child   needed? No  Secondary Emergency Contact: Vic Morillo  Home Phone: [phone]  Mobile Phone: [phone]  Relation: Brother/Sister  Preferred language: English   needed? No    Past Surgical History:  Past Surgical History:   Procedure Laterality Date    BACK SURGERY         Immunization History:   Immunization History   Administered Date(s) Administered    COVID-19, MODERNA BLUE border, Primary or Immunocompromised, (age 12y+), IM, 100 mcg/0.5mL 2021, 2021    Influenza, Triv, inactivated, subunit, adjuvanted, IM (Fluad 65 yrs and older) 2019       Active Problems:  Patient Active Problem List   Diagnosis Code    Midline low back pain with sciatica M54.40    Lumbar degenerative disc disease M51.36    Suicidal ideation R45.851    Altered mental status R41.82    Hypotension I95.9    Severe episode of recurrent major depressive disorder, without psychotic features ([facility]) F33.2    Moderate protein-calorie malnutrition (HCC) E44.0    Anemia D64.9    Sepsis ([facility]) A41.9    Septic shock ([facility]) A41.9, R65.21       Isolation/Infection:   Isolation            Contact          Patient Infection Status       Infection Onset Added Last Indicated Last Indicated By Review Planned Expiration Resolved Resolved By    ESBL (Extended Spectrum Beta Lactamase) 24 Culture, Urine        E.

## 2024-03-29 ENCOUNTER — APPOINTMENT (OUTPATIENT)
Dept: GENERAL RADIOLOGY | Age: 74
DRG: 871 | End: 2024-03-29
Payer: MEDICARE

## 2024-03-29 LAB
ALBUMIN SERPL-MCNC: 2.5 G/DL (ref 3.5–5.2)
ALP SERPL-CCNC: 55 U/L (ref 35–104)
ALT SERPL-CCNC: 13 U/L (ref 0–32)
ANION GAP SERPL CALCULATED.3IONS-SCNC: 11 MMOL/L (ref 7–16)
AST SERPL-CCNC: 14 U/L (ref 0–31)
BASOPHILS # BLD: 0.03 K/UL (ref 0–0.2)
BASOPHILS NFR BLD: 1 % (ref 0–2)
BILIRUB SERPL-MCNC: 1.7 MG/DL (ref 0–1.2)
BUN SERPL-MCNC: 7 MG/DL (ref 6–23)
CALCIUM SERPL-MCNC: 7.9 MG/DL (ref 8.6–10.2)
CHLORIDE SERPL-SCNC: 105 MMOL/L (ref 98–107)
CO2 SERPL-SCNC: 24 MMOL/L (ref 22–29)
CREAT SERPL-MCNC: 0.5 MG/DL (ref 0.5–1)
EOSINOPHIL # BLD: 0.24 K/UL (ref 0.05–0.5)
EOSINOPHILS RELATIVE PERCENT: 5 % (ref 0–6)
ERYTHROCYTE [DISTWIDTH] IN BLOOD BY AUTOMATED COUNT: 15.9 % (ref 11.5–15)
GFR SERPL CREATININE-BSD FRML MDRD: >90 ML/MIN/1.73M2
GLUCOSE BLD-MCNC: 210 MG/DL (ref 74–99)
GLUCOSE BLD-MCNC: 223 MG/DL (ref 74–99)
GLUCOSE BLD-MCNC: 245 MG/DL (ref 74–99)
GLUCOSE BLD-MCNC: 287 MG/DL (ref 74–99)
GLUCOSE BLD-MCNC: 307 MG/DL (ref 74–99)
GLUCOSE SERPL-MCNC: 215 MG/DL (ref 74–99)
HCT VFR BLD AUTO: 24.7 % (ref 34–48)
HGB BLD-MCNC: 8 G/DL (ref 11.5–15.5)
IMM GRANULOCYTES # BLD AUTO: 0.04 K/UL (ref 0–0.58)
IMM GRANULOCYTES NFR BLD: 1 % (ref 0–5)
LYMPHOCYTES NFR BLD: 0.89 K/UL (ref 1.5–4)
LYMPHOCYTES RELATIVE PERCENT: 17 % (ref 20–42)
MAGNESIUM SERPL-MCNC: 1.5 MG/DL (ref 1.6–2.6)
MCH RBC QN AUTO: 28.8 PG (ref 26–35)
MCHC RBC AUTO-ENTMCNC: 32.4 G/DL (ref 32–34.5)
MCV RBC AUTO: 88.8 FL (ref 80–99.9)
MONOCYTES NFR BLD: 0.57 K/UL (ref 0.1–0.95)
MONOCYTES NFR BLD: 11 % (ref 2–12)
NEUTROPHILS NFR BLD: 67 % (ref 43–80)
NEUTS SEG NFR BLD: 3.53 K/UL (ref 1.8–7.3)
PHOSPHATE SERPL-MCNC: 1.6 MG/DL (ref 2.5–4.5)
PLATELET # BLD AUTO: 164 K/UL (ref 130–450)
PMV BLD AUTO: 11 FL (ref 7–12)
POTASSIUM SERPL-SCNC: 3.6 MMOL/L (ref 3.5–5)
PROT SERPL-MCNC: 4.8 G/DL (ref 6.4–8.3)
RBC # BLD AUTO: 2.78 M/UL (ref 3.5–5.5)
SODIUM SERPL-SCNC: 140 MMOL/L (ref 132–146)
WBC OTHER # BLD: 5.3 K/UL (ref 4.5–11.5)

## 2024-03-29 PROCEDURE — C9113 INJ PANTOPRAZOLE SODIUM, VIA: HCPCS | Performed by: INTERNAL MEDICINE

## 2024-03-29 PROCEDURE — 2060000000 HC ICU INTERMEDIATE R&B

## 2024-03-29 PROCEDURE — 85025 COMPLETE CBC W/AUTO DIFF WBC: CPT

## 2024-03-29 PROCEDURE — 2580000003 HC RX 258: Performed by: INTERNAL MEDICINE

## 2024-03-29 PROCEDURE — 84100 ASSAY OF PHOSPHORUS: CPT

## 2024-03-29 PROCEDURE — 80053 COMPREHEN METABOLIC PANEL: CPT

## 2024-03-29 PROCEDURE — 73521 X-RAY EXAM HIPS BI 2 VIEWS: CPT

## 2024-03-29 PROCEDURE — 2700000000 HC OXYGEN THERAPY PER DAY

## 2024-03-29 PROCEDURE — 83735 ASSAY OF MAGNESIUM: CPT

## 2024-03-29 PROCEDURE — 36415 COLL VENOUS BLD VENIPUNCTURE: CPT

## 2024-03-29 PROCEDURE — 6370000000 HC RX 637 (ALT 250 FOR IP): Performed by: INTERNAL MEDICINE

## 2024-03-29 PROCEDURE — A4216 STERILE WATER/SALINE, 10 ML: HCPCS | Performed by: INTERNAL MEDICINE

## 2024-03-29 PROCEDURE — 6360000002 HC RX W HCPCS: Performed by: INTERNAL MEDICINE

## 2024-03-29 PROCEDURE — 82962 GLUCOSE BLOOD TEST: CPT

## 2024-03-29 RX ADMIN — GABAPENTIN 400 MG: 400 CAPSULE ORAL at 21:36

## 2024-03-29 RX ADMIN — ROSUVASTATIN CALCIUM 10 MG: 10 TABLET, FILM COATED ORAL at 21:36

## 2024-03-29 RX ADMIN — GABAPENTIN 400 MG: 400 CAPSULE ORAL at 08:25

## 2024-03-29 RX ADMIN — METOCLOPRAMIDE 5 MG: 5 TABLET ORAL at 08:39

## 2024-03-29 RX ADMIN — BACLOFEN 5 MG: 10 TABLET ORAL at 14:07

## 2024-03-29 RX ADMIN — DULOXETINE HYDROCHLORIDE 30 MG: 30 CAPSULE, DELAYED RELEASE ORAL at 08:25

## 2024-03-29 RX ADMIN — INSULIN LISPRO 1 UNITS: 100 INJECTION, SOLUTION INTRAVENOUS; SUBCUTANEOUS at 17:50

## 2024-03-29 RX ADMIN — SODIUM CHLORIDE, PRESERVATIVE FREE 10 ML: 5 INJECTION INTRAVENOUS at 08:26

## 2024-03-29 RX ADMIN — MEROPENEM 1000 MG: 1 INJECTION, POWDER, FOR SOLUTION INTRAVENOUS at 12:05

## 2024-03-29 RX ADMIN — Medication 1 CAPSULE: at 08:25

## 2024-03-29 RX ADMIN — BACLOFEN 5 MG: 10 TABLET ORAL at 08:25

## 2024-03-29 RX ADMIN — INSULIN LISPRO 1 UNITS: 100 INJECTION, SOLUTION INTRAVENOUS; SUBCUTANEOUS at 02:08

## 2024-03-29 RX ADMIN — INSULIN LISPRO 1 UNITS: 100 INJECTION, SOLUTION INTRAVENOUS; SUBCUTANEOUS at 11:52

## 2024-03-29 RX ADMIN — MEROPENEM 1000 MG: 1 INJECTION, POWDER, FOR SOLUTION INTRAVENOUS at 21:51

## 2024-03-29 RX ADMIN — DULOXETINE HYDROCHLORIDE 30 MG: 30 CAPSULE, DELAYED RELEASE ORAL at 21:36

## 2024-03-29 RX ADMIN — METOCLOPRAMIDE 5 MG: 5 TABLET ORAL at 17:50

## 2024-03-29 RX ADMIN — BACLOFEN 5 MG: 10 TABLET ORAL at 21:36

## 2024-03-29 RX ADMIN — TRAZODONE HYDROCHLORIDE 50 MG: 50 TABLET ORAL at 21:36

## 2024-03-29 RX ADMIN — SODIUM CHLORIDE, PRESERVATIVE FREE 10 ML: 5 INJECTION INTRAVENOUS at 21:40

## 2024-03-29 RX ADMIN — MIDODRINE HYDROCHLORIDE 15 MG: 10 TABLET ORAL at 08:25

## 2024-03-29 RX ADMIN — LEVOTHYROXINE SODIUM 75 MCG: 0.07 TABLET ORAL at 06:37

## 2024-03-29 RX ADMIN — ONDANSETRON HYDROCHLORIDE 4 MG: 4 TABLET, FILM COATED ORAL at 22:38

## 2024-03-29 RX ADMIN — DOCUSATE SODIUM LIQUID 200 MG: 100 LIQUID ORAL at 21:36

## 2024-03-29 RX ADMIN — SENNOSIDES 8.6 MG: 8.6 TABLET, FILM COATED ORAL at 08:25

## 2024-03-29 RX ADMIN — ENOXAPARIN SODIUM 40 MG: 100 INJECTION SUBCUTANEOUS at 08:26

## 2024-03-29 RX ADMIN — PANTOPRAZOLE SODIUM 40 MG: 40 INJECTION, POWDER, FOR SOLUTION INTRAVENOUS at 08:26

## 2024-03-29 RX ADMIN — INSULIN LISPRO 1 UNITS: 100 INJECTION, SOLUTION INTRAVENOUS; SUBCUTANEOUS at 06:43

## 2024-03-29 RX ADMIN — INSULIN LISPRO 3 UNITS: 100 INJECTION, SOLUTION INTRAVENOUS; SUBCUTANEOUS at 21:40

## 2024-03-29 RX ADMIN — METOCLOPRAMIDE 5 MG: 5 TABLET ORAL at 14:07

## 2024-03-29 RX ADMIN — METOCLOPRAMIDE 5 MG: 5 TABLET ORAL at 22:38

## 2024-03-29 RX ADMIN — MEROPENEM 1000 MG: 1 INJECTION, POWDER, FOR SOLUTION INTRAVENOUS at 04:32

## 2024-03-29 RX ADMIN — SODIUM CHLORIDE: 9 INJECTION, SOLUTION INTRAVENOUS at 21:49

## 2024-03-29 ASSESSMENT — PAIN SCALES - GENERAL
PAINLEVEL_OUTOF10: 4
PAINLEVEL_OUTOF10: 0

## 2024-03-29 NOTE — CARE COORDINATION
3/29/24 Update CM Note. Pt admitted 3/26/24 Dx Septic shock. Transferred out of ICU 3/28/24.  On Merrem IV. Plans to return to [facility] when medically stable.  Pt is a long term bed hold and can return at any time.  Daughter in agreement with dc plan.  Ambulance form/envelope previously completed and placed on chart.  Destination/[facility] updated.   Lily SIEGEL RN-BC  [phone]

## 2024-03-30 PROBLEM — S72.001A: Status: ACTIVE | Noted: 2024-03-30

## 2024-03-30 PROBLEM — S72.002A: Status: ACTIVE | Noted: 2024-03-30

## 2024-03-30 LAB
ALBUMIN SERPL-MCNC: 2.6 G/DL (ref 3.5–5.2)
ALP SERPL-CCNC: 64 U/L (ref 35–104)
ALT SERPL-CCNC: 12 U/L (ref 0–32)
ANION GAP SERPL CALCULATED.3IONS-SCNC: 9 MMOL/L (ref 7–16)
AST SERPL-CCNC: 13 U/L (ref 0–31)
BASOPHILS # BLD: 0.03 K/UL (ref 0–0.2)
BASOPHILS NFR BLD: 0 % (ref 0–2)
BILIRUB SERPL-MCNC: 2.2 MG/DL (ref 0–1.2)
BUN SERPL-MCNC: 10 MG/DL (ref 6–23)
CALCIUM SERPL-MCNC: 8 MG/DL (ref 8.6–10.2)
CHLORIDE SERPL-SCNC: 100 MMOL/L (ref 98–107)
CO2 SERPL-SCNC: 26 MMOL/L (ref 22–29)
CREAT SERPL-MCNC: 0.5 MG/DL (ref 0.5–1)
EOSINOPHIL # BLD: 0.23 K/UL (ref 0.05–0.5)
EOSINOPHILS RELATIVE PERCENT: 3 % (ref 0–6)
ERYTHROCYTE [DISTWIDTH] IN BLOOD BY AUTOMATED COUNT: 16 % (ref 11.5–15)
GFR SERPL CREATININE-BSD FRML MDRD: >90 ML/MIN/1.73M2
GLUCOSE BLD-MCNC: 276 MG/DL (ref 74–99)
GLUCOSE BLD-MCNC: 290 MG/DL (ref 74–99)
GLUCOSE BLD-MCNC: 319 MG/DL (ref 74–99)
GLUCOSE BLD-MCNC: 348 MG/DL (ref 74–99)
GLUCOSE BLD-MCNC: 384 MG/DL (ref 74–99)
GLUCOSE SERPL-MCNC: 299 MG/DL (ref 74–99)
HCT VFR BLD AUTO: 26.8 % (ref 34–48)
HGB BLD-MCNC: 8.7 G/DL (ref 11.5–15.5)
IMM GRANULOCYTES # BLD AUTO: 0.1 K/UL (ref 0–0.58)
IMM GRANULOCYTES NFR BLD: 1 % (ref 0–5)
LYMPHOCYTES NFR BLD: 1.21 K/UL (ref 1.5–4)
LYMPHOCYTES RELATIVE PERCENT: 17 % (ref 20–42)
MAGNESIUM SERPL-MCNC: 1.5 MG/DL (ref 1.6–2.6)
MCH RBC QN AUTO: 28.9 PG (ref 26–35)
MCHC RBC AUTO-ENTMCNC: 32.5 G/DL (ref 32–34.5)
MCV RBC AUTO: 89 FL (ref 80–99.9)
MICROORGANISM SPEC CULT: NORMAL
MICROORGANISM SPEC CULT: NORMAL
MONOCYTES NFR BLD: 0.75 K/UL (ref 0.1–0.95)
MONOCYTES NFR BLD: 10 % (ref 2–12)
NEUTROPHILS NFR BLD: 68 % (ref 43–80)
NEUTS SEG NFR BLD: 4.87 K/UL (ref 1.8–7.3)
PLATELET # BLD AUTO: 201 K/UL (ref 130–450)
PMV BLD AUTO: 10.5 FL (ref 7–12)
POTASSIUM SERPL-SCNC: 4 MMOL/L (ref 3.5–5)
PROT SERPL-MCNC: 5.1 G/DL (ref 6.4–8.3)
RBC # BLD AUTO: 3.01 M/UL (ref 3.5–5.5)
SERVICE CMNT-IMP: NORMAL
SERVICE CMNT-IMP: NORMAL
SODIUM SERPL-SCNC: 135 MMOL/L (ref 132–146)
SPECIMEN DESCRIPTION: NORMAL
SPECIMEN DESCRIPTION: NORMAL
WBC OTHER # BLD: 7.2 K/UL (ref 4.5–11.5)

## 2024-03-30 PROCEDURE — 2580000003 HC RX 258: Performed by: INTERNAL MEDICINE

## 2024-03-30 PROCEDURE — 85025 COMPLETE CBC W/AUTO DIFF WBC: CPT

## 2024-03-30 PROCEDURE — 6370000000 HC RX 637 (ALT 250 FOR IP): Performed by: INTERNAL MEDICINE

## 2024-03-30 PROCEDURE — 2700000000 HC OXYGEN THERAPY PER DAY

## 2024-03-30 PROCEDURE — 82962 GLUCOSE BLOOD TEST: CPT

## 2024-03-30 PROCEDURE — 36415 COLL VENOUS BLD VENIPUNCTURE: CPT

## 2024-03-30 PROCEDURE — 80053 COMPREHEN METABOLIC PANEL: CPT

## 2024-03-30 PROCEDURE — 2060000000 HC ICU INTERMEDIATE R&B

## 2024-03-30 PROCEDURE — 83735 ASSAY OF MAGNESIUM: CPT

## 2024-03-30 PROCEDURE — 6360000002 HC RX W HCPCS: Performed by: INTERNAL MEDICINE

## 2024-03-30 PROCEDURE — A4216 STERILE WATER/SALINE, 10 ML: HCPCS | Performed by: INTERNAL MEDICINE

## 2024-03-30 PROCEDURE — C9113 INJ PANTOPRAZOLE SODIUM, VIA: HCPCS | Performed by: INTERNAL MEDICINE

## 2024-03-30 PROCEDURE — 99222 1ST HOSP IP/OBS MODERATE 55: CPT | Performed by: STUDENT IN AN ORGANIZED HEALTH CARE EDUCATION/TRAINING PROGRAM

## 2024-03-30 RX ORDER — INSULIN GLARGINE 100 [IU]/ML
5 INJECTION, SOLUTION SUBCUTANEOUS NIGHTLY
Status: DISCONTINUED | OUTPATIENT
Start: 2024-03-30 | End: 2024-03-31

## 2024-03-30 RX ORDER — INSULIN LISPRO 100 [IU]/ML
0-4 INJECTION, SOLUTION INTRAVENOUS; SUBCUTANEOUS
Status: DISCONTINUED | OUTPATIENT
Start: 2024-03-30 | End: 2024-03-31

## 2024-03-30 RX ORDER — POLYETHYLENE GLYCOL 3350 17 G/17G
17 POWDER, FOR SOLUTION ORAL DAILY
Status: DISCONTINUED | OUTPATIENT
Start: 2024-03-30 | End: 2024-04-01 | Stop reason: HOSPADM

## 2024-03-30 RX ORDER — MAGNESIUM SULFATE IN WATER 40 MG/ML
2000 INJECTION, SOLUTION INTRAVENOUS ONCE
Status: COMPLETED | OUTPATIENT
Start: 2024-03-30 | End: 2024-03-30

## 2024-03-30 RX ADMIN — INSULIN LISPRO 3 UNITS: 100 INJECTION, SOLUTION INTRAVENOUS; SUBCUTANEOUS at 20:26

## 2024-03-30 RX ADMIN — MEROPENEM 1000 MG: 1 INJECTION, POWDER, FOR SOLUTION INTRAVENOUS at 13:18

## 2024-03-30 RX ADMIN — DULOXETINE HYDROCHLORIDE 30 MG: 30 CAPSULE, DELAYED RELEASE ORAL at 20:09

## 2024-03-30 RX ADMIN — SENNOSIDES 8.6 MG: 8.6 TABLET, FILM COATED ORAL at 08:52

## 2024-03-30 RX ADMIN — ROSUVASTATIN CALCIUM 10 MG: 10 TABLET, FILM COATED ORAL at 20:08

## 2024-03-30 RX ADMIN — SODIUM CHLORIDE, PRESERVATIVE FREE 10 ML: 5 INJECTION INTRAVENOUS at 08:55

## 2024-03-30 RX ADMIN — METOCLOPRAMIDE 5 MG: 5 TABLET ORAL at 13:17

## 2024-03-30 RX ADMIN — PANTOPRAZOLE SODIUM 40 MG: 40 INJECTION, POWDER, FOR SOLUTION INTRAVENOUS at 08:53

## 2024-03-30 RX ADMIN — GABAPENTIN 400 MG: 400 CAPSULE ORAL at 20:08

## 2024-03-30 RX ADMIN — BACLOFEN 5 MG: 10 TABLET ORAL at 08:53

## 2024-03-30 RX ADMIN — MAGNESIUM SULFATE HEPTAHYDRATE 2000 MG: 40 INJECTION, SOLUTION INTRAVENOUS at 13:51

## 2024-03-30 RX ADMIN — MEROPENEM 1000 MG: 1 INJECTION, POWDER, FOR SOLUTION INTRAVENOUS at 20:18

## 2024-03-30 RX ADMIN — METOCLOPRAMIDE 5 MG: 5 TABLET ORAL at 21:12

## 2024-03-30 RX ADMIN — GABAPENTIN 400 MG: 400 CAPSULE ORAL at 08:53

## 2024-03-30 RX ADMIN — BACLOFEN 5 MG: 10 TABLET ORAL at 20:08

## 2024-03-30 RX ADMIN — INSULIN LISPRO 4 UNITS: 100 INJECTION, SOLUTION INTRAVENOUS; SUBCUTANEOUS at 16:43

## 2024-03-30 RX ADMIN — DOCUSATE SODIUM LIQUID 200 MG: 100 LIQUID ORAL at 20:08

## 2024-03-30 RX ADMIN — POLYETHYLENE GLYCOL 3350 17 G: 17 POWDER, FOR SOLUTION ORAL at 14:44

## 2024-03-30 RX ADMIN — METOCLOPRAMIDE 5 MG: 5 TABLET ORAL at 08:52

## 2024-03-30 RX ADMIN — SODIUM CHLORIDE, PRESERVATIVE FREE 10 ML: 5 INJECTION INTRAVENOUS at 20:18

## 2024-03-30 RX ADMIN — LEVOTHYROXINE SODIUM 75 MCG: 0.07 TABLET ORAL at 06:25

## 2024-03-30 RX ADMIN — ENOXAPARIN SODIUM 40 MG: 100 INJECTION SUBCUTANEOUS at 08:54

## 2024-03-30 RX ADMIN — INSULIN GLARGINE 5 UNITS: 100 INJECTION, SOLUTION SUBCUTANEOUS at 20:26

## 2024-03-30 RX ADMIN — DULOXETINE HYDROCHLORIDE 30 MG: 30 CAPSULE, DELAYED RELEASE ORAL at 08:53

## 2024-03-30 RX ADMIN — METOCLOPRAMIDE 5 MG: 5 TABLET ORAL at 16:43

## 2024-03-30 RX ADMIN — INSULIN LISPRO 2 UNITS: 100 INJECTION, SOLUTION INTRAVENOUS; SUBCUTANEOUS at 05:19

## 2024-03-30 RX ADMIN — MEROPENEM 1000 MG: 1 INJECTION, POWDER, FOR SOLUTION INTRAVENOUS at 05:02

## 2024-03-30 RX ADMIN — BACLOFEN 5 MG: 10 TABLET ORAL at 13:17

## 2024-03-30 RX ADMIN — Medication 1 CAPSULE: at 08:53

## 2024-03-30 RX ADMIN — TRAZODONE HYDROCHLORIDE 50 MG: 50 TABLET ORAL at 20:09

## 2024-03-30 RX ADMIN — INSULIN LISPRO 2 UNITS: 100 INJECTION, SOLUTION INTRAVENOUS; SUBCUTANEOUS at 01:48

## 2024-03-30 ASSESSMENT — PAIN SCALES - GENERAL
PAINLEVEL_OUTOF10: 0
PAINLEVEL_OUTOF10: 0
PAINLEVEL_OUTOF10: 7

## 2024-03-31 LAB
ALBUMIN SERPL-MCNC: 2.5 G/DL (ref 3.5–5.2)
ALP SERPL-CCNC: 65 U/L (ref 35–104)
ALT SERPL-CCNC: 10 U/L (ref 0–32)
ANION GAP SERPL CALCULATED.3IONS-SCNC: 5 MMOL/L (ref 7–16)
AST SERPL-CCNC: 11 U/L (ref 0–31)
BASOPHILS # BLD: 0.03 K/UL (ref 0–0.2)
BASOPHILS NFR BLD: 1 % (ref 0–2)
BILIRUB SERPL-MCNC: 1.8 MG/DL (ref 0–1.2)
BUN SERPL-MCNC: 11 MG/DL (ref 6–23)
CALCIUM SERPL-MCNC: 8 MG/DL (ref 8.6–10.2)
CHLORIDE SERPL-SCNC: 98 MMOL/L (ref 98–107)
CO2 SERPL-SCNC: 32 MMOL/L (ref 22–29)
CREAT SERPL-MCNC: 0.5 MG/DL (ref 0.5–1)
EOSINOPHIL # BLD: 0.28 K/UL (ref 0.05–0.5)
EOSINOPHILS RELATIVE PERCENT: 5 % (ref 0–6)
ERYTHROCYTE [DISTWIDTH] IN BLOOD BY AUTOMATED COUNT: 15.8 % (ref 11.5–15)
GFR SERPL CREATININE-BSD FRML MDRD: >90 ML/MIN/1.73M2
GLUCOSE BLD-MCNC: 225 MG/DL (ref 74–99)
GLUCOSE BLD-MCNC: 236 MG/DL (ref 74–99)
GLUCOSE BLD-MCNC: 257 MG/DL (ref 74–99)
GLUCOSE SERPL-MCNC: 323 MG/DL (ref 74–99)
HCT VFR BLD AUTO: 25.3 % (ref 34–48)
HGB BLD-MCNC: 8.3 G/DL (ref 11.5–15.5)
IMM GRANULOCYTES # BLD AUTO: 0.11 K/UL (ref 0–0.58)
IMM GRANULOCYTES NFR BLD: 2 % (ref 0–5)
LYMPHOCYTES NFR BLD: 1.31 K/UL (ref 1.5–4)
LYMPHOCYTES RELATIVE PERCENT: 22 % (ref 20–42)
MAGNESIUM SERPL-MCNC: 1.8 MG/DL (ref 1.6–2.6)
MCH RBC QN AUTO: 29.5 PG (ref 26–35)
MCHC RBC AUTO-ENTMCNC: 32.8 G/DL (ref 32–34.5)
MCV RBC AUTO: 90 FL (ref 80–99.9)
MONOCYTES NFR BLD: 0.58 K/UL (ref 0.1–0.95)
MONOCYTES NFR BLD: 10 % (ref 2–12)
NEUTROPHILS NFR BLD: 61 % (ref 43–80)
NEUTS SEG NFR BLD: 3.59 K/UL (ref 1.8–7.3)
PLATELET # BLD AUTO: 205 K/UL (ref 130–450)
PMV BLD AUTO: 10.9 FL (ref 7–12)
POTASSIUM SERPL-SCNC: 4.4 MMOL/L (ref 3.5–5)
PROT SERPL-MCNC: 4.7 G/DL (ref 6.4–8.3)
RBC # BLD AUTO: 2.81 M/UL (ref 3.5–5.5)
SODIUM SERPL-SCNC: 135 MMOL/L (ref 132–146)
WBC OTHER # BLD: 5.9 K/UL (ref 4.5–11.5)

## 2024-03-31 PROCEDURE — 82962 GLUCOSE BLOOD TEST: CPT

## 2024-03-31 PROCEDURE — 2060000000 HC ICU INTERMEDIATE R&B

## 2024-03-31 PROCEDURE — C9113 INJ PANTOPRAZOLE SODIUM, VIA: HCPCS | Performed by: INTERNAL MEDICINE

## 2024-03-31 PROCEDURE — 6370000000 HC RX 637 (ALT 250 FOR IP): Performed by: INTERNAL MEDICINE

## 2024-03-31 PROCEDURE — A4216 STERILE WATER/SALINE, 10 ML: HCPCS | Performed by: INTERNAL MEDICINE

## 2024-03-31 PROCEDURE — 6360000002 HC RX W HCPCS: Performed by: INTERNAL MEDICINE

## 2024-03-31 PROCEDURE — 2700000000 HC OXYGEN THERAPY PER DAY

## 2024-03-31 PROCEDURE — 85025 COMPLETE CBC W/AUTO DIFF WBC: CPT

## 2024-03-31 PROCEDURE — 83735 ASSAY OF MAGNESIUM: CPT

## 2024-03-31 PROCEDURE — 80053 COMPREHEN METABOLIC PANEL: CPT

## 2024-03-31 PROCEDURE — 2580000003 HC RX 258: Performed by: INTERNAL MEDICINE

## 2024-03-31 PROCEDURE — 36415 COLL VENOUS BLD VENIPUNCTURE: CPT

## 2024-03-31 RX ORDER — INSULIN GLARGINE 100 [IU]/ML
7 INJECTION, SOLUTION SUBCUTANEOUS NIGHTLY
Status: DISCONTINUED | OUTPATIENT
Start: 2024-03-31 | End: 2024-04-01 | Stop reason: HOSPADM

## 2024-03-31 RX ORDER — INSULIN LISPRO 100 [IU]/ML
0-8 INJECTION, SOLUTION INTRAVENOUS; SUBCUTANEOUS
Status: DISCONTINUED | OUTPATIENT
Start: 2024-03-31 | End: 2024-04-01 | Stop reason: HOSPADM

## 2024-03-31 RX ORDER — INSULIN LISPRO 100 [IU]/ML
0-4 INJECTION, SOLUTION INTRAVENOUS; SUBCUTANEOUS NIGHTLY
Status: DISCONTINUED | OUTPATIENT
Start: 2024-03-31 | End: 2024-04-01 | Stop reason: HOSPADM

## 2024-03-31 RX ADMIN — METOCLOPRAMIDE 5 MG: 5 TABLET ORAL at 13:27

## 2024-03-31 RX ADMIN — GABAPENTIN 400 MG: 400 CAPSULE ORAL at 09:02

## 2024-03-31 RX ADMIN — MEROPENEM 1000 MG: 1 INJECTION, POWDER, FOR SOLUTION INTRAVENOUS at 04:40

## 2024-03-31 RX ADMIN — INSULIN GLARGINE 7 UNITS: 100 INJECTION, SOLUTION SUBCUTANEOUS at 20:50

## 2024-03-31 RX ADMIN — BACLOFEN 5 MG: 10 TABLET ORAL at 09:02

## 2024-03-31 RX ADMIN — METOCLOPRAMIDE 5 MG: 5 TABLET ORAL at 17:37

## 2024-03-31 RX ADMIN — SODIUM CHLORIDE, PRESERVATIVE FREE 10 ML: 5 INJECTION INTRAVENOUS at 20:57

## 2024-03-31 RX ADMIN — BACLOFEN 5 MG: 10 TABLET ORAL at 20:49

## 2024-03-31 RX ADMIN — ROSUVASTATIN CALCIUM 10 MG: 10 TABLET, FILM COATED ORAL at 20:49

## 2024-03-31 RX ADMIN — TRAZODONE HYDROCHLORIDE 50 MG: 50 TABLET ORAL at 20:49

## 2024-03-31 RX ADMIN — BACITRACIN ZINC: 500 OINTMENT TOPICAL at 17:38

## 2024-03-31 RX ADMIN — Medication 1 CAPSULE: at 09:02

## 2024-03-31 RX ADMIN — SODIUM CHLORIDE, PRESERVATIVE FREE 10 ML: 5 INJECTION INTRAVENOUS at 09:04

## 2024-03-31 RX ADMIN — BACLOFEN 5 MG: 10 TABLET ORAL at 13:27

## 2024-03-31 RX ADMIN — METOCLOPRAMIDE 5 MG: 5 TABLET ORAL at 20:49

## 2024-03-31 RX ADMIN — SENNOSIDES 8.6 MG: 8.6 TABLET, FILM COATED ORAL at 09:01

## 2024-03-31 RX ADMIN — PANTOPRAZOLE SODIUM 40 MG: 40 INJECTION, POWDER, FOR SOLUTION INTRAVENOUS at 09:03

## 2024-03-31 RX ADMIN — MEROPENEM 1000 MG: 1 INJECTION, POWDER, FOR SOLUTION INTRAVENOUS at 13:36

## 2024-03-31 RX ADMIN — METOCLOPRAMIDE 5 MG: 5 TABLET ORAL at 09:01

## 2024-03-31 RX ADMIN — INSULIN LISPRO 6 UNITS: 100 INJECTION, SOLUTION INTRAVENOUS; SUBCUTANEOUS at 09:15

## 2024-03-31 RX ADMIN — GABAPENTIN 400 MG: 400 CAPSULE ORAL at 20:49

## 2024-03-31 RX ADMIN — LEVOTHYROXINE SODIUM 75 MCG: 0.07 TABLET ORAL at 06:15

## 2024-03-31 RX ADMIN — DULOXETINE HYDROCHLORIDE 30 MG: 30 CAPSULE, DELAYED RELEASE ORAL at 09:03

## 2024-03-31 RX ADMIN — POLYETHYLENE GLYCOL 3350 17 G: 17 POWDER, FOR SOLUTION ORAL at 09:03

## 2024-03-31 RX ADMIN — MEROPENEM 1000 MG: 1 INJECTION, POWDER, FOR SOLUTION INTRAVENOUS at 20:07

## 2024-03-31 RX ADMIN — INSULIN LISPRO 2 UNITS: 100 INJECTION, SOLUTION INTRAVENOUS; SUBCUTANEOUS at 13:27

## 2024-03-31 RX ADMIN — INSULIN LISPRO 4 UNITS: 100 INJECTION, SOLUTION INTRAVENOUS; SUBCUTANEOUS at 18:35

## 2024-03-31 RX ADMIN — DOCUSATE SODIUM LIQUID 200 MG: 100 LIQUID ORAL at 20:49

## 2024-03-31 RX ADMIN — ENOXAPARIN SODIUM 40 MG: 100 INJECTION SUBCUTANEOUS at 09:03

## 2024-03-31 RX ADMIN — DULOXETINE HYDROCHLORIDE 30 MG: 30 CAPSULE, DELAYED RELEASE ORAL at 20:49

## 2024-03-31 ASSESSMENT — PAIN SCALES - GENERAL
PAINLEVEL_OUTOF10: 0
PAINLEVEL_OUTOF10: 0

## 2024-03-31 NOTE — CONSULTS
Assessment and Plan  Patient is a 73 y.o. female with the following medical Problems:   Severe sepsis with septic shock due to UTI  ESBL UTI with Hx of UTI due to Pseudomonas  YO  Metabolic encephalopathy  Lactic acidosis  Paraplegia since 2018  Fracture deformities of the bilateral intertrochanteric proximal femurs,   Severe protein calorie malnutrition  Chronic Anemia  Type 2 DM   Acute blood loss anemia versus dilutional anemia.      Plan of care:  Change antibiotics to Merrem.  Patient has ESBL E. coli. and a history of Pseudomonas UTI in 2021.  DVT prophylaxis  Maintenance IV fluid  Midodrine as a vaso- sparing agent.  Dietitian consult for tube feeding.  Speech therapy evaluation for dysphagia  Orthopedic consult for hip fracture  Insulin sliding scale for diabetes  Monitor urine output while on IV fluid  CT scan of the abdomen and pelvis to rule out hematoma secondary to significant drop in hemoglobin  Transfuse to keep hemoglobin above 7    History of Present Illness:   Patient is a 73-year-old woman with above-mentioned medical problem who was brought to the emergency room with altered mental status.  Patient was recently evaluated for UTI and was discharged home on oral antibiotics.  She was hypotensive when she arrived to the emergency room and received 1500 mL of crystalloid.  Cefepime and Flagyl were given to the emergency room.  Patient was recently diagnosed with right hip fracture.  She is more awake and interactive today and currently on low-dose Levophed.    Daily progress:  March 27, 2024: Patient has been weaned off Levophed however she had a significant drop in hemoglobin which was repeated and confirmed.  She was receiving 1 unit of packed red blood cells this morning.  She is more awake and interactive.  Overall she has no complaints.  She has no fever, chills, or rigors.  Heart rate has been slightly elevated.  Patient is currently on 3 L nasal cannula.  CT scan of the abdomen and pelvis was 
Assessment and Plan  Patient is a 73 y.o. female with the following medical Problems:   Severe sepsis with septic shock due to UTI  UTI due to Pseudomonas  YO  Metabolic encephalopathy  Lactic acidosis  Paraplegia since 2018  Recent closed right hip fracture  Severe protein calorie malnutrition  Chronic Anemia  Type 2 DM     Plan of care:  Broad-spectrum antibiotic with cefepime and vancomycin.  Patient had a history of Pseudomonas UTI in 2021.  DVT prophylaxis  Maintenance IV fluid  Midodrine to help with weaning off Levophed  Dietitian consult for tube feeding.  Speech therapy evaluation for dysphagia  Orthopedic consult for hip fracture  Insulin sliding scale for diabetes  Monitor urine output while on IV fluid  Reconcile home medications    History of Present Illness:   Patient is a 73-year-old woman with above-mentioned medical problem who was brought to the emergency room with altered mental status.  Patient was recently evaluated for UTI and was discharged home on oral antibiotics.  She was hypotensive when she arrived to the emergency room and received 1500 mL of crystalloid.  Cefepime and Flagyl were given to the emergency room.  Patient was recently diagnosed with right hip fracture.  She is more awake and interactive today and currently on low-dose Levophed.       Past Medical History:  Past Medical History:   Diagnosis Date    Anemia     Anxiety disorder     Chronic back pain 01/2018    6/10 on the pain scale    Depression     Diabetes mellitus (HCC)     Hyperlipidemia     Hypertension     Neurogenic bowel     Neuromuscular dysfunction of bladder     Obstructive sleep apnea     Radiculopathy of lumbar region     Spinal cord infarction (HCC)     Suicidal ideations     Vitamin D deficiency       Past Surgical History:   Procedure Laterality Date    BACK SURGERY         Family History:   Family History   Problem Relation Age of Onset    Diabetes Father         Allergies:         Patient has no known 
reports no history of drug use.  ACTIVITIES OF DAILY LIVING:    OCCUPATION:    Family History:       Problem Relation Age of Onset    Diabetes Father        REVIEW OF SYSTEMS:   Skin: no acute changes  Eyes: no acute changes  Ears/Nose/Throat: no acute changes  Respiratory: No increased work of breathing, no coughing  Cardiovascular: Brisk capillary refill bilaterally, well perfused extremities  Gastrointestinal: no acute changes  Neurologic: no acute changes  MUSCULOSKELETAL:  positive for bilateral hip injury and bilateral lower extremity contractures      PHYSICAL EXAM:    VITALS:  BP (!) 142/65   Pulse (!) 106   Temp 98.4 °F (36.9 °C)   Resp 21   Ht 1.626 m (5' 4\")   Wt 67.1 kg (148 lb)   SpO2 99%   BMI 25.40 kg/m²   Constitutional: Awake, alert and in no acute distress answer questions appropriately  HENT: Head: Atraumatic.   Eyes: EOMI  Neck: Trachea midline  Cardiovascular: Brisk capillary refill to all extremities, extremities well perfused  Pulmonary/Chest: No increased work of breathing, no cough  Abdominal: Non-distended  Neurologic: Bilateral lower extremity paraplegia  MUSCULOSKELETAL:  Bilateral lower Extremity:  Legs resting in a hip and knee flexion with hip external rotation position.  This is her baseline position due to her contractures.  She has no pain over the bilateral hips  No sensation to bilateral lower extremities at her baseline  No motor ability of bilateral lower extremities at her baseline  No pressure wounds or ulcers noted over bilateral hips or gluteal regions  +2 DP and PT pulse bilateral lower extremity toes warm and well-perfused    Secondary Exam:   bilateralUE: No obvious signs of trauma.  -TTP to fingers, hand, wrist, forearm, elbow, humerus, shoulder or clavicle.-- Patient able to flex/extend fingers, wrist, elbow and shoulder with active and passive ROM without pain, +2/4 Radial pulse, compartments soft and compressible.    Pelvis: -TTP, -Log roll, -Heel strike 
proper position with 2 cm at the skin  -Local wound care to surrounding erythema superficially secondary to gastric leakage  -No evidence of cellulitis or abscess  -No further surgical intervention indicated at this time please call with any questions or concerns    Discussed with Dr. Nathan Scanlon DO  General Surgery Resident, PGY-1    Electronically signed by Violette Scanlon DO on 3/31/24 at 11:54 AM EDT

## 2024-04-01 VITALS
OXYGEN SATURATION: 100 % | WEIGHT: 147.6 LBS | DIASTOLIC BLOOD PRESSURE: 57 MMHG | RESPIRATION RATE: 18 BRPM | HEIGHT: 64 IN | HEART RATE: 92 BPM | SYSTOLIC BLOOD PRESSURE: 120 MMHG | TEMPERATURE: 97.4 F | BODY MASS INDEX: 25.2 KG/M2

## 2024-04-01 LAB
ALBUMIN SERPL-MCNC: 2.4 G/DL (ref 3.5–5.2)
ALP SERPL-CCNC: 169 U/L (ref 35–104)
ALT SERPL-CCNC: 90 U/L (ref 0–32)
ANION GAP SERPL CALCULATED.3IONS-SCNC: 7 MMOL/L (ref 7–16)
AST SERPL-CCNC: 224 U/L (ref 0–31)
BASOPHILS # BLD: 0.02 K/UL (ref 0–0.2)
BASOPHILS NFR BLD: 0 % (ref 0–2)
BILIRUB SERPL-MCNC: 6.6 MG/DL (ref 0–1.2)
BUN SERPL-MCNC: 14 MG/DL (ref 6–23)
CALCIUM SERPL-MCNC: 8.2 MG/DL (ref 8.6–10.2)
CHLORIDE SERPL-SCNC: 99 MMOL/L (ref 98–107)
CO2 SERPL-SCNC: 30 MMOL/L (ref 22–29)
CREAT SERPL-MCNC: 0.5 MG/DL (ref 0.5–1)
EOSINOPHIL # BLD: 0.18 K/UL (ref 0.05–0.5)
EOSINOPHILS RELATIVE PERCENT: 3 % (ref 0–6)
ERYTHROCYTE [DISTWIDTH] IN BLOOD BY AUTOMATED COUNT: 15.9 % (ref 11.5–15)
GFR SERPL CREATININE-BSD FRML MDRD: >90 ML/MIN/1.73M2
GLUCOSE BLD-MCNC: 299 MG/DL (ref 74–99)
GLUCOSE BLD-MCNC: 335 MG/DL (ref 74–99)
GLUCOSE SERPL-MCNC: 336 MG/DL (ref 74–99)
HCT VFR BLD AUTO: 29 % (ref 34–48)
HGB BLD-MCNC: 9.2 G/DL (ref 11.5–15.5)
IMM GRANULOCYTES # BLD AUTO: 0.11 K/UL (ref 0–0.58)
IMM GRANULOCYTES NFR BLD: 2 % (ref 0–5)
LYMPHOCYTES NFR BLD: 0.9 K/UL (ref 1.5–4)
LYMPHOCYTES RELATIVE PERCENT: 17 % (ref 20–42)
MAGNESIUM SERPL-MCNC: 1.7 MG/DL (ref 1.6–2.6)
MCH RBC QN AUTO: 29.3 PG (ref 26–35)
MCHC RBC AUTO-ENTMCNC: 31.7 G/DL (ref 32–34.5)
MCV RBC AUTO: 92.4 FL (ref 80–99.9)
MONOCYTES NFR BLD: 0.49 K/UL (ref 0.1–0.95)
MONOCYTES NFR BLD: 9 % (ref 2–12)
NEUTROPHILS NFR BLD: 68 % (ref 43–80)
NEUTS SEG NFR BLD: 3.65 K/UL (ref 1.8–7.3)
PLATELET # BLD AUTO: 217 K/UL (ref 130–450)
PMV BLD AUTO: 11 FL (ref 7–12)
POTASSIUM SERPL-SCNC: 5 MMOL/L (ref 3.5–5)
PROT SERPL-MCNC: 4.9 G/DL (ref 6.4–8.3)
RBC # BLD AUTO: 3.14 M/UL (ref 3.5–5.5)
SODIUM SERPL-SCNC: 136 MMOL/L (ref 132–146)
WBC OTHER # BLD: 5.4 K/UL (ref 4.5–11.5)

## 2024-04-01 PROCEDURE — 83735 ASSAY OF MAGNESIUM: CPT

## 2024-04-01 PROCEDURE — 85025 COMPLETE CBC W/AUTO DIFF WBC: CPT

## 2024-04-01 PROCEDURE — 2700000000 HC OXYGEN THERAPY PER DAY

## 2024-04-01 PROCEDURE — 80053 COMPREHEN METABOLIC PANEL: CPT

## 2024-04-01 PROCEDURE — 6360000002 HC RX W HCPCS: Performed by: INTERNAL MEDICINE

## 2024-04-01 PROCEDURE — 82962 GLUCOSE BLOOD TEST: CPT

## 2024-04-01 PROCEDURE — A4216 STERILE WATER/SALINE, 10 ML: HCPCS | Performed by: INTERNAL MEDICINE

## 2024-04-01 PROCEDURE — C9113 INJ PANTOPRAZOLE SODIUM, VIA: HCPCS | Performed by: INTERNAL MEDICINE

## 2024-04-01 PROCEDURE — 2580000003 HC RX 258: Performed by: INTERNAL MEDICINE

## 2024-04-01 PROCEDURE — 6370000000 HC RX 637 (ALT 250 FOR IP): Performed by: INTERNAL MEDICINE

## 2024-04-01 PROCEDURE — 36415 COLL VENOUS BLD VENIPUNCTURE: CPT

## 2024-04-01 RX ORDER — MEROPENEM 1 G/1
1000 INJECTION, POWDER, FOR SOLUTION INTRAVENOUS EVERY 8 HOURS
DISCHARGE
Start: 2024-04-01 | End: 2024-04-02

## 2024-04-01 RX ORDER — INSULIN GLARGINE 100 [IU]/ML
7 INJECTION, SOLUTION SUBCUTANEOUS NIGHTLY
Qty: 10 ML | Refills: 3 | DISCHARGE
Start: 2024-04-01

## 2024-04-01 RX ORDER — PANTOPRAZOLE SODIUM 40 MG/1
40 TABLET, DELAYED RELEASE ORAL
Qty: 30 TABLET | Refills: 3 | DISCHARGE
Start: 2024-04-01

## 2024-04-01 RX ORDER — POLYETHYLENE GLYCOL 3350 17 G/17G
17 POWDER, FOR SOLUTION ORAL DAILY
Qty: 527 G | Refills: 1 | DISCHARGE
Start: 2024-04-02 | End: 2024-06-03

## 2024-04-01 RX ADMIN — POLYETHYLENE GLYCOL 3350 17 G: 17 POWDER, FOR SOLUTION ORAL at 08:20

## 2024-04-01 RX ADMIN — BACITRACIN ZINC: 500 OINTMENT TOPICAL at 08:19

## 2024-04-01 RX ADMIN — INSULIN LISPRO 4 UNITS: 100 INJECTION, SOLUTION INTRAVENOUS; SUBCUTANEOUS at 11:57

## 2024-04-01 RX ADMIN — GABAPENTIN 400 MG: 400 CAPSULE ORAL at 08:32

## 2024-04-01 RX ADMIN — SODIUM CHLORIDE, PRESERVATIVE FREE 10 ML: 5 INJECTION INTRAVENOUS at 08:26

## 2024-04-01 RX ADMIN — MEROPENEM 1000 MG: 1 INJECTION, POWDER, FOR SOLUTION INTRAVENOUS at 08:54

## 2024-04-01 RX ADMIN — PANTOPRAZOLE SODIUM 40 MG: 40 INJECTION, POWDER, FOR SOLUTION INTRAVENOUS at 08:25

## 2024-04-01 RX ADMIN — MEROPENEM 1000 MG: 1 INJECTION, POWDER, FOR SOLUTION INTRAVENOUS at 02:52

## 2024-04-01 RX ADMIN — LEVOTHYROXINE SODIUM 75 MCG: 0.07 TABLET ORAL at 06:20

## 2024-04-01 RX ADMIN — DULOXETINE HYDROCHLORIDE 30 MG: 30 CAPSULE, DELAYED RELEASE ORAL at 08:32

## 2024-04-01 RX ADMIN — BACLOFEN 5 MG: 10 TABLET ORAL at 08:48

## 2024-04-01 RX ADMIN — INSULIN LISPRO 6 UNITS: 100 INJECTION, SOLUTION INTRAVENOUS; SUBCUTANEOUS at 07:44

## 2024-04-01 RX ADMIN — SENNOSIDES 8.6 MG: 8.6 TABLET, FILM COATED ORAL at 08:32

## 2024-04-01 RX ADMIN — ENOXAPARIN SODIUM 40 MG: 100 INJECTION SUBCUTANEOUS at 08:22

## 2024-04-01 RX ADMIN — METOCLOPRAMIDE 5 MG: 5 TABLET ORAL at 08:32

## 2024-04-01 RX ADMIN — Medication 1 CAPSULE: at 08:32

## 2024-04-01 ASSESSMENT — PAIN SCALES - GENERAL: PAINLEVEL_OUTOF10: 3

## 2024-04-01 ASSESSMENT — PAIN DESCRIPTION - ORIENTATION: ORIENTATION: RIGHT;LEFT

## 2024-04-01 ASSESSMENT — PAIN DESCRIPTION - DESCRIPTORS: DESCRIPTORS: ACHING

## 2024-04-01 ASSESSMENT — PAIN DESCRIPTION - LOCATION: LOCATION: LEG

## 2024-04-01 NOTE — DISCHARGE SUMMARY
Internal Medicine Discharge Summary    NAME: Cait Kent :  1950  MRN:  [id number] PCP:Denton Velasquez MD    ADMITTED: 3/25/2024   DISCHARGED: 2024  1:00 PM    ADMITTING PHYSICIAN: Meena att. providers found    PCP: Denton Velasquez MD    CONSULTANT(S):   IP CONSULT TO CRITICAL CARE  IP CONSULT TO PHARMACY  IP CONSULT TO DIETITIAN  IP CONSULT TO ORTHOPEDIC SURGERY  IP CONSULT TO GENERAL SURGERY     ADMITTING DIAGNOSIS:   Colitis [K52.9]  Normocytic anemia [D64.9]  Fecal impaction in rectum (HCC) [K56.41]  Septic shock (HCC) [A41.9, R65.21]  Urinary tract infection with hematuria, site unspecified [N39.0, R31.9]  Altered mental status, unspecified altered mental status type [R41.82]     Please see H&P for further details    DISCHARGE DIAGNOSES:   Active Hospital Problems    Diagnosis     Bilateral closed hip fractures (HCC) [S72.001A, S72.002A]     Septic shock (HCC) [A41.9, R65.21]        BRIEF HISTORY OF PRESENT ILLNESS: Cait Kent is a 73 y.o. female patient of Denton Velasquez MD who  has a past medical history of Anemia, Anxiety disorder, Chronic back pain, Depression, Diabetes mellitus (HCC), Hyperlipidemia, Hypertension, Neurogenic bowel, Neuromuscular dysfunction of bladder, Obstructive sleep apnea, Radiculopathy of lumbar region, Spinal cord infarction (HCC), Suicidal ideations, and Vitamin D deficiency. who originally had concerns including Altered Mental Status ((EMS called for \"unconscious patient\", patient found to be hypoxic, hypotensive, AxOx0) EMS gave 1500 NS, placed patient on 15L NRB, patient now AxOx3). at presentation on 3/25/2024, and was found to have Colitis [K52.9]  Normocytic anemia [D64.9]  Fecal impaction in rectum (HCC) [K56.41]  Septic shock (HCC) [A41.9, R65.21]  Urinary tract infection with hematuria, site unspecified [N39.0, R31.9]  Altered mental status, unspecified altered mental status type [R41.82] after workup.    Please see H&P for further

## 2024-04-01 NOTE — PLAN OF CARE
Problem: Chronic Conditions and Co-morbidities  Goal: Patient's chronic conditions and co-morbidity symptoms are monitored and maintained or improved  3/26/2024 2353 by Priscila Sahu, RN  Outcome: Progressing  Flowsheets (Taken 3/26/2024 2000)  Care Plan - Patient's Chronic Conditions and Co-Morbidity Symptoms are Monitored and Maintained or Improved: Monitor and assess patient's chronic conditions and comorbid symptoms for stability, deterioration, or improvement  3/26/2024 1818 by Jigna Garcia  Outcome: Progressing     Problem: Discharge Planning  Goal: Discharge to home or other facility with appropriate resources  3/26/2024 2353 by Priscila Sahu, RN  Outcome: Progressing  Flowsheets (Taken 3/26/2024 2000)  Discharge to home or other facility with appropriate resources: Identify barriers to discharge with patient and caregiver  3/26/2024 1818 by Jigna Garcia  Outcome: Progressing     Problem: Pain  Goal: Verbalizes/displays adequate comfort level or baseline comfort level  3/26/2024 2353 by Priscila Sahu RN  Outcome: Progressing  Flowsheets (Taken 3/26/2024 2000)  Verbalizes/displays adequate comfort level or baseline comfort level:   Encourage patient to monitor pain and request assistance   Assess pain using appropriate pain scale   Administer analgesics based on type and severity of pain and evaluate response   Implement non-pharmacological measures as appropriate and evaluate response   Consider cultural and social influences on pain and pain management       Problem: Skin/Tissue Integrity  Goal: Absence of new skin breakdown  Description: 1.  Monitor for areas of redness and/or skin breakdown  2.  Assess vascular access sites hourly  3.  Every 4-6 hours minimum:  Change oxygen saturation probe site  4.  Every 4-6 hours:  If on nasal continuous positive airway pressure, respiratory therapy assess nares and determine need for appliance change or resting period.  3/26/2024 2353 by Adelina 
  Problem: Chronic Conditions and Co-morbidities  Goal: Patient's chronic conditions and co-morbidity symptoms are monitored and maintained or improved  4/1/2024 0956 by Keeley Gray RN  Outcome: Progressing  4/1/2024 0153 by Heather Aguilar RN  Outcome: Progressing     Problem: Discharge Planning  Goal: Discharge to home or other facility with appropriate resources  4/1/2024 0956 by Keeley Gray RN  Outcome: Progressing  4/1/2024 0153 by Heather Aguilar RN  Outcome: Progressing     Problem: Pain  Goal: Verbalizes/displays adequate comfort level or baseline comfort level  4/1/2024 0956 by Keeley Gray RN  Outcome: Progressing  4/1/2024 0153 by Heather Aguilar RN  Outcome: Progressing     Problem: Skin/Tissue Integrity  Goal: Absence of new skin breakdown  Description: 1.  Monitor for areas of redness and/or skin breakdown  2.  Assess vascular access sites hourly  3.  Every 4-6 hours minimum:  Change oxygen saturation probe site  4.  Every 4-6 hours:  If on nasal continuous positive airway pressure, respiratory therapy assess nares and determine need for appliance change or resting period.  4/1/2024 0153 by Heather Aguilar RN  Outcome: Progressing     Problem: Safety - Adult  Goal: Free from fall injury  4/1/2024 0153 by Heather Aguilar RN  Outcome: Progressing     Problem: ABCDS Injury Assessment  Goal: Absence of physical injury  4/1/2024 0153 by Heather Aguilar RN  Outcome: Progressing     Problem: Cardiovascular - Adult  Goal: Maintains optimal cardiac output and hemodynamic stability  4/1/2024 0153 by Heather Aguilar RN  Outcome: Progressing  Goal: Absence of cardiac dysrhythmias or at baseline  4/1/2024 0153 by Heather Aguilar RN  Outcome: Progressing     Problem: Respiratory - Adult  Goal: Achieves optimal ventilation and oxygenation  4/1/2024 0153 by Heather Aguilar RN  Outcome: Progressing     Problem: Genitourinary - Adult  Goal: Absence of 
  Problem: Chronic Conditions and Co-morbidities  Goal: Patient's chronic conditions and co-morbidity symptoms are monitored and maintained or improved  4/1/2024 1136 by Keeley Gray RN  Outcome: Completed  4/1/2024 0956 by Keeley Gray RN  Outcome: Progressing  4/1/2024 0153 by Heahter Aguilar RN  Outcome: Progressing     Problem: Discharge Planning  Goal: Discharge to home or other facility with appropriate resources  4/1/2024 1136 by Keeley Gray RN  Outcome: Completed  4/1/2024 0956 by Keeley Gray RN  Outcome: Progressing  4/1/2024 0153 by Heather Aguilar RN  Outcome: Progressing     Problem: Pain  Goal: Verbalizes/displays adequate comfort level or baseline comfort level  4/1/2024 1136 by Keeley Gray RN  Outcome: Completed  4/1/2024 0956 by Keeley Gray RN  Outcome: Progressing  4/1/2024 0153 by Heather Augilar RN  Outcome: Progressing     Problem: Skin/Tissue Integrity  Goal: Absence of new skin breakdown  Description: 1.  Monitor for areas of redness and/or skin breakdown  2.  Assess vascular access sites hourly  3.  Every 4-6 hours minimum:  Change oxygen saturation probe site  4.  Every 4-6 hours:  If on nasal continuous positive airway pressure, respiratory therapy assess nares and determine need for appliance change or resting period.  4/1/2024 1136 by Keeley Gray RN  Outcome: Completed  4/1/2024 0153 by Heather Aguilar RN  Outcome: Progressing     Problem: Safety - Adult  Goal: Free from fall injury  4/1/2024 1136 by Keeley Gray RN  Outcome: Completed  4/1/2024 0153 by Heather Aguilar RN  Outcome: Progressing     Problem: ABCDS Injury Assessment  Goal: Absence of physical injury  4/1/2024 1136 by Keeley Gray RN  Outcome: Completed  4/1/2024 0153 by Heather Aguilar RN  Outcome: Progressing     Problem: Cardiovascular - Adult  Goal: Maintains optimal cardiac output and hemodynamic stability  4/1/2024 1136 by Keeley Gray RN  Outcome: 
  Problem: Chronic Conditions and Co-morbidities  Goal: Patient's chronic conditions and co-morbidity symptoms are monitored and maintained or improved  Outcome: Progressing     Problem: Discharge Planning  Goal: Discharge to home or other facility with appropriate resources  Outcome: Progressing     Problem: Pain  Goal: Verbalizes/displays adequate comfort level or baseline comfort level  3/29/2024 0254 by Lily Rodriguez RN  Outcome: Progressing  3/28/2024 2248 by Karlie Astudillo RN  Outcome: Progressing  Flowsheets (Taken 3/28/2024 2248)  Verbalizes/displays adequate comfort level or baseline comfort level:   Encourage patient to monitor pain and request assistance   Assess pain using appropriate pain scale   Administer analgesics based on type and severity of pain and evaluate response   Implement non-pharmacological measures as appropriate and evaluate response   Notify Licensed Independent Practitioner if interventions unsuccessful or patient reports new pain     Problem: Skin/Tissue Integrity  Goal: Absence of new skin breakdown  Description: 1.  Monitor for areas of redness and/or skin breakdown  2.  Assess vascular access sites hourly  3.  Every 4-6 hours minimum:  Change oxygen saturation probe site  4.  Every 4-6 hours:  If on nasal continuous positive airway pressure, respiratory therapy assess nares and determine need for appliance change or resting period.  Outcome: Progressing     Problem: Safety - Adult  Goal: Free from fall injury  3/29/2024 0254 by Liyl Rodriguez RN  Outcome: Progressing  3/28/2024 2248 by Karlie Astudillo RN  Outcome: Progressing  Flowsheets (Taken 3/27/2024 2300 by Priscila Sahu RN)  Free From Fall Injury:   Instruct family/caregiver on patient safety   Based on caregiver fall risk screen, instruct family/caregiver to ask for assistance with transferring infant if caregiver noted to have fall risk factors     Problem: Gastrointestinal - Adult  Goal: Minimal or absence 
  Problem: Chronic Conditions and Co-morbidities  Goal: Patient's chronic conditions and co-morbidity symptoms are monitored and maintained or improved  Outcome: Progressing     Problem: Discharge Planning  Goal: Discharge to home or other facility with appropriate resources  Outcome: Progressing     Problem: Pain  Goal: Verbalizes/displays adequate comfort level or baseline comfort level  Outcome: Progressing     Problem: Skin/Tissue Integrity  Goal: Absence of new skin breakdown  Description: 1.  Monitor for areas of redness and/or skin breakdown  2.  Assess vascular access sites hourly  3.  Every 4-6 hours minimum:  Change oxygen saturation probe site  4.  Every 4-6 hours:  If on nasal continuous positive airway pressure, respiratory therapy assess nares and determine need for appliance change or resting period.  Outcome: Progressing     Problem: Safety - Adult  Goal: Free from fall injury  Outcome: Progressing     Problem: ABCDS Injury Assessment  Goal: Absence of physical injury  Outcome: Progressing     Problem: Cardiovascular - Adult  Goal: Maintains optimal cardiac output and hemodynamic stability  Outcome: Progressing     Problem: Cardiovascular - Adult  Goal: Absence of cardiac dysrhythmias or at baseline  Outcome: Progressing     
  Problem: Chronic Conditions and Co-morbidities  Goal: Patient's chronic conditions and co-morbidity symptoms are monitored and maintained or improved  Outcome: Progressing     Problem: Discharge Planning  Goal: Discharge to home or other facility with appropriate resources  Outcome: Progressing     Problem: Pain  Goal: Verbalizes/displays adequate comfort level or baseline comfort level  Outcome: Progressing     Problem: Skin/Tissue Integrity  Goal: Absence of new skin breakdown  Description: 1.  Monitor for areas of redness and/or skin breakdown  2.  Assess vascular access sites hourly  3.  Every 4-6 hours minimum:  Change oxygen saturation probe site  4.  Every 4-6 hours:  If on nasal continuous positive airway pressure, respiratory therapy assess nares and determine need for appliance change or resting period.  Outcome: Progressing     Problem: Safety - Adult  Goal: Free from fall injury  Outcome: Progressing     Problem: ABCDS Injury Assessment  Goal: Absence of physical injury  Outcome: Progressing     Problem: Respiratory - Adult  Goal: Achieves optimal ventilation and oxygenation  Outcome: Progressing     Problem: Metabolic/Fluid and Electrolytes - Adult  Goal: Electrolytes maintained within normal limits  Outcome: Progressing     Problem: Metabolic/Fluid and Electrolytes - Adult  Goal: Hemodynamic stability and optimal renal function maintained  Outcome: Progressing     Problem: Metabolic/Fluid and Electrolytes - Adult  Goal: Glucose maintained within prescribed range  Outcome: Progressing     
  Problem: Discharge Planning  Goal: Discharge to home or other facility with appropriate resources  3/27/2024 1056 by Shelby Patel RN  Outcome: Not Progressing  Flowsheets (Taken 3/27/2024 0800)  Discharge to home or other facility with appropriate resources: Identify barriers to discharge with patient and caregiver  3/26/2024 2353 by Priscila Sahu, RN  Outcome: Progressing  Flowsheets (Taken 3/26/2024 2000)  Discharge to home or other facility with appropriate resources: Identify barriers to discharge with patient and caregiver     Problem: Musculoskeletal - Adult  Goal: Return mobility to safest level of function  Outcome: Not Progressing  Goal: Return ADL status to a safe level of function  Outcome: Not Progressing     Problem: Chronic Conditions and Co-morbidities  Goal: Patient's chronic conditions and co-morbidity symptoms are monitored and maintained or improved  3/27/2024 1056 by Shelby Patel RN  Outcome: Progressing  Flowsheets (Taken 3/27/2024 0800)  Care Plan - Patient's Chronic Conditions and Co-Morbidity Symptoms are Monitored and Maintained or Improved:   Monitor and assess patient's chronic conditions and comorbid symptoms for stability, deterioration, or improvement   Collaborate with multidisciplinary team to address chronic and comorbid conditions and prevent exacerbation or deterioration   Update acute care plan with appropriate goals if chronic or comorbid symptoms are exacerbated and prevent overall improvement and discharge  3/26/2024 2353 by Priscila Sahu, RN  Outcome: Progressing  Flowsheets (Taken 3/26/2024 2000)  Care Plan - Patient's Chronic Conditions and Co-Morbidity Symptoms are Monitored and Maintained or Improved: Monitor and assess patient's chronic conditions and comorbid symptoms for stability, deterioration, or improvement     Problem: Pain  Goal: Verbalizes/displays adequate comfort level or baseline comfort level  3/27/2024 1056 by Shelby Patel, RN  Outcome: 
  Problem: Pain  Goal: Verbalizes/displays adequate comfort level or baseline comfort level  Outcome: Progressing  Flowsheets (Taken 3/28/2024 2248)  Verbalizes/displays adequate comfort level or baseline comfort level:   Encourage patient to monitor pain and request assistance   Assess pain using appropriate pain scale   Administer analgesics based on type and severity of pain and evaluate response   Implement non-pharmacological measures as appropriate and evaluate response   Notify Licensed Independent Practitioner if interventions unsuccessful or patient reports new pain     Problem: Safety - Adult  Goal: Free from fall injury  Outcome: Progressing  Flowsheets (Taken 3/27/2024 2300 by Priscila Sahu, RN)  Free From Fall Injury:   Instruct family/caregiver on patient safety   Based on caregiver fall risk screen, instruct family/caregiver to ask for assistance with transferring infant if caregiver noted to have fall risk factors     Problem: Respiratory - Adult  Goal: Achieves optimal ventilation and oxygenation  Outcome: Progressing  Flowsheets (Taken 3/28/2024 2248)  Achieves optimal ventilation and oxygenation:   Assess for changes in respiratory status   Position to facilitate oxygenation and minimize respiratory effort   Assess for changes in mentation and behavior   Respiratory therapy support as indicated   Assess and instruct to report shortness of breath or any respiratory difficulty     Problem: Safety - Medical Restraint  Goal: Remains free of injury from restraints (Restraint for Interference with Medical Device)  Description: INTERVENTIONS:  1. Determine that other, less restrictive measures have been tried or would not be effective before applying the restraint  2. Evaluate the patient's condition at the time of restraint application  3. Inform patient/family regarding the reason for restraint  4. Q2H: Monitor safety, psychosocial status, comfort, nutrition and hydration  Outcome: 
  Problem: Safety - Medical Restraint  Goal: Remains free of injury from restraints (Restraint for Interference with Medical Device)  Description: INTERVENTIONS:  1. Determine that other, less restrictive measures have been tried or would not be effective before applying the restraint  2. Evaluate the patient's condition at the time of restraint application  3. Inform patient/family regarding the reason for restraint  4. Q2H: Monitor safety, psychosocial status, comfort, nutrition and hydration  3/27/2024 1310 by Shelby Patel, RN  Outcome: Completed    Patient demonstrates understanding of not pulling at lines when restraints are off. Bilateral wrist restraints discontinued.      
Metabolic/Fluid and Electrolytes - Adult  Goal: Electrolytes maintained within normal limits  Outcome: Progressing  Flowsheets (Taken 3/27/2024 2000)  Electrolytes maintained within normal limits:   Monitor labs and assess patient for signs and symptoms of electrolyte imbalances   Administer electrolyte replacement as ordered   Monitor response to electrolyte replacements, including repeat lab results as appropriate   Fluid restriction as ordered   Instruct patient on fluid and nutrition restrictions as appropriate  Goal: Hemodynamic stability and optimal renal function maintained  Outcome: Progressing  Flowsheets (Taken 3/27/2024 2000)  Hemodynamic stability and optimal renal function maintained:   Monitor labs and assess for signs and symptoms of volume excess or deficit   Monitor intake, output and patient weight   Monitor urine specific gravity, serum osmolarity and serum sodium as indicated or ordered   Monitor response to interventions for patient's volume status, including labs, urine output, blood pressure (other measures as available)   Encourage oral intake as appropriate   Instruct patient on fluid and nutrition restrictions as appropriate  Goal: Glucose maintained within prescribed range  Outcome: Progressing  Flowsheets (Taken 3/27/2024 2000)  Glucose maintained within prescribed range:   Monitor blood glucose as ordered   Assess for signs and symptoms of hyperglycemia and hypoglycemia     Problem: Hematologic - Adult  Goal: Maintains hematologic stability  Outcome: Progressing  Flowsheets (Taken 3/27/2024 2000)  Maintains hematologic stability:   Assess for signs and symptoms of bleeding or hemorrhage   Monitor labs for bleeding or clotting disorders   Administer blood products/factors as ordered     Problem: Safety - Medical Restraint  Goal: Remains free of injury from restraints (Restraint for Interference with Medical Device)  Description: INTERVENTIONS:  1. Determine that other, less restrictive

## 2024-04-01 NOTE — FLOWSHEET NOTE
Inpatient Wound Care(initial evaluation) 7410    Admit Date: 3/25/2024  9:01 PM    Reason for consult:  left leg/foot    Significant history:  refer to [facility]    Wound history:  admitted with wounds    Findings:     04/01/24 1130   Skin Integumentary    Skin Integrity Ecchymosis   Location BUE   Skin Fold Management Yes   Dressing Site Groin;Abdominal pannus   Assessed This Shift Red   Skin Integrity Site 2   Skin Integrity Location 2   (redness)   Location 2 around peg tube site   Wound 03/26/24 Leg Left;Lower   Date First Assessed/Time First Assessed: 03/26/24 0756   Present on Original Admission: Yes  Location: Leg  Wound Location Orientation: Left;Lower   Wound Image    Dressing Status New dressing applied   Wound Cleansed Cleansed with saline   Dressing/Treatment Dry dressing;Xeroform;Roll gauze   Wound Length (cm) 7 cm   Wound Width (cm) 4 cm   Wound Depth (cm) 0.1 cm   Wound Surface Area (cm^2) 28 cm^2   Change in Wound Size % (l*w) -133.33   Wound Volume (cm^3) 2.8 cm^3   Wound Healing % -2233   Wound Assessment Pink/red   Drainage Amount Scant (moist but unmeasurable)   Drainage Description Serosanguinous   Odor None   Natali-wound Assessment Dry/flaky   Wound Thickness Description not for Pressure Injury Partial thickness   Wound 04/01/24 Ankle Left;Lateral   Date First Assessed/Time First Assessed: 04/01/24 1130   Present on Original Admission: Yes  Location: Ankle  Wound Location Orientation: Left;Lateral   Wound Image    Wound Etiology Pressure Stage 2   Dressing Status New dressing applied   Wound Cleansed Cleansed with saline   Dressing/Treatment Dry dressing;Xeroform;Roll gauze   Wound Length (cm) 1.8 cm   Wound Width (cm) 1.8 cm   Wound Depth (cm)   (fluid filled blister)   Wound Surface Area (cm^2) 3.24 cm^2   Wound Assessment   (fluid filled blister)   Drainage Amount None (dry)   Natali-wound Assessment Dry/flaky   Wound 04/01/24 Foot Left;Dorsal   Date First Assessed/Time First Assessed: 04/01/24

## 2024-04-01 NOTE — CARE COORDINATION
Spoke with [facility], they are able to take pt for one more day of IV antibiotics, can take q8, can take with peripheral line, asking for new start.Shira Patel, MSW, LSW

## 2024-04-01 NOTE — PROGRESS NOTES
Radiology Procedure Waiver   Name: Cait Kent  : 1950  MRN: [id number]    Date:  3/26/24    Time: 12:34 AM EDT    Benefits of immediately proceeding with radiology exam(s) without pre-testing outweigh the risks or are not indicated as specified below and therefore the following is/are being waived:    [x] Benefits of immediate radiology exam(s) outweigh any risk.                                               OR    Pre-exam testing is not indicated for the following reason(s):  [] Pregnancy test   [] Patients LMP on-time and regular.   [] Patient had Tubal Ligation or has other Contraception Device.   [] Patient  is Menopausal or Premenarcheal.    [] Patient had Full or Partial Hysterectomy.    [] Protocol for CT contrast allegry   [] Patient has tolerated well previously   [] Patient does not have a true allergy    [] MRI Questionnaire     [] BUN/Creatinine   [] Patient age w/no hx of renal dysfunction.   [] Patient on Dialysis.   [] Recent Normal Labs.  Electronically signed by Lio Corey DO on 3/26/24 at 12:34 AM EDT            
4 Eyes Skin Assessment     NAME:  Cait Kent  YOB: 1950  MEDICAL RECORD NUMBER:  [id number]    The patient is being assessed for  Admission    I agree that at least one RN has performed a thorough Head to Toe Skin Assessment on the patient. ALL assessment sites listed below have been assessed.      Areas assessed by both nurses:    Head, Face, Ears, Shoulders, Back, Chest, Arms, Elbows, Hands, Sacrum. Buttock, Coccyx, Ischium, Legs. Feet and Heels, and Under Medical Devices         Does the Patient have a Wound? No noted wound(s)  Old healed wounds on left inner food - dry flaky and purple but blanches    Aung Prevention initiated by RN: Yes  Wound Care Orders initiated by RN: No    Pressure Injury (Stage 3,4, Unstageable, DTI, NWPT, and Complex wounds) if present, place Wound referral order by RN under : No    New Ostomies, if present place, Ostomy referral order under : No     Nurse 1 eSignature: Electronically signed by Jigna Garcia on 3/26/24 at 8:46 AM EDT    **SHARE this note so that the co-signing nurse can place an eSignature**    Nurse 2 eSignature: Electronically signed by Janet Murillo RN on 3/26/24 at 7:42 AM EDT  
Contacted KEARA Colby, whom was notified of patient status and reason for restraints to be applied. Obtained consent for blood administration as well.  
Dr Florence called via answering service regarding Mag 1.5  
Gave report to vesna freeman for continuity of care.   
General surgery consult placed to Dr Coleman.  Patient information given to answering service.  Dr Evans taking messages    SROC messaged via perfect serve.  Dr Scanlon taking messages  
Messaged via perfectjens Glaser NP regarding blood sugar 384.  
Nurse to nurse called to Suki.   
Orthopedic surgery consult called to  per perfect serv patient added to census.    
Patient admitted to MICU with no belongings.   
Patient pulled out a peripheral IV and almost pulled central catheter. Pt continues to reach for lines and tubes despite attempts to deter.  Restraints started for pt safety. Attempted to contact KEARA Colby, unable to reach, voicemail left. Will attempt to notify family again tomorrow.    Priscila Sahu RN  
Patient pulled out central line in left IJ. Restraints started d/t pulling at lines and for patient safety per order. Priscila Sahu RN  
Pharmacy Consultation Note  (Antibiotic Dosing and Monitoring)    Initial consult date: 3/26/2024  Consulting physician/provider: Dr. Pierre  Drug: Vancomycin  Indication: UTI, Sepsis    Age/  Gender Height Weight IBW  Allergy Information   73 y.o./female 162.6 cm (5' 4\") 68 kg (150 lb)     Ideal body weight: 54.7 kg (120 lb 9.5 oz)  Adjusted ideal body weight: 60 kg (132 lb 5.7 oz)   Patient has no known allergies.      Renal Function:  Recent Labs     03/24/24  1544 03/26/24  0345   BUN 18 37*   CREATININE 0.8 1.4*       Intake/Output Summary (Last 24 hours) at 3/26/2024 1216  Last data filed at 3/26/2024 1113  Gross per 24 hour   Intake --   Output 150 ml   Net -150 ml       Vancomycin Monitoring:  Trough:  No results for input(s): \"VANCOTROUGH\" in the last 72 hours.  Random:  No results for input(s): \"VANCORANDOM\" in the last 72 hours.    Vancomycin Administration Times:  Recent vancomycin administrations        No vancomycin IV orders with administrations found.                    Assessment:  Patient is a 73 y.o. female who has been initiated on vancomycin  Estimated Creatinine Clearance: 35 mL/min (A) (based on SCr of 1.4 mg/dL (H)).      Plan:  Vancomycin 1750 mg IV x 1 dose   Random level 3/27 AM   Will check vancomycin levels when appropriate  Will continue to monitor renal function   Pharmacy to follow      Rachelle Rose, PharmD Candidate 2024  3/26/2024 12:16 PM      
Pharmacy Consultation Note  (Antibiotic Dosing and Monitoring)    Initial consult date: 3/26/2024  Consulting physician/provider: Dr. Pierre  Drug: Vancomycin  Indication: UTI, Sepsis    Vancomycin has been discontinued. Clinical pharmacy will sign off, please reconsult if further assistance is needed.     Haylie Vincent, PharmD, BCPS, BCCCP 3/27/2024 11:29 AM     
Progress Note  Date:3/30/2024       Room:7410/7410-A  Patient Name:Cait Kent     YOB: 1950     Age:73 y.o.    Patient seen for follow up of UTI with sepsis and bilateral intertrochanteric hip fractures. Patient this am laying in bed. Nursing reports that patient was up all night. She is refusing pills per mouth. She states that she has not moved her bowels much. She is getting meropenem IV. On 3 liters of oxygen via NC.     Subjective    Subjective:  Symptoms:  No shortness of breath, cough, chest pain, headache, chest pressure or diarrhea.    Diet:  No nausea or vomiting.       Review of Systems   Respiratory:  Negative for cough and shortness of breath.    Cardiovascular:  Negative for chest pain.   Gastrointestinal:  Negative for diarrhea, nausea and vomiting.     Objective         Vitals Last 24 Hours:  TEMPERATURE:  Temp  Av.8 °F (37.1 °C)  Min: 97.7 °F (36.5 °C)  Max: 99.8 °F (37.7 °C)  RESPIRATIONS RANGE: Resp  Av.2  Min: 20  Max: 21  PULSE OXIMETRY RANGE: SpO2  Av.7 %  Min: 98 %  Max: 100 %  PULSE RANGE: Pulse  Av.7  Min: 100  Max: 114  BLOOD PRESSURE RANGE: Systolic (24hrs), Av , Min:133 , Max:177   ; Diastolic (24hrs), Av, Min:61, Max:79    I/O (24Hr):    Intake/Output Summary (Last 24 hours) at 3/30/2024 0717  Last data filed at 3/30/2024 0515  Gross per 24 hour   Intake 492.87 ml   Output 300 ml   Net 192.87 ml     Objective:  General Appearance:  Comfortable, well-appearing and in no acute distress.    Vital signs: (most recent): Blood pressure 136/68, pulse 100, temperature 97.8 °F (36.6 °C), temperature source Temporal, resp. rate 20, height 1.626 m (5' 4\"), weight 67.1 kg (148 lb), SpO2 99 %, not currently breastfeeding.    Lungs:  Normal effort and normal respiratory rate.  There are decreased breath sounds.  No rales, wheezes or rhonchi.    Heart: Normal rate.  Regular rhythm.  S1 normal and S2 normal.  No gallop.   Abdomen: Abdomen is soft and 
Progress Note  Date:3/31/2024       Room:7410/7410-A  Patient Name:Cait Kent     YOB: 1950     Age:73 y.o.    Patient seen for follow up of UTI with sepsis and bilateral intertrochanteric hip fractures. Patient this am laying in bed. Nursing at bedside reports redness and discharge around peg tube.     Subjective    Subjective:  Symptoms:  No shortness of breath, cough, chest pain, headache, chest pressure or diarrhea.    Diet:  No nausea or vomiting.       Review of Systems   Respiratory:  Negative for cough and shortness of breath.    Cardiovascular:  Negative for chest pain.   Gastrointestinal:  Negative for diarrhea, nausea and vomiting.     Objective         Vitals Last 24 Hours:  TEMPERATURE:  Temp  Av °F (36.7 °C)  Min: 97.8 °F (36.6 °C)  Max: 98.2 °F (36.8 °C)  RESPIRATIONS RANGE: Resp  Av  Min: 20  Max: 20  PULSE OXIMETRY RANGE: SpO2  Av.4 %  Min: 97 %  Max: 99 %  PULSE RANGE: Pulse  Av.8  Min: 95  Max: 105  BLOOD PRESSURE RANGE: Systolic (24hrs), Av , Min:126 , Max:154   ; Diastolic (24hrs), Av, Min:66, Max:76    I/O (24Hr):    Intake/Output Summary (Last 24 hours) at 3/31/2024 0729  Last data filed at 3/30/2024 2006  Gross per 24 hour   Intake 195 ml   Output 800 ml   Net -605 ml       Objective:  General Appearance:  Comfortable, well-appearing and in no acute distress.    Vital signs: (most recent): Blood pressure 133/67, pulse 95, temperature 98.2 °F (36.8 °C), temperature source Oral, resp. rate 20, height 1.626 m (5' 4\"), weight 67.1 kg (148 lb), SpO2 97 %, not currently breastfeeding.    Lungs:  Normal effort and normal respiratory rate.  There are decreased breath sounds.  No rales, wheezes or rhonchi.    Heart: Normal rate.  Regular rhythm.  S1 normal and S2 normal.  No gallop.   Abdomen: Abdomen is soft and non-distended.  (Peg tube in place. + erythema with brown drainage. ).  Bowel sounds are normal.   There is no abdominal tenderness.  There 
Renal Dose Adjustment Policy (Generic)     This patient is on medication that requires renal, weight, and/or indication dose adjustment.      Date Body Weight IBW  Adjusted BW SCr  CrCl Dialysis status   3/26/2024 68 kg (150 lb) Ideal body weight: 54.7 kg (120 lb 9.5 oz)  Adjusted ideal body weight: 60 kg (132 lb 5.7 oz) Serum creatinine: 1.4 mg/dL (H) 03/26/24 0345  Estimated creatinine clearance: 35 mL/min (A) N/a       Pharmacy has dose-adjusted the following medication(s):    Date Previous Order Adjusted Order   3/26/2024 Cefepime 2 gm Q8H Cefepime 2 gm Q12H       These changes were made per protocol according to the [facility]   Automatic Renal Dose Adjustment Policy.     *Please note this dose may need readjusted if patient's condition changes.    Please contact pharmacy with any questions regarding these changes.    Lynette Puckett RPH  3/26/2024  9:28 AM   
Subjective:    Chief complaint:    Awake  Much more alert    Objective:    /61   Pulse 100   Temp 98.3 °F (36.8 °C)   Resp 20   Ht 1.626 m (5' 4\")   Wt 67.1 kg (148 lb)   SpO2 99%   BMI 25.40 kg/m²   General : Awake ,alert,no distress.  Heart:  RRR, no murmurs, gallops, or rubs.  Lungs:  CTA bilaterally, no wheeze, rales or rhonchi  Abd: bowel sounds present, nontender, nondistended, no masses  Extrem:  No clubbing, cyanosis, or edema    CBC:   Lab Results   Component Value Date/Time    WBC 5.3 03/29/2024 08:25 AM    RBC 2.78 03/29/2024 08:25 AM    HGB 8.0 03/29/2024 08:25 AM    HCT 24.7 03/29/2024 08:25 AM    MCV 88.8 03/29/2024 08:25 AM    MCH 28.8 03/29/2024 08:25 AM    MCHC 32.4 03/29/2024 08:25 AM    RDW 15.9 03/29/2024 08:25 AM     03/29/2024 08:25 AM    MPV 11.0 03/29/2024 08:25 AM     BMP:    Lab Results   Component Value Date/Time     03/29/2024 08:25 AM    K 3.6 03/29/2024 08:25 AM    K 5.0 12/08/2020 05:56 PM     03/29/2024 08:25 AM    CO2 24 03/29/2024 08:25 AM    BUN 7 03/29/2024 08:25 AM    LABALBU 2.5 03/29/2024 08:25 AM    CREATININE 0.5 03/29/2024 08:25 AM    CALCIUM 7.9 03/29/2024 08:25 AM    GFRAA >60 04/26/2021 12:59 PM    LABGLOM >90 03/29/2024 08:25 AM    GLUCOSE 215 03/29/2024 08:25 AM     PT/INR:    Lab Results   Component Value Date/Time    PROTIME 18.6 03/25/2024 09:54 PM    INR 1.7 03/25/2024 09:54 PM     Troponin:    Lab Results   Component Value Date/Time    TROPONINI 0.01 04/26/2021 12:59 PM       No results for input(s): \"LABURIN\" in the last 72 hours.  No results for input(s): \"BC\" in the last 72 hours.  No results for input(s): \"BLOODCULT2\" in the last 72 hours.      Current Facility-Administered Medications:     0.9 % sodium chloride infusion, , IntraVENous, PRN, Luo, Shellie, APRN - CNP    meropenem (MERREM) 1,000 mg in sodium chloride 0.9 % 100 mL IVPB ([id number]), 1,000 mg, IntraVENous, Q8H, Erasto Pierre MD, Stopped at 03/29/24 
Subjective:    Chief complaint:    Awake, confused  No agitation or distress      Objective:    /61   Pulse 93   Temp 98.7 °F (37.1 °C) (Oral)   Resp 20   Ht 1.626 m (5' 4\")   Wt 67.1 kg (148 lb)   SpO2 100%   BMI 25.40 kg/m²   General : Awake ,alert,no distress.  Heart:  RRR, no murmurs, gallops, or rubs.  Lungs:  CTA bilaterally, no wheeze, rales or rhonchi  Abd: bowel sounds present, nontender, nondistended, no masses  Extrem:  No clubbing, cyanosis, or edema    CBC:   Lab Results   Component Value Date/Time    WBC 5.2 03/27/2024 04:29 AM    RBC 2.21 03/27/2024 04:29 AM    HGB 8.2 03/27/2024 12:29 PM    HCT 25.0 03/27/2024 12:29 PM    MCV 91.0 03/27/2024 04:29 AM    MCH 29.4 03/27/2024 04:29 AM    MCHC 32.3 03/27/2024 04:29 AM    RDW 15.1 03/27/2024 04:29 AM     03/25/2024 09:54 PM    MPV 11.4 03/27/2024 04:29 AM     BMP:    Lab Results   Component Value Date/Time     03/27/2024 04:29 AM    K 4.1 03/27/2024 04:29 AM    K 5.0 12/08/2020 05:56 PM     03/27/2024 04:29 AM    CO2 23 03/27/2024 04:29 AM    BUN 16 03/27/2024 04:29 AM    LABALBU 2.7 03/27/2024 04:29 AM    CREATININE 0.6 03/27/2024 04:29 AM    CALCIUM 8.3 03/27/2024 04:29 AM    GFRAA >60 04/26/2021 12:59 PM    LABGLOM >90 03/27/2024 04:29 AM    GLUCOSE 205 03/27/2024 04:29 AM     PT/INR:    Lab Results   Component Value Date/Time    PROTIME 18.6 03/25/2024 09:54 PM    INR 1.7 03/25/2024 09:54 PM     Troponin:    Lab Results   Component Value Date/Time    TROPONINI 0.01 04/26/2021 12:59 PM       No results for input(s): \"LABURIN\" in the last 72 hours.  No results for input(s): \"BC\" in the last 72 hours.  No results for input(s): \"BLOODCULT2\" in the last 72 hours.      Current Facility-Administered Medications:     0.9 % sodium chloride infusion, , IntraVENous, PRN, Luo, Shellie, APRN - CNP    sodium phosphate 15 mmol in sodium chloride 0.9 % 250 mL IVPB, 15 mmol, IntraVENous, Once, Luo, Shellie, APRN - CNP, Last Rate: 62.5 
Wound care: Attempted to assess patient, patient out of room for test. Lyndsay Damian RN    
For: Paraplegia (7.5%)  Adjusted Ideal Body Weight (lbs) (Calculated): 111 lbs  Adjusted Ideal Body Weight (kg) (Calculated): 50.45 kg  Adjusted % Ideal Body Weight (Calculated): 133.3  Adjusted BMI (kg/m2) (Calculated): 27.3  BMI Categories: Overweight (BMI 25.0-29.9) (adjsuted BMI)    Estimated Daily Nutrient Needs:  Energy Requirements Based On: Kcal/kg  Weight Used for Energy Requirements: Current  Energy (kcal/day): 25-27 kcal/kg CBW; 2257-3786  Weight Used for Protein Requirements: Other (Comment) (adjusted IBW)  Protein (g/day): 1.3-1.5 g/kg adjusted IBW; 65-75  Fluid (ml/day): per critical care    Nutrition Diagnosis:   Inadequate oral intake related to cognitive or neurological impairment as evidenced by nutrition support - enteral nutrition, intake 0-25%    Nutrition Interventions:   Nutrition Education/Counseling: Education not appropriate  Coordination of Nutrition Care: Continue to monitor while inpatient      Goals:  Goals: Tolerate nutrition support at goal rate, other (specify)      Nutrition Monitoring and Evaluation:   Food/Nutrient Intake Outcomes: Enteral Nutrition Intake/Tolerance, Food and Nutrient Intake  Physical Signs/Symptoms Outcomes: Biochemical Data, Nutrition Focused Physical Findings, Skin, Weight, GI Status, Fluid Status or Edema, Hemodynamic Status    Discharge Planning:    Enteral Nutrition     Ximena Xavier RD, LD  Contact: Ext [phone]    
Instruction/training on adapted ADL techniques and AE recommendations to increase functional independence within precautions       * Training on energy conservation strategies, correct breathing pattern and techniques to improve independence/tolerance for self-care routine  * Functional transfer/mobility training/DME recommendations for increased independence, safety, and fall prevention  * Patient/Family education to increase follow through with safety techniques and functional independence  * Recommendation of environmental modifications for increased safety with functional transfers/mobility and ADLs  * Cognitive retraining/development of therapeutic activities to improve problem solving, judgement, memory, and attention for increased safety/participation in ADL/IADL tasks  * Sensory re-education to improve body/limb awareness, maintain/improve skin integrity, and improve hand/UE motor function  * Visual-perceptual training to improve environmental scanning, visual attention/focus, and oculomotor skills for increased safety/independence with functional transfers/mobility and ADLs  * Splinting/positioning for increased function, prevention of contractures, and improve skin integrity  * Therapeutic exercise to improve motor endurance, ROM, and functional strength for ADLs/functional transfers  * Therapeutic activities to facilitate/challenge dynamic balance, stand tolerance for increased safety and independence with ADLs  * Therapeutic activities to facilitate gross/fine motor skills for increased independence with ADLs  * Neuro-muscular re-education: facilitation of righting/equilibrium reactions, midline orientation, scapular stability/mobility, normalization of muscle tone, and facilitation of volitional active controled movement  * Positioning to improve skin integrity, interaction with environment and functional independence  * Delirium prevention/treatment  * Manual techniques for edema 
SubCUTAneous, Q4H, Erasto Pierre MD, 1 Units at 03/28/24 0620    levothyroxine (SYNTHROID) tablet 75 mcg, 75 mcg, PEG Tube, Daily, Erasto Pierre MD, 75 mcg at 03/28/24 0650    loperamide (IMODIUM) capsule 2 mg, 2 mg, Oral, 4x Daily PRN, Erasto Pierre MD    melatonin tablet 6 mg, 6 mg, Oral, Nightly PRN, Erasto Pierre MD, 6 mg at 03/26/24 2326    metoclopramide (REGLAN) tablet 5 mg, 5 mg, Oral, 4x Daily, Erasto Pierre MD, 5 mg at 03/28/24 0834    ondansetron (ZOFRAN) tablet 4 mg, 4 mg, Oral, Q4H PRN, Erasto Pierre MD    rosuvastatin (CRESTOR) tablet 10 mg, 10 mg, Oral, Nightly, Erasto Pierre MD, 10 mg at 03/27/24 2047    senna (SENOKOT) tablet 8.6 mg, 1 tablet, Oral, Daily, Erasto Pierre MD, 8.6 mg at 03/28/24 0834    traZODone (DESYREL) tablet 50 mg, 50 mg, Oral, Nightly, Erasto Pierre MD, 50 mg at 03/27/24 2047    glucose chewable tablet 16 g, 4 tablet, Oral, PRN, Erasto Pierre MD    dextrose bolus 10% 125 mL, 125 mL, IntraVENous, PRN **OR** dextrose bolus 10% 250 mL, 250 mL, IntraVENous, PRN, Erasto Pierre MD    glucagon injection 1 mg, 1 mg, SubCUTAneous, PRN, Erasto Pierre MD    dextrose 10 % infusion, , IntraVENous, Continuous PRN, Erasto Pierre MD    aluminum & magnesium hydroxide-simethicone (MAALOX) 200-200-20 MG/5ML suspension 30 mL, 30 mL, Oral, Q4H PRN, Erasto Pierre MD    midodrine (PROAMATINE) tablet 15 mg, 15 mg, Oral, TID WC, Erasto Pierre MD, 15 mg at 03/28/24 0830    pantoprazole (PROTONIX) 40 mg in sodium chloride (PF) 0.9 % 10 mL injection, 40 mg, IntraVENous, Daily, Erasto Pierre MD, 40 mg at 03/28/24 0835    gabapentin (NEURONTIN) capsule 400 mg, 400 mg, PEG Tube, BID, Erasto Pierre MD, 400 mg at 03/28/24 0834    ADULT DIET; Regular  ADULT TUBE 
list, have been reviewed prior to initiation of this evaluation.        ACTIVE PROBLEM LIST:   Patient Active Problem List   Diagnosis    Midline low back pain with sciatica    Lumbar degenerative disc disease    Suicidal ideation    Altered mental status    Hypotension    Severe episode of recurrent major depressive disorder, without psychotic features (HCC)    Moderate protein-calorie malnutrition (HCC)    Anemia    Sepsis (HCC)    Septic shock (HCC)              
[facility] [address] -- minutes     Carlito Batres, PT, DPT  [id number]         
all over     Investigations:  Labs, radiological imaging and cardiac work up were personally reviewed        ICU STAFF PHYSICIAN NOTE OF PERSONAL INVOLVEMENT IN CARE  As the attending physician, I certify that I personally reviewed the patient's history and personally examined the patient to confirm the physical findings described above, and that I reviewed the relevant imaging studies and available reports.  I also discussed the differential diagnosis and all of the proposed management plans with the patient and individuals accompanying the patient to this visit.  They had the opportunity to ask questions about the proposed management plans and to have those questions answered.    This patient has a high probability of sudden, clinically significant deterioration, which requires the highest level of physician preparedness to intervene urgently.  I managed/supervised life or organ supporting interventions that required frequent physician assessment.  I devoted my full attention to the direct care of this patient for the amount of time indicated below.  Time I spent with family or surrogate(s) is included only if the patient was incapable of providing the necessary information or participating in medical decision-making.  Time devoted to teaching and to any procedures I billed separately is not included.  Critical Care Time: 38 minutes      Electronically signed by Carolina Guzmán DO on 3/28/2024 at 6:56 PM

## 2024-04-01 NOTE — CARE COORDINATION
CM update no.  Discharge order noted.  Spoke with Josie from [facility].  They can accommodate IV Merrem.  Patient is long term care.  No precert required.  Len/destination completed.  Transport set up via stretcher with [facility].   time is 1230.  Nurse will need to call report to [phone].  Facility liaison, patient daughter, and RN notified of  time.  Ambulance form with envelope placed in the soft chart.  Esequiel Boyd RN  [phone].

## 2024-04-02 ENCOUNTER — CARE COORDINATION (OUTPATIENT)
Dept: CARE COORDINATION | Age: 74
End: 2024-04-02

## 2024-04-02 NOTE — CARE COORDINATION
SECURE email notification sent to identified IDT members at [facility].

## 2024-04-04 ENCOUNTER — APPOINTMENT (OUTPATIENT)
Dept: CT IMAGING | Age: 74
End: 2024-04-04
Payer: MEDICARE

## 2024-04-04 ENCOUNTER — HOSPITAL ENCOUNTER (EMERGENCY)
Age: 74
Discharge: NURSING, REHAB OR LONG-TERM CARE | End: 2024-04-05
Attending: EMERGENCY MEDICINE
Payer: MEDICARE

## 2024-04-04 DIAGNOSIS — K59.00 CONSTIPATION, UNSPECIFIED CONSTIPATION TYPE: Primary | ICD-10-CM

## 2024-04-04 DIAGNOSIS — T85.598A FEEDING TUBE DYSFUNCTION, INITIAL ENCOUNTER: ICD-10-CM

## 2024-04-04 LAB
ALBUMIN SERPL-MCNC: 2.7 G/DL (ref 3.5–5.2)
ALP SERPL-CCNC: 325 U/L (ref 35–104)
ALT SERPL-CCNC: 125 U/L (ref 0–32)
ANION GAP SERPL CALCULATED.3IONS-SCNC: 4 MMOL/L (ref 7–16)
AST SERPL-CCNC: 65 U/L (ref 0–31)
BASOPHILS # BLD: 0.03 K/UL (ref 0–0.2)
BASOPHILS NFR BLD: 0 % (ref 0–2)
BILIRUB SERPL-MCNC: 2.2 MG/DL (ref 0–1.2)
BUN SERPL-MCNC: 16 MG/DL (ref 6–23)
CALCIUM SERPL-MCNC: 8.8 MG/DL (ref 8.6–10.2)
CHLORIDE SERPL-SCNC: 94 MMOL/L (ref 98–107)
CO2 SERPL-SCNC: 32 MMOL/L (ref 22–29)
CREAT SERPL-MCNC: 0.6 MG/DL (ref 0.5–1)
EOSINOPHIL # BLD: 0.27 K/UL (ref 0.05–0.5)
EOSINOPHILS RELATIVE PERCENT: 3 % (ref 0–6)
ERYTHROCYTE [DISTWIDTH] IN BLOOD BY AUTOMATED COUNT: 17.2 % (ref 11.5–15)
GFR SERPL CREATININE-BSD FRML MDRD: >90 ML/MIN/1.73M2
GLUCOSE SERPL-MCNC: 166 MG/DL (ref 74–99)
HCT VFR BLD AUTO: 27.9 % (ref 34–48)
HGB BLD-MCNC: 8.4 G/DL (ref 11.5–15.5)
IMM GRANULOCYTES # BLD AUTO: 0.09 K/UL (ref 0–0.58)
IMM GRANULOCYTES NFR BLD: 1 % (ref 0–5)
LACTATE BLDV-SCNC: 1.8 MMOL/L (ref 0.5–2.2)
LYMPHOCYTES NFR BLD: 1.29 K/UL (ref 1.5–4)
LYMPHOCYTES RELATIVE PERCENT: 14 % (ref 20–42)
MCH RBC QN AUTO: 28.4 PG (ref 26–35)
MCHC RBC AUTO-ENTMCNC: 30.1 G/DL (ref 32–34.5)
MCV RBC AUTO: 94.3 FL (ref 80–99.9)
MONOCYTES NFR BLD: 0.51 K/UL (ref 0.1–0.95)
MONOCYTES NFR BLD: 6 % (ref 2–12)
NEUTROPHILS NFR BLD: 76 % (ref 43–80)
NEUTS SEG NFR BLD: 6.75 K/UL (ref 1.8–7.3)
PLATELET # BLD AUTO: 291 K/UL (ref 130–450)
PMV BLD AUTO: 10.9 FL (ref 7–12)
POTASSIUM SERPL-SCNC: 4.6 MMOL/L (ref 3.5–5)
PROT SERPL-MCNC: 5.4 G/DL (ref 6.4–8.3)
RBC # BLD AUTO: 2.96 M/UL (ref 3.5–5.5)
SODIUM SERPL-SCNC: 130 MMOL/L (ref 132–146)
WBC OTHER # BLD: 8.9 K/UL (ref 4.5–11.5)

## 2024-04-04 PROCEDURE — 99285 EMERGENCY DEPT VISIT HI MDM: CPT

## 2024-04-04 PROCEDURE — 83605 ASSAY OF LACTIC ACID: CPT

## 2024-04-04 PROCEDURE — 74177 CT ABD & PELVIS W/CONTRAST: CPT

## 2024-04-04 PROCEDURE — 85025 COMPLETE CBC W/AUTO DIFF WBC: CPT

## 2024-04-04 PROCEDURE — 6360000004 HC RX CONTRAST MEDICATION: Performed by: RADIOLOGY

## 2024-04-04 PROCEDURE — 80053 COMPREHEN METABOLIC PANEL: CPT

## 2024-04-04 RX ORDER — LACTULOSE 10 G/15ML
20 SOLUTION ORAL ONCE
Status: DISCONTINUED | OUTPATIENT
Start: 2024-04-04 | End: 2024-04-05

## 2024-04-04 RX ORDER — MAG HYDROX/ALUMINUM HYD/SIMETH 400-400-40
1 SUSPENSION, ORAL (FINAL DOSE FORM) ORAL DAILY PRN
Qty: 5 SUPPOSITORY | Refills: 0 | Status: SHIPPED | OUTPATIENT
Start: 2024-04-04

## 2024-04-04 RX ORDER — LACTULOSE 10 G/15ML
20 SOLUTION ORAL ONCE
Status: DISCONTINUED | OUTPATIENT
Start: 2024-04-04 | End: 2024-04-04

## 2024-04-04 RX ADMIN — IOPAMIDOL 75 ML: 755 INJECTION, SOLUTION INTRAVENOUS at 19:23

## 2024-04-04 ASSESSMENT — PAIN - FUNCTIONAL ASSESSMENT: PAIN_FUNCTIONAL_ASSESSMENT: NONE - DENIES PAIN

## 2024-04-05 ENCOUNTER — TELEPHONE (OUTPATIENT)
Dept: ORTHOPEDIC SURGERY | Age: 74
End: 2024-04-05

## 2024-04-05 VITALS
HEART RATE: 86 BPM | OXYGEN SATURATION: 95 % | RESPIRATION RATE: 16 BRPM | DIASTOLIC BLOOD PRESSURE: 60 MMHG | TEMPERATURE: 97.8 F | SYSTOLIC BLOOD PRESSURE: 102 MMHG

## 2024-04-05 RX ORDER — POLYETHYLENE GLYCOL 3350 17 G/17G
17 POWDER, FOR SOLUTION ORAL DAILY
Qty: 1530 G | Refills: 1 | Status: SHIPPED | OUTPATIENT
Start: 2024-04-05 | End: 2024-10-02

## 2024-04-05 RX ORDER — SENNOSIDES 8.6 MG
1 TABLET ORAL DAILY
Qty: 120 TABLET | Refills: 0 | Status: SHIPPED | OUTPATIENT
Start: 2024-04-05

## 2024-04-05 NOTE — TELEPHONE ENCOUNTER
Patient called office requesting appointment for ED follow up.    ED visit date: 03/24/2024, 03/25/2024, 04/0/4/2024    Injury: fracture of bilateral hip  XR Hip Bilateral W AP Pelvis  FINDINGS:  As seen on recent x-ray series of the pelvis/hip joints of March 24, images  of the pelvis/hip areas CT abdomen pelvis of March 26 and March 27, are  fracture of the hip joints.     There is a fracture of the right femur extending from intertrochanteric  region down into the proximal right femoral shaft with displacement of the  fragments.  This appears to be a more recent or acute fracture.     Some increased densities seen in the proximal right side of the femoral  fracture.     The right femoral head is in proper position in relation to the right  acetabulum.     There is also a fracture through the intertrochanteric region of proximal  left femur with superior migration of the left femoral shaft abutting against  fragments of the base of the left femoral with subluxation and reshaping of  articular surfaces of the left hip joint which are in chronic basis.  The age  of this fracture is more difficult to be determined.  Increased densities  seen in the soft tissues of the left hip area.     There is no fractures acute type of the pelvic bones.  Symphysis and  obliterate forms are intact.     Due overlying bowel contents is more difficult to evaluate the SI joints and  sacral wings.     There are findings for fecal rectal retention/impaction.  Air distension is  seen throughout the colon to the fecal rectal retention/impaction.     IMPRESSION:  Fracture of the right and left hip joints as above described and reported on  the CT abdomen pelvis of March 26 and March 27.    Routed to providers for recommendations.    No future appointments.

## 2024-04-05 NOTE — TELEPHONE ENCOUNTER
Called mailbox full unable to leave message    ED f/u DOI: 03/24/2024 - bilateral hip fractures TTS

## 2024-04-05 NOTE — CONSULTS
GENERAL SURGERY  CONSULT NOTE  4/4/2024    Physician Consulted: Dr. Baum  Reason for Consult: PEG tube malfuction  Referring Physician: Dr. Elie LEAVITT  Cait Kent is a 73 y.o. female with history of DM, hypertension, spinal cord infarction who is currently admitted secondary to UTI with sepsis and bilateral intertrochanteric hip fractures.  General surgery consulted secondary to PEG site leaking with surrounding superficial skin erythema. Per chart review patient had PEG placed by Dr. Coleman in 2019.     CT abdomen pelvis completed on 3/27 that shows gastrostomy tube within the gastric antrum versus proximal duodenum and a big stool imapction.       Past Medical History:   Diagnosis Date    Anemia     Anxiety disorder     Chronic back pain 01/2018    6/10 on the pain scale    Depression     Diabetes mellitus (HCC)     Hyperlipidemia     Hypertension     Neurogenic bowel     Neuromuscular dysfunction of bladder     Obstructive sleep apnea     Radiculopathy of lumbar region     Spinal cord infarction (HCC)     Suicidal ideations     Vitamin D deficiency        Past Surgical History:   Procedure Laterality Date    BACK SURGERY         Medications Prior to Admission:    Prior to Admission medications    Medication Sig Start Date End Date Taking? Authorizing Provider   glycerin 2 g suppository Place 1 suppository rectally daily as needed for Constipation 4/4/24  Yes Lio Corey DO   insulin glargine (LANTUS) 100 UNIT/ML injection vial Inject 7 Units into the skin nightly 4/1/24   Brant Treadwell MD   polyethylene glycol (GLYCOLAX) 17 g packet 1 packet by Per G Tube route daily 4/2/24 6/3/24  Brant Treadwell MD   pantoprazole (PROTONIX) 40 MG tablet Take 1 tablet by mouth daily (with breakfast) 4/1/24   Brant Treadwell MD   gabapentin (NEURONTIN) 400 MG capsule 1 capsule by PEG Tube route in the morning and at bedtime.    Provider, MD Den   traZODone (DESYREL) 50 MG tablet Take 1 tablet by mouth nightly     \"PT\"      RADIOLOGY: I reviewed all relevant imaging from this admission as well as the past studies available in the EMR including: CT abdomen/pelvis from ed    My assessment of the reviewed imaging is in HPI      ASSESSMENT:  73 y.o. female with gastrostomy tube displacement and stool retention      PLAN:  PEG pulled back and secured with zip tie   Recommend skin care with barrier cream and daily dressing changes   No other interventiosn from general surgery  Disimpacted at bedisde due to stool retention.   Ok to dc from ED     Electronically signed by Brisa Francisco DO on 4/4/24 at 11:30 PM EDT

## 2024-04-05 NOTE — ED NOTES
Incontinent of urine; pericare done; bed linens changed; large amount of stool removed by resident; patient tolerated without difficulty; will monitor

## 2024-04-09 NOTE — TELEPHONE ENCOUNTER
Spoke to Morena and scheduled ED f/u appointment    Future Appointments   Date Time Provider Department Center   4/24/2024 10:30 AM [facility]

## 2024-04-09 NOTE — ED PROVIDER NOTES
tablet 1 tablet by PEG Tube route 2 times daily (with meals), Disp-60 tablet, R-3DC to SNF      metoprolol tartrate 75 MG TABS 75 mg by PEG Tube route 2 times daily, Disp-60 tablet, R-3DC to SNF      levothyroxine (SYNTHROID) 75 MCG tablet 1 tablet by PEG Tube route Daily, Disp-30 tablet, R-3DC to SNF      insulin lispro (HUMALOG) 100 UNIT/ML injection vial Inject into the skin 3 times daily (before meals) Per Sliding Scale:     = 0 units  140-199 = 5 units  200-249 = 10 units  250-299 = 15 units  300-349 = 20 units  350-399 = 25 units  > 400 = 30 unitsHistorical Med      lactobacillus (CULTURELLE) CAPS capsule Take 1 capsule by mouth dailyDC to SNF      vitamin D (CHOLECALCIFEROL) 1000 UNIT TABS tablet 4 tablets by PEG Tube route dailyHistorical Med      Docusate Sodium 100 MG/10ML LIQD Take 20 mLs by mouth nightlyHistorical Med      rosuvastatin (CRESTOR) 10 MG tablet Take 1 tablet by mouth nightlyHistorical Med      acetaminophen (TYLENOL) 325 MG tablet Take 2 tablets by mouth every 4 hours as needed for PainHistorical Med      lisinopril (PRINIVIL;ZESTRIL) 5 MG tablet Take 1 tablet by mouth dailyHistorical Med       !! - Potential duplicate medications found. Please discuss with provider.          ALLERGIES     Patient has no known allergies.    FAMILYHISTORY       Family History   Problem Relation Age of Onset    Diabetes Father         SOCIAL HISTORY       Social History     Tobacco Use    Smoking status: Former     Current packs/day: 0.50     Types: Cigarettes    Smokeless tobacco: Never   Vaping Use    Vaping Use: Unknown   Substance Use Topics    Alcohol use: No    Drug use: No       SCREENINGS        [place] Coma Scale  Eye Opening: Spontaneous  Best Verbal Response: Oriented  Best Motor Response: Obeys commands  [place] Coma Scale Score: 15                CIWA Assessment  BP: 102/60  Pulse: 86           PHYSICAL EXAM  1 or more Elements     ED Triage Vitals [04/04/24 1543]   BP Temp Temp src Pulse

## 2024-04-10 ENCOUNTER — CARE COORDINATION (OUTPATIENT)
Dept: CARE COORDINATION | Age: 74
End: 2024-04-10

## 2024-04-10 ASSESSMENT — ENCOUNTER SYMPTOMS
PHOTOPHOBIA: 0
ABDOMINAL PAIN: 0
BACK PAIN: 0
SORE THROAT: 0
DIARRHEA: 0
VOMITING: 0
SHORTNESS OF BREATH: 0
RHINORRHEA: 0
COUGH: 0
NAUSEA: 0

## 2024-04-11 ENCOUNTER — HOSPITAL ENCOUNTER (EMERGENCY)
Age: 74
Discharge: OTHER INSTITUTION | End: 2024-04-12
Attending: EMERGENCY MEDICINE
Payer: MEDICARE

## 2024-04-11 ENCOUNTER — APPOINTMENT (OUTPATIENT)
Dept: GENERAL RADIOLOGY | Age: 74
End: 2024-04-11
Payer: MEDICARE

## 2024-04-11 VITALS
DIASTOLIC BLOOD PRESSURE: 63 MMHG | HEART RATE: 86 BPM | RESPIRATION RATE: 12 BRPM | OXYGEN SATURATION: 94 % | TEMPERATURE: 97.9 F | BODY MASS INDEX: 25.1 KG/M2 | HEIGHT: 64 IN | SYSTOLIC BLOOD PRESSURE: 115 MMHG | WEIGHT: 147 LBS

## 2024-04-11 DIAGNOSIS — Z43.1 ATTENTION TO G-TUBE (HCC): Primary | ICD-10-CM

## 2024-04-11 PROCEDURE — 74018 RADEX ABDOMEN 1 VIEW: CPT

## 2024-04-11 PROCEDURE — 99283 EMERGENCY DEPT VISIT LOW MDM: CPT

## 2024-04-11 PROCEDURE — 43762 RPLC GTUBE NO REVJ TRC: CPT

## 2024-04-12 NOTE — ED PROVIDER NOTES
required/indicated.  Procedure: A feeding tube was replaced using a 20 Slovak gastrostomy tube and the bulb was inflated using 20 cc of sterile water.   Tube was secured to skin pre-attached rubber skin bumper device                                                          .  Post-Procedure: Tube position confirmed by x-ray with contrast injection.  Patient tolerated the procedure well.  Complications:  None.         ------------------------- NURSING NOTES AND VITALS REVIEWED ---------------------------   The nursing notes within the ED encounter and vital signs as below have been reviewed by myself.  /63   Pulse 86   Temp 97.9 °F (36.6 °C)   Resp 12   Ht 1.626 m (5' 4\")   Wt 66.7 kg (147 lb)   SpO2 94%   BMI 25.23 kg/m²   Oxygen Saturation Interpretation: Normal    The patient’s available past medical records and past encounters were reviewed.        ------------------------------ ED COURSE/MEDICAL DECISION MAKING----------------------  Medications - No data to display          Medical Decision Making:      History From:      Cait Kent is a 73 y.o. female history of anemia anxiety chronic back pain depression diabetes hyperlipidemia hypertension neurogenic bowel history of obstructive sleep apnea history of radiculopathy spinal cord infarction suicidal ideation presenting to the ED for dislodged PEG tube, beginning unknown time ago.  The complaint has been persistent, mild in severity, and worsened by nothing.  Patient presenting here because of PEG tube being pulled out.  Patient unsure as to how this occurred.  Patient reporting no abdominal pain for me she reports no vomiting there is no diarrhea she reports no chest pain or difficulty breathing she reports no fever no chills.    CC/HPI Summary, DDx, ED Course, Reassessment, Tests Considered, Christal   Cait Kent is a 73 y.o. female history of anemia anxiety chronic back pain depression diabetes hyperlipidemia hypertension neurogenic

## 2024-04-13 ENCOUNTER — HOSPITAL ENCOUNTER (EMERGENCY)
Age: 74
Discharge: HOME | End: 2024-04-14
Attending: EMERGENCY MEDICINE
Payer: MEDICARE

## 2024-04-13 ENCOUNTER — APPOINTMENT (OUTPATIENT)
Dept: CT IMAGING | Age: 74
End: 2024-04-13
Payer: MEDICARE

## 2024-04-13 ENCOUNTER — APPOINTMENT (OUTPATIENT)
Dept: GENERAL RADIOLOGY | Age: 74
End: 2024-04-13
Payer: MEDICARE

## 2024-04-13 VITALS
HEART RATE: 94 BPM | DIASTOLIC BLOOD PRESSURE: 51 MMHG | SYSTOLIC BLOOD PRESSURE: 104 MMHG | TEMPERATURE: 97.4 F | RESPIRATION RATE: 17 BRPM | OXYGEN SATURATION: 94 %

## 2024-04-13 DIAGNOSIS — N30.00 ACUTE CYSTITIS WITHOUT HEMATURIA: Primary | ICD-10-CM

## 2024-04-13 DIAGNOSIS — Z43.1 ATTENTION TO G-TUBE (HCC): ICD-10-CM

## 2024-04-13 DIAGNOSIS — E87.20 LACTIC ACIDOSIS: ICD-10-CM

## 2024-04-13 LAB
ALBUMIN SERPL-MCNC: 3.1 G/DL (ref 3.5–5.2)
ALP SERPL-CCNC: 571 U/L (ref 35–104)
ALT SERPL-CCNC: 18 U/L (ref 0–32)
ANION GAP SERPL CALCULATED.3IONS-SCNC: 9 MMOL/L (ref 7–16)
AST SERPL-CCNC: 16 U/L (ref 0–31)
BACTERIA URNS QL MICRO: ABNORMAL
BASOPHILS # BLD: 0.02 K/UL (ref 0–0.2)
BASOPHILS NFR BLD: 0 % (ref 0–2)
BILIRUB SERPL-MCNC: 1.5 MG/DL (ref 0–1.2)
BILIRUB UR QL STRIP: NEGATIVE
BUN SERPL-MCNC: 19 MG/DL (ref 6–23)
CALCIUM SERPL-MCNC: 8.4 MG/DL (ref 8.6–10.2)
CHLORIDE SERPL-SCNC: 95 MMOL/L (ref 98–107)
CLARITY UR: CLEAR
CO2 SERPL-SCNC: 27 MMOL/L (ref 22–29)
COLOR UR: YELLOW
CREAT SERPL-MCNC: 0.8 MG/DL (ref 0.5–1)
EOSINOPHIL # BLD: 0.23 K/UL (ref 0.05–0.5)
EOSINOPHILS RELATIVE PERCENT: 4 % (ref 0–6)
ERYTHROCYTE [DISTWIDTH] IN BLOOD BY AUTOMATED COUNT: 19.6 % (ref 11.5–15)
GFR SERPL CREATININE-BSD FRML MDRD: 85 ML/MIN/1.73M2
GLUCOSE SERPL-MCNC: 244 MG/DL (ref 74–99)
GLUCOSE UR STRIP-MCNC: NEGATIVE MG/DL
HCT VFR BLD AUTO: 33.2 % (ref 34–48)
HGB BLD-MCNC: 10 G/DL (ref 11.5–15.5)
HGB UR QL STRIP.AUTO: ABNORMAL
IMM GRANULOCYTES # BLD AUTO: <0.03 K/UL (ref 0–0.58)
IMM GRANULOCYTES NFR BLD: 0 % (ref 0–5)
KETONES UR STRIP-MCNC: NEGATIVE MG/DL
LACTATE BLDV-SCNC: 2 MMOL/L (ref 0.5–1.9)
LACTATE BLDV-SCNC: 2.2 MMOL/L (ref 0.5–1.9)
LEUKOCYTE ESTERASE UR QL STRIP: ABNORMAL
LIPASE SERPL-CCNC: 114 U/L (ref 13–60)
LYMPHOCYTES NFR BLD: 1 K/UL (ref 1.5–4)
LYMPHOCYTES RELATIVE PERCENT: 18 % (ref 20–42)
MCH RBC QN AUTO: 29.2 PG (ref 26–35)
MCHC RBC AUTO-ENTMCNC: 30.1 G/DL (ref 32–34.5)
MCV RBC AUTO: 96.8 FL (ref 80–99.9)
MONOCYTES NFR BLD: 0.46 K/UL (ref 0.1–0.95)
MONOCYTES NFR BLD: 8 % (ref 2–12)
NEUTROPHILS NFR BLD: 69 % (ref 43–80)
NEUTS SEG NFR BLD: 3.86 K/UL (ref 1.8–7.3)
NITRITE UR QL STRIP: POSITIVE
PH UR STRIP: 6 [PH] (ref 5–9)
PLATELET # BLD AUTO: 534 K/UL (ref 130–450)
PMV BLD AUTO: 10.5 FL (ref 7–12)
POTASSIUM SERPL-SCNC: 4.1 MMOL/L (ref 3.5–5)
PROT SERPL-MCNC: 6.3 G/DL (ref 6.4–8.3)
PROT UR STRIP-MCNC: ABNORMAL MG/DL
RBC # BLD AUTO: 3.43 M/UL (ref 3.5–5.5)
RBC #/AREA URNS HPF: ABNORMAL /HPF
SODIUM SERPL-SCNC: 131 MMOL/L (ref 132–146)
SP GR UR STRIP: <1.005 (ref 1–1.03)
UROBILINOGEN UR STRIP-ACNC: 4 EU/DL (ref 0–1)
WBC #/AREA URNS HPF: ABNORMAL /HPF
WBC OTHER # BLD: 5.6 K/UL (ref 4.5–11.5)

## 2024-04-13 PROCEDURE — 85025 COMPLETE CBC W/AUTO DIFF WBC: CPT

## 2024-04-13 PROCEDURE — 87077 CULTURE AEROBIC IDENTIFY: CPT

## 2024-04-13 PROCEDURE — 83690 ASSAY OF LIPASE: CPT

## 2024-04-13 PROCEDURE — 99285 EMERGENCY DEPT VISIT HI MDM: CPT

## 2024-04-13 PROCEDURE — 6360000004 HC RX CONTRAST MEDICATION: Performed by: RADIOLOGY

## 2024-04-13 PROCEDURE — 81001 URINALYSIS AUTO W/SCOPE: CPT

## 2024-04-13 PROCEDURE — 87086 URINE CULTURE/COLONY COUNT: CPT

## 2024-04-13 PROCEDURE — 2580000003 HC RX 258

## 2024-04-13 PROCEDURE — 74177 CT ABD & PELVIS W/CONTRAST: CPT

## 2024-04-13 PROCEDURE — 87040 BLOOD CULTURE FOR BACTERIA: CPT

## 2024-04-13 PROCEDURE — 80053 COMPREHEN METABOLIC PANEL: CPT

## 2024-04-13 PROCEDURE — 83605 ASSAY OF LACTIC ACID: CPT

## 2024-04-13 PROCEDURE — 43762 RPLC GTUBE NO REVJ TRC: CPT

## 2024-04-13 RX ORDER — GABAPENTIN 600 MG/1
600 TABLET ORAL
COMMUNITY

## 2024-04-13 RX ORDER — SODIUM CHLORIDE 0.9 % (FLUSH) 0.9 %
5-40 SYRINGE (ML) INJECTION EVERY 12 HOURS SCHEDULED
Status: DISCONTINUED | OUTPATIENT
Start: 2024-04-13 | End: 2024-04-14 | Stop reason: HOSPADM

## 2024-04-13 RX ORDER — FERROUS SULFATE 7.5 MG/0.5
7.4 SYRINGE (EA) ORAL DAILY
COMMUNITY

## 2024-04-13 RX ORDER — SODIUM CHLORIDE 0.9 % (FLUSH) 0.9 %
5-40 SYRINGE (ML) INJECTION PRN
Status: DISCONTINUED | OUTPATIENT
Start: 2024-04-13 | End: 2024-04-14 | Stop reason: HOSPADM

## 2024-04-13 RX ORDER — SODIUM CHLORIDE 9 MG/ML
INJECTION, SOLUTION INTRAVENOUS PRN
Status: DISCONTINUED | OUTPATIENT
Start: 2024-04-13 | End: 2024-04-14 | Stop reason: HOSPADM

## 2024-04-13 RX ORDER — 0.9 % SODIUM CHLORIDE 0.9 %
30 INTRAVENOUS SOLUTION INTRAVENOUS ONCE
Status: COMPLETED | OUTPATIENT
Start: 2024-04-13 | End: 2024-04-13

## 2024-04-13 RX ORDER — LEVOFLOXACIN 500 MG/1
500 TABLET, FILM COATED ORAL DAILY
COMMUNITY
Start: 2024-04-13 | End: 2024-04-20

## 2024-04-13 RX ADMIN — IOPAMIDOL 75 ML: 755 INJECTION, SOLUTION INTRAVENOUS at 12:35

## 2024-04-13 RX ADMIN — SODIUM CHLORIDE, PRESERVATIVE FREE 10 ML: 5 INJECTION INTRAVENOUS at 10:12

## 2024-04-13 RX ADMIN — SODIUM CHLORIDE 2001 ML: 9 INJECTION, SOLUTION INTRAVENOUS at 10:12

## 2024-04-13 NOTE — ED PROVIDER NOTES
[facility] EMERGENCY DEPARTMENT  EMERGENCY DEPARTMENT ENCOUNTER        Pt Name: Cait Kent  MRN: [id number]  Birthdate 1950  Date of evaluation: 4/13/2024  Provider: Gage Jimenes MD  Attending Provider: Brody Ramirez DO  PCP: Denton Velasquez MD  Note Started: 9:34 AM EDT 4/13/24    CHIEF COMPLAINT       Chief Complaint   Patient presents with    G Tube Complications     Patient sent for possible infected PEG tube       HISTORY OF PRESENT ILLNESS: 1 or more Elements   History From: The patient/nursing report        Cait Kent is a 73 y.o. female with a past medical history of urosepsis, type 2 diabetes, hyperlipidemia, neurogenic bowel status post PEG tube placement presenting with G-tube complications.  Patient was sent in from nursing home for concern of purulent appearing drainage from her PEG tube.  There is also evidence of graininess, bilious appearing discharge around the PEG tube site as well.    Patient is a poor historian and has little insight into her condition but does not endorse any abdominal pain nausea, vomiting, chest pain, or critical to breathing    Nursing Notes were all reviewed and agreed with or any disagreements were addressed in the HPI.      REVIEW OF EXTERNAL NOTE :       ER visit on 4/11/2024 for G-tube problem.  Patient's G-tube was replaced in the emergency room.    REVIEW OF SYSTEMS :           Positives and Pertinent negatives as per HPI.     SURGICAL HISTORY     Past Surgical History:   Procedure Laterality Date    BACK SURGERY         CURRENTMEDICATIONS       Previous Medications    ACETAMINOPHEN (TYLENOL) 325 MG TABLET    Take 2 tablets by mouth every 4 hours as needed for Pain    ALUMINUM-MAGNESIUM HYDROXIDE 200-200 MG/5ML SUSPENSION    Take 30 mLs by mouth every 4 hours    BACITRACIN 500 UNIT/GM OINTMENT    Apply topically daily Apply to peg tube site every night    BACLOFEN (LIORESAL) 10 MG TABLET

## 2024-04-15 LAB
MICROORGANISM SPEC CULT: ABNORMAL
SPECIMEN DESCRIPTION: ABNORMAL

## 2024-04-18 LAB
MICROORGANISM SPEC CULT: NORMAL
MICROORGANISM SPEC CULT: NORMAL
SERVICE CMNT-IMP: NORMAL
SERVICE CMNT-IMP: NORMAL
SPECIMEN DESCRIPTION: NORMAL
SPECIMEN DESCRIPTION: NORMAL

## 2024-04-19 ENCOUNTER — APPOINTMENT (OUTPATIENT)
Dept: CT IMAGING | Age: 74
End: 2024-04-19
Payer: MEDICARE

## 2024-04-19 ENCOUNTER — HOSPITAL ENCOUNTER (EMERGENCY)
Age: 74
Discharge: HOME | End: 2024-04-20
Attending: EMERGENCY MEDICINE
Payer: MEDICARE

## 2024-04-19 ENCOUNTER — APPOINTMENT (OUTPATIENT)
Dept: GENERAL RADIOLOGY | Age: 74
End: 2024-04-19
Payer: MEDICARE

## 2024-04-19 VITALS
TEMPERATURE: 98.4 F | HEIGHT: 64 IN | OXYGEN SATURATION: 96 % | RESPIRATION RATE: 18 BRPM | BODY MASS INDEX: 20.49 KG/M2 | WEIGHT: 120 LBS | DIASTOLIC BLOOD PRESSURE: 64 MMHG | SYSTOLIC BLOOD PRESSURE: 124 MMHG | HEART RATE: 87 BPM

## 2024-04-19 DIAGNOSIS — K94.23 MALFUNCTION OF GASTROSTOMY TUBE (HCC): Primary | ICD-10-CM

## 2024-04-19 DIAGNOSIS — S72.001D CLOSED FRACTURE OF BOTH HIPS WITH ROUTINE HEALING, SUBSEQUENT ENCOUNTER: Primary | ICD-10-CM

## 2024-04-19 DIAGNOSIS — S72.002D CLOSED FRACTURE OF BOTH HIPS WITH ROUTINE HEALING, SUBSEQUENT ENCOUNTER: Primary | ICD-10-CM

## 2024-04-19 PROCEDURE — 74176 CT ABD & PELVIS W/O CONTRAST: CPT

## 2024-04-19 PROCEDURE — 6360000004 HC RX CONTRAST MEDICATION: Performed by: EMERGENCY MEDICINE

## 2024-04-19 PROCEDURE — 74018 RADEX ABDOMEN 1 VIEW: CPT

## 2024-04-19 PROCEDURE — 99285 EMERGENCY DEPT VISIT HI MDM: CPT

## 2024-04-19 PROCEDURE — 43762 RPLC GTUBE NO REVJ TRC: CPT

## 2024-04-19 RX ADMIN — DIATRIZOATE MEGLUMINE AND DIATRIZOATE SODIUM 30 ML: 600; 100 SOLUTION ORAL; RECTAL at 21:28

## 2024-04-19 ASSESSMENT — PAIN - FUNCTIONAL ASSESSMENT: PAIN_FUNCTIONAL_ASSESSMENT: NONE - DENIES PAIN

## 2024-04-19 ASSESSMENT — LIFESTYLE VARIABLES
HOW OFTEN DO YOU HAVE A DRINK CONTAINING ALCOHOL: NEVER
HOW MANY STANDARD DRINKS CONTAINING ALCOHOL DO YOU HAVE ON A TYPICAL DAY: PATIENT DOES NOT DRINK

## 2024-04-20 ENCOUNTER — HOSPITAL ENCOUNTER (EMERGENCY)
Age: 74
Discharge: HOME | End: 2024-04-21
Attending: EMERGENCY MEDICINE
Payer: MEDICARE

## 2024-04-20 VITALS
TEMPERATURE: 97.7 F | DIASTOLIC BLOOD PRESSURE: 90 MMHG | OXYGEN SATURATION: 99 % | SYSTOLIC BLOOD PRESSURE: 135 MMHG | HEART RATE: 79 BPM | RESPIRATION RATE: 16 BRPM

## 2024-04-20 DIAGNOSIS — K94.23 LEAKING PEG TUBE (HCC): Primary | ICD-10-CM

## 2024-04-20 LAB
ALBUMIN SERPL-MCNC: 3.4 G/DL (ref 3.5–5.2)
ALP SERPL-CCNC: 472 U/L (ref 35–104)
ALT SERPL-CCNC: 51 U/L (ref 0–32)
ANION GAP SERPL CALCULATED.3IONS-SCNC: 7 MMOL/L (ref 7–16)
AST SERPL-CCNC: 39 U/L (ref 0–31)
BASOPHILS # BLD: 0.02 K/UL (ref 0–0.2)
BASOPHILS NFR BLD: 1 % (ref 0–2)
BILIRUB SERPL-MCNC: 1.3 MG/DL (ref 0–1.2)
BUN SERPL-MCNC: 14 MG/DL (ref 6–23)
CALCIUM SERPL-MCNC: 8.6 MG/DL (ref 8.6–10.2)
CHLORIDE SERPL-SCNC: 95 MMOL/L (ref 98–107)
CO2 SERPL-SCNC: 30 MMOL/L (ref 22–29)
CREAT SERPL-MCNC: 0.6 MG/DL (ref 0.5–1)
EOSINOPHIL # BLD: 0.07 K/UL (ref 0.05–0.5)
EOSINOPHILS RELATIVE PERCENT: 2 % (ref 0–6)
ERYTHROCYTE [DISTWIDTH] IN BLOOD BY AUTOMATED COUNT: 18.9 % (ref 11.5–15)
GFR SERPL CREATININE-BSD FRML MDRD: >90 ML/MIN/1.73M2
GLUCOSE BLD-MCNC: 109 MG/DL (ref 74–99)
GLUCOSE SERPL-MCNC: 215 MG/DL (ref 74–99)
HCT VFR BLD AUTO: 34.5 % (ref 34–48)
HGB BLD-MCNC: 10.4 G/DL (ref 11.5–15.5)
IMM GRANULOCYTES # BLD AUTO: <0.03 K/UL (ref 0–0.58)
IMM GRANULOCYTES NFR BLD: 0 % (ref 0–5)
LACTATE BLDV-SCNC: 2.1 MMOL/L (ref 0.5–2.2)
LACTATE BLDV-SCNC: 3.2 MMOL/L (ref 0.5–2.2)
LYMPHOCYTES NFR BLD: 0.78 K/UL (ref 1.5–4)
LYMPHOCYTES RELATIVE PERCENT: 18 % (ref 20–42)
MCH RBC QN AUTO: 29.1 PG (ref 26–35)
MCHC RBC AUTO-ENTMCNC: 30.1 G/DL (ref 32–34.5)
MCV RBC AUTO: 96.4 FL (ref 80–99.9)
MONOCYTES NFR BLD: 0.31 K/UL (ref 0.1–0.95)
MONOCYTES NFR BLD: 7 % (ref 2–12)
NEUTROPHILS NFR BLD: 73 % (ref 43–80)
NEUTS SEG NFR BLD: 3.25 K/UL (ref 1.8–7.3)
PLATELET # BLD AUTO: 258 K/UL (ref 130–450)
PMV BLD AUTO: 10.6 FL (ref 7–12)
POTASSIUM SERPL-SCNC: 4.9 MMOL/L (ref 3.5–5)
PROT SERPL-MCNC: 6.7 G/DL (ref 6.4–8.3)
RBC # BLD AUTO: 3.58 M/UL (ref 3.5–5.5)
SODIUM SERPL-SCNC: 132 MMOL/L (ref 132–146)
WBC OTHER # BLD: 4.4 K/UL (ref 4.5–11.5)

## 2024-04-20 PROCEDURE — 85025 COMPLETE CBC W/AUTO DIFF WBC: CPT

## 2024-04-20 PROCEDURE — 83605 ASSAY OF LACTIC ACID: CPT

## 2024-04-20 PROCEDURE — 80053 COMPREHEN METABOLIC PANEL: CPT

## 2024-04-20 PROCEDURE — 96361 HYDRATE IV INFUSION ADD-ON: CPT

## 2024-04-20 PROCEDURE — 99284 EMERGENCY DEPT VISIT MOD MDM: CPT

## 2024-04-20 PROCEDURE — 2580000003 HC RX 258

## 2024-04-20 PROCEDURE — 96360 HYDRATION IV INFUSION INIT: CPT

## 2024-04-20 PROCEDURE — 82962 GLUCOSE BLOOD TEST: CPT

## 2024-04-20 RX ORDER — ZINC OXIDE 20 %
OINTMENT (GRAM) TOPICAL
Qty: 1 EACH | Refills: 0 | Status: SHIPPED | OUTPATIENT
Start: 2024-04-20 | End: 2024-04-20

## 2024-04-20 RX ORDER — ZINC OXIDE 20 %
OINTMENT (GRAM) TOPICAL
Qty: 1 EACH | Refills: 0 | Status: SHIPPED | OUTPATIENT
Start: 2024-04-20 | End: 2024-04-26

## 2024-04-20 RX ORDER — 0.9 % SODIUM CHLORIDE 0.9 %
1000 INTRAVENOUS SOLUTION INTRAVENOUS ONCE
Status: COMPLETED | OUTPATIENT
Start: 2024-04-20 | End: 2024-04-20

## 2024-04-20 RX ADMIN — SODIUM CHLORIDE 1000 ML: 9 INJECTION, SOLUTION INTRAVENOUS at 18:44

## 2024-04-20 NOTE — ED PROVIDER NOTES
overflow was likely old stomach contents. [KM]   2038 General surgery was consulted. Pulled back PEG tube.  Recommends barrier cream.  Otherwise no emergent surgical intervention or further workup needed at this time from surgery standpoint.    Discharge pending repeat lactic acid. [JA]      ED Course User Index  [JA] Tiffanie Moulton MD  [KM] Audrey Cooley MD      She is a 73-year-old female with a history of DM, HLD, anxiety, TETE, HTN who had PEG tube replaced most recently yesterday but had initially put in many years ago presenting today for concerns for leakage of stool around the PEG tube that was replaced yesterday.  PEG tube is reported to be in place.  Differentials include but not limited to PEG tube blockage, small bowel obstruction, cellulitis.  At the time of my exam the patient is resting comfortably in bed in no acute distress.  Her vitals are unremarkable and within normal limits.  She is afebrile.  Exam shows heart regular rate and rhythm, lungs clear to auscultation bilaterally.  She has mild generalized tenderness with no guarding.  Her PEG tube does appear to be in place however there is mild erythema surrounding PEG tube insertion site.  PEG tube insertion site is also covered with what appears to be gastric contents.  CBC shows WBC of 4.4 and hemoglobin of 10.4 that are relatively stable with prior.  CMP is stable with prior.  Lactic is 3.2.  She was given 1 L bolus of fluid with improvement to 2.1.  Glucose is 109.  The patient was evaluated by general surgery and PEG tube was pulled back a bit as they state it was a little distal.  They do not recommend surgical intervention or need for CT scan at this time.  They do recommend barrier cream for irritation.  Patient's vitals have remained stable throughout her stay.  I discussed lab results with the patient as well as recommendations from general surgery as well as the plan for discharge back to facility and she is agreeable.  She  was given follow-up with Dr. Coleman who placed her PEG tube many years ago as well as her PCP.  Patient is stable for discharge at this time      CONSULTS: (Who and What was discussed)  IP CONSULT TO GENERAL SURGERY      I am the Primary Clinician of Record.    FINAL IMPRESSION      1. Leaking PEG tube (HCC)          DISPOSITION/PLAN     DISPOSITION Decision To Discharge 04/20/2024 09:43:04 PM      PATIENT REFERRED TO:  Jose Coleman MD  [address]  [address]  [place] [id number]  [phone]    Schedule an appointment as soon as possible for a visit       Denton Velasquez MD  [address] [facility] [address]  [address]  [facility] [address]  [phone]    Schedule an appointment as soon as possible for a visit         DISCHARGE MEDICATIONS:  Current Discharge Medication List        START taking these medications    Details   zinc oxide 20 % ointment Apply topically as needed to area around peg tube  Qty: 1 each, Refills: 0             DISCONTINUED MEDICATIONS:  Current Discharge Medication List                 (Please note that portions of this note were completed with a voice recognition program.  Efforts were made to edit the dictations but occasionally words are mis-transcribed.)    Audrey Cooley MD (electronically signed)

## 2024-04-21 NOTE — CONSULTS
2 tablets by Per G Tube route nightly as needed    Den Tomlinson MD   famotidine (PEPCID) 20 MG tablet 1 tablet by PEG Tube route daily    Den Tomlinson MD   metoclopramide (REGLAN) 5 MG tablet 1 tablet by Per G Tube route 4 times daily    Den Tomlinson MD   senna (SENOKOT) 8.6 MG tablet Take 1 tablet by mouth daily    Den Tomlinson MD   metFORMIN (GLUCOPHAGE) 500 MG tablet 1 tablet by PEG Tube route 2 times daily (with meals) 10/7/19   Nathaniel Angel MD   metoprolol tartrate 75 MG TABS 75 mg by PEG Tube route 2 times daily 10/7/19   Nathaniel Angel MD   levothyroxine (SYNTHROID) 75 MCG tablet 1 tablet by PEG Tube route Daily 10/8/19   Nathaniel Angel MD   insulin lispro (HUMALOG) 100 UNIT/ML injection vial Inject into the skin 3 times daily (before meals) Per Sliding Scale:     = 0 units  140-199 = 5 units  200-249 = 10 units  250-299 = 15 units  300-349 = 20 units  350-399 = 25 units  > 400 = 30 units    Den Tomlinson MD   vitamin D (CHOLECALCIFEROL) 1000 UNIT TABS tablet 4 tablets by PEG Tube route daily    Den Tomlinson MD   Docusate Sodium 100 MG/10ML LIQD 1 mL by PEG Tube route in the morning and at bedtime    Den Tomlinson MD   rosuvastatin (CRESTOR) 10 MG tablet Take 1 tablet by mouth nightly    Den Tomlinson MD   acetaminophen (TYLENOL) 325 MG tablet 2 tablets by PEG Tube route every 4 hours as needed for Pain    Den Tomlinson MD   lisinopril (PRINIVIL;ZESTRIL) 5 MG tablet Take 1 tablet by mouth daily    Den Tomlinson MD       No Known Allergies    Family History   Problem Relation Age of Onset    Diabetes Father        Social History     Tobacco Use    Smoking status: Former     Current packs/day: 0.50     Types: Cigarettes    Smokeless tobacco: Never   Vaping Use    Vaping Use: Unknown   Substance Use Topics    Alcohol use: No    Drug use: No         Review of Systems   General ROS:

## 2024-04-24 ENCOUNTER — HOSPITAL ENCOUNTER (OUTPATIENT)
Dept: GENERAL RADIOLOGY | Age: 74
Discharge: HOME | End: 2024-04-26
Payer: MEDICARE

## 2024-04-24 ENCOUNTER — OFFICE VISIT (OUTPATIENT)
Dept: ORTHOPEDIC SURGERY | Age: 74
End: 2024-04-24
Payer: MEDICARE

## 2024-04-24 VITALS — WEIGHT: 119.93 LBS | HEIGHT: 64 IN | BODY MASS INDEX: 20.47 KG/M2

## 2024-04-24 DIAGNOSIS — S72.001D CLOSED FRACTURE OF BOTH HIPS WITH ROUTINE HEALING, SUBSEQUENT ENCOUNTER: ICD-10-CM

## 2024-04-24 DIAGNOSIS — S72.002D CLOSED FRACTURE OF BOTH HIPS WITH ROUTINE HEALING, SUBSEQUENT ENCOUNTER: ICD-10-CM

## 2024-04-24 DIAGNOSIS — S72.002D CLOSED FRACTURE OF BOTH HIPS WITH ROUTINE HEALING, SUBSEQUENT ENCOUNTER: Primary | ICD-10-CM

## 2024-04-24 DIAGNOSIS — S72.001D CLOSED FRACTURE OF BOTH HIPS WITH ROUTINE HEALING, SUBSEQUENT ENCOUNTER: Primary | ICD-10-CM

## 2024-04-24 PROCEDURE — 1090F PRES/ABSN URINE INCON ASSESS: CPT | Performed by: PHYSICIAN ASSISTANT

## 2024-04-24 PROCEDURE — 99213 OFFICE O/P EST LOW 20 MIN: CPT | Performed by: PHYSICIAN ASSISTANT

## 2024-04-24 PROCEDURE — 3017F COLORECTAL CA SCREEN DOC REV: CPT | Performed by: PHYSICIAN ASSISTANT

## 2024-04-24 PROCEDURE — G8427 DOCREV CUR MEDS BY ELIG CLIN: HCPCS | Performed by: PHYSICIAN ASSISTANT

## 2024-04-24 PROCEDURE — G8420 CALC BMI NORM PARAMETERS: HCPCS | Performed by: PHYSICIAN ASSISTANT

## 2024-04-24 PROCEDURE — 73521 X-RAY EXAM HIPS BI 2 VIEWS: CPT

## 2024-04-24 PROCEDURE — 99213 OFFICE O/P EST LOW 20 MIN: CPT

## 2024-04-24 PROCEDURE — 1036F TOBACCO NON-USER: CPT | Performed by: PHYSICIAN ASSISTANT

## 2024-04-24 PROCEDURE — G8400 PT W/DXA NO RESULTS DOC: HCPCS | Performed by: PHYSICIAN ASSISTANT

## 2024-04-24 PROCEDURE — 1123F ACP DISCUSS/DSCN MKR DOCD: CPT | Performed by: PHYSICIAN ASSISTANT

## 2024-04-24 PROCEDURE — 1111F DSCHRG MED/CURRENT MED MERGE: CPT | Performed by: PHYSICIAN ASSISTANT

## 2024-04-24 RX ORDER — LORAZEPAM 0.5 MG/1
0.5 TABLET ORAL EVERY 12 HOURS PRN
COMMUNITY
Start: 2023-11-11

## 2024-04-24 NOTE — PATIENT INSTRUCTIONS
INSTRUCTIONS FOR FACILITY      Weight Bearing: no weightbearing on bilateral lower extremity. Patient is nonweightbearing at baseline. No orthopedic intervention for bilateral hip fractures due to baseline paraplegia and non-ambulatory status. She has no sensation to her legs at baseline, so not in any pain.     Skin Care: Recommend daily skin checks to bilateral hips x1 month. Call with any questions or concerns.     Follow up as needed with orthopedics.    Call with any questions or concerns.   [phone]

## 2024-04-24 NOTE — PROGRESS NOTES
New Patient Orthopaedic Progress Note    Cait Kent is a 73 y.o. female, her YOB: 1950 with the following history as recorded in [facility]:      Patient Active Problem List    Diagnosis Date Noted    Bilateral closed hip fractures ([facility]) 03/30/2024    Moderate protein-calorie malnutrition ([facility]) 09/23/2019    Septic shock ([facility]) 09/22/2019    Sepsis ([facility]) 09/13/2019    Anemia 07/21/2019    Severe episode of recurrent major depressive disorder, without psychotic features ([facility]) 07/11/2019    Altered mental status 07/02/2019    Hypotension 07/02/2019    Suicidal ideation 04/04/2019    Lumbar degenerative disc disease 03/08/2018    Midline low back pain with sciatica 03/18/2016     Current Outpatient Medications   Medication Sig Dispense Refill    LORazepam (ATIVAN) 0.5 MG tablet Take 1 tablet by mouth every 12 hours as needed for Anxiety. Max Daily Amount: 1 mg      zinc oxide 20 % ointment Apply topically as needed to area around peg tube 1 each 0    gabapentin (NEURONTIN) 600 MG tablet 1 tablet by PEG Tube route nightly.      Insulin Glargine (LANTUS SOLOSTAR SC) Inject 64 Units into the skin nightly      ferrous sulfate (TIMMY-IN-SOL) 75 (15 Fe) MG/ML solution 7.4 mLs by PEG Tube route daily      polyethylene glycol (GLYCOLAX) 17 GM/SCOOP powder Take 17 g by mouth daily (Patient taking differently: 17 g daily Via PEG tube) 1530 g 1    glycerin 2 g suppository Place 1 suppository rectally daily as needed for Constipation 5 suppository 0    pantoprazole (PROTONIX) 40 MG tablet Take 1 tablet by mouth daily (with breakfast) (Patient taking differently: Take 1 tablet by mouth daily (with breakfast) Via PEG tube) 30 tablet 3    gabapentin (NEURONTIN) 400 MG capsule 1 capsule by PEG Tube route in the morning and at bedtime.      traZODone (DESYREL) 50 MG tablet 1 tablet by PEG Tube route nightly      ondansetron (ZOFRAN) 4 MG tablet 1 tablet by PEG Tube route every 4 hours as needed for Nausea or Vomiting

## 2024-04-25 ENCOUNTER — APPOINTMENT (OUTPATIENT)
Dept: GENERAL RADIOLOGY | Age: 74
End: 2024-04-25
Payer: MEDICARE

## 2024-04-25 ENCOUNTER — HOSPITAL ENCOUNTER (EMERGENCY)
Age: 74
Discharge: NURSING, REHAB OR LONG-TERM CARE | End: 2024-04-26
Attending: EMERGENCY MEDICINE
Payer: MEDICARE

## 2024-04-25 DIAGNOSIS — K94.23 LEAKING PEG TUBE (HCC): Primary | ICD-10-CM

## 2024-04-25 LAB
GLUCOSE BLD-MCNC: 78 MG/DL (ref 74–99)
GLUCOSE BLD-MCNC: 93 MG/DL (ref 74–99)

## 2024-04-25 PROCEDURE — 96374 THER/PROPH/DIAG INJ IV PUSH: CPT

## 2024-04-25 PROCEDURE — 43762 RPLC GTUBE NO REVJ TRC: CPT

## 2024-04-25 PROCEDURE — 6360000004 HC RX CONTRAST MEDICATION

## 2024-04-25 PROCEDURE — 82962 GLUCOSE BLOOD TEST: CPT

## 2024-04-25 PROCEDURE — 74018 RADEX ABDOMEN 1 VIEW: CPT

## 2024-04-25 PROCEDURE — 99284 EMERGENCY DEPT VISIT MOD MDM: CPT

## 2024-04-25 RX ADMIN — DIATRIZOATE MEGLUMINE AND DIATRIZOATE SODIUM 30 ML: 600; 100 SOLUTION ORAL; RECTAL at 23:47

## 2024-04-25 ASSESSMENT — ENCOUNTER SYMPTOMS
ABDOMINAL DISTENTION: 0
NAUSEA: 0
DIARRHEA: 0
EYE DISCHARGE: 0
EYE PAIN: 0
SHORTNESS OF BREATH: 0
WHEEZING: 0
VOMITING: 0
SORE THROAT: 0
EYE REDNESS: 0
COUGH: 0
BACK PAIN: 0
SINUS PRESSURE: 0

## 2024-04-25 NOTE — ED PROVIDER NOTES
72-year-old female presents to the emergency department with leakage from PEG tube.  Reportedly has been an ongoing issue and has been seen in multiple times in the emergency department for the same complaint.  She reports no fevers or chills no nausea vomiting diarrhea abdominal pain or other complaints she does report she is tired of continuing to come to the emergency department with issues with this.  She states no other complaints at this time..  She does report some irritation on her abdomen from leakage from the PEG           Review of Systems   Constitutional:  Negative for chills and fever.   HENT:  Negative for ear pain, sinus pressure and sore throat.    Eyes:  Negative for pain, discharge and redness.   Respiratory:  Negative for cough, shortness of breath and wheezing.    Cardiovascular:  Negative for chest pain.   Gastrointestinal:  Negative for abdominal distention, diarrhea, nausea and vomiting.   Genitourinary:  Negative for dysuria and frequency.   Musculoskeletal:  Negative for arthralgias and back pain.   Skin:  Positive for rash. Negative for wound.   Neurological:  Negative for weakness and headaches.   Hematological:  Negative for adenopathy.   All other systems reviewed and are negative.       Physical Exam  Constitutional:       Appearance: Normal appearance.   HENT:      Head: Normocephalic and atraumatic.      Nose: Nose normal.      Mouth/Throat:      Mouth: Mucous membranes are moist.   Eyes:      Extraocular Movements: Extraocular movements intact.      Pupils: Pupils are equal, round, and reactive to light.   Cardiovascular:      Rate and Rhythm: Normal rate and regular rhythm.      Pulses: Normal pulses.      Heart sounds: Normal heart sounds.   Pulmonary:      Effort: Pulmonary effort is normal.      Breath sounds: Normal breath sounds.   Abdominal:      General: Abdomen is flat. Bowel sounds are normal. There is no distension.      Palpations: Abdomen is soft.      Tenderness:  tube (HCC)        Disposition:  Patient's disposition: Discharge to nursing home  Patient's condition is stable.       Shankar Mccracken DO  04/25/24 0709

## 2024-04-26 VITALS
SYSTOLIC BLOOD PRESSURE: 126 MMHG | OXYGEN SATURATION: 100 % | HEART RATE: 75 BPM | RESPIRATION RATE: 20 BRPM | DIASTOLIC BLOOD PRESSURE: 75 MMHG | TEMPERATURE: 98.2 F

## 2024-04-26 LAB
GLUCOSE BLD-MCNC: 117 MG/DL (ref 74–99)
GLUCOSE BLD-MCNC: 47 MG/DL (ref 74–99)
GLUCOSE BLD-MCNC: 51 MG/DL (ref 74–99)
GLUCOSE BLD-MCNC: 84 MG/DL (ref 74–99)

## 2024-04-26 PROCEDURE — 82962 GLUCOSE BLOOD TEST: CPT

## 2024-04-26 RX ORDER — NICOTINE POLACRILEX 4 MG
15 LOZENGE BUCCAL SEE ADMIN INSTRUCTIONS
COMMUNITY

## 2024-04-26 ASSESSMENT — PAIN - FUNCTIONAL ASSESSMENT
PAIN_FUNCTIONAL_ASSESSMENT: NONE - DENIES PAIN
PAIN_FUNCTIONAL_ASSESSMENT: NONE - DENIES PAIN

## 2024-04-26 NOTE — PROGRESS NOTES
[facility] PRE-ADMISSION TESTING INSTRUCTIONS    The Preadmission Testing patient is instructed accordingly using the following criteria (check applicable):    ARRIVAL INSTRUCTIONS:  [x] Parking the day of Surgery is located in the Main Entrance lot.  Upon entering the door, make an immediate right to the surgery reception desk    [x] Bring photo ID and insurance card    [x] Bring in a copy of Living will or Durable Power of  papers.    [x] Please be sure to arrange for a responsible adult to provide transportation to and from the hospital    [x] Please arrange for a responsible adult to be with you for the 24 hour period post procedure due to having anesthesia    [x] If you awake am of surgery not feeling well or have temperature >100 please call [phone]    GENERAL INSTRUCTIONS:    [x] No solid foods after midnight, may have up to 8oz of water until 4 hours prior to surgery. No gum, no candy, no mints. STOP ALL TUBE FEEDINGS AT MIDNIGHT       [x] You may brush your teeth, do not swallow any toothpaste    [x] Take medications as instructed     [x] Stop herbal supplements and vitamins 5 days prior to procedure    [x] Follow preop dosing of blood thinners per physician instructions    [x] Take 1/2 dose of evening insulin, but no insulin after midnight    [x] No oral diabetic medications after midnight    [x] If diabetic and have low blood sugar or feel symptomatic, take 1-2oz apple juice only    [x] Shower or bath with soap, lather and rinse well, AM of Surgery, no lotion, powders or creams to surgical site    [x] No tobacco products within 24 hours of surgery     [x] No alcohol or illegal drug use, marijuana included, within 24 hours of surgery.    [x] Jewelry, body piercing's, eyeglasses, contact lenses and dentures are not permitted into surgery (bring cases)      [x] Please do not wear any nail polish, make up or hair products on the day of surgery    [x] You can expect a

## 2024-04-26 NOTE — PROGRESS NOTES
Attempted to notify Dr Coleman's office that patient was seen in ED on 4/25/24, office is closed.

## 2024-04-26 NOTE — ED PROVIDER NOTES
74 y/o female present to from the nursing home for the concerns of PEG tube malfunctioning. Per nursing report there was concern for leakage from the peg tube. The patient currently does not report pain or discomfort around the peg tube site.     Chief Complaint   Patient presents with    peg tube problems        Review of Systems   Constitutional:  Negative for chills and fever.   HENT:  Negative for ear pain, sinus pressure and sore throat.    Eyes:  Negative for pain, discharge and redness.   Respiratory:  Negative for cough, shortness of breath and wheezing.    Cardiovascular:  Negative for chest pain.   Gastrointestinal:  Negative for abdominal distention, diarrhea, nausea and vomiting.   Genitourinary:  Negative for dysuria and frequency.   Musculoskeletal:  Negative for arthralgias and back pain.   Skin:  egative for wound.   Neurological:  Negative for weakness and headaches.   Hematological:  Negative for adenopathy.   All other systems reviewed and are negative.  Physical Exam  Vitals reviewed.   Constitutional:       General: She is not in acute distress.     Appearance: She is not ill-appearing.   HENT:      Head: Normocephalic.      Right Ear: External ear normal.      Left Ear: External ear normal.      Nose: Nose normal.      Mouth/Throat:      Mouth: Mucous membranes are moist.   Eyes:      General:         Right eye: No discharge.         Left eye: No discharge.      Conjunctiva/sclera: Conjunctivae normal.   Cardiovascular:      Rate and Rhythm: Normal rate and regular rhythm.      Heart sounds:      No friction rub. No gallop.   Pulmonary:      Effort: No respiratory distress.      Breath sounds: No stridor.   Abdominal:      General: There is no distension.      Tenderness: There is no abdominal tenderness. There is no guarding or rebound.      Comments: Minimal leakage of the PEG tube site. With no cellulitic changes or hernia fel on examination    Musculoskeletal:         General: No deformity

## 2024-04-26 NOTE — PROGRESS NOTES
Patient is a resident at [facility] telephone assessment completed with Nathalie Mena LPN.      A&O:  YES  Code Status:  FULL  Ambulatory:  NO - MITALI  Oxygen:  N/A  Hard of Hearing:  NO  Call light:  YES  Signs Documents?  YES  POA:  N/A  Transport:  [facility]   Wounds:  LEFT FOOT  Isolation:  YES - ESBL  Lines/Drains/Implanted devices:  PEG      Verified arrival time of 0530 with facility.  General instructions and medication instructions faxed to facility.

## 2024-04-29 ENCOUNTER — PREP FOR PROCEDURE (OUTPATIENT)
Dept: SURGERY | Age: 74
End: 2024-04-29

## 2024-04-29 ENCOUNTER — ANESTHESIA EVENT (OUTPATIENT)
Dept: ENDOSCOPY | Age: 74
End: 2024-04-29
Payer: MEDICARE

## 2024-04-29 RX ORDER — SODIUM CHLORIDE 9 MG/ML
INJECTION, SOLUTION INTRAVENOUS CONTINUOUS
Status: CANCELLED | OUTPATIENT
Start: 2024-04-29

## 2024-04-29 NOTE — H&P (VIEW-ONLY)
Chart - [id number]  User  LR         Summary  Viewing date range:  03/26/19 - 04/30/24  Anxiety  Depression  Obesity  Hypothyroidism  Non-insulin dependent type 2 diabetes mellitus  Insulin dependent diabetes mellitus  Back pain  Arthritis  GERD (gastroesophageal reflux disease)  Hypercholesterolemia  Hypertension  Sleep apnea  S/P insertion of spinal cord stimulator  Status post epidural steroid injection  History of esophagogastroduodenoscopy (EGD)  History of colonoscopy  EGF Eval  04/20/24 02/14/24 04/24/24 07/12/22  Unable to Reach Pt for Fluoro  02/28/24  CT Abd Pelvis wo Con  04/19/24 04/01/19 04/01/19  XR Abd for NG/OG/NE Tube Placement  04/19/24  Unable to reach the pt for Radiology Imaging  04/15/24  04/01/19  04/24/24  02/14/24  04/02/19  04/02/19  04/24/24  07/13/22  04/01/19  07/12/22  Primary Language  English  Preferred Language For Discussing Healthcare  English   Required  Hx MRSA  Hx Vancomycin-Resistant Enterococci  Clinical Trial Participation  Clinical Trial Designation  04/01/19 04/24/24 02/14/24 04/02/19 04/02/19 04/24/24 07/13/22 04/01/19  Employment  [employer]  Portal  Preferred Phone  [phone]  Address  [place] [address]  Next of Kin  Person to Notify  Primary Insurance  Medicare Part A & B  No Data to Display  DATE  LOCATION/  PROVIDER  REASON FOR VISIT  04/30/24  08:00  Jose Banks MD  egd peg replacement  DATE  LOCATION/  PROVIDER  PROBLEM  04/24/24  Jose Coleman MD  Gastrostomy malfunction  04/24/24  Jose Coleman MD  Gastrostomy malfunction  04/24/24  [address]  Jose Coleman MD  Gastrostomy malfunction  04/24/24  [address]  Jose Coleman MD  ABNORCXR  04/01/24  Brant Durán Jr, MD  03/30/24  Jordyn Henson,   02/14/24  [address]  Aguila Gomes MD  Vomiting  02/14/24  [address]  Aguila Gomes MD  Nausea with

## 2024-04-29 NOTE — H&P
Chart - [id number]  User  LR         Summary  Viewing date range:  03/26/19 - 04/30/24  Anxiety  Depression  Obesity  Hypothyroidism  Non-insulin dependent type 2 diabetes mellitus  Insulin dependent diabetes mellitus  Back pain  Arthritis  GERD (gastroesophageal reflux disease)  Hypercholesterolemia  Hypertension  Sleep apnea  S/P insertion of spinal cord stimulator  Status post epidural steroid injection  History of esophagogastroduodenoscopy (EGD)  History of colonoscopy  EGF Eval  04/20/24 02/14/24 04/24/24 07/12/22  Unable to Reach Pt for Fluoro  02/28/24  CT Abd Pelvis wo Con  04/19/24 04/01/19 04/01/19  XR Abd for NG/OG/NE Tube Placement  04/19/24  Unable to reach the pt for Radiology Imaging  04/15/24  04/01/19  04/24/24  02/14/24  04/02/19  04/02/19  04/24/24  07/13/22  04/01/19  07/12/22  Primary Language  English  Preferred Language For Discussing Healthcare  English   Required  Hx MRSA  Hx Vancomycin-Resistant Enterococci  Clinical Trial Participation  Clinical Trial Designation  04/01/19 04/24/24 02/14/24 04/02/19 04/02/19 04/24/24 07/13/22 04/01/19  Employment  [employer]  Portal  Preferred Phone  [phone]  Address  [place] [address]  Next of Kin  Person to Notify  Primary Insurance  Medicare Part A & B  No Data to Display  DATE  LOCATION/  PROVIDER  REASON FOR VISIT  04/30/24  08:00  Jose Banks MD  egd peg replacement  DATE  LOCATION/  PROVIDER  PROBLEM  04/24/24  Jose Coleman MD  Gastrostomy malfunction  04/24/24  Jose Coleman MD  Gastrostomy malfunction  04/24/24  [address]  Jose Coleman MD  Gastrostomy malfunction  04/24/24  SHSW [address]  Jose Coleman MD  ABNORCXR  04/01/24  Brant Durán Jr, MD  03/30/24  Jordyn Henson,   02/14/24  [address]  Aguila Gomes MD  Vomiting  02/14/24  [address]  Aguila Gomes MD  Nausea with

## 2024-04-29 NOTE — ANESTHESIA PRE PROCEDURE
Department of Anesthesiology  Preprocedure Note       Name:  Cait Kent   Age:  73 y.o.  :  1950                                          MRN:  [id number]         Date:  2024      Surgeon: Surgeon(s):  Jose Coleman MD    Procedure: ESOPHAGOGASTRODUODENOSCOPY PERCUTANEOUS ENDOSCOPIC GASTROSTOMY TUBE REPLACEMENT   ++FACILITY++   (CONTACT ISOLATION)    Medications prior to admission:   Prior to Admission medications    Medication Sig Start Date End Date Taking? Authorizing Provider   Lactobacillus Acidophilus POWD by Does not apply route   Yes Den Tomlinson MD   glucose (GLUTOSE) 40 % GEL Take 37.5 mLs by mouth See Admin Instructions   Yes Den Tomlinson MD   LORazepam (ATIVAN) 0.5 MG tablet Take 1 tablet by mouth every 12 hours as needed for Anxiety. 23   Den Tomlinson MD   gabapentin (NEURONTIN) 600 MG tablet 1 tablet by PEG Tube route nightly.    Den Tomlinson MD   Insulin Glargine (LANTUS SOLOSTAR SC) Inject 64 Units into the skin nightly    Den Tomlinson MD   ferrous sulfate (TIMMY-IN-SOL) 75 (15 Fe) MG/ML solution 7.4 mLs by PEG Tube route daily    Den Tomlinson MD   senna (SENOKOT) 8.6 MG TABS tablet Take 1 tablet by mouth daily 24   Brisa Francisco DO   polyethylene glycol (GLYCOLAX) 17 GM/SCOOP powder Take 17 g by mouth daily  Patient taking differently: 17 g daily Via PEG tube 4/5/24 10/2/24  Brisa Francisco DO   glycerin 2 g suppository Place 1 suppository rectally daily as needed for Constipation 24   Lio Corey DO   pantoprazole (PROTONIX) 40 MG tablet Take 1 tablet by mouth daily (with breakfast)  Patient taking differently: Take 1 tablet by mouth daily (with breakfast) Via PEG tube 24   Brant Treadwell MD   gabapentin (NEURONTIN) 400 MG capsule 1 capsule by PEG Tube route in the morning and at bedtime.    Den Tomlinson MD   traZODone (DESYREL) 50 MG tablet 1 tablet by PEG Tube route nightly    [place]

## 2024-04-30 ENCOUNTER — HOSPITAL ENCOUNTER (OUTPATIENT)
Age: 74
Setting detail: OUTPATIENT SURGERY
Discharge: NURSING, REHAB OR LONG-TERM CARE | End: 2024-04-30
Attending: SURGERY | Admitting: SURGERY
Payer: MEDICARE

## 2024-04-30 ENCOUNTER — ANESTHESIA (OUTPATIENT)
Dept: ENDOSCOPY | Age: 74
End: 2024-04-30
Payer: MEDICARE

## 2024-04-30 VITALS
HEIGHT: 65 IN | TEMPERATURE: 98 F | RESPIRATION RATE: 17 BRPM | BODY MASS INDEX: 21.16 KG/M2 | HEART RATE: 68 BPM | WEIGHT: 127 LBS | OXYGEN SATURATION: 96 % | SYSTOLIC BLOOD PRESSURE: 120 MMHG | DIASTOLIC BLOOD PRESSURE: 68 MMHG

## 2024-04-30 LAB — GLUCOSE BLD-MCNC: 135 MG/DL (ref 74–99)

## 2024-04-30 PROCEDURE — 82962 GLUCOSE BLOOD TEST: CPT

## 2024-04-30 PROCEDURE — 7100000011 HC PHASE II RECOVERY - ADDTL 15 MIN: Performed by: SURGERY

## 2024-04-30 PROCEDURE — 3700000000 HC ANESTHESIA ATTENDED CARE: Performed by: SURGERY

## 2024-04-30 PROCEDURE — 2709999900 HC NON-CHARGEABLE SUPPLY: Performed by: SURGERY

## 2024-04-30 PROCEDURE — 6360000002 HC RX W HCPCS: Performed by: NURSE ANESTHETIST, CERTIFIED REGISTERED

## 2024-04-30 PROCEDURE — 7100000010 HC PHASE II RECOVERY - FIRST 15 MIN: Performed by: SURGERY

## 2024-04-30 PROCEDURE — 2580000003 HC RX 258: Performed by: NURSE ANESTHETIST, CERTIFIED REGISTERED

## 2024-04-30 PROCEDURE — 3609013300 HC EGD TUBE PLACEMENT: Performed by: SURGERY

## 2024-04-30 RX ORDER — PROPOFOL 10 MG/ML
INJECTION, EMULSION INTRAVENOUS PRN
Status: DISCONTINUED | OUTPATIENT
Start: 2024-04-30 | End: 2024-04-30 | Stop reason: SDUPTHER

## 2024-04-30 RX ORDER — NALOXONE HYDROCHLORIDE 0.4 MG/ML
INJECTION, SOLUTION INTRAMUSCULAR; INTRAVENOUS; SUBCUTANEOUS PRN
Status: DISCONTINUED | OUTPATIENT
Start: 2024-04-30 | End: 2024-04-30 | Stop reason: HOSPADM

## 2024-04-30 RX ORDER — SODIUM CHLORIDE 9 MG/ML
INJECTION, SOLUTION INTRAVENOUS CONTINUOUS PRN
Status: DISCONTINUED | OUTPATIENT
Start: 2024-04-30 | End: 2024-04-30 | Stop reason: SDUPTHER

## 2024-04-30 RX ORDER — SODIUM CHLORIDE 9 MG/ML
INJECTION, SOLUTION INTRAVENOUS PRN
Status: DISCONTINUED | OUTPATIENT
Start: 2024-04-30 | End: 2024-04-30 | Stop reason: HOSPADM

## 2024-04-30 RX ORDER — FENTANYL CITRATE 50 UG/ML
25 INJECTION, SOLUTION INTRAMUSCULAR; INTRAVENOUS EVERY 5 MIN PRN
Status: DISCONTINUED | OUTPATIENT
Start: 2024-04-30 | End: 2024-04-30 | Stop reason: HOSPADM

## 2024-04-30 RX ORDER — SODIUM CHLORIDE 0.9 % (FLUSH) 0.9 %
5-40 SYRINGE (ML) INJECTION EVERY 12 HOURS SCHEDULED
Status: DISCONTINUED | OUTPATIENT
Start: 2024-04-30 | End: 2024-04-30 | Stop reason: HOSPADM

## 2024-04-30 RX ORDER — SODIUM CHLORIDE 0.9 % (FLUSH) 0.9 %
5-40 SYRINGE (ML) INJECTION PRN
Status: DISCONTINUED | OUTPATIENT
Start: 2024-04-30 | End: 2024-04-30 | Stop reason: HOSPADM

## 2024-04-30 RX ORDER — SODIUM CHLORIDE 9 MG/ML
INJECTION, SOLUTION INTRAVENOUS CONTINUOUS
Status: DISCONTINUED | OUTPATIENT
Start: 2024-04-30 | End: 2024-04-30 | Stop reason: HOSPADM

## 2024-04-30 RX ORDER — ONDANSETRON 2 MG/ML
4 INJECTION INTRAMUSCULAR; INTRAVENOUS
Status: DISCONTINUED | OUTPATIENT
Start: 2024-04-30 | End: 2024-04-30 | Stop reason: HOSPADM

## 2024-04-30 RX ADMIN — PROPOFOL 50 MG: 10 INJECTION, EMULSION INTRAVENOUS at 07:35

## 2024-04-30 RX ADMIN — PROPOFOL 50 MG: 10 INJECTION, EMULSION INTRAVENOUS at 07:32

## 2024-04-30 RX ADMIN — SODIUM CHLORIDE: 9 INJECTION, SOLUTION INTRAVENOUS at 07:32

## 2024-04-30 ASSESSMENT — PAIN - FUNCTIONAL ASSESSMENT: PAIN_FUNCTIONAL_ASSESSMENT: 0-10

## 2024-04-30 NOTE — ANESTHESIA POSTPROCEDURE EVALUATION
Department of Anesthesiology  Postprocedure Note    Patient: Cait Kent  MRN: [id number]  YOB: 1950  Date of evaluation: 4/30/2024    Procedure Summary       Date: 04/30/24 Room / Location: [place] [address] / [facility]    Anesthesia Start: 0731 Anesthesia Stop: 0740    Procedure: EGD PEG EXCHANGE Diagnosis:       PEG tube malfunction (HCC)      (PEG tube malfunction (HCC) [K94.23])    Surgeons: Jose Coleman MD Responsible Provider: Rohan Bailey MD    Anesthesia Type: MAC ASA Status: Not recorded            Anesthesia Type: MAC    Rufina Phase I: Rufina Score: 10    Rufina Phase II: Rufina Score: 10    Anesthesia Post Evaluation    Patient location during evaluation: PACU  Patient participation: complete - patient participated  Level of consciousness: awake and alert  Pain score: 2  Airway patency: patent  Nausea & Vomiting: no nausea and no vomiting  Cardiovascular status: blood pressure returned to baseline  Respiratory status: acceptable  Hydration status: euvolemic  Pain management: adequate    No notable events documented.

## 2024-04-30 NOTE — ANESTHESIA PRE PROCEDURE
Department of Anesthesiology  Preprocedure Note       Name:  Cait Kent   Age:  73 y.o.  :  1950                                          MRN:  [id number]         Date:  2024      Surgeon: Surgeon(s):  Jose Coleman MD    Procedure: Procedure(s):  ESOPHAGOGASTRODUODENOSCOPY PERCUTANEOUS ENDOSCOPIC GASTROSTOMY TUBE REPLACEMENT   ++FACILITY++   (CONTACT ISOLATION)    Medications prior to admission:   Prior to Admission medications    Medication Sig Start Date End Date Taking? Authorizing Provider   Lactobacillus Acidophilus POWD by Does not apply route   Yes Den Tomlinson MD   glucose (GLUTOSE) 40 % GEL Take 37.5 mLs by mouth See Admin Instructions   Yes Den Tomlinson MD   LORazepam (ATIVAN) 0.5 MG tablet Take 1 tablet by mouth every 12 hours as needed for Anxiety. 23   Den Tomlinson MD   gabapentin (NEURONTIN) 600 MG tablet 1 tablet by PEG Tube route nightly.    Den Tomlinson MD   Insulin Glargine (LANTUS SOLOSTAR SC) Inject 64 Units into the skin nightly    Den Tomlinson MD   ferrous sulfate (TIMMY-IN-SOL) 75 (15 Fe) MG/ML solution 7.4 mLs by PEG Tube route daily    Den Tomlinson MD   senna (SENOKOT) 8.6 MG TABS tablet Take 1 tablet by mouth daily 24   Brisa Francisco DO   polyethylene glycol (GLYCOLAX) 17 GM/SCOOP powder Take 17 g by mouth daily  Patient taking differently: 17 g daily Via PEG tube 4/5/24 10/2/24  Brisa Francisco DO   glycerin 2 g suppository Place 1 suppository rectally daily as needed for Constipation 24   Lio Corey DO   pantoprazole (PROTONIX) 40 MG tablet Take 1 tablet by mouth daily (with breakfast)  Patient taking differently: Take 1 tablet by mouth daily (with breakfast) Via PEG tube 24   Brant Treadwell MD   gabapentin (NEURONTIN) 400 MG capsule 1 capsule by PEG Tube route in the morning and at bedtime.    Den Tomlinson MD   traZODone (DESYREL) 50 MG tablet 1 tablet by PEG Tube route nightly

## 2024-04-30 NOTE — OP NOTE
Op Note    Cait Kent  YOB: 1950  [id number]    DATE OF PROCEDURE: 4/30/2024    SURGEON: JOSE COLEMAN M.D.    ASSISTANT: None    PREOPERATIVE DIAGNOSIS: PEG malfunction  .  POSTOPERATIVE DIAGNOSIS: Same    OPERATION: EGD with PEG placement.    ANESTHESIA: LMAC.    ESTIMATED BLOOD LOSS: none    COMPLICATIONS: None.    SPECIMENS: None.      DESCRIPTION OF PROCEDURE: The patient was taken to the endoscopy suite and  placed on the endoscopy table in a supine position. LMAC anesthesia was  administered. A bite block was inserted.     A lubricated gastroscope was inserted through the oropharynx and advanced  under direct vision to the esophagus and then the stomach. The stomach was  insufflated. The gastrostomy tube and balloon were noted.  The balloon was deflated and the tube was removed.  A wire was inserted through the gastrostomy site into the gastric lumen under direct vision.  A snare forceps was inserted through the  gastroscope and used to graft the wire.  The gastroscope, snare, and wire were then retrieved.The wire was   connected to the gastrostomy tube and then pulled through the esophagus,   stomach, and out through the anterior abdominal wall. The gastroscope was   reintroduced into the stomach. The PEG tube was seen in good position without   evidence of bleeding. The gastroscope was removed. The PEG tube was fit with   a bumper and feeding port and connected to gravity. A binder was applied.   The patient tolerated the procedure well.    Jose Coleman MD  4/30/2024  6:05 AM

## 2024-04-30 NOTE — INTERVAL H&P NOTE
Update History & Physical    The patient's History and Physical of April 24, 2024 was reviewed with the patient and I examined the patient. There was no change. The surgical site was confirmed by the patient and me.     Plan: The risks, benefits, expected outcome, and alternative to the recommended procedure have been discussed with the patient. Patient understands and wants to proceed with the procedure.     Electronically signed by Jose Coleman MD on 4/30/2024 at 6:04 AM

## 2024-05-22 NOTE — ED PROVIDER NOTES
HPI:  5/8/22, Time: 12:39 AM EDT         Alayna Comer is a 73 y.o. female presenting to the ED for broken G-tube, beginning 1 day ago. The complaint has been persistent, moderate in severity, and worsened by nothing. Patient was sent here from outlying facility due to broken G-tube there is a break at the Y a G-tube. Patient reporting no chest pain no difficulty breathing no abdominal pain. Patient reporting no fever no chills. Patient reports the PEG tube has been in there for a prolonged period of time. ROS:   Pertinent positives and negatives are stated within HPI, all other systems reviewed and are negative.  --------------------------------------------- PAST HISTORY ---------------------------------------------  Past Medical History:  has a past medical history of Anemia, Anxiety disorder, Chronic back pain, Depression, Diabetes mellitus ([facility] [address] Hyperlipidemia, Hypertension, Neurogenic bowel, Neuromuscular dysfunction of bladder, Obstructive sleep apnea, Radiculopathy of lumbar region, Spinal cord infarction [facility]), Suicidal ideations, and Vitamin D deficiency. Past Surgical History:  has a past surgical history that includes back surgery. Social History:  reports that she has quit smoking. She smoked 0.50 packs per day. She has never used smokeless tobacco. She reports that she does not drink alcohol and does not use drugs. Family History: family history includes Diabetes in her father. The patients home medications have been reviewed. Allergies: Patient has no known allergies.     ---------------------------------------------------PHYSICAL EXAM--------------------------------------    Constitutional/General: Alert and oriented x3,   Head: Normocephalic and atraumatic  Eyes: PERRL, EOMI  Mouth: Oropharynx clear, handling secretions, no trismus  Neck: Supple, full ROM, non tender to palpation in the midline, no stridor, no crepitus, no meningeal signs  Pulmonary: Lungs clear to PROCEDURE DATE: 11/29/2023    PROCEDURE: Left L4/5 and L5/S1 transforaminal epidural steroid injection under fluoroscopy    DIAGNOSIS: Lumbar  Radiculopathy    Post op diagnosis: Same    PHYSICIAN: Chintan Damon MD    MEDICATIONS INJECTED:  Methylprednisolone 40mg (1ml) and 1ml 0.25% bupivicaine at each nerve root.     LOCAL ANESTHETIC INJECTED:  Lidocaine 1%. 4 ml per site.    SEDATION MEDICATIONS: none    ESTIMATED BLOOD LOSS:  none    COMPLICATIONS:  none    TECHNIQUE:   A time-out was taken to identify patient and procedure side prior to starting the procedure. The patient was placed in a prone position, prepped and draped in the usual sterile fashion using ChloraPrep and sterile towels.  The area to be injected was determined under fluoroscopic guidance in AP and oblique view.  Local anesthetic was given by raising a wheal and going down to the hub of a 25-gauge 1.5 inch needle.  In oblique view, a 3.5 inch 22-gauge bent-tip spinal needle was introduced towards 6 oclock position of the pedicle of each above named nerve root level.  The needle was walked medially then hinged into the neural foramen and position was confirmed in AP and lateral views.  Omnipaque contrast dye was injected to confirm appropriate placement and that there was no vascular uptake.  After negative aspiration for blood or CSF, the medication was then injected. This was performed at the left L4/5 and L5/S1 level(s). The patient tolerated the procedure well.    The patient was monitored after the procedure.  Patient was given post procedure and discharge instructions to follow at home. The patient was discharged in a stable condition.     auscultation bilaterally, no wheezes, rales, or rhonchi. Not in respiratory distress  Cardiovascular:  Regular rate. Regular rhythm. No murmurs, gallops, or rubs. 2+ distal pulses  Chest: no chest wall tenderness  Abdomen: Soft. Non tender. PEG tube noted and there is a break at the Y of PEG tube non distended. +BS. No rebound, guarding, or rigidity. No pulsatile masses appreciated. Musculoskeletal: Moves all extremities x 4. Patient is an amputee. Skin: warm and dry. No rashes. Neurologic: GCS 15, CN 2-12 grossly intact, no focal deficits, symmetric strength 5/5 in the upper and lower extremities bilaterally  Psych: Normal Affect    -------------------------------------------------- RESULTS -------------------------------------------------  I have personally reviewed all laboratory and imaging results for this patient. Results are listed below. LABS:  No results found for this visit on 05/08/22. RADIOLOGY:  Interpreted by Radiologist.  XR ABDOMEN FOR NG/OG/NE TUBE PLACEMENT   Final Result   Adequate placement of gastrostomy tube. PROCEDURE  5/8/22       Time: 1239 am    FEEDING TUBE REPLACEMENT  Risks, benefits and alternatives (for applicable procedures below) described. Performed By: Doc Hu MD.    Indication: Tube. There is a tear at the Y of the PEG tube. Informed consent: Verbal consent obtained. The patient was counseled regarding the procedure in person, it's indications, risks, potential complications and alternatives and any questions were answered. Verbal consent was obtained. Local Anesthesia:  not required/indicated. Procedure: A feeding tube was replaced using a 22 Lao gastrostomy tube and the bulb was inflated using 15 cc of sterile water. Tube was secured to skin dressing was placed. Post-Procedure: Tube position confirmed by ordered  and still pending. Patient tolerated the procedure well.   Complications:  None.         ------------------------- NURSING NOTES AND VITALS REVIEWED ---------------------------   The nursing notes within the ED encounter and vital signs as below have been reviewed by myself. /72   Pulse 72   Temp 98 °F (36.7 °C)   Resp 18   Ht 5' 3\" (1.6 m)   Wt 135 lb (61.2 kg)   SpO2 99%   BMI 23.91 kg/m²   Oxygen Saturation Interpretation: Normal    The patients available past medical records and past encounters were reviewed. ------------------------------ ED COURSE/MEDICAL DECISION MAKING----------------------  Medications   sterile water injection (has no administration in time range)   diatrizoate meglumine-sodium (GASTROGRAFIN) 66-10 % solution 30 mL (30 mLs Per G Tube Given 5/8/22 0111)             Medical Decision Making:   Patient presenting here because of G-tube that was broken. Patient was sent here for replacement. Patient reporting no chest pain no difficulty breathing. PEG tube was placed by me. Patient tolerated well x-ray noted and reviewed. Patient will be discharged back to facility     Re-Evaluations:             Reevaluated no distress. Patient reporting no pain or discomfort. Consultations:                 Critical Care: This patient's ED course included: a personal history and physicial eaxmination    This patient has remained hemodynamically stable during their ED course. Counseling: The emergency provider has spoken with the patient and discussed todays results, in addition to providing specific details for the plan of care and counseling regarding the diagnosis and prognosis. Questions are answered at this time and they are agreeable with the plan.       --------------------------------- IMPRESSION AND DISPOSITION ---------------------------------    IMPRESSION  1. Attention to G-tube [facility])        DISPOSITION  Disposition: Discharge to home  Patient condition is stable        NOTE: This report was transcribed using voice recognition software.  Every effort was made to ensure accuracy; however, inadvertent computerized transcription errors may be present          Ivan Hunt MD  05/08/22 1974       Ivan Hunt MD  05/08/22 2831 Dehydration

## 2024-07-31 ENCOUNTER — APPOINTMENT (OUTPATIENT)
Dept: CT IMAGING | Age: 74
End: 2024-07-31
Payer: MEDICARE

## 2024-07-31 ENCOUNTER — HOSPITAL ENCOUNTER (INPATIENT)
Age: 74
LOS: 2 days | Discharge: OTHER INSTITUTION | End: 2024-08-02
Attending: EMERGENCY MEDICINE | Admitting: INTERNAL MEDICINE
Payer: MEDICARE

## 2024-07-31 ENCOUNTER — APPOINTMENT (OUTPATIENT)
Dept: GENERAL RADIOLOGY | Age: 74
End: 2024-07-31
Payer: MEDICARE

## 2024-07-31 DIAGNOSIS — E87.5 HYPERKALEMIA: ICD-10-CM

## 2024-07-31 DIAGNOSIS — N39.0 COMPLICATED UTI (URINARY TRACT INFECTION): Primary | ICD-10-CM

## 2024-07-31 PROBLEM — E78.5 HYPERLIPIDEMIA: Status: ACTIVE | Noted: 2024-07-31

## 2024-07-31 PROBLEM — E03.9 HYPOTHYROIDISM: Status: ACTIVE | Noted: 2024-07-31

## 2024-07-31 PROBLEM — I10 HYPERTENSION: Status: ACTIVE | Noted: 2024-07-31

## 2024-07-31 PROBLEM — Z79.4 INSULIN DEPENDENT TYPE 2 DIABETES MELLITUS (HCC): Status: ACTIVE | Noted: 2024-07-31

## 2024-07-31 PROBLEM — E11.9 INSULIN DEPENDENT TYPE 2 DIABETES MELLITUS (HCC): Status: ACTIVE | Noted: 2024-07-31

## 2024-07-31 LAB
ALBUMIN SERPL-MCNC: 4 G/DL (ref 3.5–5.2)
ALP SERPL-CCNC: 154 U/L (ref 35–104)
ALT SERPL-CCNC: 11 U/L (ref 0–32)
ANION GAP SERPL CALCULATED.3IONS-SCNC: 8 MMOL/L (ref 7–16)
AST SERPL-CCNC: 13 U/L (ref 0–31)
BASOPHILS # BLD: 0.05 K/UL (ref 0–0.2)
BASOPHILS NFR BLD: 1 % (ref 0–2)
BILIRUB SERPL-MCNC: 0.7 MG/DL (ref 0–1.2)
BILIRUB UR QL STRIP: NEGATIVE
BUN SERPL-MCNC: 19 MG/DL (ref 6–23)
CALCIUM SERPL-MCNC: 9.9 MG/DL (ref 8.6–10.2)
CHLORIDE SERPL-SCNC: 98 MMOL/L (ref 98–107)
CLARITY UR: CLEAR
CO2 SERPL-SCNC: 32 MMOL/L (ref 22–29)
COLOR UR: YELLOW
CREAT SERPL-MCNC: 0.8 MG/DL (ref 0.5–1)
EKG ATRIAL RATE: 59 BPM
EKG P AXIS: 33 DEGREES
EKG P-R INTERVAL: 172 MS
EKG Q-T INTERVAL: 460 MS
EKG QRS DURATION: 82 MS
EKG QTC CALCULATION (BAZETT): 455 MS
EKG R AXIS: -32 DEGREES
EKG T AXIS: 64 DEGREES
EKG VENTRICULAR RATE: 59 BPM
EOSINOPHIL # BLD: 0.33 K/UL (ref 0.05–0.5)
EOSINOPHILS RELATIVE PERCENT: 5 % (ref 0–6)
ERYTHROCYTE [DISTWIDTH] IN BLOOD BY AUTOMATED COUNT: 15.1 % (ref 11.5–15)
GFR, ESTIMATED: 84 ML/MIN/1.73M2
GLUCOSE BLD-MCNC: 126 MG/DL (ref 74–99)
GLUCOSE BLD-MCNC: 153 MG/DL (ref 74–99)
GLUCOSE SERPL-MCNC: 172 MG/DL (ref 74–99)
GLUCOSE UR STRIP-MCNC: 100 MG/DL
HCT VFR BLD AUTO: 44.9 % (ref 34–48)
HGB BLD-MCNC: 13.9 G/DL (ref 11.5–15.5)
HGB UR QL STRIP.AUTO: ABNORMAL
IMM GRANULOCYTES # BLD AUTO: <0.03 K/UL (ref 0–0.58)
IMM GRANULOCYTES NFR BLD: 0 % (ref 0–5)
KETONES UR STRIP-MCNC: NEGATIVE MG/DL
LACTATE BLDV-SCNC: 1.7 MMOL/L (ref 0.5–2.2)
LEUKOCYTE ESTERASE UR QL STRIP: ABNORMAL
LYMPHOCYTES NFR BLD: 1.68 K/UL (ref 1.5–4)
LYMPHOCYTES RELATIVE PERCENT: 24 % (ref 20–42)
MAGNESIUM SERPL-MCNC: 1.9 MG/DL (ref 1.6–2.6)
MCH RBC QN AUTO: 27.3 PG (ref 26–35)
MCHC RBC AUTO-ENTMCNC: 31 G/DL (ref 32–34.5)
MCV RBC AUTO: 88 FL (ref 80–99.9)
MONOCYTES NFR BLD: 0.5 K/UL (ref 0.1–0.95)
MONOCYTES NFR BLD: 7 % (ref 2–12)
NEUTROPHILS NFR BLD: 63 % (ref 43–80)
NEUTS SEG NFR BLD: 4.49 K/UL (ref 1.8–7.3)
NITRITE UR QL STRIP: NEGATIVE
PH UR STRIP: 7.5 [PH] (ref 5–9)
PLATELET # BLD AUTO: 190 K/UL (ref 130–450)
PMV BLD AUTO: 11.3 FL (ref 7–12)
POTASSIUM SERPL-SCNC: 5.7 MMOL/L (ref 3.5–5)
PROT SERPL-MCNC: 7.3 G/DL (ref 6.4–8.3)
PROT UR STRIP-MCNC: ABNORMAL MG/DL
RBC # BLD AUTO: 5.1 M/UL (ref 3.5–5.5)
RBC #/AREA URNS HPF: ABNORMAL /HPF
SODIUM SERPL-SCNC: 138 MMOL/L (ref 132–146)
SP GR UR STRIP: 1.01 (ref 1–1.03)
TROPONIN I SERPL HS-MCNC: 37 NG/L (ref 0–9)
UROBILINOGEN UR STRIP-ACNC: 0.2 EU/DL (ref 0–1)
WBC #/AREA URNS HPF: ABNORMAL /HPF
WBC OTHER # BLD: 7.1 K/UL (ref 4.5–11.5)

## 2024-07-31 PROCEDURE — 93005 ELECTROCARDIOGRAM TRACING: CPT

## 2024-07-31 PROCEDURE — 6360000002 HC RX W HCPCS: Performed by: EMERGENCY MEDICINE

## 2024-07-31 PROCEDURE — 99223 1ST HOSP IP/OBS HIGH 75: CPT | Performed by: INTERNAL MEDICINE

## 2024-07-31 PROCEDURE — 85025 COMPLETE CBC W/AUTO DIFF WBC: CPT

## 2024-07-31 PROCEDURE — 2500000003 HC RX 250 WO HCPCS

## 2024-07-31 PROCEDURE — 83735 ASSAY OF MAGNESIUM: CPT

## 2024-07-31 PROCEDURE — 6360000002 HC RX W HCPCS

## 2024-07-31 PROCEDURE — 96365 THER/PROPH/DIAG IV INF INIT: CPT

## 2024-07-31 PROCEDURE — 6370000000 HC RX 637 (ALT 250 FOR IP): Performed by: INTERNAL MEDICINE

## 2024-07-31 PROCEDURE — 2580000003 HC RX 258: Performed by: INTERNAL MEDICINE

## 2024-07-31 PROCEDURE — 71045 X-RAY EXAM CHEST 1 VIEW: CPT

## 2024-07-31 PROCEDURE — 96375 TX/PRO/DX INJ NEW DRUG ADDON: CPT

## 2024-07-31 PROCEDURE — 70450 CT HEAD/BRAIN W/O DYE: CPT

## 2024-07-31 PROCEDURE — 2580000003 HC RX 258

## 2024-07-31 PROCEDURE — 80053 COMPREHEN METABOLIC PANEL: CPT

## 2024-07-31 PROCEDURE — 81001 URINALYSIS AUTO W/SCOPE: CPT

## 2024-07-31 PROCEDURE — 99285 EMERGENCY DEPT VISIT HI MDM: CPT

## 2024-07-31 PROCEDURE — 2140000000 HC CCU INTERMEDIATE R&B

## 2024-07-31 PROCEDURE — 82962 GLUCOSE BLOOD TEST: CPT

## 2024-07-31 PROCEDURE — 83605 ASSAY OF LACTIC ACID: CPT

## 2024-07-31 PROCEDURE — 96367 TX/PROPH/DG ADDL SEQ IV INF: CPT

## 2024-07-31 PROCEDURE — 6370000000 HC RX 637 (ALT 250 FOR IP)

## 2024-07-31 PROCEDURE — 6370000000 HC RX 637 (ALT 250 FOR IP): Performed by: EMERGENCY MEDICINE

## 2024-07-31 PROCEDURE — 96366 THER/PROPH/DIAG IV INF ADDON: CPT

## 2024-07-31 PROCEDURE — 84484 ASSAY OF TROPONIN QUANT: CPT

## 2024-07-31 PROCEDURE — 93010 ELECTROCARDIOGRAM REPORT: CPT | Performed by: INTERNAL MEDICINE

## 2024-07-31 RX ORDER — BENZONATATE 100 MG/1
100 CAPSULE ORAL 3 TIMES DAILY PRN
Status: DISCONTINUED | OUTPATIENT
Start: 2024-07-31 | End: 2024-08-02 | Stop reason: HOSPADM

## 2024-07-31 RX ORDER — METOCLOPRAMIDE 5 MG/1
5 TABLET ORAL 4 TIMES DAILY
Status: DISCONTINUED | OUTPATIENT
Start: 2024-07-31 | End: 2024-08-02 | Stop reason: HOSPADM

## 2024-07-31 RX ORDER — HYDRALAZINE HYDROCHLORIDE 20 MG/ML
10 INJECTION INTRAMUSCULAR; INTRAVENOUS EVERY 6 HOURS PRN
Status: DISCONTINUED | OUTPATIENT
Start: 2024-07-31 | End: 2024-08-02 | Stop reason: HOSPADM

## 2024-07-31 RX ORDER — TRAMADOL HYDROCHLORIDE 50 MG/1
50 TABLET ORAL EVERY 6 HOURS PRN
COMMUNITY

## 2024-07-31 RX ORDER — LOPERAMIDE HYDROCHLORIDE 2 MG/1
2 CAPSULE ORAL 4 TIMES DAILY PRN
Status: DISCONTINUED | OUTPATIENT
Start: 2024-07-31 | End: 2024-08-02 | Stop reason: HOSPADM

## 2024-07-31 RX ORDER — TRAZODONE HYDROCHLORIDE 50 MG/1
50 TABLET ORAL NIGHTLY
Status: DISCONTINUED | OUTPATIENT
Start: 2024-07-31 | End: 2024-08-02 | Stop reason: HOSPADM

## 2024-07-31 RX ORDER — OLANZAPINE 5 MG/1
2.5 TABLET ORAL ONCE
Status: COMPLETED | OUTPATIENT
Start: 2024-07-31 | End: 2024-07-31

## 2024-07-31 RX ORDER — POLYETHYLENE GLYCOL 3350 17 G/17G
17 POWDER, FOR SOLUTION ORAL DAILY PRN
Status: DISCONTINUED | OUTPATIENT
Start: 2024-07-31 | End: 2024-08-02 | Stop reason: HOSPADM

## 2024-07-31 RX ORDER — PANTOPRAZOLE SODIUM 40 MG/1
40 FOR SUSPENSION ORAL
COMMUNITY

## 2024-07-31 RX ORDER — POLYETHYLENE GLYCOL 3350 17 G/17G
17 POWDER, FOR SOLUTION ORAL DAILY PRN
COMMUNITY

## 2024-07-31 RX ORDER — BISACODYL 10 MG
10 SUPPOSITORY, RECTAL RECTAL DAILY PRN
Status: DISCONTINUED | OUTPATIENT
Start: 2024-07-31 | End: 2024-08-02 | Stop reason: HOSPADM

## 2024-07-31 RX ORDER — GLUCAGON 1 MG/ML
1 KIT INJECTION PRN
Status: DISCONTINUED | OUTPATIENT
Start: 2024-07-31 | End: 2024-07-31 | Stop reason: SDUPTHER

## 2024-07-31 RX ORDER — DOCUSATE SODIUM 100 MG/1
100 CAPSULE, LIQUID FILLED ORAL 2 TIMES DAILY
Status: DISCONTINUED | OUTPATIENT
Start: 2024-07-31 | End: 2024-08-02 | Stop reason: HOSPADM

## 2024-07-31 RX ORDER — TRAMADOL HYDROCHLORIDE 50 MG/1
50 TABLET ORAL EVERY 6 HOURS PRN
Status: DISCONTINUED | OUTPATIENT
Start: 2024-07-31 | End: 2024-08-02 | Stop reason: HOSPADM

## 2024-07-31 RX ORDER — LISINOPRIL 5 MG/1
5 TABLET ORAL DAILY
Status: DISCONTINUED | OUTPATIENT
Start: 2024-08-01 | End: 2024-08-02 | Stop reason: HOSPADM

## 2024-07-31 RX ORDER — LANSOPRAZOLE 30 MG/1
30 TABLET, ORALLY DISINTEGRATING, DELAYED RELEASE ORAL
Status: DISCONTINUED | OUTPATIENT
Start: 2024-08-01 | End: 2024-08-02 | Stop reason: HOSPADM

## 2024-07-31 RX ORDER — LANOLIN ALCOHOL/MO/W.PET/CERES
3 CREAM (GRAM) TOPICAL NIGHTLY PRN
Status: DISCONTINUED | OUTPATIENT
Start: 2024-07-31 | End: 2024-08-02 | Stop reason: HOSPADM

## 2024-07-31 RX ORDER — GABAPENTIN 300 MG/1
600 CAPSULE ORAL NIGHTLY
Status: DISCONTINUED | OUTPATIENT
Start: 2024-07-31 | End: 2024-08-02 | Stop reason: HOSPADM

## 2024-07-31 RX ORDER — MENTHOL 40 MG/ML
GEL TOPICAL 2 TIMES DAILY
COMMUNITY

## 2024-07-31 RX ORDER — LACTOBACILLUS ACIDOPHILUS 500MM CELL
1 CAPSULE ORAL DAILY
COMMUNITY

## 2024-07-31 RX ORDER — GLUCAGON 1 MG/ML
1 KIT INJECTION PRN
Status: DISCONTINUED | OUTPATIENT
Start: 2024-07-31 | End: 2024-08-02 | Stop reason: HOSPADM

## 2024-07-31 RX ORDER — SODIUM CHLORIDE 0.9 % (FLUSH) 0.9 %
5-40 SYRINGE (ML) INJECTION EVERY 12 HOURS SCHEDULED
Status: DISCONTINUED | OUTPATIENT
Start: 2024-07-31 | End: 2024-08-02 | Stop reason: HOSPADM

## 2024-07-31 RX ORDER — INSULIN LISPRO 100 [IU]/ML
0-4 INJECTION, SOLUTION INTRAVENOUS; SUBCUTANEOUS NIGHTLY
Status: DISCONTINUED | OUTPATIENT
Start: 2024-07-31 | End: 2024-08-02 | Stop reason: HOSPADM

## 2024-07-31 RX ORDER — SODIUM CHLORIDE 9 MG/ML
INJECTION, SOLUTION INTRAVENOUS PRN
Status: DISCONTINUED | OUTPATIENT
Start: 2024-07-31 | End: 2024-08-02 | Stop reason: HOSPADM

## 2024-07-31 RX ORDER — SENNOSIDES A AND B 8.6 MG/1
1 TABLET, FILM COATED ORAL NIGHTLY
COMMUNITY

## 2024-07-31 RX ORDER — FERROUS SULFATE 300 MG/5ML
300 LIQUID (ML) ORAL DAILY
Status: DISCONTINUED | OUTPATIENT
Start: 2024-08-01 | End: 2024-08-02 | Stop reason: HOSPADM

## 2024-07-31 RX ORDER — LACTULOSE 10 G/15ML
20 SOLUTION ORAL 2 TIMES DAILY PRN
COMMUNITY

## 2024-07-31 RX ORDER — ONDANSETRON 2 MG/ML
4 INJECTION INTRAMUSCULAR; INTRAVENOUS EVERY 6 HOURS PRN
Status: DISCONTINUED | OUTPATIENT
Start: 2024-07-31 | End: 2024-08-02 | Stop reason: HOSPADM

## 2024-07-31 RX ORDER — POLYVINYL ALCOHOL 14 MG/ML
1 SOLUTION/ DROPS OPHTHALMIC PRN
Status: DISCONTINUED | OUTPATIENT
Start: 2024-07-31 | End: 2024-08-02 | Stop reason: HOSPADM

## 2024-07-31 RX ORDER — MAGNESIUM HYDROXIDE/ALUMINUM HYDROXICE/SIMETHICONE 120; 1200; 1200 MG/30ML; MG/30ML; MG/30ML
30 SUSPENSION ORAL EVERY 4 HOURS PRN
Status: DISCONTINUED | OUTPATIENT
Start: 2024-07-31 | End: 2024-08-02 | Stop reason: HOSPADM

## 2024-07-31 RX ORDER — POTASSIUM CHLORIDE 20 MEQ/1
40 TABLET, EXTENDED RELEASE ORAL PRN
Status: DISCONTINUED | OUTPATIENT
Start: 2024-07-31 | End: 2024-08-02 | Stop reason: HOSPADM

## 2024-07-31 RX ORDER — CALCIUM CARBONATE 500 MG/1
500 TABLET, CHEWABLE ORAL 3 TIMES DAILY PRN
Status: DISCONTINUED | OUTPATIENT
Start: 2024-07-31 | End: 2024-08-02 | Stop reason: HOSPADM

## 2024-07-31 RX ORDER — INSULIN GLARGINE 100 [IU]/ML
64 INJECTION, SOLUTION SUBCUTANEOUS NIGHTLY
Status: DISCONTINUED | OUTPATIENT
Start: 2024-07-31 | End: 2024-08-02 | Stop reason: HOSPADM

## 2024-07-31 RX ORDER — LACTULOSE 10 G/15ML
20 SOLUTION ORAL 2 TIMES DAILY PRN
Status: DISCONTINUED | OUTPATIENT
Start: 2024-07-31 | End: 2024-08-02 | Stop reason: HOSPADM

## 2024-07-31 RX ORDER — GABAPENTIN 600 MG/1
600 TABLET ORAL NIGHTLY
Status: DISCONTINUED | OUTPATIENT
Start: 2024-07-31 | End: 2024-07-31 | Stop reason: SDUPTHER

## 2024-07-31 RX ORDER — LORAZEPAM 0.5 MG/1
0.5 TABLET ORAL 3 TIMES DAILY
Status: DISCONTINUED | OUTPATIENT
Start: 2024-07-31 | End: 2024-08-02 | Stop reason: HOSPADM

## 2024-07-31 RX ORDER — INSULIN LISPRO 100 [IU]/ML
0-8 INJECTION, SOLUTION INTRAVENOUS; SUBCUTANEOUS
Status: DISCONTINUED | OUTPATIENT
Start: 2024-07-31 | End: 2024-08-02 | Stop reason: HOSPADM

## 2024-07-31 RX ORDER — ANALGESIC BALM 1.74; 4.06 G/29G; G/29G
1 OINTMENT TOPICAL 2 TIMES DAILY
Status: DISCONTINUED | OUTPATIENT
Start: 2024-07-31 | End: 2024-08-02 | Stop reason: HOSPADM

## 2024-07-31 RX ORDER — ACETAMINOPHEN 325 MG/1
650 TABLET ORAL EVERY 6 HOURS PRN
Status: DISCONTINUED | OUTPATIENT
Start: 2024-07-31 | End: 2024-08-02 | Stop reason: HOSPADM

## 2024-07-31 RX ORDER — FAMOTIDINE 20 MG/1
20 TABLET, FILM COATED ORAL DAILY
Status: DISCONTINUED | OUTPATIENT
Start: 2024-08-01 | End: 2024-08-02 | Stop reason: HOSPADM

## 2024-07-31 RX ORDER — GABAPENTIN 400 MG/1
400 CAPSULE ORAL 2 TIMES DAILY
Status: DISCONTINUED | OUTPATIENT
Start: 2024-07-31 | End: 2024-08-02 | Stop reason: HOSPADM

## 2024-07-31 RX ORDER — DEXTROSE MONOHYDRATE 100 MG/ML
INJECTION, SOLUTION INTRAVENOUS CONTINUOUS PRN
Status: DISCONTINUED | OUTPATIENT
Start: 2024-07-31 | End: 2024-08-02 | Stop reason: HOSPADM

## 2024-07-31 RX ORDER — MAG HYDROX/ALUMINUM HYD/SIMETH 400-400-40
1 SUSPENSION, ORAL (FINAL DOSE FORM) ORAL DAILY PRN
Status: DISCONTINUED | OUTPATIENT
Start: 2024-07-31 | End: 2024-08-02 | Stop reason: HOSPADM

## 2024-07-31 RX ORDER — ONDANSETRON 4 MG/1
4 TABLET, ORALLY DISINTEGRATING ORAL EVERY 8 HOURS PRN
Status: DISCONTINUED | OUTPATIENT
Start: 2024-07-31 | End: 2024-08-02 | Stop reason: HOSPADM

## 2024-07-31 RX ORDER — ENOXAPARIN SODIUM 100 MG/ML
40 INJECTION SUBCUTANEOUS DAILY
Status: DISCONTINUED | OUTPATIENT
Start: 2024-08-01 | End: 2024-08-02 | Stop reason: HOSPADM

## 2024-07-31 RX ORDER — DULOXETIN HYDROCHLORIDE 30 MG/1
30 CAPSULE, DELAYED RELEASE ORAL 2 TIMES DAILY
Status: DISCONTINUED | OUTPATIENT
Start: 2024-07-31 | End: 2024-08-02 | Stop reason: HOSPADM

## 2024-07-31 RX ORDER — CALCIUM GLUCONATE 10 MG/ML
1000 INJECTION, SOLUTION INTRAVENOUS ONCE
Status: COMPLETED | OUTPATIENT
Start: 2024-07-31 | End: 2024-07-31

## 2024-07-31 RX ORDER — VITAMIN B COMPLEX
4000 TABLET ORAL DAILY
Status: DISCONTINUED | OUTPATIENT
Start: 2024-08-01 | End: 2024-08-02 | Stop reason: HOSPADM

## 2024-07-31 RX ORDER — SENNOSIDES A AND B 8.6 MG/1
1 TABLET, FILM COATED ORAL NIGHTLY
Status: DISCONTINUED | OUTPATIENT
Start: 2024-07-31 | End: 2024-08-02 | Stop reason: HOSPADM

## 2024-07-31 RX ORDER — SODIUM CHLORIDE 0.9 % (FLUSH) 0.9 %
5-40 SYRINGE (ML) INJECTION PRN
Status: DISCONTINUED | OUTPATIENT
Start: 2024-07-31 | End: 2024-08-02 | Stop reason: HOSPADM

## 2024-07-31 RX ORDER — POTASSIUM CHLORIDE 7.45 MG/ML
10 INJECTION INTRAVENOUS PRN
Status: DISCONTINUED | OUTPATIENT
Start: 2024-07-31 | End: 2024-08-02 | Stop reason: HOSPADM

## 2024-07-31 RX ORDER — ACETAMINOPHEN 650 MG/1
650 SUPPOSITORY RECTAL EVERY 6 HOURS PRN
Status: DISCONTINUED | OUTPATIENT
Start: 2024-07-31 | End: 2024-08-02 | Stop reason: HOSPADM

## 2024-07-31 RX ORDER — BACLOFEN 10 MG/1
5 TABLET ORAL 3 TIMES DAILY
Status: DISCONTINUED | OUTPATIENT
Start: 2024-07-31 | End: 2024-08-02 | Stop reason: HOSPADM

## 2024-07-31 RX ORDER — LEVOTHYROXINE SODIUM 0.07 MG/1
75 TABLET ORAL DAILY
Status: DISCONTINUED | OUTPATIENT
Start: 2024-08-01 | End: 2024-08-02 | Stop reason: HOSPADM

## 2024-07-31 RX ORDER — LORAZEPAM 2 MG/ML
1 INJECTION INTRAMUSCULAR ONCE
Status: COMPLETED | OUTPATIENT
Start: 2024-07-31 | End: 2024-07-31

## 2024-07-31 RX ORDER — MAGNESIUM SULFATE IN WATER 40 MG/ML
2000 INJECTION, SOLUTION INTRAVENOUS PRN
Status: DISCONTINUED | OUTPATIENT
Start: 2024-07-31 | End: 2024-08-02 | Stop reason: HOSPADM

## 2024-07-31 RX ORDER — ROSUVASTATIN CALCIUM 5 MG/1
10 TABLET, COATED ORAL NIGHTLY
Status: DISCONTINUED | OUTPATIENT
Start: 2024-07-31 | End: 2024-08-02 | Stop reason: HOSPADM

## 2024-07-31 RX ORDER — DEXTROSE MONOHYDRATE 100 MG/ML
INJECTION, SOLUTION INTRAVENOUS CONTINUOUS PRN
Status: DISCONTINUED | OUTPATIENT
Start: 2024-07-31 | End: 2024-07-31 | Stop reason: SDUPTHER

## 2024-07-31 RX ORDER — LACTOBACILLUS RHAMNOSUS GG 10B CELL
1 CAPSULE ORAL DAILY
Status: DISCONTINUED | OUTPATIENT
Start: 2024-08-01 | End: 2024-08-02 | Stop reason: HOSPADM

## 2024-07-31 RX ADMIN — ROSUVASTATIN 10 MG: 10 TABLET, FILM COATED ORAL at 21:48

## 2024-07-31 RX ADMIN — SODIUM CHLORIDE, PRESERVATIVE FREE 10 ML: 5 INJECTION INTRAVENOUS at 22:16

## 2024-07-31 RX ADMIN — INSULIN HUMAN 10 UNITS: 100 INJECTION, SOLUTION PARENTERAL at 11:31

## 2024-07-31 RX ADMIN — SENNOSIDES 8.6 MG: 8.6 TABLET, FILM COATED ORAL at 21:48

## 2024-07-31 RX ADMIN — DEXTROSE MONOHYDRATE 250 ML: 100 INJECTION, SOLUTION INTRAVENOUS at 11:04

## 2024-07-31 RX ADMIN — TRAZODONE HYDROCHLORIDE 50 MG: 50 TABLET ORAL at 21:47

## 2024-07-31 RX ADMIN — MUPIROCIN: 20 OINTMENT TOPICAL at 22:07

## 2024-07-31 RX ADMIN — DOCUSATE SODIUM 100 MG: 100 CAPSULE, LIQUID FILLED ORAL at 21:48

## 2024-07-31 RX ADMIN — LORAZEPAM 1 MG: 2 INJECTION INTRAMUSCULAR; INTRAVENOUS at 16:22

## 2024-07-31 RX ADMIN — OLANZAPINE 2.5 MG: 5 TABLET, FILM COATED ORAL at 21:47

## 2024-07-31 RX ADMIN — METOPROLOL TARTRATE 75 MG: 25 TABLET, FILM COATED ORAL at 21:45

## 2024-07-31 RX ADMIN — MEROPENEM 1000 MG: 1 INJECTION, POWDER, FOR SOLUTION INTRAVENOUS at 15:18

## 2024-07-31 RX ADMIN — INSULIN GLARGINE 64 UNITS: 100 INJECTION, SOLUTION SUBCUTANEOUS at 22:16

## 2024-07-31 RX ADMIN — GABAPENTIN 600 MG: 300 CAPSULE ORAL at 21:46

## 2024-07-31 RX ADMIN — BACLOFEN 5 MG: 10 TABLET ORAL at 21:49

## 2024-07-31 RX ADMIN — Medication 3 MG: at 21:50

## 2024-07-31 RX ADMIN — CALCIUM GLUCONATE 1000 MG: 10 INJECTION, SOLUTION INTRAVENOUS at 11:36

## 2024-07-31 RX ADMIN — DULOXETINE HYDROCHLORIDE 30 MG: 30 CAPSULE, DELAYED RELEASE ORAL at 21:49

## 2024-07-31 RX ADMIN — LORAZEPAM 0.5 MG: 0.5 TABLET ORAL at 20:34

## 2024-07-31 RX ADMIN — METOCLOPRAMIDE 5 MG: 5 TABLET ORAL at 21:46

## 2024-07-31 ASSESSMENT — PAIN - FUNCTIONAL ASSESSMENT: PAIN_FUNCTIONAL_ASSESSMENT: NONE - DENIES PAIN

## 2024-07-31 NOTE — H&P
Inpatient H&P      PCP:  Denton Velasquez MD  Admitting Physician:  Carmelita Carrera DO  Consultants:  None at this time   Chief Complaint:    Chief Complaint   Patient presents with    Altered Mental Status     Patient sent from [facility] with AMS  . Patient has hx of AMS and sepsis from UTI        History of Present Illness  Cait Kent is a 73 y.o. female who presents to [facility] ER complaining of AMS.    Cait Kent has a past medical history that includes diabetes, depression anxiety, hypertension, hyperlipidemia, neurogenic bowel, neuromuscular dysfunction of bladder, TETE, history of spinal cord infarction    Cait presents from prior field with altered mental status.  She has a history of frequent UTIs and history of sepsis.  According to the facility, she is normally a & O x 4.  Today she is only x 2 for them.  She has a history of previous UTIs and sepsis.  She has no current complaints.  She does not know the month or year.  She was started on meropenem in the ER.  She was also found to be hyperkalemic and given hyperkalemia protocol.  She does have a PEG tube.  She eats and has tube feeds supplements reportedly.  Discussed patient's case with ED physician.    ER Course  Upon presentation to the ER, routine labwork was performed which revealed potassium 5.7, troponin 37, glucose 172.  Imaging results are as outlined below in the Imaging section of this note. Upon arrival to the ER, patient was 125/69.  The patient received calcium gluconate, insulin and D10, Ativan, meropenem in the emergency room and was admitted to [facility].    Last Hospital Admission -I personally reviewed previous admission from 3/25/2024  ESBL UTI  Metabolic encephalopathy  Bilateral intertrochanteric hip fractures  Anemia  Diabetes    Last Echocardiogram -I personally reviewed previous echocardiogram results from 7/30/2019  Normal left ventricular systolic function.   Ejection fraction is visually

## 2024-07-31 NOTE — ED NOTES
Patient yelling out for help, Sharon at [facility] stated that this was normal for her.

## 2024-07-31 NOTE — ED PROVIDER NOTES
Department of Emergency Medicine     Written by: Jonah Bradshaw DO  Patient Name: Cait Kent  Admit Date: 2024  8:20 AM  MRN: [id number]                   : 1950      ------------------------- CC-------------------------  Chief Complaint   Patient presents with    Altered Mental Status     Patient sent from [facility] with AMS  . Patient has hx of AMS and sepsis from UTI      ------------------------- HPI -------------------------    Cait Kent is a 73 y.o. female who presents to the emergency department today complaining of altered mental status.  Patient is from prior field and she was sent in today for alteration in her baseline mental status.  According to the facility, patient is normally alert and oriented x 4 however she is alert and oriented x 2 for them today.  Patient has a history of multiple UTI and has history of sepsis from them as well.  Patient tells me she has absolutely no complaints at this time.  She states she think she probably has a UTI however does not have any specific urinary complaints right now.  Patient denies any lightheadedness or dizziness, fever or chills, nausea or vomiting, chest pain, shortness of breath, abdominal pain, hematuria or dysuria, constipation or diarrhea.    Nursing notes were all reviewed and agreed with or any disagreements were addressed in the HPI.    REVIEW OF SYSTEMS:    Pertinent positives and negatives mentioned in the HPI/MDM.     ------------------------- PAST HISTORY -------------------------    I personally reviewed the following history:    Past Medical History:  has a past medical history of Anemia, Anxiety disorder, Chronic back pain, Depression, Diabetes mellitus (HCC), Hyperlipidemia, Hypertension, Neurogenic bowel, Neuromuscular dysfunction of bladder, Obstructive sleep apnea, Radiculopathy of lumbar region, Spinal cord infarction (HCC), Suicidal ideations, and Vitamin D deficiency.    Past Surgical History:  has a past  are visualized and preliminarily interpreted by the ED Provider with the below findings:    My interpretation of chest x-ray: No significant pneumonia, pneumothorax, acute rib fractures, or mediastinal widening noted.    Interpretation per the Radiologist below, if available at the time of this note:    CT Head W/O Contrast   Final Result   No acute intracranial abnormality.         XR CHEST PORTABLE   Final Result   No acute process.           No results found.    No results found.    ------------------------------ MDM ------------------------------    History is obtained from patient and nursing home for patient's acute onset of moderate altered mental status.    73-year-old female presenting to the emergency department with concerns of altered mental status.  Initial evaluation, patient is alert and oriented x 2 which appears to be less than her baseline which is normal.  Heart regular rate rhythm, lungs good auscultation bilaterally.  Legs are contracted which is the patient's baseline.  CBC reveals no leukocytosis, leukopenia, or acute anemia.  CMP reveals hyperkalemia at 5.7.  This was treated with hyperkalemia protocol including calcium, insulin, glucose.  Lactic acid normal 1.7.  Magnesium levels unremarkable.  Troponin is elevated at 37.  Urinalysis reveals leukocyte esterase with 10-20 white blood cells.  Patient has a history of ESBL urine which is sensitive to meropenem.  After consultation with pharmacy in the ED, decision made to give the patient a dose of meropenem here.  Also given 1 mg of Ativan as she is very anxious in the department and screaming out.  Decision made to admit the patient to the hospital for complicated UTI and hyperkalemia.  Shared decision making utilized with patient and present parties, and due to their presenting conditions patient to be admitted to the hospital for inpatient management and monitoring.  Patient has remained closely monitored with multiple reevaluations and

## 2024-07-31 NOTE — ED NOTES
This RN spoke with Sharon at [facility] regarding patients normal diet. She is allowed  po food along with tube feedings. Sharon updated on POC.

## 2024-08-01 PROBLEM — E44.0 MODERATE PROTEIN-CALORIE MALNUTRITION (HCC): Chronic | Status: ACTIVE | Noted: 2024-08-01

## 2024-08-01 LAB
ALBUMIN SERPL-MCNC: 4.1 G/DL (ref 3.5–5.2)
ALP SERPL-CCNC: 148 U/L (ref 35–104)
ALT SERPL-CCNC: 11 U/L (ref 0–32)
ANION GAP SERPL CALCULATED.3IONS-SCNC: 10 MMOL/L (ref 7–16)
AST SERPL-CCNC: 14 U/L (ref 0–31)
BILIRUB SERPL-MCNC: 0.8 MG/DL (ref 0–1.2)
BUN SERPL-MCNC: 15 MG/DL (ref 6–23)
CALCIUM SERPL-MCNC: 9.5 MG/DL (ref 8.6–10.2)
CHLORIDE SERPL-SCNC: 103 MMOL/L (ref 98–107)
CHOLEST SERPL-MCNC: 120 MG/DL
CO2 SERPL-SCNC: 28 MMOL/L (ref 22–29)
CREAT SERPL-MCNC: 0.9 MG/DL (ref 0.5–1)
ERYTHROCYTE [DISTWIDTH] IN BLOOD BY AUTOMATED COUNT: 15.7 % (ref 11.5–15)
GFR, ESTIMATED: 70 ML/MIN/1.73M2
GLUCOSE BLD-MCNC: 123 MG/DL (ref 74–99)
GLUCOSE BLD-MCNC: 142 MG/DL (ref 74–99)
GLUCOSE BLD-MCNC: 54 MG/DL (ref 74–99)
GLUCOSE BLD-MCNC: 55 MG/DL (ref 74–99)
GLUCOSE BLD-MCNC: 67 MG/DL (ref 74–99)
GLUCOSE BLD-MCNC: 84 MG/DL (ref 74–99)
GLUCOSE BLD-MCNC: 98 MG/DL (ref 74–99)
GLUCOSE BLD-MCNC: <40 MG/DL (ref 74–99)
GLUCOSE SERPL-MCNC: 65 MG/DL (ref 74–99)
GLUCOSE SERPL-MCNC: 80 MG/DL (ref 74–99)
HBA1C MFR BLD: 6.2 % (ref 4–5.6)
HCT VFR BLD AUTO: 44.3 % (ref 34–48)
HDLC SERPL-MCNC: 31 MG/DL
HGB BLD-MCNC: 13.9 G/DL (ref 11.5–15.5)
LDLC SERPL CALC-MCNC: 49 MG/DL
MAGNESIUM SERPL-MCNC: 2 MG/DL (ref 1.6–2.6)
MCH RBC QN AUTO: 27.7 PG (ref 26–35)
MCHC RBC AUTO-ENTMCNC: 31.4 G/DL (ref 32–34.5)
MCV RBC AUTO: 88.2 FL (ref 80–99.9)
PHOSPHATE SERPL-MCNC: 4.2 MG/DL (ref 2.5–4.5)
PLATELET # BLD AUTO: 203 K/UL (ref 130–450)
PMV BLD AUTO: 10.8 FL (ref 7–12)
POTASSIUM SERPL-SCNC: 4 MMOL/L (ref 3.5–5)
PROT SERPL-MCNC: 7.2 G/DL (ref 6.4–8.3)
RBC # BLD AUTO: 5.02 M/UL (ref 3.5–5.5)
SODIUM SERPL-SCNC: 141 MMOL/L (ref 132–146)
TRIGL SERPL-MCNC: 201 MG/DL
TSH SERPL DL<=0.05 MIU/L-ACNC: 1.1 UIU/ML (ref 0.27–4.2)
VLDLC SERPL CALC-MCNC: 40 MG/DL
WBC OTHER # BLD: 6.2 K/UL (ref 4.5–11.5)

## 2024-08-01 PROCEDURE — 82962 GLUCOSE BLOOD TEST: CPT

## 2024-08-01 PROCEDURE — 2140000000 HC CCU INTERMEDIATE R&B

## 2024-08-01 PROCEDURE — 6370000000 HC RX 637 (ALT 250 FOR IP): Performed by: INTERNAL MEDICINE

## 2024-08-01 PROCEDURE — 83735 ASSAY OF MAGNESIUM: CPT

## 2024-08-01 PROCEDURE — 80053 COMPREHEN METABOLIC PANEL: CPT

## 2024-08-01 PROCEDURE — 6360000002 HC RX W HCPCS: Performed by: INTERNAL MEDICINE

## 2024-08-01 PROCEDURE — 2580000003 HC RX 258: Performed by: INTERNAL MEDICINE

## 2024-08-01 PROCEDURE — 82947 ASSAY GLUCOSE BLOOD QUANT: CPT

## 2024-08-01 PROCEDURE — 99233 SBSQ HOSP IP/OBS HIGH 50: CPT | Performed by: INTERNAL MEDICINE

## 2024-08-01 PROCEDURE — 84443 ASSAY THYROID STIM HORMONE: CPT

## 2024-08-01 PROCEDURE — 83036 HEMOGLOBIN GLYCOSYLATED A1C: CPT

## 2024-08-01 PROCEDURE — 84100 ASSAY OF PHOSPHORUS: CPT

## 2024-08-01 PROCEDURE — 80061 LIPID PANEL: CPT

## 2024-08-01 PROCEDURE — 85027 COMPLETE CBC AUTOMATED: CPT

## 2024-08-01 PROCEDURE — 36415 COLL VENOUS BLD VENIPUNCTURE: CPT

## 2024-08-01 RX ADMIN — METFORMIN HYDROCHLORIDE 500 MG: 500 TABLET ORAL at 09:07

## 2024-08-01 RX ADMIN — Medication 4000 UNITS: at 09:07

## 2024-08-01 RX ADMIN — GABAPENTIN 400 MG: 400 CAPSULE ORAL at 20:43

## 2024-08-01 RX ADMIN — Medication 1 CAPSULE: at 09:44

## 2024-08-01 RX ADMIN — MINERAL SUPPLEMENT IRON 300 MG / 5 ML STRENGTH LIQUID 100 PER BOX UNFLAVORED 300 MG: at 12:09

## 2024-08-01 RX ADMIN — METOCLOPRAMIDE 5 MG: 5 TABLET ORAL at 17:14

## 2024-08-01 RX ADMIN — BACLOFEN 5 MG: 10 TABLET ORAL at 20:42

## 2024-08-01 RX ADMIN — LORAZEPAM 0.5 MG: 0.5 TABLET ORAL at 09:07

## 2024-08-01 RX ADMIN — DEXTROSE MONOHYDRATE 125 ML: 100 INJECTION, SOLUTION INTRAVENOUS at 18:12

## 2024-08-01 RX ADMIN — GABAPENTIN 400 MG: 400 CAPSULE ORAL at 09:09

## 2024-08-01 RX ADMIN — TRAZODONE HYDROCHLORIDE 50 MG: 50 TABLET ORAL at 20:42

## 2024-08-01 RX ADMIN — LISINOPRIL 5 MG: 10 TABLET ORAL at 09:08

## 2024-08-01 RX ADMIN — LORAZEPAM 0.5 MG: 0.5 TABLET ORAL at 14:41

## 2024-08-01 RX ADMIN — LEVOTHYROXINE SODIUM 75 MCG: 0.07 TABLET ORAL at 09:34

## 2024-08-01 RX ADMIN — FAMOTIDINE 20 MG: 20 TABLET, FILM COATED ORAL at 09:08

## 2024-08-01 RX ADMIN — LORAZEPAM 0.5 MG: 0.5 TABLET ORAL at 20:41

## 2024-08-01 RX ADMIN — DULOXETINE HYDROCHLORIDE 30 MG: 30 CAPSULE, DELAYED RELEASE ORAL at 09:07

## 2024-08-01 RX ADMIN — METOCLOPRAMIDE 5 MG: 5 TABLET ORAL at 20:41

## 2024-08-01 RX ADMIN — DEXTROSE MONOHYDRATE 125 ML: 100 INJECTION, SOLUTION INTRAVENOUS at 18:41

## 2024-08-01 RX ADMIN — MEROPENEM 1000 MG: 1 INJECTION INTRAVENOUS at 14:40

## 2024-08-01 RX ADMIN — BACLOFEN 5 MG: 10 TABLET ORAL at 14:41

## 2024-08-01 RX ADMIN — DOCUSATE SODIUM 100 MG: 100 CAPSULE, LIQUID FILLED ORAL at 09:07

## 2024-08-01 RX ADMIN — MEROPENEM 1000 MG: 1 INJECTION INTRAVENOUS at 09:27

## 2024-08-01 RX ADMIN — ROSUVASTATIN 10 MG: 10 TABLET, FILM COATED ORAL at 20:42

## 2024-08-01 RX ADMIN — SODIUM CHLORIDE, PRESERVATIVE FREE 10 ML: 5 INJECTION INTRAVENOUS at 20:48

## 2024-08-01 RX ADMIN — METOPROLOL TARTRATE 75 MG: 25 TABLET, FILM COATED ORAL at 20:41

## 2024-08-01 RX ADMIN — METFORMIN HYDROCHLORIDE 500 MG: 500 TABLET ORAL at 17:15

## 2024-08-01 RX ADMIN — METOCLOPRAMIDE 5 MG: 5 TABLET ORAL at 09:08

## 2024-08-01 RX ADMIN — BACLOFEN 5 MG: 10 TABLET ORAL at 09:09

## 2024-08-01 RX ADMIN — SENNOSIDES 8.6 MG: 8.6 TABLET, FILM COATED ORAL at 20:47

## 2024-08-01 RX ADMIN — DULOXETINE HYDROCHLORIDE 30 MG: 30 CAPSULE, DELAYED RELEASE ORAL at 20:41

## 2024-08-01 RX ADMIN — METOPROLOL TARTRATE 75 MG: 25 TABLET, FILM COATED ORAL at 09:08

## 2024-08-01 RX ADMIN — MEROPENEM 1000 MG: 1 INJECTION INTRAVENOUS at 01:17

## 2024-08-01 RX ADMIN — METOCLOPRAMIDE 5 MG: 5 TABLET ORAL at 12:09

## 2024-08-01 RX ADMIN — GABAPENTIN 600 MG: 300 CAPSULE ORAL at 20:41

## 2024-08-01 RX ADMIN — ENOXAPARIN SODIUM 40 MG: 100 INJECTION SUBCUTANEOUS at 09:07

## 2024-08-01 RX ADMIN — LANSOPRAZOLE 30 MG: 30 TABLET, ORALLY DISINTEGRATING, DELAYED RELEASE ORAL at 12:09

## 2024-08-01 RX ADMIN — SODIUM CHLORIDE, PRESERVATIVE FREE 10 ML: 5 INJECTION INTRAVENOUS at 09:09

## 2024-08-01 NOTE — PROGRESS NOTES
Patients blood sugar came back unreadable low. Blood draw of glucose was taken and currently in process. Patient asymptomatic and alert. Orange juice was given. Will follow protocol once blood glucose results. Attending notified.     Blood glucose resulted as 65. Glucose tabs unable to be given due to patient leaving dentures at facility. D 10 administered. Will recheck blood sugar in 15 minutes.      Blood glucose reading on glucometer after D10 administration: 55. Patient alert. Another d 10 infusing   Recheck: 123

## 2024-08-01 NOTE — PROGRESS NOTES
Messaged attending regarding discharge order. Per attending: ID is to see before patient is discharged.

## 2024-08-01 NOTE — PROGRESS NOTES
[facility] Hospitalist Progress Note    Admitting Date and Time: 7/31/2024  8:20 AM  Admit Dx: Hyperkalemia [E87.5]  Altered mental status [R41.82]  Complicated UTI (urinary tract infection) [N39.0]    Subjective:  Patient is being followed for Hyperkalemia [E87.5]  Altered mental status [R41.82]  Complicated UTI (urinary tract infection) [N39.0]     ROS: denies fever, chills, cp, sob, n/v, HA unless stated above.      sodium chloride flush  5-40 mL IntraVENous 2 times per day    enoxaparin  40 mg SubCUTAneous Daily    lactobacillus  1 capsule PEG Tube Daily    baclofen  5 mg PEG Tube TID    Vitamin D  4,000 Units PEG Tube Daily    docusate sodium  100 mg PEG Tube BID    DULoxetine  30 mg Per G Tube BID    famotidine  20 mg PEG Tube Daily    ferrous Sulfate  300 mg Per G Tube Daily    gabapentin  400 mg PEG Tube BID    insulin glargine  64 Units SubCUTAneous Nightly    levothyroxine  75 mcg PEG Tube Daily    lisinopril  5 mg Oral Daily    LORazepam  0.5 mg Oral TID    analgesic ointment  1 each Topical BID    metFORMIN  500 mg PEG Tube BID WC    metoclopramide  5 mg Per G Tube 4x Daily    metoprolol tartrate  75 mg PEG Tube BID    mupirocin   Topical Nightly    lansoprazole  30 mg Oral QAM AC    rosuvastatin  10 mg PEG Tube Nightly    senna  1 tablet Oral Nightly    traZODone  50 mg PEG Tube Nightly    insulin lispro  0-8 Units SubCUTAneous TID WC    insulin lispro  0-4 Units SubCUTAneous Nightly    meropenem  1,000 mg IntraVENous Q8H    gabapentin  600 mg PEG Tube Nightly     benzocaine-menthol, 1 lozenge, Q2H PRN  benzonatate, 100 mg, TID PRN  calcium carbonate, 500 mg, TID PRN  hydrALAZINE, 10 mg, Q6H PRN  melatonin, 3 mg, Nightly PRN  polyvinyl alcohol, 1 drop, PRN  sodium chloride, 1 spray, PRN  sodium chloride flush, 5-40 mL, PRN  sodium chloride, , PRN  potassium chloride, 40 mEq, PRN   Or  potassium alternative oral replacement, 40 mEq, PRN   Or  potassium chloride, 10 mEq, PRN  magnesium sulfate,

## 2024-08-01 NOTE — ACP (ADVANCE CARE PLANNING)
Advance Care Planning   Healthcare Decision Maker:    Primary Decision Maker: Ayanna Colby - Norma - [phone]    Secondary Decision Maker: Francisca Nava - Brother/Sister - [phone]    Today we documented Decision Maker(s) consistent with ACP documents on file.    Electronically signed by SARAH Coleman on 8/1/2024 at 10:51 AM

## 2024-08-01 NOTE — CONSULTS
[facility] CONSULT NOTE    Reason for Consult: UTI   Requested by: Carmelita Carrera DO      Chief complaint: Altered mental status    History Obtained From: Patient and EMR    HISTORY OFPRESENT ILLNESS              The patient is a 73 y.o. paraplegic female nursing home resident with history of DM, hypertension, hyperlipidemia, spinal cord infarction, neurogenic bowel and bladder, IgG and IgM deficiency, multiple hospital visits for leaking/dislodged PEG tube (most recently replaced on 04/30/2024) and presumed UTI, presented on 07/31 with altered mental status described as oriented x 2, found to be hyperkalemic.  On admission, she was afebrile and hemodynamically stable with no leukocytosis.  Chest x-ray showed no acute process.  Urinalysis showed pyuria of 10-20 WBCs with no urine culture sent. She reports no suprapubic spasms and no dysuria.  She has had urine cultures in the past growing ESBL E. coli and non-MDR Pseudomonas aeruginosa.  Meropenem was started on admission.  ID service was subsequently consulted for further recommendations.    Past Medical History  Past Medical History:   Diagnosis Date    Anemia     Anxiety disorder     Chronic back pain 01/2018    6/10 on the pain scale    Depression     Diabetes mellitus (HCC)     Hyperlipidemia     Hypertension     Neurogenic bowel     Neuromuscular dysfunction of bladder     Obstructive sleep apnea     Radiculopathy of lumbar region     Spinal cord infarction (HCC)     Suicidal ideations     Vitamin D deficiency        Current Facility-Administered Medications   Medication Dose Route Frequency Provider Last Rate Last Admin    benzocaine-menthol (CEPACOL SORE THROAT) lozenge 1 lozenge  1 lozenge Oral Q2H PRN Carmelita Carrera DO        benzonatate (TESSALON) capsule 100 mg  100 mg Oral TID PRN Carmelita Carrera DO        calcium carbonate (TUMS) chewable tablet 500 mg  500 mg Oral TID PRN Carmelita Carrera DO        hydrALAZINE (APRESOLINE) injection 10 mg  suspension 30 mL  30 mL Per G Tube Q4H PRN Carmelita Carrera, DO        baclofen (LIORESAL) tablet 5 mg  5 mg PEG Tube TID Carmelita Carrera, DO   5 mg at 08/01/24 0909    bisacodyl (DULCOLAX) suppository 10 mg  10 mg Rectal Daily PRN Carmelita Carrera, DO        Vitamin D (CHOLECALCIFEROL) tablet 4,000 Units  4,000 Units PEG Tube Daily Carmelita Carrera, DO   4,000 Units at 08/01/24 0907    docusate sodium (COLACE) capsule 100 mg  100 mg PEG Tube BID Carmelita Carrera DO   100 mg at 08/01/24 0907    DULoxetine (CYMBALTA) extended release capsule 30 mg  30 mg Per G Tube BID Carmelita Carrera, DO   30 mg at 08/01/24 0907    famotidine (PEPCID) tablet 20 mg  20 mg PEG Tube Daily Carmelita Carrera, DO   20 mg at 08/01/24 0908    ferrous Sulfate 300 (60 Fe) MG/5ML solution 300 mg  300 mg Per G Tube Daily Carmelita Carrera, DO   300 mg at 08/01/24 1209    gabapentin (NEURONTIN) capsule 400 mg  400 mg PEG Tube BID Carmelita Carrera, DO   400 mg at 08/01/24 0909    glycerin suppository 2 g  1 suppository Rectal Daily PRN Carmelita Carrera, DO        insulin glargine (LANTUS) injection vial 64 Units  64 Units SubCUTAneous Nightly Carmelita Carrera DO   64 Units at 07/31/24 2216    lactulose (CHRONULAC) 10 GM/15ML solution 20 g  20 g PEG Tube BID PRN Carmelita Carrera, DO        levothyroxine (SYNTHROID) tablet 75 mcg  75 mcg PEG Tube Daily Carmelita Carrera, DO   75 mcg at 08/01/24 0934    lisinopril (PRINIVIL;ZESTRIL) tablet 5 mg  5 mg Oral Daily Carmelita Carrera, DO   5 mg at 08/01/24 0908    loperamide (IMODIUM) capsule 2 mg  2 mg PEG Tube 4x Daily PRN Carmelita Carrera, DO        LORazepam (ATIVAN) tablet 0.5 mg  0.5 mg Oral TID Carmelita Carrera DO   0.5 mg at 08/01/24 0907    analgesic ointment ointment 1 each  (Patient Supplied)  1 each Topical BID Carmelita Carrera DO        metFORMIN (GLUCOPHAGE) tablet 500 mg  500 mg PEG Tube BID WC Carmelita Carrera DO   500 mg at 08/01/24 0907     involving me in the care of Cait Kent. I will sign off for now. Please do not hesitate to call for any questions, concerns, or new findings.    Electronically signed by Stacie Pierce MD on 8/1/2024 at 12:10 PM

## 2024-08-01 NOTE — PROGRESS NOTES
Infect disease consult/  Re:  uti, hx esbl, hx septic shock from uti   Per office sent to Dr. Pierce

## 2024-08-01 NOTE — DISCHARGE INSTR - COC
Continuity of Care Form    Patient Name: Cait Kent   :  1950  MRN:  [id number]    Admit date:  2024  Discharge date:  ***    Code Status Order: Full Code   Advance Directives:     Admitting Physician:  Carmelita Carrera DO  PCP: Denton Velasquez MD    Discharging Nurse: ***  Discharging Hospital Unit/Room#: 6408/6408-B  Discharging Unit Phone Number: ***    Emergency Contact:   Extended Emergency Contact Information  Primary Emergency Contact: Ayanna Colby  Address: [facility]  Home Phone: [phone]  Mobile Phone: [phone]  Relation: Child   needed? No  Secondary Emergency Contact: Vic Morillo  Home Phone: [phone]  Mobile Phone: [phone]  Relation: Brother/Sister  Preferred language: English   needed? No    Past Surgical History:  Past Surgical History:   Procedure Laterality Date    BACK SURGERY      UPPER GASTROINTESTINAL ENDOSCOPY N/A 2024    EGD PEG EXCHANGE performed by Jose Coleman MD at [facility] ENDOSCOPY       Immunization History:   Immunization History   Administered Date(s) Administered    COVID-19, MODERNA BLUE border, Primary or Immunocompromised, (age 12y+), IM, 100 mcg/0.5mL 2021, 2021    Influenza, Triv, inactivated, subunit, adjuvanted, IM (Fluad 65 yrs and older) 2019       Active Problems:  Patient Active Problem List   Diagnosis Code    Midline low back pain with sciatica M54.40    Lumbar degenerative disc disease M51.36    Suicidal ideation R45.851    Altered mental status R41.82    Hypotension I95.9    Severe episode of recurrent major depressive disorder, without psychotic features (HCC) F33.2    Moderate protein-calorie malnutrition (HCC) E44.0    Anemia D64.9    Sepsis (HCC) A41.9    Septic shock (HCC) A41.9, R65.21    Bilateral closed hip fractures (HCC) S72.001A, S72.002A    Hyperkalemia E87.5    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

## 2024-08-01 NOTE — PROGRESS NOTES
Comprehensive Nutrition Assessment    Type and Reason for Visit:  Initial, Consult    Nutrition Recommendations/Plan:   Tube feeding recommendation per physician consult.    Recommend Diabetic (Glucerna 1.5) @40ml/hr x 23 hours (holding 30 minutes before and after Synthroid) to provide 920ml, 1380 calories, 76g protein, 698ml water.    Monitor renal lab values and will adjust TF formula if needed. Potassium and phosphorus levels are WNL at this time.    Recommend speech therapy to consult to help determine correct food and fluid consistencies.         Malnutrition Assessment:  Malnutrition Status:  Moderate malnutrition (08/01/24 1205)    Context:  Chronic Illness     Findings of the 6 clinical characteristics of malnutrition:  Energy Intake:  75% or less estimated energy requirements for 1 month or longer  Weight Loss:  Unable to assess (d/t possible fluid shifts and d/t possible weight discrepancies)     Body Fat Loss:   (moderate) Orbital, Triceps   Muscle Mass Loss:   (moderate) Temples (temporalis), Clavicles (pectoralis & deltoids)  Fluid Accumulation:  No significant fluid accumulation     Strength:  Not Performed    Nutrition Assessment:    Patient currently on modified diet ; hx of chronic PEG (s/p PEG exchange on 4/30/24) ; adm from [facility] w/ AMS/hyperkalemia/hypertension ; noted complicated UTI ;  hx of DM/depression/anxiety/hypothyroidism ; hx of neuromuscular dysfunction of bladder and spinal cord infarction ; hx of moderate malnutrition ; pt does meet criteria for moderate malnutrition ; hx of multiple UTI and sepsis ;will provide recommendations    Nutrition Related Findings:    I&Os WNL, no edema, A&O x 2, contractures, muscle/fat wasting, potassium now WNL ; Wound Type: None       Current Nutrition Intake & Therapies:    Average Meal Intake: 1-25%     ADULT DIET; Dysphagia - Soft and Bite Sized; 4 carb choices (60 gm/meal); Low Potassium (Less than 3000 mg/day)    Anthropometric

## 2024-08-01 NOTE — PROGRESS NOTES
4 Eyes Skin Assessment     NAME:  Cait Kent  YOB: 1950  MEDICAL RECORD NUMBER:  [id number]    The patient is being assessed for  Admission    I agree that at least one RN has performed a thorough Head to Toe Skin Assessment on the patient. ALL assessment sites listed below have been assessed.      Areas assessed by both nurses:    Head, Face, Ears, Shoulders, Back, Chest, Arms, Elbows, Hands, Sacrum. Buttock, Coccyx, Ischium, Legs. Feet and Heels, and Under Medical Devices         Does the Patient have a Wound? No noted wound(s)       Aung Prevention initiated by RN: Yes  Wound Care Orders initiated by RN: No    Pressure Injury (Stage 3,4, Unstageable, DTI, NWPT, and Complex wounds) if present, place Wound referral order by RN under : No    New Ostomies, if present place, Ostomy referral order under : No     Nurse 1 eSignature: Electronically signed by Michelle Chandler RN on 8/1/24 at 12:27 PM EDT    **SHARE this note so that the co-signing nurse can place an eSignature**    Nurse 2 eSignature: Electronically signed by Aletha Farnsworth RN on 8/1/24 at 3:17 PM EDT

## 2024-08-01 NOTE — CARE COORDINATION
Patient presented to the ED from [facility] due to altered mental status, hyperkalemia, and hypertension. Call made to Andrew at [facility], no answer; left message regarding the call. Call made to patient's daughter, Ayanna for transition of care planning. Ayanna reports the patient has been living at [facility] for the past 4-5 years and her mother is normally alert and oriented x4. Ayanna she resides in [place], is currently on her way to see her mother here at the hospital and states the plan will be for her mother to return to the facility. Ambulance form and REGINO completed; envelope placed on the soft chart. Patient currently on room air, IV Merrem Q8, ID and dietician consulted.     Informed by CM, patient pending an ID consult and cannot discharge until cleared by ID.    Case Management Assessment  Initial Evaluation    Date/Time of Evaluation: 8/1/2024 10:45 AM  Assessment Completed by: SARAH Coleman    If patient is discharged prior to next notation, then this note serves as note for discharge by case management.    Patient Name: Cait Kent                   YOB: 1950  Diagnosis: Hyperkalemia [E87.5]  Altered mental status [R41.82]  Complicated UTI (urinary tract infection) [N39.0]                   Date / Time: 7/31/2024  8:20 AM    Patient Admission Status: Inpatient   Readmission Risk (Low < 19, Mod (19-27), High > 27): Readmission Risk Score: 13.5    Current PCP: Denton Velasquez MD  PCP verified by CM? Yes    Chart Reviewed: Yes      History Provided by: Child/Family, Medical Record  Patient Orientation: Self    Patient Cognition: Alert    Hospitalization in the last 30 days (Readmission):  No    If yes, Readmission Assessment in CM Navigator will be completed.    Advance Directives:      Code Status: Full Code   Patient's Primary Decision Maker is: Named in Scanned ACP Document    Primary Decision Maker: Ayanna Colby - Child - [phone]    Secondary  Decision Maker: Francisca Nava - Brother/Sister - [phone]    Discharge Planning:    Patient lives with:   Type of Home:    Primary Care Giver: Other (Comment) (J aCrlos Rossi)  Patient Support Systems include: Children, Family Members   Current Financial resources:    Current community resources:    Current services prior to admission:              Current DME:              Type of Home Care services:       ADLS  Prior functional level: Assistance with the following:, Bathing, Dressing, Toileting, Feeding, Mobility  Current functional level: Assistance with the following:, Bathing, Dressing, Toileting, Feeding, Mobility    PT AM-PAC:   /24  OT AM-PAC:   /24    Family can provide assistance at DC: No  Would you like Case Management to discuss the discharge plan with any other family members/significant others, and if so, who? No  Plans to Return to Present Housing: Yes  Other Identified Issues/Barriers to RETURNING to current housing: n/a  Potential Assistance needed at discharge:              Potential DME:    Patient expects to discharge to:    Plan for transportation at discharge:      Financial    Payor: MEDICARE / Plan: MEDICARE PART A AND B / Product Type: *No Product type* /     Does insurance require precert for SNF: No    Potential assistance Purchasing Medications:    Meds-to-Beds request:        [id number] - [place], [place] - [address] [phone] - F [phone]  [address] [id number]  Phone: [phone] Fax: [phone]    [facility] - [place], [place] - [address] - P [phone] - F [phone]  [address]  [address]  [place] [id number]  Phone: [phone] Fax: [phone]      Notes:    Factors facilitating achievement of predicted outcomes: Family support    Barriers to discharge: altered mental status    Additional Case Management Notes: n/a    The Plan for Transition of Care is related to the following treatment  goals of Hyperkalemia [E87.5]  Altered mental status [R41.82]  Complicated UTI (urinary tract infection) [N39.0]    IF APPLICABLE: The Patient and/or patient representative Cait and her family were provided with a choice of provider and agrees with the discharge plan. Freedom of choice list with basic dialogue that supports the patient's individualized plan of care/goals and shares the quality data associated with the providers was provided to: Patient   Patient Representative Name:       The Patient and/or Patient Representative Agree with the Discharge Plan? Yes    Electronically signed by SARAH Coleman on 8/1/2024 at 10:46 AM

## 2024-08-02 ENCOUNTER — CARE COORDINATION (OUTPATIENT)
Dept: CARE COORDINATION | Age: 74
End: 2024-08-02

## 2024-08-02 VITALS
HEIGHT: 63 IN | DIASTOLIC BLOOD PRESSURE: 62 MMHG | TEMPERATURE: 97.8 F | WEIGHT: 126 LBS | HEART RATE: 79 BPM | OXYGEN SATURATION: 94 % | SYSTOLIC BLOOD PRESSURE: 105 MMHG | RESPIRATION RATE: 13 BRPM | BODY MASS INDEX: 22.32 KG/M2

## 2024-08-02 LAB — GLUCOSE BLD-MCNC: 121 MG/DL (ref 74–99)

## 2024-08-02 PROCEDURE — 6370000000 HC RX 637 (ALT 250 FOR IP): Performed by: INTERNAL MEDICINE

## 2024-08-02 PROCEDURE — 82962 GLUCOSE BLOOD TEST: CPT

## 2024-08-02 RX ADMIN — LEVOTHYROXINE SODIUM 75 MCG: 0.07 TABLET ORAL at 05:50

## 2024-08-02 NOTE — PLAN OF CARE
Problem: Chronic Conditions and Co-morbidities  Goal: Patient's chronic conditions and co-morbidity symptoms are monitored and maintained or improved  Outcome: Progressing     Problem: Discharge Planning  Goal: Discharge to home or other facility with appropriate resources  Outcome: Progressing     Problem: Safety - Adult  Goal: Free from fall injury  Outcome: Progressing     Problem: Skin/Tissue Integrity  Goal: Absence of new skin breakdown  Description: 1.  Monitor for areas of redness and/or skin breakdown  2.  Assess vascular access sites hourly  3.  Every 4-6 hours minimum:  Change oxygen saturation probe site  4.  Every 4-6 hours:  If on nasal continuous positive airway pressure, respiratory therapy assess nares and determine need for appliance change or resting period.  Outcome: Progressing     Problem: Nutrition Deficit:  Goal: Optimize nutritional status  Outcome: Progressing

## 2024-08-02 NOTE — PROGRESS NOTES
[facility] set up to pick patient up in 3-4 hours. Spoke to Devante.     Spoke to Unruly at [facility] she states it is okay if patient is picked up in 3-4 hours and brought to them.

## 2024-08-02 NOTE — CARE COORDINATION
Discharge order noted. Per charge nurse, PAS to  patient at 9A. Facility liaison informed. Called patient's daughter, Ayanna, notified her of patient's discharge back to the facility.    Electronically signed by SARAH Coleman on 8/2/2024 at 9:10 AM

## 2024-08-10 NOTE — PROGRESS NOTES
Physician Progress Note      PATIENT:               ASNDEEP MERCADO  CSN #:                  [id number]  :                       1950  ADMIT DATE:       2024 8:20 AM  DISCH DATE:        2024 9:00 AM  RESPONDING  PROVIDER #:        Roxanne Momin DO          QUERY TEXT:    Pt admitted with altered mental status. Pt noted to have suspected UTI,   hyperkalemia. If possible, please document in progress notes and discharge   summary the etiology of presenting symptoms.    The medical record reflects the following:  Risk Factors: AMS  Clinical Indicators: per H&P  female who presented with AMS. Altered mental   status- CT head wo acute abnormality. Likely due to below. UTI- history ESBL.   Started on meropenem in ER. Follow cultures. ID. Hyperkalemia- given hyperK   protocol. Follow BMP; Per ID consult presented on  with altered mental   status described as oriented x 2, found to be hyperkalemic.  On admission, she   was afebrile and hemodynamically stable with no leukocytosis Asymptomatic   bacteriuria Stop meropenem.  Monitor clinically off antibiotics for now  Treatment: Serial labs, ID consult, Merrem x1 then stopped    Thank you  Elyssa SIEGEL, RN, CCDS  Clinical Documentation Improvement  Options provided:  -- AMS due to hyperkalemia UTI ruled out  -- AMS due to other, Please specify alternate cause  -- Other - I will add my own diagnosis  -- Disagree - Not applicable / Not valid  -- Disagree - Clinically unable to determine / Unknown  -- Refer to Clinical Documentation Reviewer    PROVIDER RESPONSE TEXT:    Provider is clinically unable to determine a response to this query.    Query created by: Elyssa Chang on 2024 12:56 PM      Electronically signed by:  Roxanne Momin DO 8/10/2024 1:49 PM

## 2024-08-15 NOTE — DISCHARGE SUMMARY
[facility] Hospitalist Physician Discharge Summary       [address]  [facility] [address]  [phone]          Activity level: As tolerated     Dispo:   Suki Rossi    Condition on discharge: Stable     Patient ID:  Cait Kent  [id number]  73 y.o.  1950    Admit date: 7/31/2024    Discharge date and time:  8/15/2024  12:53 PM    Admission Diagnoses: Principal Problem:    Altered mental status  Active Problems:    Moderate protein-calorie malnutrition (HCC)    Hyperkalemia    Insulin dependent type 2 diabetes mellitus (HCC)    Hypertension    Hyperlipidemia    Hypothyroidism  Resolved Problems:    * No resolved hospital problems. *      Discharge Diagnoses: Principal Problem:    Altered mental status  Active Problems:    Moderate protein-calorie malnutrition (HCC)    Hyperkalemia    Insulin dependent type 2 diabetes mellitus (HCC)    Hypertension    Hyperlipidemia    Hypothyroidism  Resolved Problems:    * No resolved hospital problems. *      Consults:  IP CONSULT TO HOSPITALIST  IP CONSULT TO DIETITIAN  IP CONSULT TO INFECTIOUS DISEASES  IP CONSULT TO DIETITIAN    Procedures: PICC line    Hospital Course:   Patient Cait Kent is a 73 y.o. presented with Hyperkalemia [E87.5]  Altered mental status [R41.82]  Complicated UTI (urinary tract infection) [N39.0]    This is a 73-year-old female presents to the hospital with altered mental status secondary to acute cystitis without hematuria. Patient does have a history of ESBL. She is currently on Merrem as per infectious disease. Will adjust antibiotics as per finalized urine culture. Will continue her home medications. Continue tube feeds via PEG. She did have hyperkalemia which was treated yesterday. Patient is medically stable for discharge with antibiotics as per ID.    Discharge Exam:    General Appearance: alert and oriented to person, place and time and in no acute distress  Skin: warm and

## 2024-09-06 ENCOUNTER — APPOINTMENT (OUTPATIENT)
Dept: GENERAL RADIOLOGY | Age: 74
End: 2024-09-06
Payer: MEDICARE

## 2024-09-06 ENCOUNTER — HOSPITAL ENCOUNTER (EMERGENCY)
Age: 74
Discharge: HOME | End: 2024-09-06
Attending: STUDENT IN AN ORGANIZED HEALTH CARE EDUCATION/TRAINING PROGRAM
Payer: MEDICARE

## 2024-09-06 VITALS
HEART RATE: 92 BPM | BODY MASS INDEX: 22.32 KG/M2 | HEIGHT: 63 IN | DIASTOLIC BLOOD PRESSURE: 86 MMHG | TEMPERATURE: 98.2 F | SYSTOLIC BLOOD PRESSURE: 108 MMHG | RESPIRATION RATE: 20 BRPM | OXYGEN SATURATION: 96 %

## 2024-09-06 DIAGNOSIS — K94.23 PEG TUBE MALFUNCTION (HCC): Primary | ICD-10-CM

## 2024-09-06 PROCEDURE — 6360000004 HC RX CONTRAST MEDICATION: Performed by: RADIOLOGY

## 2024-09-06 PROCEDURE — 6360000002 HC RX W HCPCS

## 2024-09-06 PROCEDURE — 74018 RADEX ABDOMEN 1 VIEW: CPT

## 2024-09-06 PROCEDURE — 43762 RPLC GTUBE NO REVJ TRC: CPT

## 2024-09-06 PROCEDURE — 6370000000 HC RX 637 (ALT 250 FOR IP): Performed by: EMERGENCY MEDICINE

## 2024-09-06 PROCEDURE — 96372 THER/PROPH/DIAG INJ SC/IM: CPT

## 2024-09-06 PROCEDURE — 99284 EMERGENCY DEPT VISIT MOD MDM: CPT

## 2024-09-06 RX ORDER — LORAZEPAM 1 MG/1
0.5 TABLET ORAL ONCE
Status: COMPLETED | OUTPATIENT
Start: 2024-09-06 | End: 2024-09-06

## 2024-09-06 RX ORDER — HALOPERIDOL 5 MG/ML
5 INJECTION INTRAMUSCULAR ONCE
Status: COMPLETED | OUTPATIENT
Start: 2024-09-06 | End: 2024-09-06

## 2024-09-06 RX ADMIN — LORAZEPAM 0.5 MG: 1 TABLET ORAL at 07:15

## 2024-09-06 RX ADMIN — DIATRIZOATE MEGLUMINE AND DIATRIZOATE SODIUM 30 ML: 660; 100 LIQUID ORAL; RECTAL at 06:08

## 2024-09-06 RX ADMIN — HALOPERIDOL LACTATE 5 MG: 5 INJECTION, SOLUTION INTRAMUSCULAR at 07:47

## 2024-09-06 ASSESSMENT — PAIN - FUNCTIONAL ASSESSMENT: PAIN_FUNCTIONAL_ASSESSMENT: NONE - DENIES PAIN

## 2024-09-06 NOTE — ED PROVIDER NOTES
[facility] EMERGENCY DEPARTMENT  EMERGENCY DEPARTMENT ENCOUNTER      Pt Name: Cait Kent  MRN: [id number]  Birthdate 1950  Date of evaluation: 9/6/2024  Provider: Koffi Baird DO  PCP: Denton Velasquez MD  Note Started: 5:46 AM EDT 9/6/24    CHIEF COMPLAINT       Chief Complaint   Patient presents with    pulled out peg tube     Transferred from nursing home, patient pulled G-tube out        HISTORY OF PRESENT ILLNESS: 1 or more Elements   History From: EMS  Limitations to history : None    Cait Kent is a 74 y.o. female Past medical history of hyperlipidemia, diabetes, hypertension as well as neuromuscular disorder.  Patient presents with chief complaint of PEG tube malfunction.  According to nursing patient pulled out PEG tube earlier tonight.  Patient presents to emergency department for PEG tube replacement.  Further history review of systems limited secondary to patient's altered mental status    Nursing Notes were all reviewed and agreed with or any disagreements were addressed in the HPI.    REVIEW OF SYSTEMS :    Positives and Pertinent negatives as per HPI.     PAST MEDICAL HISTORY/Chronic Conditions Affecting Care    has a past medical history of Anemia, Anxiety disorder, Chronic back pain (01/2018), Depression, Diabetes mellitus (HCC), Hyperlipidemia, Hypertension, Neurogenic bowel, Neuromuscular dysfunction of bladder, Obstructive sleep apnea, Radiculopathy of lumbar region, Spinal cord infarction (HCC), Suicidal ideations, and Vitamin D deficiency.     SURGICAL HISTORY     Past Surgical History:   Procedure Laterality Date    BACK SURGERY      UPPER GASTROINTESTINAL ENDOSCOPY N/A 4/30/2024    EGD PEG EXCHANGE performed by Jsoe Coleman MD at [facility] ENDOSCOPY       CURRENTMEDICATIONS       Discharge Medication List as of 9/6/2024  6:55 AM        CONTINUE these medications which have NOT CHANGED    Details   Acidophilus Lactobacillus CAPS 1 capsule by  1.6 m (5' 3\")        Patient was given the following medications:  Medications   LORazepam (ATIVAN) tablet 0.5 mg (0.5 mg Oral Given 9/6/24 7715)   haloperidol lactate (HALDOL) injection 5 mg (5 mg IntraMUSCular Given 9/6/24 0728)         Is this patient to be included in the SEP-1 core measure due to severe sepsis or septic shock? No Exclusion criteria - the patient is NOT to be included for SEP-1 Core Measure due to: Infection is not suspected        Medical Decision Making/Differential Diagnosis:    CC/HPI Summary, Social Determinants of health, Records Reviewed, DDx, testing done/not done, ED Course, Reassessment, disposition considerations/shared decision making with patient, consults, disposition:            Chronic Conditions:   Past Medical History:   Diagnosis Date    Anemia     Anxiety disorder     Chronic back pain 01/2018    6/10 on the pain scale    Depression     Diabetes mellitus (HCC)     Hyperlipidemia     Hypertension     Neurogenic bowel     Neuromuscular dysfunction of bladder     Obstructive sleep apnea     Radiculopathy of lumbar region     Spinal cord infarction (HCC)     Suicidal ideations     Vitamin D deficiency        CONSULTS: (Who and What was discussed)  None    Discussion with Other Profesionals : None    Social Determinants : None    Records Reviewed : Inpatient Notes G-tube insertion note from 4/30/2024    CC/HPI Summary, DDx, ED Course, and Reassessment:   Patient is a 74-year-old female past medical history of hyperlipidemia, diabetes, hypertension as well as neuromuscular disorder status post PEG tube placement.  Patient presents with chief complaint of PEG tube dislodgment.  Vital signs stable presentation.  On physical exam heart regular and rhythm, lungs are auscultation bilaterally, abdomen soft PEG tube site clean dry intact no significant purulent drainage or tenderness no crepitus.  Differential diagnosis includes PEG tube dislodgment.  On review of records appears a PEG

## 2024-09-06 NOTE — ED NOTES
20 fr peg tube placed by Dr. Baird. Dressed with gauze and secured with tape. Abdominal binder placed loosely around patient to prevent patient from pulling out.

## 2024-09-11 ENCOUNTER — HOSPITAL ENCOUNTER (EMERGENCY)
Age: 74
Discharge: OTHER INSTITUTION | End: 2024-09-11
Attending: EMERGENCY MEDICINE
Payer: MEDICARE

## 2024-09-11 ENCOUNTER — APPOINTMENT (OUTPATIENT)
Dept: GENERAL RADIOLOGY | Age: 74
End: 2024-09-11
Payer: MEDICARE

## 2024-09-11 ENCOUNTER — HOSPITAL ENCOUNTER (EMERGENCY)
Age: 74
Discharge: NURSING, REHAB OR LONG-TERM CARE | End: 2024-09-11
Attending: EMERGENCY MEDICINE
Payer: MEDICARE

## 2024-09-11 VITALS
DIASTOLIC BLOOD PRESSURE: 75 MMHG | BODY MASS INDEX: 22.68 KG/M2 | SYSTOLIC BLOOD PRESSURE: 119 MMHG | RESPIRATION RATE: 16 BRPM | HEIGHT: 63 IN | HEART RATE: 69 BPM | WEIGHT: 128 LBS | OXYGEN SATURATION: 96 % | TEMPERATURE: 97.7 F

## 2024-09-11 VITALS
DIASTOLIC BLOOD PRESSURE: 70 MMHG | RESPIRATION RATE: 20 BRPM | SYSTOLIC BLOOD PRESSURE: 115 MMHG | OXYGEN SATURATION: 97 % | TEMPERATURE: 97.4 F | HEART RATE: 81 BPM

## 2024-09-11 DIAGNOSIS — F91.9 BEHAVIOR DISTURBANCE: Primary | ICD-10-CM

## 2024-09-11 DIAGNOSIS — N39.0 URINARY TRACT INFECTION WITHOUT HEMATURIA, SITE UNSPECIFIED: ICD-10-CM

## 2024-09-11 DIAGNOSIS — K94.20 COMPLICATION OF GASTROSTOMY TUBE (HCC): Primary | ICD-10-CM

## 2024-09-11 LAB
ALBUMIN SERPL-MCNC: 4.1 G/DL (ref 3.5–5.2)
ALP SERPL-CCNC: 129 U/L (ref 35–104)
ALT SERPL-CCNC: 13 U/L (ref 0–32)
AMPHET UR QL SCN: NEGATIVE
ANION GAP SERPL CALCULATED.3IONS-SCNC: 13 MMOL/L (ref 7–16)
APAP SERPL-MCNC: <5 UG/ML (ref 10–30)
AST SERPL-CCNC: 18 U/L (ref 0–31)
BACTERIA URNS QL MICRO: ABNORMAL
BARBITURATES UR QL SCN: NEGATIVE
BASOPHILS # BLD: 0.1 K/UL (ref 0–0.2)
BASOPHILS NFR BLD: 1 % (ref 0–2)
BENZODIAZ UR QL: POSITIVE
BILIRUB SERPL-MCNC: 1 MG/DL (ref 0–1.2)
BILIRUB UR QL STRIP: NEGATIVE
BUN SERPL-MCNC: 22 MG/DL (ref 6–23)
BUPRENORPHINE UR QL: NEGATIVE
CALCIUM SERPL-MCNC: 10 MG/DL (ref 8.6–10.2)
CANNABINOIDS UR QL SCN: NEGATIVE
CHLORIDE SERPL-SCNC: 95 MMOL/L (ref 98–107)
CLARITY UR: ABNORMAL
CO2 SERPL-SCNC: 27 MMOL/L (ref 22–29)
COCAINE UR QL SCN: NEGATIVE
COLOR UR: YELLOW
CREAT SERPL-MCNC: 0.6 MG/DL (ref 0.5–1)
EOSINOPHIL # BLD: 0.16 K/UL (ref 0.05–0.5)
EOSINOPHILS RELATIVE PERCENT: 2 % (ref 0–6)
ERYTHROCYTE [DISTWIDTH] IN BLOOD BY AUTOMATED COUNT: 14 % (ref 11.5–15)
ETHANOLAMINE SERPL-MCNC: <10 MG/DL (ref 0–0.08)
FENTANYL UR QL: NEGATIVE
GFR, ESTIMATED: >90 ML/MIN/1.73M2
GLUCOSE SERPL-MCNC: 155 MG/DL (ref 74–99)
GLUCOSE UR STRIP-MCNC: NEGATIVE MG/DL
HCT VFR BLD AUTO: 41.4 % (ref 34–48)
HGB BLD-MCNC: 13.4 G/DL (ref 11.5–15.5)
HGB UR QL STRIP.AUTO: ABNORMAL
IMM GRANULOCYTES # BLD AUTO: 0.03 K/UL (ref 0–0.58)
IMM GRANULOCYTES NFR BLD: 0 % (ref 0–5)
KETONES UR STRIP-MCNC: NEGATIVE MG/DL
LEUKOCYTE ESTERASE UR QL STRIP: ABNORMAL
LYMPHOCYTES NFR BLD: 2.76 K/UL (ref 1.5–4)
LYMPHOCYTES RELATIVE PERCENT: 31 % (ref 20–42)
MCH RBC QN AUTO: 28 PG (ref 26–35)
MCHC RBC AUTO-ENTMCNC: 32.4 G/DL (ref 32–34.5)
MCV RBC AUTO: 86.6 FL (ref 80–99.9)
METHADONE UR QL: NEGATIVE
MONOCYTES NFR BLD: 0.66 K/UL (ref 0.1–0.95)
MONOCYTES NFR BLD: 7 % (ref 2–12)
NEUTROPHILS NFR BLD: 58 % (ref 43–80)
NEUTS SEG NFR BLD: 5.18 K/UL (ref 1.8–7.3)
NITRITE UR QL STRIP: NEGATIVE
OPIATES UR QL SCN: NEGATIVE
OXYCODONE UR QL SCN: NEGATIVE
PCP UR QL SCN: NEGATIVE
PH UR STRIP: 7.5 [PH] (ref 5–9)
PLATELET # BLD AUTO: 293 K/UL (ref 130–450)
PMV BLD AUTO: 10.7 FL (ref 7–12)
POTASSIUM SERPL-SCNC: 4.8 MMOL/L (ref 3.5–5)
PROT SERPL-MCNC: 7.7 G/DL (ref 6.4–8.3)
PROT UR STRIP-MCNC: 30 MG/DL
RBC # BLD AUTO: 4.78 M/UL (ref 3.5–5.5)
RBC #/AREA URNS HPF: ABNORMAL /HPF
SALICYLATES SERPL-MCNC: <0.3 MG/DL (ref 0–30)
SODIUM SERPL-SCNC: 135 MMOL/L (ref 132–146)
SP GR UR STRIP: 1.01 (ref 1–1.03)
TEST INFORMATION: ABNORMAL
TOXIC TRICYCLIC SC,BLOOD: NEGATIVE
UROBILINOGEN UR STRIP-ACNC: 4 EU/DL (ref 0–1)
WBC #/AREA URNS HPF: ABNORMAL /HPF
WBC OTHER # BLD: 8.9 K/UL (ref 4.5–11.5)

## 2024-09-11 PROCEDURE — 80179 DRUG ASSAY SALICYLATE: CPT

## 2024-09-11 PROCEDURE — 87077 CULTURE AEROBIC IDENTIFY: CPT

## 2024-09-11 PROCEDURE — 6360000002 HC RX W HCPCS: Performed by: EMERGENCY MEDICINE

## 2024-09-11 PROCEDURE — 74018 RADEX ABDOMEN 1 VIEW: CPT

## 2024-09-11 PROCEDURE — 96376 TX/PRO/DX INJ SAME DRUG ADON: CPT

## 2024-09-11 PROCEDURE — 96374 THER/PROPH/DIAG INJ IV PUSH: CPT

## 2024-09-11 PROCEDURE — 96375 TX/PRO/DX INJ NEW DRUG ADDON: CPT

## 2024-09-11 PROCEDURE — 93005 ELECTROCARDIOGRAM TRACING: CPT | Performed by: EMERGENCY MEDICINE

## 2024-09-11 PROCEDURE — G0480 DRUG TEST DEF 1-7 CLASSES: HCPCS

## 2024-09-11 PROCEDURE — 80143 DRUG ASSAY ACETAMINOPHEN: CPT

## 2024-09-11 PROCEDURE — 99284 EMERGENCY DEPT VISIT MOD MDM: CPT

## 2024-09-11 PROCEDURE — 96372 THER/PROPH/DIAG INJ SC/IM: CPT

## 2024-09-11 PROCEDURE — 81001 URINALYSIS AUTO W/SCOPE: CPT

## 2024-09-11 PROCEDURE — 2580000003 HC RX 258: Performed by: EMERGENCY MEDICINE

## 2024-09-11 PROCEDURE — 85025 COMPLETE CBC W/AUTO DIFF WBC: CPT

## 2024-09-11 PROCEDURE — 6360000004 HC RX CONTRAST MEDICATION: Performed by: EMERGENCY MEDICINE

## 2024-09-11 PROCEDURE — 80307 DRUG TEST PRSMV CHEM ANLYZR: CPT

## 2024-09-11 PROCEDURE — 87086 URINE CULTURE/COLONY COUNT: CPT

## 2024-09-11 PROCEDURE — 51701 INSERT BLADDER CATHETER: CPT

## 2024-09-11 PROCEDURE — 80053 COMPREHEN METABOLIC PANEL: CPT

## 2024-09-11 RX ORDER — CEFDINIR 300 MG/1
300 CAPSULE ORAL 2 TIMES DAILY
Qty: 14 CAPSULE | Refills: 0 | Status: SHIPPED | OUTPATIENT
Start: 2024-09-11 | End: 2024-09-18

## 2024-09-11 RX ORDER — LORAZEPAM 2 MG/ML
1 INJECTION INTRAMUSCULAR ONCE
Status: COMPLETED | OUTPATIENT
Start: 2024-09-11 | End: 2024-09-11

## 2024-09-11 RX ORDER — LORAZEPAM 2 MG/ML
0.5 INJECTION INTRAMUSCULAR ONCE
Status: COMPLETED | OUTPATIENT
Start: 2024-09-11 | End: 2024-09-11

## 2024-09-11 RX ADMIN — WATER 2000 MG: 1 INJECTION INTRAMUSCULAR; INTRAVENOUS; SUBCUTANEOUS at 20:42

## 2024-09-11 RX ADMIN — LORAZEPAM 1 MG: 2 INJECTION INTRAMUSCULAR; INTRAVENOUS at 14:24

## 2024-09-11 RX ADMIN — LORAZEPAM 1 MG: 2 INJECTION INTRAMUSCULAR; INTRAVENOUS at 21:23

## 2024-09-11 RX ADMIN — DIATRIZOATE MEGLUMINE AND DIATRIZOATE SODIUM 60 ML: 600; 100 SOLUTION ORAL; RECTAL at 13:41

## 2024-09-11 RX ADMIN — LORAZEPAM 0.5 MG: 2 INJECTION INTRAMUSCULAR; INTRAVENOUS at 20:36

## 2024-09-11 ASSESSMENT — PAIN - FUNCTIONAL ASSESSMENT
PAIN_FUNCTIONAL_ASSESSMENT: NONE - DENIES PAIN

## 2024-09-11 ASSESSMENT — ENCOUNTER SYMPTOMS
NAUSEA: 0
SHORTNESS OF BREATH: 0
EYE REDNESS: 0
ABDOMINAL PAIN: 0
VOMITING: 0

## 2024-09-12 LAB
EKG ATRIAL RATE: 75 BPM
EKG P AXIS: 56 DEGREES
EKG P-R INTERVAL: 184 MS
EKG Q-T INTERVAL: 414 MS
EKG QRS DURATION: 78 MS
EKG QTC CALCULATION (BAZETT): 462 MS
EKG R AXIS: -35 DEGREES
EKG T AXIS: 61 DEGREES
EKG VENTRICULAR RATE: 75 BPM

## 2024-09-12 PROCEDURE — 93010 ELECTROCARDIOGRAM REPORT: CPT | Performed by: INTERNAL MEDICINE

## 2024-09-15 LAB
MICROORGANISM SPEC CULT: ABNORMAL
SERVICE CMNT-IMP: ABNORMAL
SPECIMEN DESCRIPTION: ABNORMAL

## 2024-09-17 ENCOUNTER — HOSPITAL ENCOUNTER (INPATIENT)
Age: 74
LOS: 2 days | Discharge: NURSING, REHAB OR LONG-TERM CARE | DRG: 689 | End: 2024-09-19
Attending: STUDENT IN AN ORGANIZED HEALTH CARE EDUCATION/TRAINING PROGRAM | Admitting: FAMILY MEDICINE
Payer: MEDICARE

## 2024-09-17 DIAGNOSIS — G93.41 METABOLIC ENCEPHALOPATHY: ICD-10-CM

## 2024-09-17 DIAGNOSIS — N30.00 ACUTE CYSTITIS WITHOUT HEMATURIA: Primary | ICD-10-CM

## 2024-09-17 PROBLEM — N39.0 UTI (URINARY TRACT INFECTION): Status: ACTIVE | Noted: 2024-09-17

## 2024-09-17 LAB
ALBUMIN SERPL-MCNC: 4.2 G/DL (ref 3.5–5.2)
ALP SERPL-CCNC: 127 U/L (ref 35–104)
ALT SERPL-CCNC: 13 U/L (ref 0–32)
ANION GAP SERPL CALCULATED.3IONS-SCNC: 10 MMOL/L (ref 7–16)
AST SERPL-CCNC: 16 U/L (ref 0–31)
BACTERIA URNS QL MICRO: ABNORMAL
BASOPHILS # BLD: 0.05 K/UL (ref 0–0.2)
BASOPHILS NFR BLD: 1 % (ref 0–2)
BILIRUB SERPL-MCNC: 1 MG/DL (ref 0–1.2)
BILIRUB UR QL STRIP: NEGATIVE
BUN SERPL-MCNC: 14 MG/DL (ref 6–23)
CALCIUM SERPL-MCNC: 9.9 MG/DL (ref 8.6–10.2)
CHLORIDE SERPL-SCNC: 92 MMOL/L (ref 98–107)
CLARITY UR: CLEAR
CO2 SERPL-SCNC: 27 MMOL/L (ref 22–29)
COLOR UR: YELLOW
CREAT SERPL-MCNC: 0.6 MG/DL (ref 0.5–1)
EOSINOPHIL # BLD: 0.1 K/UL (ref 0.05–0.5)
EOSINOPHILS RELATIVE PERCENT: 1 % (ref 0–6)
ERYTHROCYTE [DISTWIDTH] IN BLOOD BY AUTOMATED COUNT: 13.9 % (ref 11.5–15)
GFR, ESTIMATED: >90 ML/MIN/1.73M2
GLUCOSE BLD-MCNC: 129 MG/DL (ref 74–99)
GLUCOSE SERPL-MCNC: 95 MG/DL (ref 74–99)
GLUCOSE UR STRIP-MCNC: NEGATIVE MG/DL
HCT VFR BLD AUTO: 41.8 % (ref 34–48)
HGB BLD-MCNC: 14 G/DL (ref 11.5–15.5)
HGB UR QL STRIP.AUTO: ABNORMAL
IMM GRANULOCYTES # BLD AUTO: 0.03 K/UL (ref 0–0.58)
IMM GRANULOCYTES NFR BLD: 0 % (ref 0–5)
KETONES UR STRIP-MCNC: NEGATIVE MG/DL
LEUKOCYTE ESTERASE UR QL STRIP: ABNORMAL
LYMPHOCYTES NFR BLD: 1.68 K/UL (ref 1.5–4)
LYMPHOCYTES RELATIVE PERCENT: 21 % (ref 20–42)
MCH RBC QN AUTO: 28.2 PG (ref 26–35)
MCHC RBC AUTO-ENTMCNC: 33.5 G/DL (ref 32–34.5)
MCV RBC AUTO: 84.1 FL (ref 80–99.9)
MONOCYTES NFR BLD: 0.54 K/UL (ref 0.1–0.95)
MONOCYTES NFR BLD: 7 % (ref 2–12)
NEUTROPHILS NFR BLD: 70 % (ref 43–80)
NEUTS SEG NFR BLD: 5.55 K/UL (ref 1.8–7.3)
NITRITE UR QL STRIP: NEGATIVE
PH UR STRIP: 7.5 [PH] (ref 5–9)
PLATELET # BLD AUTO: 275 K/UL (ref 130–450)
PMV BLD AUTO: 11.5 FL (ref 7–12)
POTASSIUM SERPL-SCNC: 4.2 MMOL/L (ref 3.5–5)
PROT SERPL-MCNC: 7.4 G/DL (ref 6.4–8.3)
PROT UR STRIP-MCNC: NEGATIVE MG/DL
RBC # BLD AUTO: 4.97 M/UL (ref 3.5–5.5)
RBC #/AREA URNS HPF: ABNORMAL /HPF
SODIUM SERPL-SCNC: 129 MMOL/L (ref 132–146)
SP GR UR STRIP: 1.01 (ref 1–1.03)
UROBILINOGEN UR STRIP-ACNC: 1 EU/DL (ref 0–1)
WBC #/AREA URNS HPF: ABNORMAL /HPF
WBC OTHER # BLD: 8 K/UL (ref 4.5–11.5)

## 2024-09-17 PROCEDURE — 2580000003 HC RX 258: Performed by: FAMILY MEDICINE

## 2024-09-17 PROCEDURE — 87086 URINE CULTURE/COLONY COUNT: CPT

## 2024-09-17 PROCEDURE — 6360000002 HC RX W HCPCS: Performed by: FAMILY MEDICINE

## 2024-09-17 PROCEDURE — 6370000000 HC RX 637 (ALT 250 FOR IP): Performed by: STUDENT IN AN ORGANIZED HEALTH CARE EDUCATION/TRAINING PROGRAM

## 2024-09-17 PROCEDURE — 87077 CULTURE AEROBIC IDENTIFY: CPT

## 2024-09-17 PROCEDURE — 6370000000 HC RX 637 (ALT 250 FOR IP): Performed by: FAMILY MEDICINE

## 2024-09-17 PROCEDURE — 85025 COMPLETE CBC W/AUTO DIFF WBC: CPT

## 2024-09-17 PROCEDURE — 2060000000 HC ICU INTERMEDIATE R&B

## 2024-09-17 PROCEDURE — 99285 EMERGENCY DEPT VISIT HI MDM: CPT

## 2024-09-17 PROCEDURE — 80053 COMPREHEN METABOLIC PANEL: CPT

## 2024-09-17 PROCEDURE — 82962 GLUCOSE BLOOD TEST: CPT

## 2024-09-17 PROCEDURE — 81001 URINALYSIS AUTO W/SCOPE: CPT

## 2024-09-17 RX ORDER — INSULIN GLARGINE 100 [IU]/ML
64 INJECTION, SOLUTION SUBCUTANEOUS NIGHTLY
Status: DISCONTINUED | OUTPATIENT
Start: 2024-09-17 | End: 2024-09-19 | Stop reason: HOSPADM

## 2024-09-17 RX ORDER — LANOLIN ALCOHOL/MO/W.PET/CERES
6 CREAM (GRAM) TOPICAL NIGHTLY PRN
Status: DISCONTINUED | OUTPATIENT
Start: 2024-09-17 | End: 2024-09-19 | Stop reason: HOSPADM

## 2024-09-17 RX ORDER — INSULIN LISPRO 100 [IU]/ML
0-8 INJECTION, SOLUTION INTRAVENOUS; SUBCUTANEOUS
Status: DISCONTINUED | OUTPATIENT
Start: 2024-09-18 | End: 2024-09-19

## 2024-09-17 RX ORDER — POTASSIUM CHLORIDE 7.45 MG/ML
10 INJECTION INTRAVENOUS PRN
Status: DISCONTINUED | OUTPATIENT
Start: 2024-09-17 | End: 2024-09-18 | Stop reason: SDUPTHER

## 2024-09-17 RX ORDER — POTASSIUM CHLORIDE 1500 MG/1
40 TABLET, EXTENDED RELEASE ORAL PRN
Status: DISCONTINUED | OUTPATIENT
Start: 2024-09-17 | End: 2024-09-18 | Stop reason: SDUPTHER

## 2024-09-17 RX ORDER — TRAMADOL HYDROCHLORIDE 50 MG/1
50 TABLET ORAL EVERY 6 HOURS PRN
Status: DISCONTINUED | OUTPATIENT
Start: 2024-09-17 | End: 2024-09-19 | Stop reason: HOSPADM

## 2024-09-17 RX ORDER — FERROUS SULFATE 300 MG/5ML
111 LIQUID (ML) ORAL DAILY
Status: DISCONTINUED | OUTPATIENT
Start: 2024-09-18 | End: 2024-09-19 | Stop reason: HOSPADM

## 2024-09-17 RX ORDER — PANTOPRAZOLE SODIUM 40 MG/1
40 TABLET, DELAYED RELEASE ORAL
Status: DISCONTINUED | OUTPATIENT
Start: 2024-09-18 | End: 2024-09-19 | Stop reason: HOSPADM

## 2024-09-17 RX ORDER — ENOXAPARIN SODIUM 100 MG/ML
40 INJECTION SUBCUTANEOUS DAILY
Status: DISCONTINUED | OUTPATIENT
Start: 2024-09-18 | End: 2024-09-19 | Stop reason: HOSPADM

## 2024-09-17 RX ORDER — SODIUM CHLORIDE 0.9 % (FLUSH) 0.9 %
5-40 SYRINGE (ML) INJECTION PRN
Status: DISCONTINUED | OUTPATIENT
Start: 2024-09-17 | End: 2024-09-19 | Stop reason: HOSPADM

## 2024-09-17 RX ORDER — GLUCAGON 1 MG/ML
1 KIT INJECTION PRN
Status: DISCONTINUED | OUTPATIENT
Start: 2024-09-17 | End: 2024-09-19 | Stop reason: HOSPADM

## 2024-09-17 RX ORDER — ACETAMINOPHEN 650 MG/1
650 SUPPOSITORY RECTAL EVERY 6 HOURS PRN
Status: DISCONTINUED | OUTPATIENT
Start: 2024-09-17 | End: 2024-09-19 | Stop reason: HOSPADM

## 2024-09-17 RX ORDER — LISINOPRIL 5 MG/1
5 TABLET ORAL DAILY
Status: DISCONTINUED | OUTPATIENT
Start: 2024-09-18 | End: 2024-09-19 | Stop reason: HOSPADM

## 2024-09-17 RX ORDER — ONDANSETRON 4 MG/1
4 TABLET, ORALLY DISINTEGRATING ORAL EVERY 8 HOURS PRN
Status: DISCONTINUED | OUTPATIENT
Start: 2024-09-17 | End: 2024-09-19 | Stop reason: HOSPADM

## 2024-09-17 RX ORDER — DULOXETIN HYDROCHLORIDE 30 MG/1
30 CAPSULE, DELAYED RELEASE ORAL 2 TIMES DAILY
Status: DISCONTINUED | OUTPATIENT
Start: 2024-09-17 | End: 2024-09-19 | Stop reason: HOSPADM

## 2024-09-17 RX ORDER — LACTULOSE 10 G/15ML
20 SOLUTION ORAL 2 TIMES DAILY PRN
Status: DISCONTINUED | OUTPATIENT
Start: 2024-09-17 | End: 2024-09-19 | Stop reason: HOSPADM

## 2024-09-17 RX ORDER — BISACODYL 10 MG
10 SUPPOSITORY, RECTAL RECTAL DAILY PRN
Status: DISCONTINUED | OUTPATIENT
Start: 2024-09-17 | End: 2024-09-19 | Stop reason: HOSPADM

## 2024-09-17 RX ORDER — LANOLIN ALCOHOL/MO/W.PET/CERES
3 CREAM (GRAM) TOPICAL NIGHTLY PRN
Status: DISCONTINUED | OUTPATIENT
Start: 2024-09-17 | End: 2024-09-17 | Stop reason: ALTCHOICE

## 2024-09-17 RX ORDER — METOPROLOL TARTRATE 25 MG/1
75 TABLET, FILM COATED ORAL 2 TIMES DAILY
Status: DISCONTINUED | OUTPATIENT
Start: 2024-09-17 | End: 2024-09-19 | Stop reason: HOSPADM

## 2024-09-17 RX ORDER — INSULIN LISPRO 100 [IU]/ML
0-4 INJECTION, SOLUTION INTRAVENOUS; SUBCUTANEOUS NIGHTLY
Status: DISCONTINUED | OUTPATIENT
Start: 2024-09-17 | End: 2024-09-19

## 2024-09-17 RX ORDER — FAMOTIDINE 20 MG/1
20 TABLET, FILM COATED ORAL DAILY
Status: DISCONTINUED | OUTPATIENT
Start: 2024-09-18 | End: 2024-09-19 | Stop reason: HOSPADM

## 2024-09-17 RX ORDER — ROSUVASTATIN CALCIUM 10 MG/1
10 TABLET, COATED ORAL NIGHTLY
Status: DISCONTINUED | OUTPATIENT
Start: 2024-09-17 | End: 2024-09-19 | Stop reason: HOSPADM

## 2024-09-17 RX ORDER — ACETAMINOPHEN 325 MG/1
650 TABLET ORAL EVERY 6 HOURS PRN
Status: DISCONTINUED | OUTPATIENT
Start: 2024-09-17 | End: 2024-09-19 | Stop reason: HOSPADM

## 2024-09-17 RX ORDER — TRAZODONE HYDROCHLORIDE 50 MG/1
50 TABLET, FILM COATED ORAL NIGHTLY
Status: DISCONTINUED | OUTPATIENT
Start: 2024-09-17 | End: 2024-09-19 | Stop reason: HOSPADM

## 2024-09-17 RX ORDER — SODIUM CHLORIDE 9 MG/ML
INJECTION, SOLUTION INTRAVENOUS CONTINUOUS
Status: DISCONTINUED | OUTPATIENT
Start: 2024-09-17 | End: 2024-09-19 | Stop reason: HOSPADM

## 2024-09-17 RX ORDER — METOCLOPRAMIDE 5 MG/1
5 TABLET ORAL 4 TIMES DAILY
Status: DISCONTINUED | OUTPATIENT
Start: 2024-09-17 | End: 2024-09-19 | Stop reason: HOSPADM

## 2024-09-17 RX ORDER — GABAPENTIN 400 MG/1
400 CAPSULE ORAL 2 TIMES DAILY
Status: DISCONTINUED | OUTPATIENT
Start: 2024-09-18 | End: 2024-09-19 | Stop reason: HOSPADM

## 2024-09-17 RX ORDER — MAGNESIUM HYDROXIDE/ALUMINUM HYDROXICE/SIMETHICONE 120; 1200; 1200 MG/30ML; MG/30ML; MG/30ML
30 SUSPENSION ORAL EVERY 6 HOURS PRN
Status: DISCONTINUED | OUTPATIENT
Start: 2024-09-17 | End: 2024-09-19 | Stop reason: HOSPADM

## 2024-09-17 RX ORDER — POLYETHYLENE GLYCOL 3350 17 G/17G
17 POWDER, FOR SOLUTION ORAL DAILY PRN
Status: DISCONTINUED | OUTPATIENT
Start: 2024-09-17 | End: 2024-09-19 | Stop reason: HOSPADM

## 2024-09-17 RX ORDER — BACLOFEN 10 MG/1
5 TABLET ORAL 3 TIMES DAILY
Status: DISCONTINUED | OUTPATIENT
Start: 2024-09-17 | End: 2024-09-19 | Stop reason: HOSPADM

## 2024-09-17 RX ORDER — LORAZEPAM 0.5 MG/1
0.5 TABLET ORAL 3 TIMES DAILY
Status: DISCONTINUED | OUTPATIENT
Start: 2024-09-17 | End: 2024-09-19 | Stop reason: HOSPADM

## 2024-09-17 RX ORDER — GABAPENTIN 300 MG/1
600 CAPSULE ORAL NIGHTLY
Status: DISCONTINUED | OUTPATIENT
Start: 2024-09-17 | End: 2024-09-19 | Stop reason: HOSPADM

## 2024-09-17 RX ORDER — ONDANSETRON 2 MG/ML
4 INJECTION INTRAMUSCULAR; INTRAVENOUS EVERY 6 HOURS PRN
Status: DISCONTINUED | OUTPATIENT
Start: 2024-09-17 | End: 2024-09-19 | Stop reason: HOSPADM

## 2024-09-17 RX ORDER — DEXTROSE MONOHYDRATE 100 MG/ML
INJECTION, SOLUTION INTRAVENOUS CONTINUOUS PRN
Status: DISCONTINUED | OUTPATIENT
Start: 2024-09-17 | End: 2024-09-19 | Stop reason: HOSPADM

## 2024-09-17 RX ORDER — GABAPENTIN 300 MG/1
600 CAPSULE ORAL ONCE
Status: COMPLETED | OUTPATIENT
Start: 2024-09-17 | End: 2024-09-17

## 2024-09-17 RX ORDER — SODIUM CHLORIDE 0.9 % (FLUSH) 0.9 %
5-40 SYRINGE (ML) INJECTION EVERY 12 HOURS SCHEDULED
Status: DISCONTINUED | OUTPATIENT
Start: 2024-09-17 | End: 2024-09-19 | Stop reason: HOSPADM

## 2024-09-17 RX ORDER — SODIUM CHLORIDE 9 MG/ML
INJECTION, SOLUTION INTRAVENOUS PRN
Status: DISCONTINUED | OUTPATIENT
Start: 2024-09-17 | End: 2024-09-19 | Stop reason: HOSPADM

## 2024-09-17 RX ADMIN — GABAPENTIN 600 MG: 300 CAPSULE ORAL at 23:46

## 2024-09-17 RX ADMIN — TRAZODONE HYDROCHLORIDE 50 MG: 50 TABLET ORAL at 23:38

## 2024-09-17 RX ADMIN — Medication 6 MG: at 23:38

## 2024-09-17 RX ADMIN — GABAPENTIN 600 MG: 300 CAPSULE ORAL at 21:41

## 2024-09-17 RX ADMIN — BACLOFEN 5 MG: 10 TABLET ORAL at 23:38

## 2024-09-17 RX ADMIN — LORAZEPAM 0.5 MG: 0.5 TABLET ORAL at 23:38

## 2024-09-17 RX ADMIN — METOCLOPRAMIDE 5 MG: 5 TABLET ORAL at 23:39

## 2024-09-17 RX ADMIN — DULOXETINE HYDROCHLORIDE 30 MG: 30 CAPSULE, DELAYED RELEASE ORAL at 23:39

## 2024-09-17 RX ADMIN — ROSUVASTATIN 10 MG: 10 TABLET, FILM COATED ORAL at 23:39

## 2024-09-17 RX ADMIN — SODIUM CHLORIDE, PRESERVATIVE FREE 10 ML: 5 INJECTION INTRAVENOUS at 23:39

## 2024-09-17 RX ADMIN — PIPERACILLIN AND TAZOBACTAM 4500 MG: 4; .5 INJECTION, POWDER, FOR SOLUTION INTRAVENOUS at 23:06

## 2024-09-17 RX ADMIN — METOPROLOL TARTRATE 75 MG: 25 TABLET, FILM COATED ORAL at 23:38

## 2024-09-17 ASSESSMENT — PAIN - FUNCTIONAL ASSESSMENT: PAIN_FUNCTIONAL_ASSESSMENT: NONE - DENIES PAIN

## 2024-09-17 ASSESSMENT — PAIN SCALES - GENERAL: PAINLEVEL_OUTOF10: 0

## 2024-09-17 ASSESSMENT — LIFESTYLE VARIABLES: HOW OFTEN DO YOU HAVE A DRINK CONTAINING ALCOHOL: NEVER

## 2024-09-18 LAB
ANION GAP SERPL CALCULATED.3IONS-SCNC: 11 MMOL/L (ref 7–16)
BASOPHILS # BLD: 0.05 K/UL (ref 0–0.2)
BASOPHILS NFR BLD: 1 % (ref 0–2)
BUN SERPL-MCNC: 12 MG/DL (ref 6–23)
CALCIUM SERPL-MCNC: 8.6 MG/DL (ref 8.6–10.2)
CHLORIDE SERPL-SCNC: 99 MMOL/L (ref 98–107)
CO2 SERPL-SCNC: 21 MMOL/L (ref 22–29)
CREAT SERPL-MCNC: 0.5 MG/DL (ref 0.5–1)
CRP SERPL HS-MCNC: 4 MG/L (ref 0–5)
EOSINOPHIL # BLD: 0.2 K/UL (ref 0.05–0.5)
EOSINOPHILS RELATIVE PERCENT: 3 % (ref 0–6)
ERYTHROCYTE [DISTWIDTH] IN BLOOD BY AUTOMATED COUNT: 14.1 % (ref 11.5–15)
ERYTHROCYTE [SEDIMENTATION RATE] IN BLOOD BY WESTERGREN METHOD: 23 MM/HR (ref 0–20)
GFR, ESTIMATED: >90 ML/MIN/1.73M2
GLUCOSE BLD-MCNC: 133 MG/DL (ref 74–99)
GLUCOSE BLD-MCNC: 169 MG/DL (ref 74–99)
GLUCOSE BLD-MCNC: 203 MG/DL (ref 74–99)
GLUCOSE SERPL-MCNC: 145 MG/DL (ref 74–99)
HBA1C MFR BLD: 6.4 % (ref 4–5.6)
HCT VFR BLD AUTO: 39.1 % (ref 34–48)
HGB BLD-MCNC: 12.9 G/DL (ref 11.5–15.5)
IMM GRANULOCYTES # BLD AUTO: 0.03 K/UL (ref 0–0.58)
IMM GRANULOCYTES NFR BLD: 0 % (ref 0–5)
LYMPHOCYTES NFR BLD: 2.25 K/UL (ref 1.5–4)
LYMPHOCYTES RELATIVE PERCENT: 33 % (ref 20–42)
MAGNESIUM SERPL-MCNC: 1.5 MG/DL (ref 1.6–2.6)
MCH RBC QN AUTO: 28.1 PG (ref 26–35)
MCHC RBC AUTO-ENTMCNC: 33 G/DL (ref 32–34.5)
MCV RBC AUTO: 85.2 FL (ref 80–99.9)
MONOCYTES NFR BLD: 0.6 K/UL (ref 0.1–0.95)
MONOCYTES NFR BLD: 9 % (ref 2–12)
NEUTROPHILS NFR BLD: 54 % (ref 43–80)
NEUTS SEG NFR BLD: 3.7 K/UL (ref 1.8–7.3)
PLATELET # BLD AUTO: 218 K/UL (ref 130–450)
PMV BLD AUTO: 10.8 FL (ref 7–12)
POTASSIUM SERPL-SCNC: 4.7 MMOL/L (ref 3.5–5)
PROCALCITONIN SERPL-MCNC: <0.02 NG/ML (ref 0–0.08)
RBC # BLD AUTO: 4.59 M/UL (ref 3.5–5.5)
SODIUM SERPL-SCNC: 131 MMOL/L (ref 132–146)
WBC OTHER # BLD: 6.8 K/UL (ref 4.5–11.5)

## 2024-09-18 PROCEDURE — 85652 RBC SED RATE AUTOMATED: CPT

## 2024-09-18 PROCEDURE — 2060000000 HC ICU INTERMEDIATE R&B

## 2024-09-18 PROCEDURE — 2580000003 HC RX 258: Performed by: FAMILY MEDICINE

## 2024-09-18 PROCEDURE — 84145 PROCALCITONIN (PCT): CPT

## 2024-09-18 PROCEDURE — 83036 HEMOGLOBIN GLYCOSYLATED A1C: CPT

## 2024-09-18 PROCEDURE — 99222 1ST HOSP IP/OBS MODERATE 55: CPT | Performed by: FAMILY MEDICINE

## 2024-09-18 PROCEDURE — 6360000002 HC RX W HCPCS: Performed by: FAMILY MEDICINE

## 2024-09-18 PROCEDURE — 6370000000 HC RX 637 (ALT 250 FOR IP): Performed by: FAMILY MEDICINE

## 2024-09-18 PROCEDURE — 80048 BASIC METABOLIC PNL TOTAL CA: CPT

## 2024-09-18 PROCEDURE — 85025 COMPLETE CBC W/AUTO DIFF WBC: CPT

## 2024-09-18 PROCEDURE — 82962 GLUCOSE BLOOD TEST: CPT

## 2024-09-18 PROCEDURE — 6360000002 HC RX W HCPCS

## 2024-09-18 PROCEDURE — 83735 ASSAY OF MAGNESIUM: CPT

## 2024-09-18 PROCEDURE — 86140 C-REACTIVE PROTEIN: CPT

## 2024-09-18 RX ORDER — POTASSIUM CHLORIDE 1500 MG/1
40 TABLET, EXTENDED RELEASE ORAL PRN
Status: DISCONTINUED | OUTPATIENT
Start: 2024-09-18 | End: 2024-09-19 | Stop reason: HOSPADM

## 2024-09-18 RX ORDER — POTASSIUM CHLORIDE 7.45 MG/ML
10 INJECTION INTRAVENOUS PRN
Status: DISCONTINUED | OUTPATIENT
Start: 2024-09-18 | End: 2024-09-19 | Stop reason: HOSPADM

## 2024-09-18 RX ORDER — MAGNESIUM SULFATE IN WATER 40 MG/ML
2000 INJECTION, SOLUTION INTRAVENOUS PRN
Status: DISCONTINUED | OUTPATIENT
Start: 2024-09-18 | End: 2024-09-19 | Stop reason: HOSPADM

## 2024-09-18 RX ORDER — MAGNESIUM SULFATE 1 G/100ML
1000 INJECTION INTRAVENOUS ONCE
Status: COMPLETED | OUTPATIENT
Start: 2024-09-18 | End: 2024-09-18

## 2024-09-18 RX ADMIN — LISINOPRIL 5 MG: 10 TABLET ORAL at 10:28

## 2024-09-18 RX ADMIN — TRAZODONE HYDROCHLORIDE 50 MG: 50 TABLET ORAL at 22:21

## 2024-09-18 RX ADMIN — LORAZEPAM 0.5 MG: 0.5 TABLET ORAL at 22:21

## 2024-09-18 RX ADMIN — BACLOFEN 5 MG: 10 TABLET ORAL at 10:34

## 2024-09-18 RX ADMIN — PANTOPRAZOLE SODIUM 40 MG: 40 TABLET, DELAYED RELEASE ORAL at 05:14

## 2024-09-18 RX ADMIN — LEVOTHYROXINE SODIUM 75 MCG: 0.05 TABLET ORAL at 05:14

## 2024-09-18 RX ADMIN — FAMOTIDINE 20 MG: 20 TABLET, FILM COATED ORAL at 10:28

## 2024-09-18 RX ADMIN — DULOXETINE HYDROCHLORIDE 30 MG: 30 CAPSULE, DELAYED RELEASE ORAL at 10:34

## 2024-09-18 RX ADMIN — METOCLOPRAMIDE 5 MG: 5 TABLET ORAL at 10:34

## 2024-09-18 RX ADMIN — METOPROLOL TARTRATE 75 MG: 25 TABLET, FILM COATED ORAL at 10:28

## 2024-09-18 RX ADMIN — SODIUM CHLORIDE, PRESERVATIVE FREE 10 ML: 5 INJECTION INTRAVENOUS at 10:29

## 2024-09-18 RX ADMIN — GABAPENTIN 400 MG: 400 CAPSULE ORAL at 10:28

## 2024-09-18 RX ADMIN — ROSUVASTATIN 10 MG: 10 TABLET, FILM COATED ORAL at 22:21

## 2024-09-18 RX ADMIN — SODIUM CHLORIDE, PRESERVATIVE FREE 10 ML: 5 INJECTION INTRAVENOUS at 22:22

## 2024-09-18 RX ADMIN — MAGNESIUM SULFATE HEPTAHYDRATE 1000 MG: 1 INJECTION, SOLUTION INTRAVENOUS at 04:48

## 2024-09-18 RX ADMIN — BACLOFEN 5 MG: 10 TABLET ORAL at 22:20

## 2024-09-18 RX ADMIN — GABAPENTIN 600 MG: 300 CAPSULE ORAL at 22:20

## 2024-09-18 RX ADMIN — PIPERACILLIN AND TAZOBACTAM 3375 MG: 3; .375 INJECTION, POWDER, LYOPHILIZED, FOR SOLUTION INTRAVENOUS at 05:41

## 2024-09-18 RX ADMIN — SODIUM CHLORIDE: 9 INJECTION, SOLUTION INTRAVENOUS at 00:06

## 2024-09-18 RX ADMIN — INSULIN LISPRO 2 UNITS: 100 INJECTION, SOLUTION INTRAVENOUS; SUBCUTANEOUS at 10:34

## 2024-09-18 RX ADMIN — METOCLOPRAMIDE 5 MG: 5 TABLET ORAL at 22:21

## 2024-09-18 RX ADMIN — METOPROLOL TARTRATE 75 MG: 25 TABLET, FILM COATED ORAL at 22:20

## 2024-09-18 RX ADMIN — BACLOFEN 5 MG: 10 TABLET ORAL at 18:27

## 2024-09-18 RX ADMIN — METOCLOPRAMIDE 5 MG: 5 TABLET ORAL at 18:27

## 2024-09-18 RX ADMIN — DULOXETINE HYDROCHLORIDE 30 MG: 30 CAPSULE, DELAYED RELEASE ORAL at 22:20

## 2024-09-18 RX ADMIN — ENOXAPARIN SODIUM 40 MG: 100 INJECTION SUBCUTANEOUS at 10:28

## 2024-09-18 RX ADMIN — INSULIN GLARGINE 64 UNITS: 100 INJECTION, SOLUTION SUBCUTANEOUS at 22:23

## 2024-09-18 ASSESSMENT — PAIN - FUNCTIONAL ASSESSMENT: PAIN_FUNCTIONAL_ASSESSMENT: NONE - DENIES PAIN

## 2024-09-18 ASSESSMENT — PAIN SCALES - GENERAL
PAINLEVEL_OUTOF10: 0

## 2024-09-19 VITALS
WEIGHT: 132.28 LBS | OXYGEN SATURATION: 97 % | HEART RATE: 71 BPM | DIASTOLIC BLOOD PRESSURE: 62 MMHG | BODY MASS INDEX: 22.58 KG/M2 | TEMPERATURE: 97.2 F | HEIGHT: 64 IN | SYSTOLIC BLOOD PRESSURE: 135 MMHG | RESPIRATION RATE: 20 BRPM

## 2024-09-19 LAB
ANION GAP SERPL CALCULATED.3IONS-SCNC: 12 MMOL/L (ref 7–16)
BASOPHILS # BLD: 0.05 K/UL (ref 0–0.2)
BASOPHILS NFR BLD: 1 % (ref 0–2)
BUN SERPL-MCNC: 14 MG/DL (ref 6–23)
CALCIUM SERPL-MCNC: 8.9 MG/DL (ref 8.6–10.2)
CHLORIDE SERPL-SCNC: 98 MMOL/L (ref 98–107)
CO2 SERPL-SCNC: 22 MMOL/L (ref 22–29)
CREAT SERPL-MCNC: 0.7 MG/DL (ref 0.5–1)
EOSINOPHIL # BLD: 0.16 K/UL (ref 0.05–0.5)
EOSINOPHILS RELATIVE PERCENT: 2 % (ref 0–6)
ERYTHROCYTE [DISTWIDTH] IN BLOOD BY AUTOMATED COUNT: 15.1 % (ref 11.5–15)
GFR, ESTIMATED: >90 ML/MIN/1.73M2
GLUCOSE BLD-MCNC: 212 MG/DL (ref 74–99)
GLUCOSE BLD-MCNC: 268 MG/DL (ref 74–99)
GLUCOSE BLD-MCNC: 285 MG/DL (ref 74–99)
GLUCOSE BLD-MCNC: 367 MG/DL (ref 74–99)
GLUCOSE SERPL-MCNC: 295 MG/DL (ref 74–99)
HCT VFR BLD AUTO: 40.7 % (ref 34–48)
HGB BLD-MCNC: 12.4 G/DL (ref 11.5–15.5)
IMM GRANULOCYTES # BLD AUTO: <0.03 K/UL (ref 0–0.58)
IMM GRANULOCYTES NFR BLD: 0 % (ref 0–5)
LYMPHOCYTES NFR BLD: 2.42 K/UL (ref 1.5–4)
LYMPHOCYTES RELATIVE PERCENT: 30 % (ref 20–42)
MAGNESIUM SERPL-MCNC: 1.8 MG/DL (ref 1.6–2.6)
MCH RBC QN AUTO: 28.5 PG (ref 26–35)
MCHC RBC AUTO-ENTMCNC: 30.5 G/DL (ref 32–34.5)
MCV RBC AUTO: 93.6 FL (ref 80–99.9)
MONOCYTES NFR BLD: 0.71 K/UL (ref 0.1–0.95)
MONOCYTES NFR BLD: 9 % (ref 2–12)
NEUTROPHILS NFR BLD: 59 % (ref 43–80)
NEUTS SEG NFR BLD: 4.77 K/UL (ref 1.8–7.3)
PLATELET # BLD AUTO: 208 K/UL (ref 130–450)
PMV BLD AUTO: 11.2 FL (ref 7–12)
POTASSIUM SERPL-SCNC: 4.4 MMOL/L (ref 3.5–5)
RBC # BLD AUTO: 4.35 M/UL (ref 3.5–5.5)
SODIUM SERPL-SCNC: 132 MMOL/L (ref 132–146)
WBC OTHER # BLD: 8.1 K/UL (ref 4.5–11.5)

## 2024-09-19 PROCEDURE — 99239 HOSP IP/OBS DSCHRG MGMT >30: CPT | Performed by: INTERNAL MEDICINE

## 2024-09-19 PROCEDURE — 82962 GLUCOSE BLOOD TEST: CPT

## 2024-09-19 PROCEDURE — 97165 OT EVAL LOW COMPLEX 30 MIN: CPT

## 2024-09-19 PROCEDURE — 6370000000 HC RX 637 (ALT 250 FOR IP): Performed by: INTERNAL MEDICINE

## 2024-09-19 PROCEDURE — 6370000000 HC RX 637 (ALT 250 FOR IP): Performed by: FAMILY MEDICINE

## 2024-09-19 PROCEDURE — 36415 COLL VENOUS BLD VENIPUNCTURE: CPT

## 2024-09-19 PROCEDURE — 6360000002 HC RX W HCPCS: Performed by: FAMILY MEDICINE

## 2024-09-19 PROCEDURE — 85025 COMPLETE CBC W/AUTO DIFF WBC: CPT

## 2024-09-19 PROCEDURE — 83735 ASSAY OF MAGNESIUM: CPT

## 2024-09-19 PROCEDURE — 80048 BASIC METABOLIC PNL TOTAL CA: CPT

## 2024-09-19 PROCEDURE — 2580000003 HC RX 258: Performed by: FAMILY MEDICINE

## 2024-09-19 PROCEDURE — 97161 PT EVAL LOW COMPLEX 20 MIN: CPT

## 2024-09-19 RX ORDER — INSULIN LISPRO 100 [IU]/ML
0-4 INJECTION, SOLUTION INTRAVENOUS; SUBCUTANEOUS NIGHTLY
Status: DISCONTINUED | OUTPATIENT
Start: 2024-09-19 | End: 2024-09-19 | Stop reason: HOSPADM

## 2024-09-19 RX ORDER — INSULIN LISPRO 100 [IU]/ML
0-16 INJECTION, SOLUTION INTRAVENOUS; SUBCUTANEOUS
Status: DISCONTINUED | OUTPATIENT
Start: 2024-09-19 | End: 2024-09-19 | Stop reason: HOSPADM

## 2024-09-19 RX ADMIN — LEVOTHYROXINE SODIUM 75 MCG: 0.05 TABLET ORAL at 05:16

## 2024-09-19 RX ADMIN — GABAPENTIN 400 MG: 400 CAPSULE ORAL at 13:46

## 2024-09-19 RX ADMIN — GABAPENTIN 400 MG: 400 CAPSULE ORAL at 08:54

## 2024-09-19 RX ADMIN — METOPROLOL TARTRATE 75 MG: 25 TABLET, FILM COATED ORAL at 08:51

## 2024-09-19 RX ADMIN — LORAZEPAM 0.5 MG: 0.5 TABLET ORAL at 08:50

## 2024-09-19 RX ADMIN — Medication 114 MG: at 08:55

## 2024-09-19 RX ADMIN — INSULIN LISPRO 2 UNITS: 100 INJECTION, SOLUTION INTRAVENOUS; SUBCUTANEOUS at 08:44

## 2024-09-19 RX ADMIN — LISINOPRIL 5 MG: 10 TABLET ORAL at 08:50

## 2024-09-19 RX ADMIN — SODIUM CHLORIDE, PRESERVATIVE FREE 10 ML: 5 INJECTION INTRAVENOUS at 08:59

## 2024-09-19 RX ADMIN — ENOXAPARIN SODIUM 40 MG: 100 INJECTION SUBCUTANEOUS at 08:45

## 2024-09-19 RX ADMIN — DULOXETINE HYDROCHLORIDE 30 MG: 30 CAPSULE, DELAYED RELEASE ORAL at 08:53

## 2024-09-19 RX ADMIN — SODIUM CHLORIDE: 9 INJECTION, SOLUTION INTRAVENOUS at 05:12

## 2024-09-19 RX ADMIN — BACLOFEN 5 MG: 10 TABLET ORAL at 13:42

## 2024-09-19 RX ADMIN — METOCLOPRAMIDE 5 MG: 5 TABLET ORAL at 16:57

## 2024-09-19 RX ADMIN — METOCLOPRAMIDE 5 MG: 5 TABLET ORAL at 08:50

## 2024-09-19 RX ADMIN — PANTOPRAZOLE SODIUM 40 MG: 40 TABLET, DELAYED RELEASE ORAL at 05:17

## 2024-09-19 RX ADMIN — INSULIN LISPRO 16 UNITS: 100 INJECTION, SOLUTION INTRAVENOUS; SUBCUTANEOUS at 11:33

## 2024-09-19 RX ADMIN — LORAZEPAM 0.5 MG: 0.5 TABLET ORAL at 13:42

## 2024-09-19 RX ADMIN — BACLOFEN 5 MG: 10 TABLET ORAL at 08:50

## 2024-09-19 RX ADMIN — METOCLOPRAMIDE 5 MG: 5 TABLET ORAL at 13:43

## 2024-09-19 RX ADMIN — INSULIN LISPRO 8 UNITS: 100 INJECTION, SOLUTION INTRAVENOUS; SUBCUTANEOUS at 16:54

## 2024-09-19 RX ADMIN — FAMOTIDINE 20 MG: 20 TABLET, FILM COATED ORAL at 08:50

## 2024-09-19 ASSESSMENT — PAIN - FUNCTIONAL ASSESSMENT
PAIN_FUNCTIONAL_ASSESSMENT: ACTIVITIES ARE NOT PREVENTED
PAIN_FUNCTIONAL_ASSESSMENT: ACTIVITIES ARE NOT PREVENTED

## 2024-09-19 ASSESSMENT — PAIN DESCRIPTION - FREQUENCY
FREQUENCY: CONTINUOUS
FREQUENCY: CONTINUOUS

## 2024-09-19 ASSESSMENT — PAIN DESCRIPTION - DESCRIPTORS
DESCRIPTORS: ACHING;DISCOMFORT;SORE
DESCRIPTORS: ACHING;DISCOMFORT;SORE

## 2024-09-19 ASSESSMENT — PAIN DESCRIPTION - LOCATION
LOCATION: GENERALIZED
LOCATION: GENERALIZED

## 2024-09-19 ASSESSMENT — PAIN DESCRIPTION - ONSET
ONSET: ON-GOING
ONSET: ON-GOING

## 2024-09-19 ASSESSMENT — PAIN DESCRIPTION - PAIN TYPE
TYPE: CHRONIC PAIN
TYPE: CHRONIC PAIN

## 2024-09-19 ASSESSMENT — PAIN SCALES - GENERAL
PAINLEVEL_OUTOF10: 0
PAINLEVEL_OUTOF10: 3
PAINLEVEL_OUTOF10: 5

## 2024-09-19 ASSESSMENT — PAIN DESCRIPTION - ORIENTATION
ORIENTATION: OTHER (COMMENT)
ORIENTATION: OTHER (COMMENT)

## 2024-09-21 LAB
MICROORGANISM SPEC CULT: ABNORMAL
SERVICE CMNT-IMP: ABNORMAL
SPECIMEN DESCRIPTION: ABNORMAL

## 2024-10-17 PROBLEM — N39.0 UTI (URINARY TRACT INFECTION): Status: RESOLVED | Noted: 2024-09-17 | Resolved: 2024-10-17

## 2024-11-30 ENCOUNTER — HOSPITAL ENCOUNTER (EMERGENCY)
Age: 74
Discharge: HOME | End: 2024-12-01
Attending: EMERGENCY MEDICINE
Payer: MEDICARE

## 2024-11-30 DIAGNOSIS — Z43.1 ATTENTION TO G-TUBE (HCC): Primary | ICD-10-CM

## 2024-11-30 LAB
ANION GAP SERPL CALCULATED.3IONS-SCNC: 6 MMOL/L (ref 7–16)
BASOPHILS # BLD: 0.04 K/UL (ref 0–0.2)
BASOPHILS NFR BLD: 1 % (ref 0–2)
BUN SERPL-MCNC: 18 MG/DL (ref 6–23)
CALCIUM SERPL-MCNC: 9.6 MG/DL (ref 8.6–10.2)
CHLORIDE SERPL-SCNC: 102 MMOL/L (ref 98–107)
CO2 SERPL-SCNC: 31 MMOL/L (ref 22–29)
CREAT SERPL-MCNC: 0.7 MG/DL (ref 0.5–1)
EOSINOPHIL # BLD: 0.41 K/UL (ref 0.05–0.5)
EOSINOPHILS RELATIVE PERCENT: 7 % (ref 0–6)
ERYTHROCYTE [DISTWIDTH] IN BLOOD BY AUTOMATED COUNT: 13.8 % (ref 11.5–15)
GFR, ESTIMATED: >90 ML/MIN/1.73M2
GLUCOSE SERPL-MCNC: 164 MG/DL (ref 74–99)
HCT VFR BLD AUTO: 40.8 % (ref 34–48)
HGB BLD-MCNC: 13.2 G/DL (ref 11.5–15.5)
IMM GRANULOCYTES # BLD AUTO: <0.03 K/UL (ref 0–0.58)
IMM GRANULOCYTES NFR BLD: 0 % (ref 0–5)
LYMPHOCYTES NFR BLD: 2.42 K/UL (ref 1.5–4)
LYMPHOCYTES RELATIVE PERCENT: 42 % (ref 20–42)
MCH RBC QN AUTO: 29.2 PG (ref 26–35)
MCHC RBC AUTO-ENTMCNC: 32.4 G/DL (ref 32–34.5)
MCV RBC AUTO: 90.3 FL (ref 80–99.9)
MONOCYTES NFR BLD: 0.34 K/UL (ref 0.1–0.95)
MONOCYTES NFR BLD: 6 % (ref 2–12)
NEUTROPHILS NFR BLD: 44 % (ref 43–80)
NEUTS SEG NFR BLD: 2.51 K/UL (ref 1.8–7.3)
PLATELET # BLD AUTO: 258 K/UL (ref 130–450)
PMV BLD AUTO: 11.4 FL (ref 7–12)
POTASSIUM SERPL-SCNC: 4.8 MMOL/L (ref 3.5–5)
RBC # BLD AUTO: 4.52 M/UL (ref 3.5–5.5)
SODIUM SERPL-SCNC: 139 MMOL/L (ref 132–146)
WBC OTHER # BLD: 5.7 K/UL (ref 4.5–11.5)

## 2024-11-30 PROCEDURE — 85025 COMPLETE CBC W/AUTO DIFF WBC: CPT

## 2024-11-30 PROCEDURE — 2500000003 HC RX 250 WO HCPCS

## 2024-11-30 PROCEDURE — 2580000003 HC RX 258

## 2024-11-30 PROCEDURE — 99284 EMERGENCY DEPT VISIT MOD MDM: CPT

## 2024-11-30 PROCEDURE — 96365 THER/PROPH/DIAG IV INF INIT: CPT

## 2024-11-30 PROCEDURE — 80048 BASIC METABOLIC PNL TOTAL CA: CPT

## 2024-11-30 PROCEDURE — 96366 THER/PROPH/DIAG IV INF ADDON: CPT

## 2024-11-30 RX ORDER — DOXYCYCLINE 25 MG/5ML
100 POWDER, FOR SUSPENSION ORAL 2 TIMES DAILY
Qty: 280 ML | Refills: 0 | Status: SHIPPED | OUTPATIENT
Start: 2024-11-30 | End: 2024-12-07

## 2024-11-30 RX ADMIN — DOXYCYCLINE 100 MG: 100 INJECTION, POWDER, LYOPHILIZED, FOR SOLUTION INTRAVENOUS at 21:27

## 2024-12-01 VITALS
RESPIRATION RATE: 18 BRPM | HEART RATE: 61 BPM | DIASTOLIC BLOOD PRESSURE: 65 MMHG | TEMPERATURE: 98 F | OXYGEN SATURATION: 95 % | SYSTOLIC BLOOD PRESSURE: 103 MMHG

## 2024-12-01 NOTE — ED PROVIDER NOTES
lymphadenopathy, easy bruising, easy bleeding   Allergic/Immunologic: recurrent infections      ------------------------- PAST MEDICAL HISORY-------------------------    I personally reviewed the following history:    Past Medical History:  has a past medical history of Anemia, Anxiety disorder, Chronic back pain, Depression, Diabetes mellitus (HCC), Hyperlipidemia, Hypertension, Neurogenic bowel, Neuromuscular dysfunction of bladder, Obstructive sleep apnea, Radiculopathy of lumbar region, Spinal cord infarction (HCC), Suicidal ideations, and Vitamin D deficiency.    Past Surgical History:  has a past surgical history that includes back surgery and Upper gastrointestinal endoscopy (N/A, 4/30/2024).    Social History:  reports that she has quit smoking. Her smoking use included cigarettes. She has never used smokeless tobacco. She reports that she does not drink alcohol and does not use drugs.    Family History: family history includes Diabetes in her father.     The patient’s home medications have been reviewed.    Allergies: Patient has no known allergies.  ------------------------ LAST CHART REVIEW------------------------    On 9/17/2022 for, patient was admitted to [facility] for UTI.  ------------------------- NURSING NOTES AND VITALS REVIEWED ---------------------------    Date / Time Roomed:  11/30/2024  7:17 PM  ED Bed Assignment:  18A/18A-18    The nursing notes within the ED encounter and vital signs as below have been reviewed.   Patient Vitals for the past 24 hrs:   BP Temp Temp src Pulse Resp SpO2   12/01/24 0100 (!) 105/57 -- -- 61 18 96 %   11/30/24 2200 (!) 115/57 -- -- 60 18 95 %   11/30/24 2000 118/60 -- -- 60 18 96 %   11/30/24 1930 (!) 115/53 -- -- 61 17 96 %   11/30/24 1918 (!) 105/57 97.3 °F (36.3 °C) Oral 60 16 96 %     ------------------------------ PHYSICAL EXAM -----------------------------    Constitutional/General: Alert and oriented x3  Head: Normocephalic and

## 2024-12-01 NOTE — DISCHARGE INSTRUCTIONS
You were evaluated in the ER for slime around G-tube.  Your evaluation and workup did not show any acute abnormalities that need medical intervention.    Please keep the area surrounding clean and dry. You will be given a prescription for antibiotics, please take the antibiotics as directed for the full course of the medication. If the abscess progresses you may have to have the abscess incised and drained.    Please take doxycycline twice a day for 7 days as directed.    Please schedule an appointment with your primary care physician as soon as possible for follow up.    Return to the Emergency Department if you experience fevers greater than 100.4F, increase in area of redness or swelling, increasing amount of discharge from the area, increased tenderness around the area, or any other concerning symptoms.    Thank you for choosing us for your care.

## 2025-01-14 PROBLEM — M54.16 LUMBAR RADICULOPATHY: Status: ACTIVE | Noted: 2025-01-14

## 2025-01-14 PROBLEM — G82.20 PARAPLEGIA FOLLOWING SPINAL CORD INJURY (HCC): Status: ACTIVE | Noted: 2025-01-14

## 2025-01-14 PROBLEM — N31.9 NEUROGENIC BLADDER: Status: ACTIVE | Noted: 2025-01-14

## 2025-01-14 PROBLEM — F33.41 RECURRENT MAJOR DEPRESSIVE DISORDER, IN PARTIAL REMISSION: Status: ACTIVE | Noted: 2025-01-14

## 2025-02-07 PROBLEM — Z43.1 ATTENTION TO G-TUBE (HCC): Status: ACTIVE | Noted: 2025-02-07

## 2025-07-11 ENCOUNTER — HOSPITAL ENCOUNTER (EMERGENCY)
Age: 75
Discharge: HOME | End: 2025-07-12
Attending: EMERGENCY MEDICINE
Payer: MEDICARE

## 2025-07-11 ENCOUNTER — APPOINTMENT (OUTPATIENT)
Dept: GENERAL RADIOLOGY | Age: 75
End: 2025-07-11
Payer: MEDICARE

## 2025-07-11 DIAGNOSIS — K94.23 LEAKING PEG TUBE (HCC): Primary | ICD-10-CM

## 2025-07-11 PROCEDURE — 43762 RPLC GTUBE NO REVJ TRC: CPT

## 2025-07-11 PROCEDURE — 6360000004 HC RX CONTRAST MEDICATION

## 2025-07-11 PROCEDURE — 74018 RADEX ABDOMEN 1 VIEW: CPT

## 2025-07-11 PROCEDURE — 99284 EMERGENCY DEPT VISIT MOD MDM: CPT

## 2025-07-11 RX ORDER — DIATRIZOATE MEGLUMINE AND DIATRIZOATE SODIUM 660; 100 MG/ML; MG/ML
30 SOLUTION ORAL; RECTAL
Status: DISCONTINUED | OUTPATIENT
Start: 2025-07-11 | End: 2025-07-12 | Stop reason: HOSPADM

## 2025-07-11 RX ADMIN — DIATRIZOATE MEGLUMINE AND DIATRIZOATE SODIUM 30 ML: 600; 100 SOLUTION ORAL; RECTAL at 22:02

## 2025-07-11 ASSESSMENT — PAIN - FUNCTIONAL ASSESSMENT: PAIN_FUNCTIONAL_ASSESSMENT: NONE - DENIES PAIN

## 2025-07-12 VITALS
DIASTOLIC BLOOD PRESSURE: 74 MMHG | HEART RATE: 64 BPM | WEIGHT: 130 LBS | RESPIRATION RATE: 17 BRPM | TEMPERATURE: 97.8 F | SYSTOLIC BLOOD PRESSURE: 134 MMHG | OXYGEN SATURATION: 94 % | BODY MASS INDEX: 22.31 KG/M2

## 2025-07-12 ASSESSMENT — PAIN - FUNCTIONAL ASSESSMENT: PAIN_FUNCTIONAL_ASSESSMENT: NONE - DENIES PAIN

## 2025-07-12 NOTE — ED PROVIDER NOTES
[facility] EMERGENCY DEPARTMENT  EMERGENCY DEPARTMENT ENCOUNTER        Pt Name: Cait Kent  MRN: [id number]  Birthdate 1950  Date of evaluation: 7/11/2025  Provider: Jose Antonio Bryan DO  PCP: Denton Velasquez MD  Note Started: 9:16 PM EDT 7/11/25    CHIEF COMPLAINT       Chief Complaint   Patient presents with    PEG Tube leaking     PEG tube leaking per patient and SNF       HISTORY OF PRESENT ILLNESS: 1 or more Elements   History From: Patient, EMS    Cait Kent is a 74 y.o. female presents to the emergency department for PEG tube complications.  Per the patient her PEG tube has been leaking, staff at SNF states that is leaking from the feeding site, not the entry site.  There is mild excoriations but no signs of infection, no surrounding erythema or induration.  Denies fevers, chills, chest pain, shortness of breath, nausea, vomiting, diarrhea, constipation, urinary symptoms.  Patient has a 20 [place] G-tube.  Patient is a former smoker, denies alcohol use, denies illicit drug use.      Nursing Notes were all reviewed and agreed with or any disagreements were addressed in the HPI.      REVIEW OF EXTERNAL NOTES :       PDMP Monitoring:    Last PDMP Jose Antonio as Reviewed:  Review User Review Instant Review Result   BRADY VENTURA 10/11/2019  3:17 PM Reviewed PDMP [1]     Last Controlled Substance Monitoring Documentation      Flowsheet Row Initial consult from 3/1/2018 in [facility]   Attestation The Prescription Monitoring Report for this patient was reviewed today. filed at 03/01/2018 1038   Periodic Controlled Substance Monitoring Random urine drug screen sent today. filed at 03/01/2018 1038          Urine Drug Screenings (1 yr)       POCT Rapid Drug Screen  Collected: 3/1/2018  3:57 PM (Preliminary result)              Urine Drug Screen  Collected: 9/11/2024  7:31 PM (Final result)              Urine Drug Screen  Collected: 7/18/2019

## (undated) DEVICE — SPONGE GZ W4XL4IN RAYON POLY CVR W/NONWOVEN FAB STRL 2/PK

## (undated) DEVICE — Device

## (undated) DEVICE — DEFENDO AIR WATER SUCTION AND BIOPSY VALVE KIT FOR  OLYMPUS: Brand: DEFENDO AIR/WATER/SUCTION AND BIOPSY VALVE

## (undated) DEVICE — CANNULA NSL ORAL AD FOR CAPNOFLEX CO2 O2 AIRLFE

## (undated) DEVICE — GAUZE,SPONGE,POST-OP,4X3,STRL,LF: Brand: MEDLINE

## (undated) DEVICE — SPONGE,DRAIN,NONWVN,4"X4",6PLY,STRL,LF: Brand: MEDLINE

## (undated) DEVICE — GRADUATE TRIANG MEASURE 1000ML BLK PRNT

## (undated) DEVICE — 3M™ MICROPORE™ TAPE, 1530-2: Brand: 3M™ MICROPORE™

## (undated) DEVICE — TOWEL,OR,DSP,ST,BLUE,STD,6/PK,12PK/CS: Brand: MEDLINE

## (undated) DEVICE — BLOCK BITE 60FR RUBBER ADLT DENTAL